# Patient Record
Sex: FEMALE | Race: OTHER | NOT HISPANIC OR LATINO
[De-identification: names, ages, dates, MRNs, and addresses within clinical notes are randomized per-mention and may not be internally consistent; named-entity substitution may affect disease eponyms.]

---

## 2017-01-26 ENCOUNTER — APPOINTMENT (OUTPATIENT)
Dept: NEUROLOGY | Facility: CLINIC | Age: 42
End: 2017-01-26

## 2017-04-25 ENCOUNTER — OUTPATIENT (OUTPATIENT)
Dept: OUTPATIENT SERVICES | Facility: HOSPITAL | Age: 42
LOS: 1 days | End: 2017-04-25
Payer: COMMERCIAL

## 2017-04-25 ENCOUNTER — APPOINTMENT (OUTPATIENT)
Dept: MRI IMAGING | Facility: CLINIC | Age: 42
End: 2017-04-25

## 2017-04-25 DIAGNOSIS — M54.16 RADICULOPATHY, LUMBAR REGION: ICD-10-CM

## 2017-04-25 DIAGNOSIS — Z98.89 OTHER SPECIFIED POSTPROCEDURAL STATES: Chronic | ICD-10-CM

## 2017-04-25 DIAGNOSIS — G89.29 OTHER CHRONIC PAIN: ICD-10-CM

## 2017-04-25 DIAGNOSIS — Z98.51 TUBAL LIGATION STATUS: Chronic | ICD-10-CM

## 2017-04-25 DIAGNOSIS — M51.36 OTHER INTERVERTEBRAL DISC DEGENERATION, LUMBAR REGION: ICD-10-CM

## 2017-04-25 DIAGNOSIS — Z98.84 BARIATRIC SURGERY STATUS: Chronic | ICD-10-CM

## 2017-04-25 PROCEDURE — 72158 MRI LUMBAR SPINE W/O & W/DYE: CPT

## 2017-04-25 PROCEDURE — A9585: CPT

## 2017-04-29 ENCOUNTER — EMERGENCY (EMERGENCY)
Facility: HOSPITAL | Age: 42
LOS: 1 days | Discharge: HOME | End: 2017-04-29
Attending: EMERGENCY MEDICINE | Admitting: EMERGENCY MEDICINE
Payer: COMMERCIAL

## 2017-04-29 VITALS
OXYGEN SATURATION: 100 % | TEMPERATURE: 98 F | HEIGHT: 66 IN | DIASTOLIC BLOOD PRESSURE: 50 MMHG | WEIGHT: 173.94 LBS | HEART RATE: 74 BPM | RESPIRATION RATE: 14 BRPM | SYSTOLIC BLOOD PRESSURE: 140 MMHG

## 2017-04-29 VITALS
HEART RATE: 70 BPM | OXYGEN SATURATION: 100 % | DIASTOLIC BLOOD PRESSURE: 58 MMHG | TEMPERATURE: 98 F | SYSTOLIC BLOOD PRESSURE: 137 MMHG | RESPIRATION RATE: 14 BRPM

## 2017-04-29 DIAGNOSIS — Z98.89 OTHER SPECIFIED POSTPROCEDURAL STATES: Chronic | ICD-10-CM

## 2017-04-29 DIAGNOSIS — A60.09 HERPESVIRAL INFECTION OF OTHER UROGENITAL TRACT: ICD-10-CM
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
## 2017-04-29 DIAGNOSIS — S70.12XA CONTUSION OF LEFT THIGH, INITIAL ENCOUNTER: ICD-10-CM

## 2017-04-29 DIAGNOSIS — J45.909 UNSPECIFIED ASTHMA, UNCOMPLICATED: ICD-10-CM

## 2017-04-29 DIAGNOSIS — Z98.51 TUBAL LIGATION STATUS: Chronic | ICD-10-CM
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
## 2017-04-29 DIAGNOSIS — Z98.84 BARIATRIC SURGERY STATUS: Chronic | ICD-10-CM

## 2017-04-29 LAB
ALBUMIN SERPL ELPH-MCNC: 3.8 G/DL — SIGNIFICANT CHANGE UP (ref 3.3–5)
ALP SERPL-CCNC: 67 U/L — SIGNIFICANT CHANGE UP (ref 40–120)
ALT FLD-CCNC: 28 U/L — SIGNIFICANT CHANGE UP (ref 12–78)
ANION GAP SERPL CALC-SCNC: 12 MMOL/L — SIGNIFICANT CHANGE UP (ref 5–17)
AST SERPL-CCNC: 38 U/L — HIGH (ref 15–37)
BASOPHILS # BLD AUTO: 0.1 K/UL — SIGNIFICANT CHANGE UP (ref 0–0.2)
BASOPHILS NFR BLD AUTO: 0.8 % — SIGNIFICANT CHANGE UP (ref 0–2)
BILIRUB SERPL-MCNC: 0.5 MG/DL — SIGNIFICANT CHANGE UP (ref 0.2–1.2)
BLD GP AB SCN SERPL QL: SIGNIFICANT CHANGE UP
BUN SERPL-MCNC: 17 MG/DL — SIGNIFICANT CHANGE UP (ref 7–23)
CALCIUM SERPL-MCNC: 8.7 MG/DL — SIGNIFICANT CHANGE UP (ref 8.5–10.1)
CHLORIDE SERPL-SCNC: 103 MMOL/L — SIGNIFICANT CHANGE UP (ref 96–108)
CO2 SERPL-SCNC: 26 MMOL/L — SIGNIFICANT CHANGE UP (ref 22–31)
CREAT SERPL-MCNC: 0.61 MG/DL — SIGNIFICANT CHANGE UP (ref 0.5–1.3)
EOSINOPHIL # BLD AUTO: 0 K/UL — SIGNIFICANT CHANGE UP (ref 0–0.5)
EOSINOPHIL NFR BLD AUTO: 0.6 % — SIGNIFICANT CHANGE UP (ref 0–6)
GLUCOSE SERPL-MCNC: 84 MG/DL — SIGNIFICANT CHANGE UP (ref 70–99)
HCG SERPL-ACNC: <1 MIU/ML — SIGNIFICANT CHANGE UP
HCT VFR BLD CALC: 39.3 % — SIGNIFICANT CHANGE UP (ref 34.5–45)
HGB BLD-MCNC: 12.4 G/DL — SIGNIFICANT CHANGE UP (ref 11.5–15.5)
INR BLD: 0.97 RATIO — SIGNIFICANT CHANGE UP (ref 0.88–1.16)
LYMPHOCYTES # BLD AUTO: 1.9 K/UL — SIGNIFICANT CHANGE UP (ref 1–3.3)
LYMPHOCYTES # BLD AUTO: 23.8 % — SIGNIFICANT CHANGE UP (ref 13–44)
MCHC RBC-ENTMCNC: 30.1 PG — SIGNIFICANT CHANGE UP (ref 27–34)
MCHC RBC-ENTMCNC: 31.6 GM/DL — LOW (ref 32–36)
MCV RBC AUTO: 95.2 FL — SIGNIFICANT CHANGE UP (ref 80–100)
MONOCYTES # BLD AUTO: 0.6 K/UL — SIGNIFICANT CHANGE UP (ref 0–0.9)
MONOCYTES NFR BLD AUTO: 7.3 % — SIGNIFICANT CHANGE UP (ref 1–9)
NEUTROPHILS # BLD AUTO: 5.3 K/UL — SIGNIFICANT CHANGE UP (ref 1.8–7.4)
NEUTROPHILS NFR BLD AUTO: 67.5 % — SIGNIFICANT CHANGE UP (ref 43–77)
PLATELET # BLD AUTO: 306 K/UL — SIGNIFICANT CHANGE UP (ref 150–400)
POTASSIUM SERPL-MCNC: 3.4 MMOL/L — LOW (ref 3.5–5.3)
POTASSIUM SERPL-SCNC: 3.4 MMOL/L — LOW (ref 3.5–5.3)
PROT SERPL-MCNC: 7.5 G/DL — SIGNIFICANT CHANGE UP (ref 6–8.3)
PROTHROM AB SERPL-ACNC: 10.6 SEC — SIGNIFICANT CHANGE UP (ref 9.8–12.7)
RBC # BLD: 4.13 M/UL — SIGNIFICANT CHANGE UP (ref 3.8–5.2)
RBC # FLD: 11.7 % — SIGNIFICANT CHANGE UP (ref 10.3–14.5)
SODIUM SERPL-SCNC: 141 MMOL/L — SIGNIFICANT CHANGE UP (ref 135–145)
WBC # BLD: 7.8 K/UL — SIGNIFICANT CHANGE UP (ref 3.8–10.5)
WBC # FLD AUTO: 7.8 K/UL — SIGNIFICANT CHANGE UP (ref 3.8–10.5)

## 2017-04-29 PROCEDURE — 80053 COMPREHEN METABOLIC PANEL: CPT

## 2017-04-29 PROCEDURE — 85610 PROTHROMBIN TIME: CPT

## 2017-04-29 PROCEDURE — 99284 EMERGENCY DEPT VISIT MOD MDM: CPT | Mod: 25

## 2017-04-29 PROCEDURE — 86850 RBC ANTIBODY SCREEN: CPT

## 2017-04-29 PROCEDURE — 86901 BLOOD TYPING SEROLOGIC RH(D): CPT

## 2017-04-29 PROCEDURE — 93971 EXTREMITY STUDY: CPT

## 2017-04-29 PROCEDURE — 96374 THER/PROPH/DIAG INJ IV PUSH: CPT

## 2017-04-29 PROCEDURE — 86900 BLOOD TYPING SEROLOGIC ABO: CPT

## 2017-04-29 PROCEDURE — 84702 CHORIONIC GONADOTROPIN TEST: CPT

## 2017-04-29 PROCEDURE — 93971 EXTREMITY STUDY: CPT | Mod: 26,LT

## 2017-04-29 PROCEDURE — 99284 EMERGENCY DEPT VISIT MOD MDM: CPT

## 2017-04-29 PROCEDURE — 85027 COMPLETE CBC AUTOMATED: CPT

## 2017-04-29 RX ORDER — KETOROLAC TROMETHAMINE 30 MG/ML
60 SYRINGE (ML) INJECTION ONCE
Qty: 0 | Refills: 0 | Status: DISCONTINUED | OUTPATIENT
Start: 2017-04-29 | End: 2017-04-29

## 2017-04-29 RX ADMIN — Medication 60 MILLIGRAM(S): at 18:08

## 2017-04-29 RX ADMIN — Medication 60 MILLIGRAM(S): at 18:38

## 2017-04-29 NOTE — ED PROVIDER NOTE - PROGRESS NOTE DETAILS
labs, US reviwed with patient, no acute findings, agrees to f/u with PMD on Monday, given name of Dr. Kohler for vasular follow up

## 2017-04-29 NOTE — ED ADULT NURSE NOTE - OBJECTIVE STATEMENT
Pt presents to the subacute area s/p bruising noted on the left leg, right ankle swelling, denies injury, skin warm and dry, + sensation, + capillary refill < 2 sec, + ambulation.

## 2017-04-29 NOTE — ED PROVIDER NOTE - OBJECTIVE STATEMENT
41 female presents to ER c/o left leg swelling and bruising, states she awoke Friday morning with the bruising and does not recall any recent trauma or fall.

## 2017-05-11 ENCOUNTER — RESULT REVIEW (OUTPATIENT)
Age: 42
End: 2017-05-11

## 2017-06-30 ENCOUNTER — OUTPATIENT (OUTPATIENT)
Dept: OUTPATIENT SERVICES | Facility: HOSPITAL | Age: 42
LOS: 1 days | Discharge: HOME | End: 2017-06-30
Payer: COMMERCIAL

## 2017-06-30 VITALS
OXYGEN SATURATION: 100 % | WEIGHT: 164.91 LBS | HEIGHT: 64 IN | TEMPERATURE: 98 F | SYSTOLIC BLOOD PRESSURE: 147 MMHG | DIASTOLIC BLOOD PRESSURE: 78 MMHG | HEART RATE: 58 BPM | RESPIRATION RATE: 14 BRPM

## 2017-06-30 DIAGNOSIS — Z98.51 TUBAL LIGATION STATUS: Chronic | ICD-10-CM

## 2017-06-30 DIAGNOSIS — M43.16 SPONDYLOLISTHESIS, LUMBAR REGION: ICD-10-CM

## 2017-06-30 DIAGNOSIS — Z98.890 OTHER SPECIFIED POSTPROCEDURAL STATES: Chronic | ICD-10-CM

## 2017-06-30 DIAGNOSIS — Z98.89 OTHER SPECIFIED POSTPROCEDURAL STATES: Chronic | ICD-10-CM

## 2017-06-30 DIAGNOSIS — M48.06 SPINAL STENOSIS, LUMBAR REGION: ICD-10-CM

## 2017-06-30 DIAGNOSIS — Z98.84 BARIATRIC SURGERY STATUS: Chronic | ICD-10-CM

## 2017-06-30 DIAGNOSIS — M41.25 OTHER IDIOPATHIC SCOLIOSIS, THORACOLUMBAR REGION: ICD-10-CM

## 2017-06-30 LAB
ABO RH CONFIRMATION: SIGNIFICANT CHANGE UP
ANION GAP SERPL CALC-SCNC: 7 MMOL/L — SIGNIFICANT CHANGE UP (ref 5–17)
APPEARANCE UR: CLEAR — SIGNIFICANT CHANGE UP
APTT BLD: 33.3 SEC — SIGNIFICANT CHANGE UP (ref 27.5–37.4)
BACTERIA # UR AUTO: (no result)
BASOPHILS # BLD AUTO: 0 K/UL — SIGNIFICANT CHANGE UP (ref 0–0.2)
BASOPHILS NFR BLD AUTO: 0.6 % — SIGNIFICANT CHANGE UP (ref 0–2)
BILIRUB UR-MCNC: (no result)
BLD GP AB SCN SERPL QL: SIGNIFICANT CHANGE UP
BUN SERPL-MCNC: 17 MG/DL — SIGNIFICANT CHANGE UP (ref 7–23)
CALCIUM SERPL-MCNC: 8.7 MG/DL — SIGNIFICANT CHANGE UP (ref 8.5–10.1)
CHLORIDE SERPL-SCNC: 108 MMOL/L — SIGNIFICANT CHANGE UP (ref 96–108)
CO2 SERPL-SCNC: 26 MMOL/L — SIGNIFICANT CHANGE UP (ref 22–31)
COLOR SPEC: YELLOW — SIGNIFICANT CHANGE UP
COMMENT - URINE: SIGNIFICANT CHANGE UP
CREAT SERPL-MCNC: 0.55 MG/DL — SIGNIFICANT CHANGE UP (ref 0.5–1.3)
DIFF PNL FLD: (no result)
EOSINOPHIL # BLD AUTO: 0.3 K/UL — SIGNIFICANT CHANGE UP (ref 0–0.5)
EOSINOPHIL NFR BLD AUTO: 4.6 % — SIGNIFICANT CHANGE UP (ref 0–6)
EPI CELLS # UR: SIGNIFICANT CHANGE UP
GLUCOSE SERPL-MCNC: 77 MG/DL — SIGNIFICANT CHANGE UP (ref 70–99)
GLUCOSE UR QL: NEGATIVE MG/DL — SIGNIFICANT CHANGE UP
HCT VFR BLD CALC: 37.6 % — SIGNIFICANT CHANGE UP (ref 34.5–45)
HGB BLD-MCNC: 12.6 G/DL — SIGNIFICANT CHANGE UP (ref 11.5–15.5)
INR BLD: 1.06 RATIO — SIGNIFICANT CHANGE UP (ref 0.88–1.16)
KETONES UR-MCNC: (no result)
LEUKOCYTE ESTERASE UR-ACNC: (no result)
LYMPHOCYTES # BLD AUTO: 2 K/UL — SIGNIFICANT CHANGE UP (ref 1–3.3)
LYMPHOCYTES # BLD AUTO: 27.6 % — SIGNIFICANT CHANGE UP (ref 13–44)
MCHC RBC-ENTMCNC: 29.3 PG — SIGNIFICANT CHANGE UP (ref 27–34)
MCHC RBC-ENTMCNC: 33.4 GM/DL — SIGNIFICANT CHANGE UP (ref 32–36)
MCV RBC AUTO: 88 FL — SIGNIFICANT CHANGE UP (ref 80–100)
MONOCYTES # BLD AUTO: 0.5 K/UL — SIGNIFICANT CHANGE UP (ref 0–0.9)
MONOCYTES NFR BLD AUTO: 6.3 % — SIGNIFICANT CHANGE UP (ref 2–14)
NEUTROPHILS # BLD AUTO: 4.4 K/UL — SIGNIFICANT CHANGE UP (ref 1.8–7.4)
NEUTROPHILS NFR BLD AUTO: 60.8 % — SIGNIFICANT CHANGE UP (ref 43–77)
NITRITE UR-MCNC: NEGATIVE — SIGNIFICANT CHANGE UP
PH UR: 6 — SIGNIFICANT CHANGE UP (ref 5–8)
PLATELET # BLD AUTO: 285 K/UL — SIGNIFICANT CHANGE UP (ref 150–400)
POTASSIUM SERPL-MCNC: 4.4 MMOL/L — SIGNIFICANT CHANGE UP (ref 3.5–5.3)
POTASSIUM SERPL-SCNC: 4.4 MMOL/L — SIGNIFICANT CHANGE UP (ref 3.5–5.3)
PROT UR-MCNC: 15 MG/DL
PROTHROM AB SERPL-ACNC: 11.5 SEC — SIGNIFICANT CHANGE UP (ref 9.8–12.7)
RBC # BLD: 4.28 M/UL — SIGNIFICANT CHANGE UP (ref 3.8–5.2)
RBC # FLD: 11.4 % — SIGNIFICANT CHANGE UP (ref 10.3–14.5)
RBC CASTS # UR COMP ASSIST: >50 /HPF (ref 0–4)
SODIUM SERPL-SCNC: 141 MMOL/L — SIGNIFICANT CHANGE UP (ref 135–145)
SP GR SPEC: 1.02 — SIGNIFICANT CHANGE UP (ref 1.01–1.02)
TYPE + AB SCN PNL BLD: SIGNIFICANT CHANGE UP
UROBILINOGEN FLD QL: 4 MG/DL
WBC # BLD: 7.2 K/UL — SIGNIFICANT CHANGE UP (ref 3.8–10.5)
WBC # FLD AUTO: 7.2 K/UL — SIGNIFICANT CHANGE UP (ref 3.8–10.5)
WBC UR QL: SIGNIFICANT CHANGE UP

## 2017-06-30 PROCEDURE — 93010 ELECTROCARDIOGRAM REPORT: CPT

## 2017-06-30 RX ORDER — MONTELUKAST 4 MG/1
0 TABLET, CHEWABLE ORAL
Qty: 0 | Refills: 0 | COMMUNITY

## 2017-06-30 RX ORDER — CYCLOBENZAPRINE HYDROCHLORIDE 10 MG/1
0 TABLET, FILM COATED ORAL
Qty: 0 | Refills: 0 | COMMUNITY

## 2017-06-30 RX ORDER — DICLOFENAC SODIUM 75 MG/1
0 TABLET, DELAYED RELEASE ORAL
Qty: 0 | Refills: 0 | COMMUNITY

## 2017-06-30 NOTE — H&P PST ADULT - HISTORY OF PRESENT ILLNESS
This is a  41y /o  Female who speaks fluent English, presents to PST prior to proposed procedure with Dr. Aguilar for laminectomy L3/4, L4/L5, spinal fusion, T10-L5, possible T10-pelvis, BMP, intra-op navigation. Reports 9/2016 while driving home from work and lost feeling to right lower extremity. Reports seen by her PCP Dr. Retana who referred an MRI of lower back which demonstrated herniations of vertebral discs, and referred to [facility] with  injections to right hip which did not help. Reports lower back pain which radiates down both legs and feeling of knife sensations to outside aspect of knees B/L. Reports followed up with PCP who ordered an MRI with contrast which demonstrated scoliosis and herniations of lumbar discs. Reports intense pain of back and "nerve pain" is managed with Oxycodone-Acetaminophen, Lyrica as directed and ice packs and pillows. Denies problems with urination or defaecation. Evaluated by Dr. Aguilar and now presents for said procedure.

## 2017-06-30 NOTE — H&P PST ADULT - ASSESSMENT
This is a  41y /o  Female who speaks fluent English, presents to PST prior to proposed procedure with Dr. Aguilar for laminectomy L3/4, L4/L5, spinal fusion, T10-L5, possible T10-pelvis, BMP, intra-op navigation.     1. Labs, UA, MRSA Swab: per Dr. Aguilar.  2. EKG to be reviewed by Cardiology.  3. UCG-STAT day of procedure.  4. Clearance with PCP Dr. Retana as scheduled,# [phone].  5. EZ sponges, Mupirocin ointment, holistic sheet, pamphlet, and day of procedure instructions provided and reviewed with patient.

## 2017-06-30 NOTE — H&P PST ADULT - FAMILY HISTORY
Father  Still living? No  Family history of aortic aneurysm, Age at diagnosis: Age Unknown     Mother  Still living? No  Family history of pneumonia, Age at diagnosis: 61-70     Grandparent  Still living? No  Family history of ovarian cancer, Age at diagnosis: 81-90  Family history of myocardial infarction, Age at diagnosis: 71-80  Family history of myocardial infarction, Age at diagnosis: 81-90

## 2017-06-30 NOTE — H&P PST ADULT - PSH
Gastric bypass status for obesity    H/O varicose vein ligation  left lower extremity 2011  S/P  section  X3; ,,   S/P endoscopy  Baseline

## 2017-06-30 NOTE — H&P PST ADULT - PMH
Asthma    Bilateral sciatica    History of anemia    Lumbar stenosis with neurogenic claudication    Scoliosis (and kyphoscoliosis), idiopathic    Spondylisthesis    Varicose veins of both lower extremities

## 2017-06-30 NOTE — H&P PST ADULT - VISION (WITH CORRECTIVE LENSES IF THE PATIENT USUALLY WEARS THEM):
Normal vision: sees adequately in most situations; can see medication labels, newsprint/for driving PRN

## 2017-07-01 LAB
MRSA PCR RESULT.: SIGNIFICANT CHANGE UP
S AUREUS DNA NOSE QL NAA+PROBE: DETECTED

## 2017-07-07 RX ORDER — NALOXONE HYDROCHLORIDE 4 MG/.1ML
0.1 SPRAY NASAL
Qty: 0 | Refills: 0 | Status: DISCONTINUED | OUTPATIENT
Start: 2017-07-10 | End: 2017-07-13

## 2017-07-07 RX ORDER — HYDROMORPHONE HYDROCHLORIDE 2 MG/ML
0.5 INJECTION INTRAMUSCULAR; INTRAVENOUS; SUBCUTANEOUS
Qty: 0 | Refills: 0 | Status: DISCONTINUED | OUTPATIENT
Start: 2017-07-10 | End: 2017-07-13

## 2017-07-07 RX ORDER — HYDROMORPHONE HYDROCHLORIDE 2 MG/ML
30 INJECTION INTRAMUSCULAR; INTRAVENOUS; SUBCUTANEOUS
Qty: 0 | Refills: 0 | Status: DISCONTINUED | OUTPATIENT
Start: 2017-07-10 | End: 2017-07-13

## 2017-07-07 RX ORDER — ONDANSETRON 8 MG/1
4 TABLET, FILM COATED ORAL EVERY 6 HOURS
Qty: 0 | Refills: 0 | Status: DISCONTINUED | OUTPATIENT
Start: 2017-07-10 | End: 2017-07-13

## 2017-07-10 ENCOUNTER — INPATIENT (INPATIENT)
Facility: HOSPITAL | Age: 42
LOS: 7 days | Discharge: HOME HEALTH | End: 2017-07-18
Attending: ORTHOPAEDIC SURGERY | Admitting: ORTHOPAEDIC SURGERY
Payer: COMMERCIAL

## 2017-07-10 VITALS
HEART RATE: 58 BPM | WEIGHT: 173.94 LBS | DIASTOLIC BLOOD PRESSURE: 90 MMHG | HEIGHT: 64 IN | RESPIRATION RATE: 16 BRPM | TEMPERATURE: 98 F | SYSTOLIC BLOOD PRESSURE: 149 MMHG

## 2017-07-10 DIAGNOSIS — Z98.890 OTHER SPECIFIED POSTPROCEDURAL STATES: Chronic | ICD-10-CM

## 2017-07-10 DIAGNOSIS — Z98.84 BARIATRIC SURGERY STATUS: Chronic | ICD-10-CM

## 2017-07-10 DIAGNOSIS — D50.0 IRON DEFICIENCY ANEMIA SECONDARY TO BLOOD LOSS (CHRONIC): ICD-10-CM

## 2017-07-10 DIAGNOSIS — I95.9 HYPOTENSION, UNSPECIFIED: ICD-10-CM

## 2017-07-10 DIAGNOSIS — Z98.89 OTHER SPECIFIED POSTPROCEDURAL STATES: Chronic | ICD-10-CM

## 2017-07-10 DIAGNOSIS — M48.06 SPINAL STENOSIS, LUMBAR REGION: ICD-10-CM

## 2017-07-10 LAB
ANION GAP SERPL CALC-SCNC: 12 MMOL/L — SIGNIFICANT CHANGE UP (ref 5–17)
BASOPHILS # BLD AUTO: 0 K/UL — SIGNIFICANT CHANGE UP (ref 0–0.2)
BASOPHILS NFR BLD AUTO: 0.2 % — SIGNIFICANT CHANGE UP (ref 0–2)
BLD GP AB SCN SERPL QL: SIGNIFICANT CHANGE UP
BUN SERPL-MCNC: 9 MG/DL — SIGNIFICANT CHANGE UP (ref 7–23)
CALCIUM SERPL-MCNC: 7 MG/DL — LOW (ref 8.5–10.1)
CHLORIDE SERPL-SCNC: 111 MMOL/L — HIGH (ref 96–108)
CO2 SERPL-SCNC: 20 MMOL/L — LOW (ref 22–31)
CREAT SERPL-MCNC: 0.44 MG/DL — LOW (ref 0.5–1.3)
EOSINOPHIL # BLD AUTO: 0 K/UL — SIGNIFICANT CHANGE UP (ref 0–0.5)
EOSINOPHIL NFR BLD AUTO: 0 % — SIGNIFICANT CHANGE UP (ref 0–6)
GLUCOSE SERPL-MCNC: 139 MG/DL — HIGH (ref 70–99)
HCG UR QL: NEGATIVE — SIGNIFICANT CHANGE UP
HCT VFR BLD CALC: 30.4 % — LOW (ref 34.5–45)
HGB BLD-MCNC: 9.9 G/DL — LOW (ref 11.5–15.5)
LYMPHOCYTES # BLD AUTO: 1.1 K/UL — SIGNIFICANT CHANGE UP (ref 1–3.3)
LYMPHOCYTES # BLD AUTO: 11.6 % — LOW (ref 13–44)
MCHC RBC-ENTMCNC: 28.8 PG — SIGNIFICANT CHANGE UP (ref 27–34)
MCHC RBC-ENTMCNC: 32.6 GM/DL — SIGNIFICANT CHANGE UP (ref 32–36)
MCV RBC AUTO: 88.5 FL — SIGNIFICANT CHANGE UP (ref 80–100)
MONOCYTES # BLD AUTO: 0.4 K/UL — SIGNIFICANT CHANGE UP (ref 0–0.9)
MONOCYTES NFR BLD AUTO: 3.8 % — SIGNIFICANT CHANGE UP (ref 2–14)
NEUTROPHILS # BLD AUTO: 7.8 K/UL — HIGH (ref 1.8–7.4)
NEUTROPHILS NFR BLD AUTO: 84.3 % — HIGH (ref 43–77)
PLATELET # BLD AUTO: 230 K/UL — SIGNIFICANT CHANGE UP (ref 150–400)
POTASSIUM SERPL-MCNC: 4 MMOL/L — SIGNIFICANT CHANGE UP (ref 3.5–5.3)
POTASSIUM SERPL-SCNC: 4 MMOL/L — SIGNIFICANT CHANGE UP (ref 3.5–5.3)
RBC # BLD: 3.43 M/UL — LOW (ref 3.8–5.2)
RBC # FLD: 12.1 % — SIGNIFICANT CHANGE UP (ref 10.3–14.5)
SODIUM SERPL-SCNC: 143 MMOL/L — SIGNIFICANT CHANGE UP (ref 135–145)
TYPE + AB SCN PNL BLD: SIGNIFICANT CHANGE UP
WBC # BLD: 9.3 K/UL — SIGNIFICANT CHANGE UP (ref 3.8–10.5)
WBC # FLD AUTO: 9.3 K/UL — SIGNIFICANT CHANGE UP (ref 3.8–10.5)

## 2017-07-10 PROCEDURE — 72100 X-RAY EXAM L-S SPINE 2/3 VWS: CPT | Mod: 26

## 2017-07-10 RX ORDER — ONDANSETRON 8 MG/1
4 TABLET, FILM COATED ORAL EVERY 6 HOURS
Qty: 0 | Refills: 0 | Status: DISCONTINUED | OUTPATIENT
Start: 2017-07-10 | End: 2017-07-18

## 2017-07-10 RX ORDER — SODIUM CHLORIDE 9 MG/ML
1000 INJECTION, SOLUTION INTRAVENOUS
Qty: 0 | Refills: 0 | Status: DISCONTINUED | OUTPATIENT
Start: 2017-07-10 | End: 2017-07-12

## 2017-07-10 RX ORDER — ACETAMINOPHEN 500 MG
1000 TABLET ORAL ONCE
Qty: 0 | Refills: 0 | Status: COMPLETED | OUTPATIENT
Start: 2017-07-10 | End: 2017-07-10

## 2017-07-10 RX ORDER — SODIUM CHLORIDE 9 MG/ML
1000 INJECTION, SOLUTION INTRAVENOUS
Qty: 0 | Refills: 0 | Status: DISCONTINUED | OUTPATIENT
Start: 2017-07-10 | End: 2017-07-10

## 2017-07-10 RX ORDER — BUDESONIDE, MICRONIZED 100 %
0.5 POWDER (GRAM) MISCELLANEOUS EVERY 12 HOURS
Qty: 0 | Refills: 0 | Status: DISCONTINUED | OUTPATIENT
Start: 2017-07-10 | End: 2017-07-18

## 2017-07-10 RX ORDER — ALBUTEROL 90 UG/1
2.5 AEROSOL, METERED ORAL EVERY 4 HOURS
Qty: 0 | Refills: 0 | Status: DISCONTINUED | OUTPATIENT
Start: 2017-07-10 | End: 2017-07-18

## 2017-07-10 RX ORDER — MAGNESIUM HYDROXIDE 400 MG/1
30 TABLET, CHEWABLE ORAL EVERY 12 HOURS
Qty: 0 | Refills: 0 | Status: DISCONTINUED | OUTPATIENT
Start: 2017-07-10 | End: 2017-07-18

## 2017-07-10 RX ORDER — DOCUSATE SODIUM 100 MG
100 CAPSULE ORAL THREE TIMES A DAY
Qty: 0 | Refills: 0 | Status: DISCONTINUED | OUTPATIENT
Start: 2017-07-10 | End: 2017-07-18

## 2017-07-10 RX ORDER — CYCLOBENZAPRINE HYDROCHLORIDE 10 MG/1
10 TABLET, FILM COATED ORAL EVERY 8 HOURS
Qty: 0 | Refills: 0 | Status: DISCONTINUED | OUTPATIENT
Start: 2017-07-10 | End: 2017-07-18

## 2017-07-10 RX ORDER — ACETAMINOPHEN 500 MG
650 TABLET ORAL EVERY 6 HOURS
Qty: 0 | Refills: 0 | Status: DISCONTINUED | OUTPATIENT
Start: 2017-07-10 | End: 2017-07-18

## 2017-07-10 RX ORDER — CEFAZOLIN SODIUM 1 G
2000 VIAL (EA) INJECTION EVERY 8 HOURS
Qty: 0 | Refills: 0 | Status: COMPLETED | OUTPATIENT
Start: 2017-07-10 | End: 2017-07-11

## 2017-07-10 RX ORDER — INFLUENZA VIRUS VACCINE 15; 15; 15; 15 UG/.5ML; UG/.5ML; UG/.5ML; UG/.5ML
0.5 SUSPENSION INTRAMUSCULAR ONCE
Qty: 0 | Refills: 0 | Status: DISCONTINUED | OUTPATIENT
Start: 2017-07-10 | End: 2017-07-18

## 2017-07-10 RX ORDER — ACETAMINOPHEN 500 MG
650 TABLET ORAL ONCE
Qty: 0 | Refills: 0 | Status: COMPLETED | OUTPATIENT
Start: 2017-07-10 | End: 2017-07-10

## 2017-07-10 RX ORDER — SODIUM CHLORIDE 9 MG/ML
1000 INJECTION INTRAMUSCULAR; INTRAVENOUS; SUBCUTANEOUS ONCE
Qty: 0 | Refills: 0 | Status: COMPLETED | OUTPATIENT
Start: 2017-07-10 | End: 2017-07-10

## 2017-07-10 RX ORDER — PHENYLEPHRINE HYDROCHLORIDE 10 MG/ML
0.5 INJECTION INTRAVENOUS
Qty: 80 | Refills: 0 | Status: DISCONTINUED | OUTPATIENT
Start: 2017-07-10 | End: 2017-07-13

## 2017-07-10 RX ADMIN — Medication 1000 MILLIGRAM(S): at 21:10

## 2017-07-10 RX ADMIN — HYDROMORPHONE HYDROCHLORIDE 30 MILLILITER(S): 2 INJECTION INTRAMUSCULAR; INTRAVENOUS; SUBCUTANEOUS at 18:52

## 2017-07-10 RX ADMIN — SODIUM CHLORIDE 2000 MILLILITER(S): 9 INJECTION INTRAMUSCULAR; INTRAVENOUS; SUBCUTANEOUS at 20:13

## 2017-07-10 RX ADMIN — SODIUM CHLORIDE 100 MILLILITER(S): 9 INJECTION, SOLUTION INTRAVENOUS at 18:58

## 2017-07-10 RX ADMIN — Medication 400 MILLIGRAM(S): at 19:47

## 2017-07-10 RX ADMIN — PHENYLEPHRINE HYDROCHLORIDE 14.79 MICROGRAM(S)/KG/MIN: 10 INJECTION INTRAVENOUS at 21:59

## 2017-07-10 RX ADMIN — CYCLOBENZAPRINE HYDROCHLORIDE 10 MILLIGRAM(S): 10 TABLET, FILM COATED ORAL at 19:35

## 2017-07-10 NOTE — CONSULT NOTE ADULT - SUBJECTIVE AND OBJECTIVE BOX
Patient is a 41y old  Female who presents with a chief complaint of "I'm in pain"   Since pt is having extreme pain, history obtained from chart.      HPI: Pt is a 42 y/o female with h/o iron deficiency anemia, asthma who while driving from work on 2016 developed sudden onset of RLE numbness.  Her w/up revealed multiple disc herniations.  She has been having LBP radiating down her blt lower ext.  She was admitted for elective  laminectomy and fusion of lumbar spine with instrumentation.  She had approximately 1000 cc of blood loss in OR s/p 4.5L of IVF intra-op and now s/p 1500 cc in PACU.  Pt is hypotensive in PACU, c/o severe LBP.  She is moving her blt lower ext.        PAST MEDICAL & SURGICAL HISTORY:  Varicose veins of both lower extremities  History of anemia  Bilateral sciatica  Spondylisthesis  Lumbar stenosis with neurogenic claudication  Scoliosis (and kyphoscoliosis), idiopathic  Asthma  S/P endoscopy: Baseline   H/O varicose vein ligation: left lower extremity 2011  Gastric bypass status for obesity:   S/P  section: X3; ,,   Now lumbar laminectomy      Allergies    diclofenac (Other)  shellfish (Hives; Short breath)    Intolerances        MEDICATIONS  (STANDING):  HYDROmorphone PCA (1 mG/mL) 30 milliLiter(s) PCA Continuous PCA Continuous  lactated ringers. 1000 milliLiter(s) (75 mL/Hr) IV Continuous <Continuous>  influenza   Vaccine 0.5 milliLiter(s) IntraMuscular once  lactated ringers. 1000 milliLiter(s) (100 mL/Hr) IV Continuous <Continuous>  cyclobenzaprine 10 milliGRAM(s) Oral every 8 hours    MEDICATIONS  (PRN):  HYDROmorphone PCA (1 mG/mL) Rescue Clinician Bolus 0.5 milliGRAM(s) IV Push every 15 minutes PRN for Pain Scale GREATER THAN 6  naloxone Injectable 0.1 milliGRAM(s) IV Push every 3 minutes PRN For ANY of the following changes in patient status:  A. RR LESS THAN 10 breaths per minute, B. Oxygen saturation LESS THAN 90%, C. Sedation score of 6  ondansetron Injectable 4 milliGRAM(s) IV Push every 6 hours PRN Nausea      FAMILY HISTORY:  Family history of myocardial infarction (Grandparent): Grandmother-paternal  Family history of myocardial infarction (Grandparent): Grandfather-paternal  Family history of ovarian cancer (Grandparent): Grandmother-maternal  Family history of pneumonia (Mother): Mother  Family history of aortic aneurysm (Father): Father      Social History:  with children, works as a , social ETOH use, never smoked.      Vital Signs Last 24 Hrs  T(C): 36 (10 Jul 2017 18:32), Max: 36.9 (10 Jul 2017 08:43)  T(F): 96.8 (10 Jul 2017 18:32), Max: 98.4 (10 Jul 2017 08:43)  HR: 68 (10 Jul 2017 18:42) (58 - 68)  BP: 87/52 (10 Jul 2017 18:42) (75/41 - 149/90)  BP(mean): --  RR: 18 (10 Jul 2017 18:42) (16 - 22)  SpO2: 100% (10 Jul 2017 18:42) (99% - 100%)    Daily Height in cm: 162.56 (10 Jul 2017 08:43)    Daily     I&O's Summary    10 Jul 2017 07:01  -  10 Jul 2017 19:39  --------------------------------------------------------  IN: 4500 mL / OUT: 420 mL / NET: 4080 mL          Data    Complete Blood Count + Automated Diff (17 @ 13:47)    WBC Count: 7.2 K/uL    RBC Count: 4.28 M/uL    Hemoglobin: 12.6 g/dL    Hematocrit: 37.6 %    Mean Cell Volume: 88.0 fl    Mean Cell Hemoglobin: 29.3 pg    Mean Cell Hemoglobin Conc: 33.4 gm/dL    Red Cell Distrib Width: 11.4 %    Platelet Count - Automated: 285 K/uL    Auto Neutrophil #: 4.4 K/uL    Auto Lymphocyte #: 2.0 K/uL    Auto Monocyte #: 0.5 K/uL    Auto Eosinophil #: 0.3 K/uL    Auto Basophil #: 0.0 K/uL    Auto Neutrophil %: 60.8: Differential percentages must be correlated with absolute numbers for  clinical significance. %    Auto Lymphocyte %: 27.6 %    Auto Monocyte %: 6.3 %    Auto Eosinophil %: 4.6 %    Auto Basophil %: 0.6 %                  Basic Metabolic Panel (17 @ 13:47)    Sodium, Serum: 141 mmol/L    Potassium, Serum: 4.4 mmol/L    Chloride, Serum: 108 mmol/L    Carbon Dioxide, Serum: 26 mmol/L    Anion Gap, Serum: 7 mmol/L    Blood Urea Nitrogen, Serum: 17 mg/dL    Creatinine, Serum: 0.55 mg/dL    Glucose, Serum: 77 mg/dL    Calcium, Total Serum: 8.7 mg/dL    eGFR if Non : 116: Interpretative comment  The units for eGFR are ml/min/1.73m2 (normalized body surface area). The  eGFR is calculated from a serum creatinine using the CKD-EPI equation.  Other variables required for calculation are race, age and sex. Among  patients w116: ith chronic kidney disease (CKD), the eGFR is useful in  determining the stage of disease according to KDOQI CKD classification.  All eGFR results are reported numerically with the following  interpretation.          GFR                    [id number]:                  Without     (ml/min/1.73 m2)    Kidney Damage       Kidney Damage        >= 90                    Stage 1                     Normal        60-89                    Stage 2                     Decreased GFR        [id number]:      Stage 3                     Stage 3        15-29                    Stage 4                     Stage 4        < 15                      Stage 5                     Stage 5  Each stage of CKD assumes that the associated GFR level has been in  cip992: ect for at least 3 months. Determination of stages one and two (with  eGFR > 59 ml/min/m2) requires estimation of kidney damage for at least 3  months as defined by structural or functional abnormalities.  Limitations: All estimates of GFR will be [id number]: s accurate for patients at  extremes of muscle mass (including but not limited to frail elderly,  critically ill, or cancer patients), those with unusual diets, and those  with conditions associated with reduced secretion or extrarenal  elimination of116:  creatinine. The eGFR equation is not recommended for use  in patients with unstable creatinine levels. mL/min/1.73M2    eGFR if African American: 134 mL/min/1.73M2
Patient is a 41y old  Female who presents with a chief complaint of "I'm in pain"   Since pt is having extreme pain, history obtained from chart.      HPI: Pt is a 40 y/o female with h/o iron deficiency anemia, asthma who while driving from work on 2016 developed sudden onset of RLE numbness.  Her w/up revealed multiple disc herniations.  She has been having LBP radiating down her blt lower ext.  She was admitted for elective  laminectomy and fusion of lumbar spine with instrumentation.  She had approximately 1000 cc of blood loss in OR s/p 4.5L of IVF intra-op and now s/p 1500 cc in PACU.  Pt is hypotensive in PACU, c/o severe LBP.  She is moving her blt lower ext.        PAST MEDICAL & SURGICAL HISTORY:  Varicose veins of both lower extremities  History of anemia  Bilateral sciatica  Spondylisthesis  Lumbar stenosis with neurogenic claudication  Scoliosis (and kyphoscoliosis), idiopathic  Asthma  S/P endoscopy: Baseline   H/O varicose vein ligation: left lower extremity 2011  Gastric bypass status for obesity:   S/P  section: X3; ,,   Now lumbar laminectomy      Vital Signs Last 24 Hrs  T(C): 37.2 (10 Jul 2017 21:15), Max: 37.2 (10 Jul 2017 21:15)  T(F): 99 (10 Jul 2017 21:15), Max: 99 (10 Jul 2017 21:15)  HR: 64 (10 Jul 2017 22:00) (55 - 68)  BP: 84/44 (10 Jul 2017 22:00) (75/41 - 149/90)  BP(mean): --  RR: 15 (10 Jul 2017 22:00) (12 - 22)  SpO2: 100% (10 Jul 2017 22:00) (98% - 100%)    PE  A and o x 3  Pain controlled.  HENT- na  Neck- -JVD  Lungs- clear, good air entry  Heart- RRR  Abd- soft  Spine as per surgery, Hemovacs no large output.  Ext- [place]  Neuro- non focal    07-10    143  |  111<H>  |  9   ----------------------------<  139<H>  4.0   |  20<L>  |  0.44<L>    Ca    7.0<L>      10 Jul 2017 19:28                              9.9    9.3   )-----------( 230      ( 10 Jul 2017 19:28 )             30.4

## 2017-07-10 NOTE — CONSULT NOTE ADULT - ASSESSMENT
Pt is a 40 y/o female with h/o iron deficiency anemia, asthma who while driving from work on 9/2016 developed sudden onset of RLE numbness.  Her w/up revealed multiple disc herniations.  She has been having LBP radiating down her blt lower ext.  She was admitted for elective  laminectomy and fusion of lumbar spine with instrumentation.  Her course complicated by hypotension with acute blood loss anemia.  Post-op medicine consult called for comanagement.    * Lumbar spinal stenosis-s/p lumbar laminectomy, fusion with instrumentation.  Continue pain control, monitor post-op labs, encourage use of incentive spirometry when able.  * Hypotension-due to acute blood loss and anesthesia.  Continue with IVF resuscitation, await repeat labs, may need blood transfusion.  If hypotension persists consider ICU eval.  Another liter of normal saline now.  * Asthma-stable, order nebulizers for now.  * Proph- Venodyne  * Disp-admit to SDU  * Comm- d/w pt and RN

## 2017-07-11 LAB
ANION GAP SERPL CALC-SCNC: 6 MMOL/L — SIGNIFICANT CHANGE UP (ref 5–17)
BASOPHILS # BLD AUTO: 0 K/UL — SIGNIFICANT CHANGE UP (ref 0–0.2)
BASOPHILS NFR BLD AUTO: 0.4 % — SIGNIFICANT CHANGE UP (ref 0–2)
BUN SERPL-MCNC: 6 MG/DL — LOW (ref 7–23)
CALCIUM SERPL-MCNC: 6.7 MG/DL — LOW (ref 8.5–10.1)
CHLORIDE SERPL-SCNC: 116 MMOL/L — HIGH (ref 96–108)
CO2 SERPL-SCNC: 21 MMOL/L — LOW (ref 22–31)
CREAT SERPL-MCNC: 0.43 MG/DL — LOW (ref 0.5–1.3)
EOSINOPHIL # BLD AUTO: 0 K/UL — SIGNIFICANT CHANGE UP (ref 0–0.5)
EOSINOPHIL NFR BLD AUTO: 0 % — SIGNIFICANT CHANGE UP (ref 0–6)
GLUCOSE SERPL-MCNC: 90 MG/DL — SIGNIFICANT CHANGE UP (ref 70–99)
HCT VFR BLD CALC: 28.8 % — LOW (ref 34.5–45)
HGB BLD-MCNC: 8.7 G/DL — LOW (ref 11.5–15.5)
HGB BLD-MCNC: 8.7 G/DL — LOW (ref 11.5–15.5)
HGB BLD-MCNC: 9.3 G/DL — LOW (ref 11.5–15.5)
LYMPHOCYTES # BLD AUTO: 1.8 K/UL — SIGNIFICANT CHANGE UP (ref 1–3.3)
LYMPHOCYTES # BLD AUTO: 18.3 % — SIGNIFICANT CHANGE UP (ref 13–44)
MAGNESIUM SERPL-MCNC: 1.4 MG/DL — LOW (ref 1.6–2.6)
MCHC RBC-ENTMCNC: 28.8 PG — SIGNIFICANT CHANGE UP (ref 27–34)
MCHC RBC-ENTMCNC: 32.2 GM/DL — SIGNIFICANT CHANGE UP (ref 32–36)
MCV RBC AUTO: 89.2 FL — SIGNIFICANT CHANGE UP (ref 80–100)
MONOCYTES # BLD AUTO: 0.9 K/UL — SIGNIFICANT CHANGE UP (ref 0–0.9)
MONOCYTES NFR BLD AUTO: 9.2 % — SIGNIFICANT CHANGE UP (ref 2–14)
NEUTROPHILS # BLD AUTO: 7 K/UL — SIGNIFICANT CHANGE UP (ref 1.8–7.4)
NEUTROPHILS NFR BLD AUTO: 72 % — SIGNIFICANT CHANGE UP (ref 43–77)
PHOSPHATE SERPL-MCNC: 3.8 MG/DL — SIGNIFICANT CHANGE UP (ref 2.5–4.5)
PLATELET # BLD AUTO: 265 K/UL — SIGNIFICANT CHANGE UP (ref 150–400)
POTASSIUM SERPL-MCNC: 4.4 MMOL/L — SIGNIFICANT CHANGE UP (ref 3.5–5.3)
POTASSIUM SERPL-SCNC: 4.4 MMOL/L — SIGNIFICANT CHANGE UP (ref 3.5–5.3)
RBC # BLD: 3.23 M/UL — LOW (ref 3.8–5.2)
RBC # FLD: 11.8 % — SIGNIFICANT CHANGE UP (ref 10.3–14.5)
SODIUM SERPL-SCNC: 143 MMOL/L — SIGNIFICANT CHANGE UP (ref 135–145)
WBC # BLD: 9.7 K/UL — SIGNIFICANT CHANGE UP (ref 3.8–10.5)
WBC # FLD AUTO: 9.7 K/UL — SIGNIFICANT CHANGE UP (ref 3.8–10.5)

## 2017-07-11 RX ORDER — SODIUM CHLORIDE 9 MG/ML
2000 INJECTION INTRAMUSCULAR; INTRAVENOUS; SUBCUTANEOUS ONCE
Qty: 0 | Refills: 0 | Status: COMPLETED | OUTPATIENT
Start: 2017-07-11 | End: 2017-07-11

## 2017-07-11 RX ORDER — DIPHENHYDRAMINE HCL 50 MG
50 CAPSULE ORAL EVERY 6 HOURS
Qty: 0 | Refills: 0 | Status: DISCONTINUED | OUTPATIENT
Start: 2017-07-11 | End: 2017-07-13

## 2017-07-11 RX ORDER — SODIUM CHLORIDE 9 MG/ML
1000 INJECTION INTRAMUSCULAR; INTRAVENOUS; SUBCUTANEOUS ONCE
Qty: 0 | Refills: 0 | Status: COMPLETED | OUTPATIENT
Start: 2017-07-11 | End: 2017-07-11

## 2017-07-11 RX ORDER — MAGNESIUM SULFATE 500 MG/ML
1 VIAL (ML) INJECTION ONCE
Qty: 0 | Refills: 0 | Status: COMPLETED | OUTPATIENT
Start: 2017-07-11 | End: 2017-07-11

## 2017-07-11 RX ORDER — CALCIUM GLUCONATE 100 MG/ML
2 VIAL (ML) INTRAVENOUS ONCE
Qty: 2 | Refills: 0 | Status: COMPLETED | OUTPATIENT
Start: 2017-07-11 | End: 2017-07-11

## 2017-07-11 RX ADMIN — SODIUM CHLORIDE 2000 MILLILITER(S): 9 INJECTION INTRAMUSCULAR; INTRAVENOUS; SUBCUTANEOUS at 13:45

## 2017-07-11 RX ADMIN — Medication 100 MILLIGRAM(S): at 21:25

## 2017-07-11 RX ADMIN — Medication 0.5 MILLIGRAM(S): at 08:44

## 2017-07-11 RX ADMIN — Medication 50 MILLIGRAM(S): at 15:10

## 2017-07-11 RX ADMIN — SODIUM CHLORIDE 100 MILLILITER(S): 9 INJECTION, SOLUTION INTRAVENOUS at 04:34

## 2017-07-11 RX ADMIN — Medication 50 MILLIGRAM(S): at 21:26

## 2017-07-11 RX ADMIN — Medication 0.5 MILLIGRAM(S): at 20:31

## 2017-07-11 RX ADMIN — CYCLOBENZAPRINE HYDROCHLORIDE 10 MILLIGRAM(S): 10 TABLET, FILM COATED ORAL at 05:44

## 2017-07-11 RX ADMIN — CYCLOBENZAPRINE HYDROCHLORIDE 10 MILLIGRAM(S): 10 TABLET, FILM COATED ORAL at 13:46

## 2017-07-11 RX ADMIN — Medication 650 MILLIGRAM(S): at 00:19

## 2017-07-11 RX ADMIN — Medication 100 MILLIGRAM(S): at 13:46

## 2017-07-11 RX ADMIN — SODIUM CHLORIDE 2000 MILLILITER(S): 9 INJECTION INTRAMUSCULAR; INTRAVENOUS; SUBCUTANEOUS at 14:19

## 2017-07-11 RX ADMIN — Medication 100 MILLIGRAM(S): at 05:43

## 2017-07-11 RX ADMIN — SODIUM CHLORIDE 1333.33 MILLILITER(S): 9 INJECTION INTRAMUSCULAR; INTRAVENOUS; SUBCUTANEOUS at 08:47

## 2017-07-11 RX ADMIN — Medication 100 GRAM(S): at 09:38

## 2017-07-11 RX ADMIN — HYDROMORPHONE HYDROCHLORIDE 30 MILLILITER(S): 2 INJECTION INTRAMUSCULAR; INTRAVENOUS; SUBCUTANEOUS at 17:07

## 2017-07-11 RX ADMIN — Medication 200 GRAM(S): at 11:00

## 2017-07-11 RX ADMIN — CYCLOBENZAPRINE HYDROCHLORIDE 10 MILLIGRAM(S): 10 TABLET, FILM COATED ORAL at 21:25

## 2017-07-11 RX ADMIN — Medication 100 MILLIGRAM(S): at 05:44

## 2017-07-11 NOTE — PROGRESS NOTE ADULT - ASSESSMENT
42yo F s/p spine surgery 7/10 - 1500ml EBL - shock requiring pressors  ? pt rec'd 7000ml IVF intra and post-operatively - still intravascularly depleted ?  No evidence of significant hemorrhage (beyond operative EBL) - Hgb reasonable  No evidence of sepsis    plan at this time is for continued ICU care  HOB 30  2000ml NS bolus - titrate pressors - strict I/O  Gi and DVT prophy as appropriate  analgesia with PCA  diet and mobility per spine surgery team  supportive care    case d/w pt and all questions answered - discussed possible need for CVL if escalating doses of pressors.

## 2017-07-11 NOTE — PROGRESS NOTE ADULT - SUBJECTIVE AND OBJECTIVE BOX
[facility]                                                           Orthopedic Spine Progress Note      POST OPERATIVE DAY #: 1 S/P laminectomy L3/4, L4/5, osteotomies, PSF T10-L5 for deg scoliosis     STATUS POST:                 Pre-Op Dx: Lumbar stenosis with neurogenic claudication    Post-Op Dx:  Lumbar stenosis with neurogenic claudication    Prin. Procedure:      SUBJECTIVE: Patient seen and examined.     Pain (0-10):   Current Pain Management:  [ ] PCA   [ ] Po Analgesics [ ] IM /IV Anagesics     Vital Signs Last 24 Hrs  T(C): 37 (11 Jul 2017 06:00), Max: 37.7 (10 Jul 2017 22:30)  T(F): 98.6 (11 Jul 2017 06:00), Max: 99.9 (10 Jul 2017 22:30)  HR: 68 (11 Jul 2017 07:00) (54 - 69)  BP: 96/50 (11 Jul 2017 07:00) (75/41 - 149/90)  BP(mean): 61 (11 Jul 2017 07:00) (56 - 65)  RR: 7 (11 Jul 2017 07:00) (7 - 22)  SpO2: 100% (11 Jul 2017 07:00) (98% - 100%)  I&O's Detail    10 Jul 2017 07:01  -  11 Jul 2017 07:00  --------------------------------------------------------  IN:    IV PiggyBack: 150 mL    lactated ringers.: 1200 mL    Other: 4500 mL    phenylephrine   Infusion: 263.5 mL    Sodium Chloride 0.9% IV Bolus: 1500 mL  Total IN: 7613.5 mL    OUT:    Bulb: 15 mL    Bulb: 25 mL    Drain: 230 mL    Drain: 405 mL    Indwelling Catheter - Urethral: 1295 mL    Other: 420 mL  Total OUT: 2390 mL    Total NET: 5223.5 mL          OBJECTIVE:         Wound /Dressing:   Cervical ROM:  Lumbar: ROM :  Neurological: A/O x               Sensation: [ ] intact to light touch  [ ] decreased:          Motor exam: [  ]          [ ] Upper extremity    Delt      Bicp       Tri      Wrist ext  Wrst Flex       Digit Ext Digit Flex                                     R         5/5        5/5        5/5       5/5            5/5            5/5       5/5          5/5                                     L          5/5        5/5        5/5       5/5            5/5            5/5       5/5          5/5         [ ] Lower ext.     Hip Flx  Hip Ext   Hip Abd  Hip Add Quad   Hamstrg   TA       EHL      GS                              R        5/5        5/5        5/5             5/5        5/5        5/5        5/5     5/5      5/5                              L         5/5        5/5        5/5             5/5        5/5        5/5        5/5     5/5      5/5                                                        [ ] Vascular :             Tension Signs:           Long Tract Findings:     RADIOLOGY & ADDITIONAL STUDIES:                                               LABS:                        9.3    9.7   )-----------( 265      ( 11 Jul 2017 06:43 )             28.8     07-11    143  |  116<H>  |  6<L>  ----------------------------<  90  4.4   |  21<L>  |  0.43<L>    Ca    6.7<L>      11 Jul 2017 05:43  Phos  3.8     07-11  Mg     1.4     07-11        POD # 1 S/ p laminectomy L3/4, L4/5, osteotomies, PSF T10-L5 for deg scoliosis   - Patient seen and examined, resting in bed, notes pain tolerable with PCA dosage  -  Neuro remains,  full and intact  - Wound clean and dry,  drains x 4 in place managed by plastics  - Post op anemia, monitor  serial  cbc  this am,   hct 28,..   - hypotensive 90's /50 's   HR 60's - remains in monitored bed,  followed by medicine / intensivist

## 2017-07-11 NOTE — PROGRESS NOTE ADULT - SUBJECTIVE AND OBJECTIVE BOX
42 y/o female with h/o iron deficiency anemia, asthma - diagnosed with multiple disc herniations causing LBP radiating down her blt lower ext.  She was admitted for elective  laminectomy and fusion of lumbar spine with instrumentation.    EBL ~ 1000 cc - pt received 4.5L of IVF intra-op and s/p 1500 cc in PACU.  Pt hypotensive in PACU, c/o severe LBP.  She was moving her blt lower ext.    Transferred to ICU and phenylephrine infusion initiated.    Above d/w Dr Malcolm.    Seen and examined this morning - pain reasonably controlled with PCA.  Hemovac output noted.  Decreasing urine output this morning - currently on phenylephrine infusionat 1.1 - 1.2 mcg/kg/min  offers no significant somatic complaints other than those related to the surgery.      Allergies    diclofenac (Other)  shellfish (Hives; Short breath)    ICU Vital Signs Last 24 Hrs  T(C): 37.5 (11 Jul 2017 10:00), Max: 37.7 (10 Jul 2017 22:30)  T(F): 99.5 (11 Jul 2017 10:00), Max: 99.9 (10 Jul 2017 22:30)  HR: 68 (11 Jul 2017 10:55) (54 - 73)  BP: 106/55 (11 Jul 2017 10:55) (75/41 - 121/47)  BP(mean): 72 (11 Jul 2017 10:55) (52 - 72)  ABP: --  ABP(mean): --  RR: 10 (11 Jul 2017 10:55) (7 - 22)  SpO2: 100% (11 Jul 2017 10:55) (98% - 100%)          I&O's Summary    10 Jul 2017 07:01  -  11 Jul 2017 07:00  --------------------------------------------------------  IN: 7613.5 mL / OUT: 2405 mL / NET: 5208.5 mL    11 Jul 2017 07:01  -  11 Jul 2017 11:06  --------------------------------------------------------  IN: 505.1 mL / OUT: 176 mL / NET: 329.1 mL                              9.3    9.7   )-----------( 265      ( 11 Jul 2017 06:43 )             28.8       07-11    143  |  116<H>  |  6<L>  ----------------------------<  90  4.4   |  21<L>  |  0.43<L>    Ca    6.7<L>      11 Jul 2017 05:43  Phos  3.8     07-11  Mg     1.4     07-11    MEDICATIONS  (STANDING):  HYDROmorphone PCA (1 mG/mL) 30 milliLiter(s) PCA Continuous PCA Continuous  influenza   Vaccine 0.5 milliLiter(s) IntraMuscular once  lactated ringers. 1000 milliLiter(s) (100 mL/Hr) IV Continuous <Continuous>  ceFAZolin   IVPB 2000 milliGRAM(s) IV Intermittent every 8 hours  cyclobenzaprine 10 milliGRAM(s) Oral every 8 hours  docusate sodium 100 milliGRAM(s) Oral three times a day  buDESOnide   0.5 milliGRAM(s) Respule 0.5 milliGRAM(s) Inhalation every 12 hours  phenylephrine    Infusion 0.5 MICROgram(s)/kG/Min (14.794 mL/Hr) IV Continuous <Continuous>    MEDICATIONS  (PRN):  HYDROmorphone PCA (1 mG/mL) Rescue Clinician Bolus 0.5 milliGRAM(s) IV Push every 15 minutes PRN for Pain Scale GREATER THAN 6  naloxone Injectable 0.1 milliGRAM(s) IV Push every 3 minutes PRN For ANY of the following changes in patient status:  A. RR LESS THAN 10 breaths per minute, B. Oxygen saturation LESS THAN 90%, C. Sedation score of 6  ondansetron Injectable 4 milliGRAM(s) IV Push every 6 hours PRN Nausea  acetaminophen   Tablet. 650 milliGRAM(s) Oral every 6 hours PRN Mild Pain (1 - 3)  aluminum hydroxide/magnesium hydroxide/simethicone Suspension 30 milliLiter(s) Oral every 12 hours PRN Indigestion  ondansetron Injectable 4 milliGRAM(s) IV Push every 6 hours PRN Nausea  magnesium hydroxide Suspension 30 milliLiter(s) Oral every 12 hours PRN Constipation  ALBUTerol   0.5% 2.5 milliGRAM(s) Nebulizer every 4 hours PRN SOB    Review of Systems:   · Negative General Symptoms	no sweating	  · General Symptoms	No fever/chills	  · Skin/Breast	No itching/rash	  · Ophthalmologic	No diplopia/photophobia	  · ENMT	dry mouth (+)	  · Respiratory and Thorax	No cough	  · Cardiovascular	No CP/Palpitations	  · Gastrointestinal	No NVD	  · Musculoskeletal Comments	C/o back pain as noted	  · Additional ROS	All other ROS are negative	    Physical Exam:   · Constitutional	detailed exam	  · Constitutional Comments	calm and collected but uncomfortable due to pain	  · Eyes	detailed exam	  · Eyes Details	PERRL; EOMI; conjunctiva clear	  · ENMT	detailed exam	  · ENMT Comments	limited exam, clear oropharynx	  · Neck	detailed exam	  · Neck Details	supple, No JVD	  · Respiratory	detailed exam	  · Respiratory Details	breath sounds equal; clear to auscultation bilaterally	  · Cardiovascular	detailed exam	  · Cardiovascular Details	regular rate and rhythm	  · Cardiovascular Details	positive S1; positive S2	  · Gastrointestinal	detailed exam	  · GI Normal	soft; nontender; no distention; bowel sounds normal	  · Extremities	detailed exam	  · Extremities Details	no clubbing; no cyanosis/edema	  · Neurological	detailed exam	  · Mental Status	non-focal, AAOx3	  · Motor	moving her blt lower ext	  · Musculoskeletal	detailed exam	  · Musculoskeletal Details	decreased ROM due to pain	  · Musculoskeletal Comments	due to LBP with drain in place	  · Psychiatric	detailed exam	  · Psychiatric Details	normal affect; normal behavior	        DVT Prophylaxis: Venodynes - chemoprophy contra due to POD#1 s/p spine sx with sig blood loss.    Advanced Directives: FULL  Discussed with: patient    Visit Information: Critical care time 45 min    ** Time is exclusive of billed procedures and/or teaching and/or routine family updates.

## 2017-07-11 NOTE — PROGRESS NOTE ADULT - SUBJECTIVE AND OBJECTIVE BOX
Pt remained hypotensive, ICU eval noted and appreciated, now on blayne.  Pt c/o LBP and spams of her lower back.    Vital Signs Last 24 Hrs  T(C): 37 (2017 06:00), Max: 37.7 (10 Jul 2017 22:30)  T(F): 98.6 (2017 06:00), Max: 99.9 (10 Jul 2017 22:30)  HR: 73 (2017 08:15) (54 - 73)  BP: 96/40 (2017 08:15) (75/41 - 149/90)  BP(mean): 58 (2017 08:15) (56 - 65)  RR: 10 (2017 08:15) (7 - 22)  SpO2: 100% (2017 08:15) (98% - 100%)    Daily Height in cm: 162.56 (10 Jul 2017 08:43)    Daily Weight in k.7 (10 Jul 2017 23:30)    I&O's Detail    10 Jul 2017 07:01  -  2017 07:00  --------------------------------------------------------  IN:    IV PiggyBack: 150 mL    lactated ringers.: 1200 mL    Other: 4500 mL    phenylephrine   Infusion: 263.5 mL    Sodium Chloride 0.9% IV Bolus: 1500 mL  Total IN: 7613.5 mL    OUT:    Bulb: 15 mL    Bulb: 25 mL    Drain: 230 mL    Drain: 405 mL    Indwelling Catheter - Urethral: 1295 mL    Other: 420 mL  Total OUT: 2390 mL    Total NET: 5223.5 mL          CAPILLARY BLOOD GLUCOSE                                          9.3    9.7   )-----------( 265      ( 2017 06:43 )             28.8       07-11    143  |  116<H>  |  6<L>  ----------------------------<  90  4.4   |  21<L>  |  0.43<L>    Ca    6.7<L>      2017 05:43  Phos  3.8     07-11  Mg     1.4     07-11                        MEDICATIONS  (STANDING):  HYDROmorphone PCA (1 mG/mL) 30 milliLiter(s) PCA Continuous PCA Continuous  influenza   Vaccine 0.5 milliLiter(s) IntraMuscular once  lactated ringers. 1000 milliLiter(s) (100 mL/Hr) IV Continuous <Continuous>  ceFAZolin   IVPB 2000 milliGRAM(s) IV Intermittent every 8 hours  cyclobenzaprine 10 milliGRAM(s) Oral every 8 hours  docusate sodium 100 milliGRAM(s) Oral three times a day  buDESOnide   0.5 milliGRAM(s) Respule 0.5 milliGRAM(s) Inhalation every 12 hours  phenylephrine    Infusion 0.5 MICROgram(s)/kG/Min (14.794 mL/Hr) IV Continuous <Continuous>  magnesium sulfate  IVPB 1 Gram(s) IV Intermittent once  calcium gluconate IVPB 2 Gram(s) IV Intermittent once    MEDICATIONS  (PRN):  HYDROmorphone PCA (1 mG/mL) Rescue Clinician Bolus 0.5 milliGRAM(s) IV Push every 15 minutes PRN for Pain Scale GREATER THAN 6  naloxone Injectable 0.1 milliGRAM(s) IV Push every 3 minutes PRN For ANY of the following changes in patient status:  A. RR LESS THAN 10 breaths per minute, B. Oxygen saturation LESS THAN 90%, C. Sedation score of 6  ondansetron Injectable 4 milliGRAM(s) IV Push every 6 hours PRN Nausea  acetaminophen   Tablet. 650 milliGRAM(s) Oral every 6 hours PRN Mild Pain (1 - 3)  aluminum hydroxide/magnesium hydroxide/simethicone Suspension 30 milliLiter(s) Oral every 12 hours PRN Indigestion  ondansetron Injectable 4 milliGRAM(s) IV Push every 6 hours PRN Nausea  magnesium hydroxide Suspension 30 milliLiter(s) Oral every 12 hours PRN Constipation  ALBUTerol   0.5% 2.5 milliGRAM(s) Nebulizer every 4 hours PRN SOB

## 2017-07-11 NOTE — PROGRESS NOTE ADULT - SUBJECTIVE AND OBJECTIVE BOX
pt requiring escalating dose of phenylephrine to maintain MAP 65mmHg    s/p 2000ml IVF bolus she was on 1.45 mcg/kg/min    repeat Hgb at 12:00 8.7  (was 12.6 pre-op and 9.9 post-op)    discussed risks/benefits/alternatives of blood transfusion with pt in detail and after the discussion she refused the blood transfusion suggesting we wait until the next Hgb check.    additional 2000ml IVF ordered for her.    Notably, Uout improved s/p the initial 2000ml boluses.    will repeat Hgb at 17:00 and address need for transfusion at that time if she is still requiring pressors.    A) acute blood loss anemia, symptomatic anemia, hypovolemic shock requiring pressors    plan as above.    Critical care time additional 30 min excluding procedures/teaching/routine family updates.

## 2017-07-11 NOTE — PROGRESS NOTE ADULT - ASSESSMENT
Pt is a 42 y/o female with h/o iron deficiency anemia, asthma who while driving from work on 9/2016 developed sudden onset of RLE numbness.  Her w/up revealed multiple disc herniations.  She has been having LBP radiating down her blt lower ext.  She was admitted for elective  laminectomy and fusion of lumbar spine with instrumentation.  Her course complicated by hypotension with acute blood loss anemia.  Post-op medicine consult called for comanagement.    * Lumbar spinal stenosis-s/p lumbar laminectomy, fusion with instrumentation.  Continue pain control, encourage use of incentive spirometry.  * Hotension- ? due to acute blood loss and anesthesia.  S/p aggressive IVF, continue titrate blayne as bp allows, monitor in ICU.  * Asthma-stable, nebulizers for now.  * Anemia-acute blood loss from surgery and dilutional (s/p 7 liters of IVF), monitor for now.  * Proph- Venodyne  * Disp-OOB when able, monitor in ICU  * Comm- d/w pt, Dr Vides, RN

## 2017-07-12 LAB
ANION GAP SERPL CALC-SCNC: 8 MMOL/L — SIGNIFICANT CHANGE UP (ref 5–17)
BASOPHILS # BLD AUTO: 0 K/UL — SIGNIFICANT CHANGE UP (ref 0–0.2)
BASOPHILS NFR BLD AUTO: 0.4 % — SIGNIFICANT CHANGE UP (ref 0–2)
BUN SERPL-MCNC: 4 MG/DL — LOW (ref 7–23)
CALCIUM SERPL-MCNC: 7.7 MG/DL — LOW (ref 8.5–10.1)
CHLORIDE SERPL-SCNC: 110 MMOL/L — HIGH (ref 96–108)
CO2 SERPL-SCNC: 24 MMOL/L — SIGNIFICANT CHANGE UP (ref 22–31)
CREAT SERPL-MCNC: 0.38 MG/DL — LOW (ref 0.5–1.3)
EOSINOPHIL # BLD AUTO: 0.1 K/UL — SIGNIFICANT CHANGE UP (ref 0–0.5)
EOSINOPHIL NFR BLD AUTO: 0.8 % — SIGNIFICANT CHANGE UP (ref 0–6)
GLUCOSE SERPL-MCNC: 104 MG/DL — HIGH (ref 70–99)
HCT VFR BLD CALC: 26.9 % — LOW (ref 34.5–45)
HGB BLD-MCNC: 8.1 G/DL — LOW (ref 11.5–15.5)
HGB BLD-MCNC: 8.5 G/DL — LOW (ref 11.5–15.5)
HGB BLD-MCNC: 8.7 G/DL — LOW (ref 11.5–15.5)
LYMPHOCYTES # BLD AUTO: 1.1 K/UL — SIGNIFICANT CHANGE UP (ref 1–3.3)
LYMPHOCYTES # BLD AUTO: 12 % — LOW (ref 13–44)
MCHC RBC-ENTMCNC: 28.7 PG — SIGNIFICANT CHANGE UP (ref 27–34)
MCHC RBC-ENTMCNC: 32.3 GM/DL — SIGNIFICANT CHANGE UP (ref 32–36)
MCV RBC AUTO: 88.8 FL — SIGNIFICANT CHANGE UP (ref 80–100)
MONOCYTES # BLD AUTO: 1 K/UL — HIGH (ref 0–0.9)
MONOCYTES NFR BLD AUTO: 10.6 % — SIGNIFICANT CHANGE UP (ref 2–14)
NEUTROPHILS # BLD AUTO: 7.1 K/UL — SIGNIFICANT CHANGE UP (ref 1.8–7.4)
NEUTROPHILS NFR BLD AUTO: 76.2 % — SIGNIFICANT CHANGE UP (ref 43–77)
PLATELET # BLD AUTO: 209 K/UL — SIGNIFICANT CHANGE UP (ref 150–400)
POTASSIUM SERPL-MCNC: 3.7 MMOL/L — SIGNIFICANT CHANGE UP (ref 3.5–5.3)
POTASSIUM SERPL-SCNC: 3.7 MMOL/L — SIGNIFICANT CHANGE UP (ref 3.5–5.3)
RBC # BLD: 3.02 M/UL — LOW (ref 3.8–5.2)
RBC # FLD: 12.2 % — SIGNIFICANT CHANGE UP (ref 10.3–14.5)
SODIUM SERPL-SCNC: 142 MMOL/L — SIGNIFICANT CHANGE UP (ref 135–145)
WBC # BLD: 9.3 K/UL — SIGNIFICANT CHANGE UP (ref 3.8–10.5)
WBC # FLD AUTO: 9.3 K/UL — SIGNIFICANT CHANGE UP (ref 3.8–10.5)

## 2017-07-12 RX ORDER — SODIUM CHLORIDE 9 MG/ML
3 INJECTION INTRAMUSCULAR; INTRAVENOUS; SUBCUTANEOUS EVERY 8 HOURS
Qty: 0 | Refills: 0 | Status: DISCONTINUED | OUTPATIENT
Start: 2017-07-12 | End: 2017-07-18

## 2017-07-12 RX ORDER — SENNA PLUS 8.6 MG/1
2 TABLET ORAL ONCE
Qty: 0 | Refills: 0 | Status: COMPLETED | OUTPATIENT
Start: 2017-07-12 | End: 2017-07-12

## 2017-07-12 RX ORDER — SODIUM CHLORIDE 9 MG/ML
1000 INJECTION, SOLUTION INTRAVENOUS
Qty: 0 | Refills: 0 | Status: DISCONTINUED | OUTPATIENT
Start: 2017-07-12 | End: 2017-07-18

## 2017-07-12 RX ORDER — SENNA PLUS 8.6 MG/1
2 TABLET ORAL AT BEDTIME
Qty: 0 | Refills: 0 | Status: DISCONTINUED | OUTPATIENT
Start: 2017-07-12 | End: 2017-07-18

## 2017-07-12 RX ADMIN — Medication 50 MILLIGRAM(S): at 21:33

## 2017-07-12 RX ADMIN — Medication 0.5 MILLIGRAM(S): at 19:46

## 2017-07-12 RX ADMIN — Medication 50 MILLIGRAM(S): at 07:24

## 2017-07-12 RX ADMIN — Medication 50 MILLIGRAM(S): at 13:33

## 2017-07-12 RX ADMIN — Medication 100 MILLIGRAM(S): at 13:31

## 2017-07-12 RX ADMIN — Medication 100 MILLIGRAM(S): at 21:33

## 2017-07-12 RX ADMIN — SENNA PLUS 2 TABLET(S): 8.6 TABLET ORAL at 21:33

## 2017-07-12 RX ADMIN — Medication 0.5 MILLIGRAM(S): at 07:54

## 2017-07-12 RX ADMIN — CYCLOBENZAPRINE HYDROCHLORIDE 10 MILLIGRAM(S): 10 TABLET, FILM COATED ORAL at 13:31

## 2017-07-12 RX ADMIN — CYCLOBENZAPRINE HYDROCHLORIDE 10 MILLIGRAM(S): 10 TABLET, FILM COATED ORAL at 21:33

## 2017-07-12 RX ADMIN — SODIUM CHLORIDE 100 MILLILITER(S): 9 INJECTION, SOLUTION INTRAVENOUS at 12:01

## 2017-07-12 RX ADMIN — Medication 100 MILLIGRAM(S): at 06:18

## 2017-07-12 RX ADMIN — CYCLOBENZAPRINE HYDROCHLORIDE 10 MILLIGRAM(S): 10 TABLET, FILM COATED ORAL at 06:18

## 2017-07-12 RX ADMIN — SENNA PLUS 2 TABLET(S): 8.6 TABLET ORAL at 09:17

## 2017-07-12 NOTE — PROGRESS NOTE ADULT - SUBJECTIVE AND OBJECTIVE BOX
Overnight events noted, her pressors were increased due to persistant hypotension, stable now.  Pt c/o LBP with spasms.  No CP, dizziness.    Vital Signs Last 24 Hrs  T(C): 37 (12 Jul 2017 06:00), Max: 38.1 (11 Jul 2017 18:15)  T(F): 98.6 (12 Jul 2017 06:00), Max: 100.6 (11 Jul 2017 18:15)  HR: 78 (12 Jul 2017 07:54) (67 - 94)  BP: 111/55 (12 Jul 2017 06:00) (82/64 - 121/57)  BP(mean): 69 (12 Jul 2017 06:00) (52 - 76)  RR: 26 (12 Jul 2017 06:00) (6 - 27)  SpO2: 99% (12 Jul 2017 06:00) (95% - 100%)    Daily     Daily     I&O's Detail    11 Jul 2017 07:01  -  12 Jul 2017 07:00  --------------------------------------------------------  IN:    IV PiggyBack: 4200 mL    lactated ringers.: 2300 mL    Oral Fluid: 1090 mL    phenylephrine   Infusion: 881.7 mL  Total IN: 8471.7 mL    OUT:    Bulb: 40 mL    Bulb: 60 mL    Drain: 140 mL    Drain: 310 mL    Indwelling Catheter - Urethral: 2771 mL  Total OUT: 3321 mL    Total NET: 5150.7 mL          CAPILLARY BLOOD GLUCOSE                                          8.7    9.3   )-----------( 209      ( 12 Jul 2017 05:29 )             26.9       07-12    142  |  110<H>  |  4<L>  ----------------------------<  104<H>  3.7   |  24  |  0.38<L>    Ca    7.7<L>      12 Jul 2017 05:29  Phos  3.8     07-11  Mg     1.4     07-11                        MEDICATIONS  (STANDING):  HYDROmorphone PCA (1 mG/mL) 30 milliLiter(s) PCA Continuous PCA Continuous  influenza   Vaccine 0.5 milliLiter(s) IntraMuscular once  lactated ringers. 1000 milliLiter(s) (100 mL/Hr) IV Continuous <Continuous>  cyclobenzaprine 10 milliGRAM(s) Oral every 8 hours  docusate sodium 100 milliGRAM(s) Oral three times a day  buDESOnide   0.5 milliGRAM(s) Respule 0.5 milliGRAM(s) Inhalation every 12 hours  phenylephrine    Infusion 0.5 MICROgram(s)/kG/Min (14.794 mL/Hr) IV Continuous <Continuous>  senna 2 Tablet(s) Oral at bedtime    MEDICATIONS  (PRN):  HYDROmorphone PCA (1 mG/mL) Rescue Clinician Bolus 0.5 milliGRAM(s) IV Push every 15 minutes PRN for Pain Scale GREATER THAN 6  naloxone Injectable 0.1 milliGRAM(s) IV Push every 3 minutes PRN For ANY of the following changes in patient status:  A. RR LESS THAN 10 breaths per minute, B. Oxygen saturation LESS THAN 90%, C. Sedation score of 6  ondansetron Injectable 4 milliGRAM(s) IV Push every 6 hours PRN Nausea  acetaminophen   Tablet. 650 milliGRAM(s) Oral every 6 hours PRN Mild Pain (1 - 3)  aluminum hydroxide/magnesium hydroxide/simethicone Suspension 30 milliLiter(s) Oral every 12 hours PRN Indigestion  ondansetron Injectable 4 milliGRAM(s) IV Push every 6 hours PRN Nausea  magnesium hydroxide Suspension 30 milliLiter(s) Oral every 12 hours PRN Constipation  ALBUTerol   0.5% 2.5 milliGRAM(s) Nebulizer every 4 hours PRN SOB  diphenhydrAMINE   Injectable 50 milliGRAM(s) IV Push every 6 hours PRN Itching

## 2017-07-12 NOTE — PROGRESS NOTE ADULT - SUBJECTIVE AND OBJECTIVE BOX
40 y/o female with h/o iron deficiency anemia, asthma - diagnosed with multiple disc herniations causing LBP radiating down her blt lower ext.  She was admitted for elective  laminectomy and fusion of lumbar spine with instrumentation.    EBL ~ 1000 cc - pt received 4.5L of IVF intra-op and s/p 1500 cc in PACU.  Pt hypotensive in PACU, c/o severe LBP.  She was moving her blt lower ext.    Transferred to ICU and phenylephrine infusion initiated.    Above d/w Dr Malcolm.    Seen and examined this morning - pain reasonably controlled with PCA.  Hemovac output noted.  Decreasing urine output this morning - currently on phenylephrine infusionat 1.1 - 1.2 mcg/kg/min  offers no significant somatic complaints other than those related to the surgery.    7/12: remains on phenylephrine infusion 1.1 mcg/kg/min - no new issues.  Hgb remains reasonable.      Allergies    diclofenac (Other)  shellfish (Hives; Short breath)    ICU Vital Signs Last 24 Hrs  T(C): 37 (12 Jul 2017 06:00), Max: 38.1 (11 Jul 2017 18:15)  T(F): 98.6 (12 Jul 2017 06:00), Max: 100.6 (11 Jul 2017 18:15)  HR: 78 (12 Jul 2017 07:54) (67 - 94)  BP: 111/55 (12 Jul 2017 06:00) (82/64 - 121/57)  BP(mean): 69 (12 Jul 2017 06:00) (52 - 76)  ABP: --  ABP(mean): --  RR: 26 (12 Jul 2017 06:00) (6 - 27)  SpO2: 99% (12 Jul 2017 06:00) (95% - 100%)          I&O's Summary    11 Jul 2017 07:01  -  12 Jul 2017 07:00  --------------------------------------------------------  IN: 8471.7 mL / OUT: 3321 mL / NET: 5150.7 mL                              8.7    9.3   )-----------( 209      ( 12 Jul 2017 05:29 )             26.9       07-12    142  |  110<H>  |  4<L>  ----------------------------<  104<H>  3.7   |  24  |  0.38<L>    Ca    7.7<L>      12 Jul 2017 05:29  Phos  3.8     07-11  Mg     1.4     07-11        MEDICATIONS  (STANDING):  HYDROmorphone PCA (1 mG/mL) 30 milliLiter(s) PCA Continuous PCA Continuous  influenza   Vaccine 0.5 milliLiter(s) IntraMuscular once  lactated ringers. 1000 milliLiter(s) (100 mL/Hr) IV Continuous <Continuous>  cyclobenzaprine 10 milliGRAM(s) Oral every 8 hours  docusate sodium 100 milliGRAM(s) Oral three times a day  buDESOnide   0.5 milliGRAM(s) Respule 0.5 milliGRAM(s) Inhalation every 12 hours  phenylephrine    Infusion 0.5 MICROgram(s)/kG/Min (14.794 mL/Hr) IV Continuous <Continuous>  senna 2 Tablet(s) Oral at bedtime    MEDICATIONS  (PRN):  HYDROmorphone PCA (1 mG/mL) Rescue Clinician Bolus 0.5 milliGRAM(s) IV Push every 15 minutes PRN for Pain Scale GREATER THAN 6  naloxone Injectable 0.1 milliGRAM(s) IV Push every 3 minutes PRN For ANY of the following changes in patient status:  A. RR LESS THAN 10 breaths per minute, B. Oxygen saturation LESS THAN 90%, C. Sedation score of 6  ondansetron Injectable 4 milliGRAM(s) IV Push every 6 hours PRN Nausea  acetaminophen   Tablet. 650 milliGRAM(s) Oral every 6 hours PRN Mild Pain (1 - 3)  aluminum hydroxide/magnesium hydroxide/simethicone Suspension 30 milliLiter(s) Oral every 12 hours PRN Indigestion  ondansetron Injectable 4 milliGRAM(s) IV Push every 6 hours PRN Nausea  magnesium hydroxide Suspension 30 milliLiter(s) Oral every 12 hours PRN Constipation  ALBUTerol   0.5% 2.5 milliGRAM(s) Nebulizer every 4 hours PRN SOB  diphenhydrAMINE   Injectable 50 milliGRAM(s) IV Push every 6 hours PRN Itching    Review of Systems:   · Negative General Symptoms	no sweating	  · General Symptoms	No fever/chills	  · Skin/Breast	No itching/rash	  · Ophthalmologic	No diplopia/photophobia	  · ENMT	dry mouth (+)	  · Respiratory and Thorax	No cough	  · Cardiovascular	No CP/Palpitations	  · Gastrointestinal	No NVD	  · Musculoskeletal Comments	C/o back pain as noted	  · Additional ROS	All other ROS are negative	    Physical Exam:   · Constitutional	detailed exam	  · Constitutional Comments	calm and collected but uncomfortable due to pain	  · Eyes	detailed exam	  · Eyes Details	PERRL; EOMI; conjunctiva clear	  · ENMT	detailed exam	  · ENMT Comments	limited exam, clear oropharynx	  · Neck	detailed exam	  · Neck Details	supple, No JVD	  · Respiratory	detailed exam	  · Respiratory Details	breath sounds equal; clear to auscultation bilaterally	  · Cardiovascular	detailed exam	  · Cardiovascular Details	regular rate and rhythm	  · Cardiovascular Details	positive S1; positive S2	  · Gastrointestinal	detailed exam	  · GI Normal	soft; nontender; no distention; bowel sounds normal	  · Extremities	detailed exam	  · Extremities Details	no clubbing; no cyanosis/edema	  · Neurological	detailed exam	  · Mental Status	non-focal, AAOx3	  · Motor	moving her blt lower ext	  · Musculoskeletal	detailed exam	  · Musculoskeletal Details	decreased ROM due to pain	  · Musculoskeletal Comments	due to LBP with drain in place	  · Psychiatric	detailed exam	  · Psychiatric Details	normal affect; normal behavior	        DVT Prophylaxis: Venodynes - chemoprophy contra due to POD#2 s/p spine sx with sig blood loss.    Advanced Directives: FULL  Discussed with: patient    Visit Information: Critical care time 45 min    ** Time is exclusive of billed procedures and/or teaching and/or routine family updates.

## 2017-07-12 NOTE — PROGRESS NOTE ADULT - SUBJECTIVE AND OBJECTIVE BOX
[facility]                                                           Orthopedic Spine Progress Note      POST OPERATIVE DAY # 2  STATUS POST: L3-5 laminectomies/osteotomies, T10-L5 PSF               Pre-Op Dx: Lumbar stenosis with neurogenic claudication    Post-Op Dx:  Lumbar stenosis with neurogenic claudication      SUBJECTIVE: Patient seen and examined, AOx3, unable to mobilize yesterday due to persistent hypotension, blood transfusion was discussed but pt refused, pressure improving after total 4L IVF boluses and pressors throughout yesterday, pain well-controlled w/PCA, c/o lumbar spasms - on Flexeril, Tmax 100.6    Current Pain Management:  [x] PCA   [ ] Po Analgesics [ ] IM /IV Anagesics     Vital Signs Last 24 Hrs  T(C): 37 (12 Jul 2017 06:00), Max: 38.1 (11 Jul 2017 18:15)  T(F): 98.6 (12 Jul 2017 06:00), Max: 100.6 (11 Jul 2017 18:15)  HR: 78 (12 Jul 2017 07:54) (64 - 94)  BP: 111/55 (12 Jul 2017 06:00) (82/64 - 121/57)  BP(mean): 69 (12 Jul 2017 06:00) (52 - 76)  RR: 26 (12 Jul 2017 06:00) (6 - 27)  SpO2: 99% (12 Jul 2017 06:00) (95% - 100%)  I&O's Detail    11 Jul 2017 07:01  -  12 Jul 2017 07:00  --------------------------------------------------------  IN:    IV PiggyBack: 4200 mL    lactated ringers.: 2300 mL    Oral Fluid: 1090 mL    phenylephrine   Infusion: 881.7 mL  Total IN: 8471.7 mL    OUT:    Bulb: 40 mL    Bulb: 60 mL    Drain: 140 mL    Drain: 310 mL    Indwelling Catheter - Urethral: 2771 mL  Total OUT: 3321 mL    Total NET: 5150.7 mL    OBJECTIVE:      Wound /Dressing: intact, drains 60/40/140/310cc - all drains kept/managed by plastic surgeon  Cervical ROM: normal  Lumbar: ROM: not tested  Neurological: A/O x 3         Sensation: [x] intact to light touch  [ ] decreased:          Motor exam: [x]                 [x] Lower ext.     Hip Flx   Quad   Hamstrg   TA       EHL      GS                                 R        5/5        5/5        5/5        5/5        5/5      5/5                                        L        5-/5       5-/5        5/5        5/5        5/5      5/5                                                                [x] Vascular: intact             Tension Signs: none          Long Tract Findings: none     RADIOLOGY & ADDITIONAL STUDIES:                                               LABS:                        8.7    9.3   )-----------( 209      ( 12 Jul 2017 05:29 )             26.9     07-12    142  |  110<H>  |  4<L>  ----------------------------<  104<H>  3.7   |  24  |  0.38<L>    Ca    7.7<L>      12 Jul 2017 05:29  Phos  3.8     07-11  Mg     1.4     07-11          Blood Culture: n/a  Wound Culture: n/a

## 2017-07-12 NOTE — PROGRESS NOTE ADULT - SUBJECTIVE AND OBJECTIVE BOX
Doing well  Stable in the ICU  States she feels much better compared to yesterday.  Exam:  dressing intact  Drains:30/40/45/5 cc of serosanguenous output

## 2017-07-12 NOTE — PROGRESS NOTE ADULT - ASSESSMENT
Will observe the drains overnight and remove the right hemovac drain in the morning if output continues to be low. Dressing change tomorrow.  Continue current care

## 2017-07-12 NOTE — PROGRESS NOTE ADULT - ASSESSMENT
42yo F s/p spine surgery 7/10 - 1500ml EBL - shock requiring pressors  ? pt rec'd 7000ml IVF intra and post-operatively - still intravascularly depleted ?  additional 4000ml IVF on 7/11 - still requiring pressors for BP support  No evidence of significant hemorrhage (beyond operative EBL) - Hgb reasonable  No evidence of sepsis    plan at this time is for continued ICU care  HOB 30  Strict I/O  Gi and DVT prophy as appropriate  analgesia with PCA  diet and mobility per spine surgery team  start chemoprophy for DVT in am if H/H reasonable/no evidence of bleeding  supportive care    case d/w pt and all questions answered - discussed possible need for CVL if escalating doses of pressors.

## 2017-07-12 NOTE — PROGRESS NOTE ADULT - ASSESSMENT
Pt is a 42 y/o female with h/o iron deficiency anemia, asthma who while driving from work on 9/2016 developed sudden onset of RLE numbness.  Her w/up revealed multiple disc herniations.  She has been having LBP radiating down her blt lower ext.  She was admitted for elective  laminectomy and fusion of lumbar spine with instrumentation.  Her course complicated by hypotension with acute blood loss anemia.  Post-op medicine consult called for comanagement.    * Lumbar spinal stenosis-s/p lumbar laminectomy, fusion with instrumentation.  Continue pain control, encourage use of incentive spirometry.  * Hotension- due to acute blood loss and anesthesia.  S/p aggressive IVF, continue titrate blayne as bp allows, monitor in ICU.  * Asthma-stable  * Anemia-acute blood loss from surgery and dilutional, monitor for now, transfuse as needed.  * Proph- Venodyne  * Disp-OOB, PT, monitor in ICU  * Comm- d/w pt

## 2017-07-13 LAB
ANION GAP SERPL CALC-SCNC: 6 MMOL/L — SIGNIFICANT CHANGE UP (ref 5–17)
BASOPHILS # BLD AUTO: 0 K/UL — SIGNIFICANT CHANGE UP (ref 0–0.2)
BASOPHILS NFR BLD AUTO: 0.7 % — SIGNIFICANT CHANGE UP (ref 0–2)
BUN SERPL-MCNC: 5 MG/DL — LOW (ref 7–23)
CALCIUM SERPL-MCNC: 7.4 MG/DL — LOW (ref 8.5–10.1)
CHLORIDE SERPL-SCNC: 104 MMOL/L — SIGNIFICANT CHANGE UP (ref 96–108)
CO2 SERPL-SCNC: 28 MMOL/L — SIGNIFICANT CHANGE UP (ref 22–31)
CREAT SERPL-MCNC: 0.33 MG/DL — LOW (ref 0.5–1.3)
EOSINOPHIL # BLD AUTO: 0.2 K/UL — SIGNIFICANT CHANGE UP (ref 0–0.5)
EOSINOPHIL NFR BLD AUTO: 3.4 % — SIGNIFICANT CHANGE UP (ref 0–6)
GLUCOSE SERPL-MCNC: 94 MG/DL — SIGNIFICANT CHANGE UP (ref 70–99)
HCT VFR BLD CALC: 23.9 % — LOW (ref 34.5–45)
HGB BLD-MCNC: 7.8 G/DL — LOW (ref 11.5–15.5)
LYMPHOCYTES # BLD AUTO: 1.1 K/UL — SIGNIFICANT CHANGE UP (ref 1–3.3)
LYMPHOCYTES # BLD AUTO: 15.4 % — SIGNIFICANT CHANGE UP (ref 13–44)
MAGNESIUM SERPL-MCNC: 1.5 MG/DL — LOW (ref 1.6–2.6)
MCHC RBC-ENTMCNC: 28.9 PG — SIGNIFICANT CHANGE UP (ref 27–34)
MCHC RBC-ENTMCNC: 32.7 GM/DL — SIGNIFICANT CHANGE UP (ref 32–36)
MCV RBC AUTO: 88.4 FL — SIGNIFICANT CHANGE UP (ref 80–100)
MONOCYTES # BLD AUTO: 0.7 K/UL — SIGNIFICANT CHANGE UP (ref 0–0.9)
MONOCYTES NFR BLD AUTO: 9.7 % — SIGNIFICANT CHANGE UP (ref 2–14)
NEUTROPHILS # BLD AUTO: 4.9 K/UL — SIGNIFICANT CHANGE UP (ref 1.8–7.4)
NEUTROPHILS NFR BLD AUTO: 70.9 % — SIGNIFICANT CHANGE UP (ref 43–77)
PHOSPHATE SERPL-MCNC: 2.8 MG/DL — SIGNIFICANT CHANGE UP (ref 2.5–4.5)
PLATELET # BLD AUTO: 164 K/UL — SIGNIFICANT CHANGE UP (ref 150–400)
POTASSIUM SERPL-MCNC: 3.6 MMOL/L — SIGNIFICANT CHANGE UP (ref 3.5–5.3)
POTASSIUM SERPL-SCNC: 3.6 MMOL/L — SIGNIFICANT CHANGE UP (ref 3.5–5.3)
RBC # BLD: 2.7 M/UL — LOW (ref 3.8–5.2)
RBC # FLD: 12.1 % — SIGNIFICANT CHANGE UP (ref 10.3–14.5)
SODIUM SERPL-SCNC: 138 MMOL/L — SIGNIFICANT CHANGE UP (ref 135–145)
WBC # BLD: 6.9 K/UL — SIGNIFICANT CHANGE UP (ref 3.8–10.5)
WBC # FLD AUTO: 6.9 K/UL — SIGNIFICANT CHANGE UP (ref 3.8–10.5)

## 2017-07-13 RX ORDER — DIPHENHYDRAMINE HCL 50 MG
25 CAPSULE ORAL EVERY 6 HOURS
Qty: 0 | Refills: 0 | Status: DISCONTINUED | OUTPATIENT
Start: 2017-07-13 | End: 2017-07-18

## 2017-07-13 RX ORDER — MAGNESIUM SULFATE 500 MG/ML
1 VIAL (ML) INJECTION ONCE
Qty: 0 | Refills: 0 | Status: COMPLETED | OUTPATIENT
Start: 2017-07-13 | End: 2017-07-13

## 2017-07-13 RX ORDER — HYDROMORPHONE HYDROCHLORIDE 2 MG/ML
2 INJECTION INTRAMUSCULAR; INTRAVENOUS; SUBCUTANEOUS
Qty: 0 | Refills: 0 | Status: DISCONTINUED | OUTPATIENT
Start: 2017-07-13 | End: 2017-07-14

## 2017-07-13 RX ORDER — OXYCODONE HYDROCHLORIDE 5 MG/1
5 TABLET ORAL ONCE
Qty: 0 | Refills: 0 | Status: DISCONTINUED | OUTPATIENT
Start: 2017-07-13 | End: 2017-07-13

## 2017-07-13 RX ORDER — OXYCODONE AND ACETAMINOPHEN 5; 325 MG/1; MG/1
2 TABLET ORAL EVERY 4 HOURS
Qty: 0 | Refills: 0 | Status: DISCONTINUED | OUTPATIENT
Start: 2017-07-13 | End: 2017-07-18

## 2017-07-13 RX ADMIN — HYDROMORPHONE HYDROCHLORIDE 2 MILLIGRAM(S): 2 INJECTION INTRAMUSCULAR; INTRAVENOUS; SUBCUTANEOUS at 21:09

## 2017-07-13 RX ADMIN — SODIUM CHLORIDE 3 MILLILITER(S): 9 INJECTION INTRAMUSCULAR; INTRAVENOUS; SUBCUTANEOUS at 15:05

## 2017-07-13 RX ADMIN — Medication 25 MILLIGRAM(S): at 15:04

## 2017-07-13 RX ADMIN — Medication 0.5 MILLIGRAM(S): at 20:32

## 2017-07-13 RX ADMIN — CYCLOBENZAPRINE HYDROCHLORIDE 10 MILLIGRAM(S): 10 TABLET, FILM COATED ORAL at 13:33

## 2017-07-13 RX ADMIN — OXYCODONE AND ACETAMINOPHEN 2 TABLET(S): 5; 325 TABLET ORAL at 18:07

## 2017-07-13 RX ADMIN — SENNA PLUS 2 TABLET(S): 8.6 TABLET ORAL at 21:08

## 2017-07-13 RX ADMIN — CYCLOBENZAPRINE HYDROCHLORIDE 10 MILLIGRAM(S): 10 TABLET, FILM COATED ORAL at 05:36

## 2017-07-13 RX ADMIN — CYCLOBENZAPRINE HYDROCHLORIDE 10 MILLIGRAM(S): 10 TABLET, FILM COATED ORAL at 21:08

## 2017-07-13 RX ADMIN — SODIUM CHLORIDE 3 MILLILITER(S): 9 INJECTION INTRAMUSCULAR; INTRAVENOUS; SUBCUTANEOUS at 21:10

## 2017-07-13 RX ADMIN — OXYCODONE AND ACETAMINOPHEN 2 TABLET(S): 5; 325 TABLET ORAL at 23:01

## 2017-07-13 RX ADMIN — Medication 100 MILLIGRAM(S): at 05:36

## 2017-07-13 RX ADMIN — Medication 100 MILLIGRAM(S): at 13:33

## 2017-07-13 RX ADMIN — Medication 100 GRAM(S): at 09:20

## 2017-07-13 RX ADMIN — Medication 100 MILLIGRAM(S): at 21:08

## 2017-07-13 RX ADMIN — ONDANSETRON 4 MILLIGRAM(S): 8 TABLET, FILM COATED ORAL at 11:10

## 2017-07-13 RX ADMIN — SODIUM CHLORIDE 3 MILLILITER(S): 9 INJECTION INTRAMUSCULAR; INTRAVENOUS; SUBCUTANEOUS at 05:37

## 2017-07-13 RX ADMIN — OXYCODONE HYDROCHLORIDE 5 MILLIGRAM(S): 5 TABLET ORAL at 12:30

## 2017-07-13 RX ADMIN — OXYCODONE HYDROCHLORIDE 5 MILLIGRAM(S): 5 TABLET ORAL at 13:21

## 2017-07-13 RX ADMIN — Medication 5 MILLIGRAM(S): at 13:33

## 2017-07-13 NOTE — PHYSICAL THERAPY INITIAL EVALUATION ADULT - CRITERIA FOR SKILLED THERAPEUTIC INTERVENTIONS
anticipated equipment needs at discharge/predicted duration of therapy intervention/rehab potential/impairments found/functional limitations in following categories/therapy frequency

## 2017-07-13 NOTE — PROGRESS NOTE ADULT - ASSESSMENT
Pt is a 42 y/o female with h/o iron deficiency anemia, asthma who while driving from work on 9/2016 developed sudden onset of RLE numbness.  Her w/up revealed multiple disc herniations.  She has been having LBP radiating down her blt lower ext.  She was admitted for elective  laminectomy and fusion of lumbar spine with instrumentation.  Her course complicated by hypotension with acute blood loss anemia.  Post-op medicine consult called for comanagement.    * Lumbar spinal stenosis-s/p lumbar laminectomy, fusion with instrumentation.  Continue pain control, encourage use of incentive spirometry.  * Hotension- due to hypovolumic shock from acute blood loss and anesthesia.  S/p aggressive IVF and now off blayne.  * Asthma-stable  * Anemia-acute blood loss from surgery and dilutional, monitor for now, transfuse as needed.  Add po iron.  * Proph- Venodyne while drains in place  * Disp-OOB, PT, transfer to floors  * Comm- d/w pt

## 2017-07-13 NOTE — PROGRESS NOTE ADULT - SUBJECTIVE AND OBJECTIVE BOX
42 y/o female with h/o iron deficiency anemia, asthma - diagnosed with multiple disc herniations causing LBP radiating down her blt lower ext.  She was admitted for elective  laminectomy and fusion of lumbar spine with instrumentation.    EBL ~ 1000 cc - pt received 4.5L of IVF intra-op and s/p 1500 cc in PACU.  Pt hypotensive in PACU, c/o severe LBP.  She was moving her blt lower ext.    Transferred to ICU and phenylephrine infusion initiated.    Above d/w Dr Malcolm.    Seen and examined this morning - pain reasonably controlled with PCA.  Hemovac output noted.  Decreasing urine output this morning - currently on phenylephrine infusionat 1.1 - 1.2 mcg/kg/min  offers no significant somatic complaints other than those related to the surgery.    : remains on phenylephrine infusion 1.1 mcg/kg/min - no new issues.  Hgb remains reasonable.    addendum: OFF phenylephrine as of 14:30.    : remains off pressors - hgb slightly lower but still reasonable.  Hemodynamics and respiratory function reasonable.  No new issues no overnight events. Spont voiding and tolerating diet.  No significant somatic complaints.    PAST MEDICAL & SURGICAL HISTORY:  Varicose veins of both lower extremities  History of anemia  Bilateral sciatica  Spondylisthesis  Lumbar stenosis with neurogenic claudication  Scoliosis (and kyphoscoliosis), idiopathic  Asthma  S/P endoscopy: Baseline   H/O varicose vein ligation: left lower extremity 2011  Gastric bypass status for obesity:   S/P  section: X3; ,,       Allergies    diclofenac (Other)  shellfish (Hives; Short breath)    ICU Vital Signs Last 24 Hrs  T(C): 36.2 (2017 10:00), Max: 36.9 (2017 20:00)  T(F): 97.2 (2017 10:00), Max: 98.4 (2017 20:00)  HR: 86 (2017 10:00) (82 - 115)  BP: 113/72 (2017 10:00) (91/53 - 123/81)  BP(mean): 82 (2017 10:00) (62 - 90)  ABP: --  ABP(mean): --  RR: 9 (2017 10:00) (7 - 21)  SpO2: 99% (2017 10:00) (92% - 100%)          I&O's Summary    2017 07:  -  2017 07:00  --------------------------------------------------------  IN: 2240.7 mL / OUT: 2095 mL / NET: 145.7 mL    2017 07:  -  2017 12:11  --------------------------------------------------------  IN: 60 mL / OUT: 350 mL / NET: -290 mL                              7.8    6.9   )-----------( 164      ( 2017 05:17 )             23.9       07-13    138  |  104  |  5<L>  ----------------------------<  94  3.6   |  28  |  0.33<L>    Ca    7.4<L>      2017 05:17  Phos  2.8     07-13  Mg     1.5     13      MEDICATIONS  (STANDING):  influenza   Vaccine 0.5 milliLiter(s) IntraMuscular once  cyclobenzaprine 10 milliGRAM(s) Oral every 8 hours  docusate sodium 100 milliGRAM(s) Oral three times a day  buDESOnide   0.5 milliGRAM(s) Respule 0.5 milliGRAM(s) Inhalation every 12 hours  senna 2 Tablet(s) Oral at bedtime  sodium chloride 0.9% lock flush 3 milliLiter(s) IV Push every 8 hours  lactated ringers. 1000 milliLiter(s) (30 mL/Hr) IV Continuous <Continuous>    MEDICATIONS  (PRN):  acetaminophen   Tablet. 650 milliGRAM(s) Oral every 6 hours PRN Mild Pain (1 - 3)  aluminum hydroxide/magnesium hydroxide/simethicone Suspension 30 milliLiter(s) Oral every 12 hours PRN Indigestion  ondansetron Injectable 4 milliGRAM(s) IV Push every 6 hours PRN Nausea  magnesium hydroxide Suspension 30 milliLiter(s) Oral every 12 hours PRN Constipation  ALBUTerol   0.5% 2.5 milliGRAM(s) Nebulizer every 4 hours PRN SOB  diphenhydrAMINE   Injectable 50 milliGRAM(s) IV Push every 6 hours PRN Itching  bisacodyl 5 milliGRAM(s) Oral every 12 hours PRN Constipation    Review of Systems:   · Negative General Symptoms	no sweating	  · General Symptoms	No fever/chills	  · Skin/Breast	No itching/rash	  · Ophthalmologic	No diplopia/photophobia	  · ENMT	dry mouth (+)	  · Respiratory and Thorax	No cough	  · Cardiovascular	No CP/Palpitations	  · Gastrointestinal	No NVD	  · Musculoskeletal Comments	C/o back pain as noted	  · Additional ROS	All other ROS are negative	    Physical Exam:   · Constitutional	detailed exam	  · Constitutional Comments	calm and collected but uncomfortable due to pain	  · Eyes	detailed exam	  · Eyes Details	PERRL; EOMI; conjunctiva clear	  · ENMT	detailed exam	  · ENMT Comments	limited exam, clear oropharynx	  · Neck	detailed exam	  · Neck Details	supple, No JVD	  · Respiratory	detailed exam	  · Respiratory Details	breath sounds equal; clear to auscultation bilaterally	  · Cardiovascular	detailed exam	  · Cardiovascular Details	regular rate and rhythm	  · Cardiovascular Details	positive S1; positive S2	  · Gastrointestinal	detailed exam	  · GI Normal	soft; nontender; no distention; bowel sounds normal	  · Extremities	detailed exam	  · Extremities Details	no clubbing; no cyanosis/edema	  · Neurological	detailed exam	  · Mental Status	non-focal, AAOx3	  · Motor	moving her blt lower ext	  · Musculoskeletal	detailed exam	  · Musculoskeletal Details	decreased ROM due to pain	  · Musculoskeletal Comments	due to LBP with drain in place	  · Psychiatric	detailed exam	  · Psychiatric Details	normal affect; normal behavior	        DVT Prophylaxis: Venodynes - chemoprophy contra due to POD#3 s/p spine sx with sig blood loss.    Advanced Directives: FULL  Discussed with: patient    Visit Information: SSQ    ** Time is exclusive of billed procedures and/or teaching and/or routine family updates.

## 2017-07-13 NOTE — PHYSICAL THERAPY INITIAL EVALUATION ADULT - ADDITIONAL COMMENTS
pt lives in 2 level house, can stay ground floor if needed, 2 JAIR w/o rail, amb w/ SAC outside of house

## 2017-07-13 NOTE — PHYSICAL THERAPY INITIAL EVALUATION ADULT - ACTIVE RANGE OF MOTION EXAMINATION, REHAB EVAL
bilateral upper extremity Active ROM was WFL (within functional limits)/L hip/knee flex 1/4 (pain), knee ext ~0 from TKE pos. , B DF/PF WFL, R hip/knee flex ~1/2, knee ext ~0 from TKE pos.

## 2017-07-13 NOTE — PROGRESS NOTE ADULT - ASSESSMENT
40yo F s/p spine surgery 7/10 - 1500ml EBL - shock requiring pressors  ? pt rec'd 7000ml IVF intra and post-operatively - still intravascularly depleted ?  additional 4000ml IVF on 7/11 - still requiring pressors for BP support  No evidence of significant hemorrhage (beyond operative EBL) - Hgb reasonable  No evidence of sepsis  OFF pressors, tolerating diet, spont voiding.  anemia due to acute blood loss    plan at this time is for transfer to med-surg (2N)  HOB 30  IVF  Gi and DVT prophy as appropriate - chemoprophy when ok with spine surgery team  analgesia with PCA  diet and mobility per spine surgery team  supportive care

## 2017-07-13 NOTE — PROGRESS NOTE ADULT - SUBJECTIVE AND OBJECTIVE BOX
Doing well  Pending transfer from the ICU to the regular room  Exam:  dressing changed  incision c/d/i  Drains:40/20/10/5  Hemovac drains with minimal output

## 2017-07-13 NOTE — PROGRESS NOTE ADULT - SUBJECTIVE AND OBJECTIVE BOX
[facility]                                                           Orthopedic Spine Progress Note      POST OPERATIVE DAY #: 3  STATUS POST:      T10-L5 Posterior spinal fusion, instrumentation, osteotomies           Pre-Op Dx: Lumbar stenosis with neurogenic claudication    Post-Op Dx:  Lumbar stenosis with neurogenic claudication        SUBJECTIVE: Patient seen and examined. moderate pain.  Continued pre op left leg pain/weakness.  No longer on pressors  Vitals have stabilized  Has not ambulated yet  Pain controlled with the PCA      Current Pain Management:  [X] PCA   [ ] Po Analgesics [ ] IM /IV Anagesics     Vital Signs Last 24 Hrs  T(C): 36.7 (13 Jul 2017 06:00), Max: 37.7 (12 Jul 2017 12:00)  T(F): 98 (13 Jul 2017 06:00), Max: 99.8 (12 Jul 2017 12:00)  HR: 94 (13 Jul 2017 09:00) (81 - 115)  BP: 95/72 (13 Jul 2017 09:00) (91/53 - 123/81)  BP(mean): 76 (13 Jul 2017 09:00) (62 - 90)  RR: 15 (13 Jul 2017 09:00) (7 - 21)  SpO2: 100% (13 Jul 2017 09:00) (92% - 100%)  I&O's Detail    12 Jul 2017 07:01  -  13 Jul 2017 07:00  --------------------------------------------------------  IN:    lactated ringers.: 1500 mL    lactated ringers.: 210 mL    Oral Fluid: 430 mL    phenylephrine   Infusion: 100.7 mL  Total IN: 2240.7 mL    OUT:    Bulb: 60 mL    Bulb: 70 mL    Drain: 55 mL    Drain: 10 mL    Indwelling Catheter - Urethral: 1500 mL    Voided: 400 mL  Total OUT: 2095 mL    Total NET: 145.7 mL      13 Jul 2017 07:01  -  13 Jul 2017 10:23  --------------------------------------------------------  IN:    lactated ringers.: 60 mL  Total IN: 60 mL    OUT:    Voided: 350 mL  Total OUT: 350 mL    Total NET: -290 mL          OBJECTIVE:         Wound /Dressing: intact    Neurological: A/O x   3           Sensation: [ X] intact to light touch  [ ] decreased:          Motor exam: [  ]          5/5 strength in the B/L quads, TA, EHL, GS                                                      RADIOLOGY & ADDITIONAL STUDIES:                                               LABS:                        7.8    6.9   )-----------( 164      ( 13 Jul 2017 05:17 )             23.9     07-13    138  |  104  |  5<L>  ----------------------------<  94  3.6   |  28  |  0.33<L>    Ca    7.4<L>      13 Jul 2017 05:17  Phos  2.8     07-13  Mg     1.5     07-13            A/P :  41y Female   s/p:  T10-L5 instrumented fusion, osteotomies  -    Pain control  Cont to monitor H/H  VSS stable  Cont PCA  Up with PT

## 2017-07-13 NOTE — PHYSICAL THERAPY INITIAL EVALUATION ADULT - DIAGNOSIS, PT EVAL
Lumbar stenosis with neurogenic claudication, S/P laminectomy L3/4, L4/5, osteotomies, PSF T10-L5 for degen. scoliosis, anemia, shock

## 2017-07-13 NOTE — PHYSICAL THERAPY INITIAL EVALUATION ADULT - PRECAUTIONS/LIMITATIONS, REHAB EVAL
spinal precautions/fall precautions/surgical precautions/tele, drain, brace/oxygen therapy device and L/min

## 2017-07-13 NOTE — PHYSICAL THERAPY INITIAL EVALUATION ADULT - PERTINENT HX OF CURRENT PROBLEM, REHAB EVAL
multiple disc herniations causing LBP radiating down her blt lower ext.  She was admitted for elective lami/fusion w/ instr.1500ml EBL. hypotensive in PACU, c/o severe LBP. Transferred to ICU, on pressor support.

## 2017-07-13 NOTE — PROGRESS NOTE ADULT - SUBJECTIVE AND OBJECTIVE BOX
Pt now off pressors, c/o LBP from surgery.  No CP or SOB.    Vital Signs Last 24 Hrs  T(C): 36.7 (13 Jul 2017 06:00), Max: 37.7 (12 Jul 2017 12:00)  T(F): 98 (13 Jul 2017 06:00), Max: 99.8 (12 Jul 2017 12:00)  HR: 84 (13 Jul 2017 06:00) (75 - 115)  BP: 104/63 (13 Jul 2017 06:00) (91/53 - 123/81)  BP(mean): 72 (13 Jul 2017 06:00) (62 - 90)  RR: 8 (13 Jul 2017 06:00) (7 - 21)  SpO2: 100% (13 Jul 2017 06:00) (92% - 100%)    Daily     Daily     I&O's Detail    12 Jul 2017 07:01  -  13 Jul 2017 07:00  --------------------------------------------------------  IN:    lactated ringers.: 1500 mL    lactated ringers.: 210 mL    Oral Fluid: 430 mL    phenylephrine   Infusion: 100.7 mL  Total IN: 2240.7 mL    OUT:    Bulb: 60 mL    Bulb: 70 mL    Drain: 55 mL    Drain: 10 mL    Indwelling Catheter - Urethral: 1500 mL    Voided: 400 mL  Total OUT: 2095 mL    Total NET: 145.7 mL          CAPILLARY BLOOD GLUCOSE                                          7.8    6.9   )-----------( 164      ( 13 Jul 2017 05:17 )             23.9       07-13    138  |  104  |  5<L>  ----------------------------<  94  3.6   |  28  |  0.33<L>    Ca    7.4<L>      13 Jul 2017 05:17  Phos  2.8     07-13  Mg     1.5     07-13                        MEDICATIONS  (STANDING):  HYDROmorphone PCA (1 mG/mL) 30 milliLiter(s) PCA Continuous PCA Continuous  influenza   Vaccine 0.5 milliLiter(s) IntraMuscular once  cyclobenzaprine 10 milliGRAM(s) Oral every 8 hours  docusate sodium 100 milliGRAM(s) Oral three times a day  buDESOnide   0.5 milliGRAM(s) Respule 0.5 milliGRAM(s) Inhalation every 12 hours  phenylephrine    Infusion 0.5 MICROgram(s)/kG/Min (14.794 mL/Hr) IV Continuous <Continuous>  senna 2 Tablet(s) Oral at bedtime  sodium chloride 0.9% lock flush 3 milliLiter(s) IV Push every 8 hours  lactated ringers. 1000 milliLiter(s) (30 mL/Hr) IV Continuous <Continuous>    MEDICATIONS  (PRN):  HYDROmorphone PCA (1 mG/mL) Rescue Clinician Bolus 0.5 milliGRAM(s) IV Push every 15 minutes PRN for Pain Scale GREATER THAN 6  naloxone Injectable 0.1 milliGRAM(s) IV Push every 3 minutes PRN For ANY of the following changes in patient status:  A. RR LESS THAN 10 breaths per minute, B. Oxygen saturation LESS THAN 90%, C. Sedation score of 6  ondansetron Injectable 4 milliGRAM(s) IV Push every 6 hours PRN Nausea  acetaminophen   Tablet. 650 milliGRAM(s) Oral every 6 hours PRN Mild Pain (1 - 3)  aluminum hydroxide/magnesium hydroxide/simethicone Suspension 30 milliLiter(s) Oral every 12 hours PRN Indigestion  ondansetron Injectable 4 milliGRAM(s) IV Push every 6 hours PRN Nausea  magnesium hydroxide Suspension 30 milliLiter(s) Oral every 12 hours PRN Constipation  ALBUTerol   0.5% 2.5 milliGRAM(s) Nebulizer every 4 hours PRN SOB  diphenhydrAMINE   Injectable 50 milliGRAM(s) IV Push every 6 hours PRN Itching

## 2017-07-14 LAB
ANION GAP SERPL CALC-SCNC: 7 MMOL/L — SIGNIFICANT CHANGE UP (ref 5–17)
BLD GP AB SCN SERPL QL: SIGNIFICANT CHANGE UP
BUN SERPL-MCNC: 7 MG/DL — SIGNIFICANT CHANGE UP (ref 7–23)
CALCIUM SERPL-MCNC: 7.6 MG/DL — LOW (ref 8.5–10.1)
CHLORIDE SERPL-SCNC: 104 MMOL/L — SIGNIFICANT CHANGE UP (ref 96–108)
CO2 SERPL-SCNC: 29 MMOL/L — SIGNIFICANT CHANGE UP (ref 22–31)
CREAT SERPL-MCNC: 0.31 MG/DL — LOW (ref 0.5–1.3)
GLUCOSE SERPL-MCNC: 95 MG/DL — SIGNIFICANT CHANGE UP (ref 70–99)
HCT VFR BLD CALC: 22.1 % — LOW (ref 34.5–45)
HGB BLD-MCNC: 7.2 G/DL — LOW (ref 11.5–15.5)
MAGNESIUM SERPL-MCNC: 1.9 MG/DL — SIGNIFICANT CHANGE UP (ref 1.6–2.6)
MCHC RBC-ENTMCNC: 28.5 PG — SIGNIFICANT CHANGE UP (ref 27–34)
MCHC RBC-ENTMCNC: 32.6 GM/DL — SIGNIFICANT CHANGE UP (ref 32–36)
MCV RBC AUTO: 87.5 FL — SIGNIFICANT CHANGE UP (ref 80–100)
PHOSPHATE SERPL-MCNC: 3.1 MG/DL — SIGNIFICANT CHANGE UP (ref 2.5–4.5)
PLATELET # BLD AUTO: 212 K/UL — SIGNIFICANT CHANGE UP (ref 150–400)
POTASSIUM SERPL-MCNC: 3.1 MMOL/L — LOW (ref 3.5–5.3)
POTASSIUM SERPL-SCNC: 3.1 MMOL/L — LOW (ref 3.5–5.3)
RBC # BLD: 2.52 M/UL — LOW (ref 3.8–5.2)
RBC # FLD: 12.1 % — SIGNIFICANT CHANGE UP (ref 10.3–14.5)
SODIUM SERPL-SCNC: 140 MMOL/L — SIGNIFICANT CHANGE UP (ref 135–145)
TYPE + AB SCN PNL BLD: SIGNIFICANT CHANGE UP
WBC # BLD: 5.7 K/UL — SIGNIFICANT CHANGE UP (ref 3.8–10.5)
WBC # FLD AUTO: 5.7 K/UL — SIGNIFICANT CHANGE UP (ref 3.8–10.5)

## 2017-07-14 RX ORDER — HYDROMORPHONE HYDROCHLORIDE 2 MG/ML
2 INJECTION INTRAMUSCULAR; INTRAVENOUS; SUBCUTANEOUS
Qty: 0 | Refills: 0 | Status: DISCONTINUED | OUTPATIENT
Start: 2017-07-14 | End: 2017-07-18

## 2017-07-14 RX ORDER — POTASSIUM CHLORIDE 20 MEQ
40 PACKET (EA) ORAL ONCE
Qty: 0 | Refills: 0 | Status: COMPLETED | OUTPATIENT
Start: 2017-07-14 | End: 2017-07-14

## 2017-07-14 RX ADMIN — SENNA PLUS 2 TABLET(S): 8.6 TABLET ORAL at 21:06

## 2017-07-14 RX ADMIN — Medication 100 MILLIGRAM(S): at 15:25

## 2017-07-14 RX ADMIN — HYDROMORPHONE HYDROCHLORIDE 2 MILLIGRAM(S): 2 INJECTION INTRAMUSCULAR; INTRAVENOUS; SUBCUTANEOUS at 04:09

## 2017-07-14 RX ADMIN — OXYCODONE AND ACETAMINOPHEN 2 TABLET(S): 5; 325 TABLET ORAL at 06:10

## 2017-07-14 RX ADMIN — OXYCODONE AND ACETAMINOPHEN 2 TABLET(S): 5; 325 TABLET ORAL at 11:16

## 2017-07-14 RX ADMIN — CYCLOBENZAPRINE HYDROCHLORIDE 10 MILLIGRAM(S): 10 TABLET, FILM COATED ORAL at 21:06

## 2017-07-14 RX ADMIN — Medication 40 MILLIEQUIVALENT(S): at 09:39

## 2017-07-14 RX ADMIN — OXYCODONE AND ACETAMINOPHEN 2 TABLET(S): 5; 325 TABLET ORAL at 15:27

## 2017-07-14 RX ADMIN — SODIUM CHLORIDE 3 MILLILITER(S): 9 INJECTION INTRAMUSCULAR; INTRAVENOUS; SUBCUTANEOUS at 06:12

## 2017-07-14 RX ADMIN — Medication 0.5 MILLIGRAM(S): at 20:17

## 2017-07-14 RX ADMIN — HYDROMORPHONE HYDROCHLORIDE 2 MILLIGRAM(S): 2 INJECTION INTRAMUSCULAR; INTRAVENOUS; SUBCUTANEOUS at 09:39

## 2017-07-14 RX ADMIN — ONDANSETRON 4 MILLIGRAM(S): 8 TABLET, FILM COATED ORAL at 09:47

## 2017-07-14 RX ADMIN — HYDROMORPHONE HYDROCHLORIDE 2 MILLIGRAM(S): 2 INJECTION INTRAMUSCULAR; INTRAVENOUS; SUBCUTANEOUS at 23:01

## 2017-07-14 RX ADMIN — OXYCODONE AND ACETAMINOPHEN 2 TABLET(S): 5; 325 TABLET ORAL at 17:41

## 2017-07-14 RX ADMIN — SODIUM CHLORIDE 3 MILLILITER(S): 9 INJECTION INTRAMUSCULAR; INTRAVENOUS; SUBCUTANEOUS at 15:24

## 2017-07-14 RX ADMIN — HYDROMORPHONE HYDROCHLORIDE 2 MILLIGRAM(S): 2 INJECTION INTRAMUSCULAR; INTRAVENOUS; SUBCUTANEOUS at 13:21

## 2017-07-14 RX ADMIN — Medication 0.5 MILLIGRAM(S): at 08:54

## 2017-07-14 RX ADMIN — HYDROMORPHONE HYDROCHLORIDE 2 MILLIGRAM(S): 2 INJECTION INTRAMUSCULAR; INTRAVENOUS; SUBCUTANEOUS at 17:41

## 2017-07-14 RX ADMIN — OXYCODONE AND ACETAMINOPHEN 2 TABLET(S): 5; 325 TABLET ORAL at 20:05

## 2017-07-14 RX ADMIN — OXYCODONE AND ACETAMINOPHEN 2 TABLET(S): 5; 325 TABLET ORAL at 21:00

## 2017-07-14 RX ADMIN — CYCLOBENZAPRINE HYDROCHLORIDE 10 MILLIGRAM(S): 10 TABLET, FILM COATED ORAL at 06:10

## 2017-07-14 RX ADMIN — CYCLOBENZAPRINE HYDROCHLORIDE 10 MILLIGRAM(S): 10 TABLET, FILM COATED ORAL at 15:25

## 2017-07-14 RX ADMIN — HYDROMORPHONE HYDROCHLORIDE 2 MILLIGRAM(S): 2 INJECTION INTRAMUSCULAR; INTRAVENOUS; SUBCUTANEOUS at 18:45

## 2017-07-14 RX ADMIN — OXYCODONE AND ACETAMINOPHEN 2 TABLET(S): 5; 325 TABLET ORAL at 07:48

## 2017-07-14 RX ADMIN — SODIUM CHLORIDE 3 MILLILITER(S): 9 INJECTION INTRAMUSCULAR; INTRAVENOUS; SUBCUTANEOUS at 21:03

## 2017-07-14 RX ADMIN — HYDROMORPHONE HYDROCHLORIDE 2 MILLIGRAM(S): 2 INJECTION INTRAMUSCULAR; INTRAVENOUS; SUBCUTANEOUS at 17:45

## 2017-07-14 RX ADMIN — HYDROMORPHONE HYDROCHLORIDE 2 MILLIGRAM(S): 2 INJECTION INTRAMUSCULAR; INTRAVENOUS; SUBCUTANEOUS at 10:09

## 2017-07-14 RX ADMIN — Medication 100 MILLIGRAM(S): at 06:10

## 2017-07-14 RX ADMIN — Medication 100 MILLIGRAM(S): at 21:06

## 2017-07-14 NOTE — PROGRESS NOTE ADULT - SUBJECTIVE AND OBJECTIVE BOX
Pt c/o LBP and spams otherwise uneventful night.  She ambulated to bathroom this morning.    Vital Signs Last 24 Hrs  T(C): 36.9 (14 Jul 2017 07:18), Max: 37.2 (13 Jul 2017 15:39)  T(F): 98.5 (14 Jul 2017 07:18), Max: 98.9 (13 Jul 2017 15:39)  HR: 70 (14 Jul 2017 08:50) (60 - 97)  BP: 99/57 (14 Jul 2017 07:18) (97/60 - 136/71)  BP(mean): 66 (14 Jul 2017 03:07) (66 - 87)  RR: 17 (14 Jul 2017 07:18) (9 - 23)  SpO2: 98% (14 Jul 2017 07:18) (93% - 100%)    Daily     Daily     I&O's Detail    13 Jul 2017 07:01  -  14 Jul 2017 07:00  --------------------------------------------------------  IN:    lactated ringers.: 210 mL  Total IN: 210 mL    OUT:    Bulb: 100 mL    Bulb: 100 mL    Voided: 800 mL  Total OUT: 1000 mL    Total NET: -790 mL          CAPILLARY BLOOD GLUCOSE                                          7.2    5.7   )-----------( 212      ( 14 Jul 2017 05:43 )             22.1       07-14    140  |  104  |  7   ----------------------------<  95  3.1<L>   |  29  |  0.31<L>    Ca    7.6<L>      14 Jul 2017 05:43  Phos  3.1     07-14  Mg     1.9     07-14                        MEDICATIONS  (STANDING):  influenza   Vaccine 0.5 milliLiter(s) IntraMuscular once  cyclobenzaprine 10 milliGRAM(s) Oral every 8 hours  docusate sodium 100 milliGRAM(s) Oral three times a day  buDESOnide   0.5 milliGRAM(s) Respule 0.5 milliGRAM(s) Inhalation every 12 hours  senna 2 Tablet(s) Oral at bedtime  sodium chloride 0.9% lock flush 3 milliLiter(s) IV Push every 8 hours  lactated ringers. 1000 milliLiter(s) (30 mL/Hr) IV Continuous <Continuous>  potassium chloride    Tablet ER 40 milliEquivalent(s) Oral once    MEDICATIONS  (PRN):  acetaminophen   Tablet. 650 milliGRAM(s) Oral every 6 hours PRN Mild Pain (1 - 3)  aluminum hydroxide/magnesium hydroxide/simethicone Suspension 30 milliLiter(s) Oral every 12 hours PRN Indigestion  ondansetron Injectable 4 milliGRAM(s) IV Push every 6 hours PRN Nausea  magnesium hydroxide Suspension 30 milliLiter(s) Oral every 12 hours PRN Constipation  ALBUTerol   0.5% 2.5 milliGRAM(s) Nebulizer every 4 hours PRN SOB  bisacodyl 5 milliGRAM(s) Oral every 12 hours PRN Constipation  diphenhydrAMINE   Capsule 25 milliGRAM(s) Oral every 6 hours PRN Rash and/or Itching  oxyCODONE    5 mG/acetaminophen 325 mG 2 Tablet(s) Oral every 4 hours PRN Moderate Pain (4 - 6)  HYDROmorphone  Injectable 2 milliGRAM(s) SubCutaneous every 3 hours PRN Severe Pain (7 - 10)

## 2017-07-14 NOTE — PROGRESS NOTE ADULT - SUBJECTIVE AND OBJECTIVE BOX
Pt seen resting in bed with daughter at bedside. Pt c/o incicisonal site pain with numbness and tingling of b/l lower extremities (anterior aspect). She denies weakness of b/l lower extremities. She states she has vertigo and light headedness with phywsical therapy and with walking. She voids on own without complications and no BM at this time.    PE  Gen appearance: in mild discomfort Alert and oriented x 3  Motor strength: 5/5 of b/l lower extremities  SEnsation: intact  Incisional site: clean and dry dressing intact with drains x2 100cc over 24 hours serosang drainage  No calf tenderness bilaterally  ABD: soft and non-tender    Plan  Afebrile, last BP:99/57  Drains per Plastics  H/H: 7.2/22.1, WBC: wnl, --- Transfusion was discussed with patient. Pt agrees to transfusion.   Potassium: 3.1L repleted  Creatinine .31L and calcuium 7.6L POD#3. Pt seen resting in bed with daughter at bedside. Pt c/o incicisonal site pain with numbness and tingling of b/l lower extremities (anterior aspect) present prior to surgery. She denies weakness of b/l lower extremities. She states she has vertigo and lightheaded with physical therapy and with walking. She voids on own without complications and no BM at this time.    PE  Gen appearance: in mild discomfort Alert and oriented x 3  Motor strength: 5/5 of b/l lower extremities  Sensation: intact  Incisional site: clean and dry dressing intact with drains x2 100cc over 24 hours serosang drainage  No calf tenderness bilaterally  ABD: soft and non-tender    Plan  Afebrile, last BP:99/57---manage per Medicine 2/2 anemia  Drains per Plastics  H/H: 7.2/22.1, WBC: wnl, --- Transfusion was discussed with patient. Pt agrees to transfusion. 2 units packed red blood cells. Type and screen ordered as well.   Potassium: 3.1L repleted  Creatinine.31L and calcuium 7.6L evaluate and manage per Medicine  Discussed with Dr. Bullock.

## 2017-07-14 NOTE — PROGRESS NOTE ADULT - ASSESSMENT
Pt is a 42 y/o female with h/o iron deficiency anemia, asthma who while driving from work on 9/2016 developed sudden onset of RLE numbness.  Her w/up revealed multiple disc herniations.  She has been having LBP radiating down her blt lower ext.  She was admitted for elective  laminectomy and fusion of lumbar spine with instrumentation.  Her course complicated by hypotension with acute blood loss anemia.  Post-op medicine consult called for comanagement.    * Lumbar spinal stenosis-s/p lumbar laminectomy, fusion with instrumentation.  Continue pain control, encourage use of incentive spirometry.  * Hypotension- due to hypovolumic shock from acute blood loss and anesthesia.  S/p aggressive IVF and off pressors, improved.  * Asthma-stable  * Hypokalemia-supplement po potassium  * Anemia-acute blood loss from surgery and dilutional, monitor for now, will discuss transfusion with primary team as needed, po iron.  * Proph- Venodyne while drains in place  * Disp-OOB, PT,    * Comm- d/w pt, RN

## 2017-07-15 LAB
ANION GAP SERPL CALC-SCNC: 5 MMOL/L — SIGNIFICANT CHANGE UP (ref 5–17)
BUN SERPL-MCNC: 9 MG/DL — SIGNIFICANT CHANGE UP (ref 7–23)
CALCIUM SERPL-MCNC: 7.6 MG/DL — LOW (ref 8.5–10.1)
CHLORIDE SERPL-SCNC: 108 MMOL/L — SIGNIFICANT CHANGE UP (ref 96–108)
CO2 SERPL-SCNC: 27 MMOL/L — SIGNIFICANT CHANGE UP (ref 22–31)
CREAT SERPL-MCNC: 0.4 MG/DL — LOW (ref 0.5–1.3)
GLUCOSE SERPL-MCNC: 89 MG/DL — SIGNIFICANT CHANGE UP (ref 70–99)
HCT VFR BLD CALC: 27.3 % — LOW (ref 34.5–45)
HGB BLD-MCNC: 9.1 G/DL — LOW (ref 11.5–15.5)
MCHC RBC-ENTMCNC: 28.6 PG — SIGNIFICANT CHANGE UP (ref 27–34)
MCHC RBC-ENTMCNC: 33.4 GM/DL — SIGNIFICANT CHANGE UP (ref 32–36)
MCV RBC AUTO: 85.8 FL — SIGNIFICANT CHANGE UP (ref 80–100)
PLATELET # BLD AUTO: 260 K/UL — SIGNIFICANT CHANGE UP (ref 150–400)
POTASSIUM SERPL-MCNC: 3.4 MMOL/L — LOW (ref 3.5–5.3)
POTASSIUM SERPL-SCNC: 3.4 MMOL/L — LOW (ref 3.5–5.3)
RBC # BLD: 3.18 M/UL — LOW (ref 3.8–5.2)
RBC # FLD: 12.3 % — SIGNIFICANT CHANGE UP (ref 10.3–14.5)
SODIUM SERPL-SCNC: 140 MMOL/L — SIGNIFICANT CHANGE UP (ref 135–145)
WBC # BLD: 6.4 K/UL — SIGNIFICANT CHANGE UP (ref 3.8–10.5)
WBC # FLD AUTO: 6.4 K/UL — SIGNIFICANT CHANGE UP (ref 3.8–10.5)

## 2017-07-15 PROCEDURE — 93970 EXTREMITY STUDY: CPT | Mod: 26

## 2017-07-15 RX ORDER — POTASSIUM CHLORIDE 20 MEQ
40 PACKET (EA) ORAL ONCE
Qty: 0 | Refills: 0 | Status: COMPLETED | OUTPATIENT
Start: 2017-07-15 | End: 2017-07-15

## 2017-07-15 RX ADMIN — SENNA PLUS 2 TABLET(S): 8.6 TABLET ORAL at 21:03

## 2017-07-15 RX ADMIN — OXYCODONE AND ACETAMINOPHEN 2 TABLET(S): 5; 325 TABLET ORAL at 07:40

## 2017-07-15 RX ADMIN — Medication 0.5 MILLIGRAM(S): at 19:32

## 2017-07-15 RX ADMIN — HYDROMORPHONE HYDROCHLORIDE 2 MILLIGRAM(S): 2 INJECTION INTRAMUSCULAR; INTRAVENOUS; SUBCUTANEOUS at 14:43

## 2017-07-15 RX ADMIN — OXYCODONE AND ACETAMINOPHEN 2 TABLET(S): 5; 325 TABLET ORAL at 21:00

## 2017-07-15 RX ADMIN — SODIUM CHLORIDE 3 MILLILITER(S): 9 INJECTION INTRAMUSCULAR; INTRAVENOUS; SUBCUTANEOUS at 05:13

## 2017-07-15 RX ADMIN — CYCLOBENZAPRINE HYDROCHLORIDE 10 MILLIGRAM(S): 10 TABLET, FILM COATED ORAL at 05:16

## 2017-07-15 RX ADMIN — HYDROMORPHONE HYDROCHLORIDE 2 MILLIGRAM(S): 2 INJECTION INTRAMUSCULAR; INTRAVENOUS; SUBCUTANEOUS at 20:00

## 2017-07-15 RX ADMIN — HYDROMORPHONE HYDROCHLORIDE 2 MILLIGRAM(S): 2 INJECTION INTRAMUSCULAR; INTRAVENOUS; SUBCUTANEOUS at 11:00

## 2017-07-15 RX ADMIN — CYCLOBENZAPRINE HYDROCHLORIDE 10 MILLIGRAM(S): 10 TABLET, FILM COATED ORAL at 21:03

## 2017-07-15 RX ADMIN — OXYCODONE AND ACETAMINOPHEN 2 TABLET(S): 5; 325 TABLET ORAL at 21:01

## 2017-07-15 RX ADMIN — Medication 100 MILLIGRAM(S): at 05:16

## 2017-07-15 RX ADMIN — HYDROMORPHONE HYDROCHLORIDE 2 MILLIGRAM(S): 2 INJECTION INTRAMUSCULAR; INTRAVENOUS; SUBCUTANEOUS at 20:30

## 2017-07-15 RX ADMIN — HYDROMORPHONE HYDROCHLORIDE 2 MILLIGRAM(S): 2 INJECTION INTRAMUSCULAR; INTRAVENOUS; SUBCUTANEOUS at 05:16

## 2017-07-15 RX ADMIN — SODIUM CHLORIDE 3 MILLILITER(S): 9 INJECTION INTRAMUSCULAR; INTRAVENOUS; SUBCUTANEOUS at 13:34

## 2017-07-15 RX ADMIN — HYDROMORPHONE HYDROCHLORIDE 2 MILLIGRAM(S): 2 INJECTION INTRAMUSCULAR; INTRAVENOUS; SUBCUTANEOUS at 15:43

## 2017-07-15 RX ADMIN — Medication 100 MILLIGRAM(S): at 21:03

## 2017-07-15 RX ADMIN — OXYCODONE AND ACETAMINOPHEN 2 TABLET(S): 5; 325 TABLET ORAL at 17:25

## 2017-07-15 RX ADMIN — Medication 0.5 MILLIGRAM(S): at 07:31

## 2017-07-15 RX ADMIN — OXYCODONE AND ACETAMINOPHEN 2 TABLET(S): 5; 325 TABLET ORAL at 08:00

## 2017-07-15 RX ADMIN — Medication 40 MILLIEQUIVALENT(S): at 13:43

## 2017-07-15 RX ADMIN — SODIUM CHLORIDE 3 MILLILITER(S): 9 INJECTION INTRAMUSCULAR; INTRAVENOUS; SUBCUTANEOUS at 21:04

## 2017-07-15 RX ADMIN — OXYCODONE AND ACETAMINOPHEN 2 TABLET(S): 5; 325 TABLET ORAL at 02:30

## 2017-07-15 RX ADMIN — OXYCODONE AND ACETAMINOPHEN 2 TABLET(S): 5; 325 TABLET ORAL at 16:25

## 2017-07-15 RX ADMIN — HYDROMORPHONE HYDROCHLORIDE 2 MILLIGRAM(S): 2 INJECTION INTRAMUSCULAR; INTRAVENOUS; SUBCUTANEOUS at 10:00

## 2017-07-15 RX ADMIN — CYCLOBENZAPRINE HYDROCHLORIDE 10 MILLIGRAM(S): 10 TABLET, FILM COATED ORAL at 13:34

## 2017-07-15 RX ADMIN — Medication 100 MILLIGRAM(S): at 13:34

## 2017-07-15 RX ADMIN — HYDROMORPHONE HYDROCHLORIDE 2 MILLIGRAM(S): 2 INJECTION INTRAMUSCULAR; INTRAVENOUS; SUBCUTANEOUS at 00:00

## 2017-07-15 RX ADMIN — OXYCODONE AND ACETAMINOPHEN 2 TABLET(S): 5; 325 TABLET ORAL at 01:50

## 2017-07-15 RX ADMIN — HYDROMORPHONE HYDROCHLORIDE 2 MILLIGRAM(S): 2 INJECTION INTRAMUSCULAR; INTRAVENOUS; SUBCUTANEOUS at 06:00

## 2017-07-15 NOTE — PROGRESS NOTE ADULT - ASSESSMENT
A/P: s/p L3-5 osteotomies, T10-L5 PSF - stable  1.  STAT CBC post-transfusion  2.  Repeat CBC tomorrow am (already in orders), repeat BMP   3.  STAT b/l LE Dopplers to r/o DVT  4.  Continue PT/mobilization A/P: s/p L3-5 osteotomies, T10-L5 PSF - stable  1.  STAT CBC post-transfusion  2.  STAT BMP (last K+ was 3.1 on 7/14)  2.  Repeat CBC tomorrow am (already in orders), repeat BMP tomorrow am  3.  STAT b/l LE Dopplers to r/o DVT  4.  Continue PT/mobilization

## 2017-07-15 NOTE — PROGRESS NOTE ADULT - ASSESSMENT
Pt is a 42 y/o female with h/o iron deficiency anemia, asthma who while driving from work on 9/2016 developed sudden onset of RLE numbness.  Her w/up revealed multiple disc herniations.  She has been having LBP radiating down her blt lower ext.  She was admitted for elective  laminectomy and fusion of lumbar spine with instrumentation.  Her course complicated by hypotension with acute blood loss anemia.  Post-op medicine consult called for comanagement.    * Lumbar spinal stenosis-s/p lumbar laminectomy, fusion with instrumentation.  Continue pain control, encourage use of incentive spirometry. Physical therapy  * Hypotension- due to hypovolumic shock from acute blood loss and anesthesia.  S/p aggressive IVF and off pressors, improved. BP better now  * Asthma-stable  * Hypokalemia-supplement po potassium again today  * Anemia-acute blood loss from surgery and dilutional, monitor for now, will discuss transfusion with primary team as needed, po iron. Hb still low but patient is asymptomatic. Transfuse as per primary team  * Proph- Venodyne while drains in place  * Disp-OOB, PT,    * Comm- d/w pt, RN

## 2017-07-15 NOTE — PROGRESS NOTE ADULT - SUBJECTIVE AND OBJECTIVE BOX
[facility]                                                           Orthopedic Spine Progress Note      POST OPERATIVE DAY #5   STATUS POST: L3-5 osteotomies, T10-L5 PSF    Pre-Op Dx: Lumbar stenosis with neurogenic claudication    Post-Op Dx:  Lumbar stenosis with neurogenic claudication    SUBJECTIVE: Patient seen and examined, received 2U PRBCs yesterday - 2nd unit completed at 0200, no repeat CBC ordered until Sunday am, c/o b/l leg pain after PT today (first real mobilization since surgery), patient restless     Current Pain Management:  [ ] PCA   [x] Po Analgesics [x] IM /IV Anagesics     Vital Signs Last 24 Hrs  T(C): 36.4 (15 Jul 2017 02:00), Max: 37.1 (14 Jul 2017 22:46)  T(F): 97.6 (15 Jul 2017 02:00), Max: 98.7 (14 Jul 2017 22:46)  HR: 70 (15 Jul 2017 07:32) (70 - 82)  BP: 103/60 (15 Jul 2017 02:00) (92/53 - 103/60)  BP(mean): --  RR: 16 (15 Jul 2017 02:00) (14 - 16)  SpO2: 99% (15 Jul 2017 02:00) (98% - 100%)  I&O's Detail    14 Jul 2017 07:01  -  15 Jul 2017 07:00  --------------------------------------------------------  IN:    Oral Fluid: 240 mL    Packed Red Blood Cells: 279 mL  Total IN: 519 mL    OUT:    Bulb: 30 mL    Bulb: 80 mL    Drain: 20 mL    Voided: 1 mL  Total OUT: 131 mL    Total NET: 388 mL          OBJECTIVE:       Wound /Dressing: wound clean, dry  Drains: remaining two SHAWNA drains 20/30cc - kept/managed by plastics, drains sewn in - resecured drain sites w/gauze/tape (patient moving about in bed, restless)   Cervical ROM: normal  Lumbar: ROM: not tested  Neurological: A/O x 3              Sensation: [x] intact to light touch  [ ] decreased:          Motor exam: [x]                [x] Lower ext.     Hip Flx   Quad   Hamstrg   TA       EHL      GS                                R        5/5        5/5       5/5         5/5      5/5        5/5                                        L         5/5        5/5       5/5         5/5      5/5        5/5                                                                [x Vascular :             Tension Signs: none          Long Tract Findings: none    RADIOLOGY & ADDITIONAL STUDIES:                                               LABS:                        7.2    5.7   )-----------( 212      ( 14 Jul 2017 05:43 )             22.1     07-14    140  |  104  |  7   ----------------------------<  95  3.1<L>   |  29  |  0.31<L>    Ca    7.6<L>      14 Jul 2017 05:43  Phos  3.1     07-14  Mg     1.9     07-14          Blood Culture: n/a  Wound Culture: n/a

## 2017-07-15 NOTE — PROGRESS NOTE ADULT - SUBJECTIVE AND OBJECTIVE BOX
7/15/17: No cp, sob, n/v/f/c; reports back pain and pain going down the left leg    ROS: all systems reviewed and are negative    PHYSICAL EXAM:  Vital Signs Last 24 Hrs  T(C): 36.4 (15 Jul 2017 02:00), Max: 37.1 (14 Jul 2017 22:46)  T(F): 97.6 (15 Jul 2017 02:00), Max: 98.7 (14 Jul 2017 22:46)  HR: 70 (15 Jul 2017 07:32) (70 - 86)  BP: 103/60 (15 Jul 2017 02:00) (92/53 - 106/63)  BP(mean): --  RR: 16 (15 Jul 2017 02:00) (14 - 16)  SpO2: 99% (15 Jul 2017 02:00) (98% - 100%)  Vital Signs Last 24 Hrs  T(C): 36.4 (15 Jul 2017 02:00), Max: 37.1 (14 Jul 2017 22:46)  T(F): 97.6 (15 Jul 2017 02:00), Max: 98.7 (14 Jul 2017 22:46)  HR: 70 (15 Jul 2017 07:32) (70 - 86)  BP: 103/60 (15 Jul 2017 02:00) (92/53 - 106/63)  BP(mean): --  RR: 16 (15 Jul 2017 02:00) (14 - 16)  SpO2: 99% (15 Jul 2017 02:00) (98% - 100%)    GEN: A and O, NAD,, mood stable  HEENT:   NC/AT, EOMI, no oropharyngeal lesions, erythema, exudates, oral thrush    NECK:   supple, , no palpable lymph nodes, no thyromegaly    CV:  +S1, +S2, regular, no murmurs or rubs    RESP:   lungs clear to auscultation bilaterally, no wheezing, rales, rhonchi, good air entry bilaterally    GI:  abdomen soft, non-tender, non-distended, normal BS, no bruits, no abdominal masses, no palpable masses    BACK: dressing intact - 2 carlos alberto drains in place; brace in place;    RECTAL:  not examined    :  not examined    MSK:   normal muscle tone, no atrophy, no rigidity, no contractions    EXT:   no clubbing, no cyanosis, no edema, no calf pain, swelling or erythema    VASCULAR:  pulses equal and symmetric in the upper and lower extremities    NEURO:  AAOX3, no focal neurological deficits, follows all commands, able to move extremities spontaneously    SKIN:  no ulcers, lesions or rashes    LABS:                              7.2    5.7   )-----------( 212      ( 14 Jul 2017 05:43 )             22.1     07-14    140  |  104  |  7   ----------------------------<  95  3.1<L>   |  29  |  0.31<L>    Ca    7.6<L>      14 Jul 2017 05:43  Phos  3.1     07-14  Mg     1.9     07-14        MEDICATIONS  (STANDING):  influenza   Vaccine 0.5 milliLiter(s) IntraMuscular once  cyclobenzaprine 10 milliGRAM(s) Oral every 8 hours  docusate sodium 100 milliGRAM(s) Oral three times a day  buDESOnide   0.5 milliGRAM(s) Respule 0.5 milliGRAM(s) Inhalation every 12 hours  senna 2 Tablet(s) Oral at bedtime  sodium chloride 0.9% lock flush 3 milliLiter(s) IV Push every 8 hours  lactated ringers. 1000 milliLiter(s) (30 mL/Hr) IV Continuous <Continuous>  potassium chloride    Tablet ER 40 milliEquivalent(s) Oral once    MEDICATIONS  (PRN):  acetaminophen   Tablet. 650 milliGRAM(s) Oral every 6 hours PRN Mild Pain (1 - 3)  aluminum hydroxide/magnesium hydroxide/simethicone Suspension 30 milliLiter(s) Oral every 12 hours PRN Indigestion  ondansetron Injectable 4 milliGRAM(s) IV Push every 6 hours PRN Nausea  magnesium hydroxide Suspension 30 milliLiter(s) Oral every 12 hours PRN Constipation  ALBUTerol   0.5% 2.5 milliGRAM(s) Nebulizer every 4 hours PRN SOB  bisacodyl 5 milliGRAM(s) Oral every 12 hours PRN Constipation  diphenhydrAMINE   Capsule 25 milliGRAM(s) Oral every 6 hours PRN Rash and/or Itching  oxyCODONE    5 mG/acetaminophen 325 mG 2 Tablet(s) Oral every 4 hours PRN Moderate Pain (4 - 6)  HYDROmorphone  Injectable 2 milliGRAM(s) SubCutaneous every 3 hours PRN Severe Pain (7 - 10)

## 2017-07-16 LAB
ANION GAP SERPL CALC-SCNC: 5 MMOL/L — SIGNIFICANT CHANGE UP (ref 5–17)
BUN SERPL-MCNC: 8 MG/DL — SIGNIFICANT CHANGE UP (ref 7–23)
CALCIUM SERPL-MCNC: 7.6 MG/DL — LOW (ref 8.5–10.1)
CHLORIDE SERPL-SCNC: 108 MMOL/L — SIGNIFICANT CHANGE UP (ref 96–108)
CO2 SERPL-SCNC: 26 MMOL/L — SIGNIFICANT CHANGE UP (ref 22–31)
CREAT SERPL-MCNC: 0.37 MG/DL — LOW (ref 0.5–1.3)
GLUCOSE SERPL-MCNC: 86 MG/DL — SIGNIFICANT CHANGE UP (ref 70–99)
HCT VFR BLD CALC: 25.2 % — LOW (ref 34.5–45)
HGB BLD-MCNC: 8.6 G/DL — LOW (ref 11.5–15.5)
MCHC RBC-ENTMCNC: 29.2 PG — SIGNIFICANT CHANGE UP (ref 27–34)
MCHC RBC-ENTMCNC: 34.1 GM/DL — SIGNIFICANT CHANGE UP (ref 32–36)
MCV RBC AUTO: 85.4 FL — SIGNIFICANT CHANGE UP (ref 80–100)
PLATELET # BLD AUTO: 251 K/UL — SIGNIFICANT CHANGE UP (ref 150–400)
POTASSIUM SERPL-MCNC: 3.8 MMOL/L — SIGNIFICANT CHANGE UP (ref 3.5–5.3)
POTASSIUM SERPL-SCNC: 3.8 MMOL/L — SIGNIFICANT CHANGE UP (ref 3.5–5.3)
RBC # BLD: 2.96 M/UL — LOW (ref 3.8–5.2)
RBC # FLD: 12.2 % — SIGNIFICANT CHANGE UP (ref 10.3–14.5)
SODIUM SERPL-SCNC: 139 MMOL/L — SIGNIFICANT CHANGE UP (ref 135–145)
WBC # BLD: 5.2 K/UL — SIGNIFICANT CHANGE UP (ref 3.8–10.5)
WBC # FLD AUTO: 5.2 K/UL — SIGNIFICANT CHANGE UP (ref 3.8–10.5)

## 2017-07-16 RX ADMIN — Medication 0.5 MILLIGRAM(S): at 19:28

## 2017-07-16 RX ADMIN — OXYCODONE AND ACETAMINOPHEN 2 TABLET(S): 5; 325 TABLET ORAL at 06:05

## 2017-07-16 RX ADMIN — HYDROMORPHONE HYDROCHLORIDE 2 MILLIGRAM(S): 2 INJECTION INTRAMUSCULAR; INTRAVENOUS; SUBCUTANEOUS at 17:17

## 2017-07-16 RX ADMIN — HYDROMORPHONE HYDROCHLORIDE 2 MILLIGRAM(S): 2 INJECTION INTRAMUSCULAR; INTRAVENOUS; SUBCUTANEOUS at 10:20

## 2017-07-16 RX ADMIN — OXYCODONE AND ACETAMINOPHEN 2 TABLET(S): 5; 325 TABLET ORAL at 06:35

## 2017-07-16 RX ADMIN — SENNA PLUS 2 TABLET(S): 8.6 TABLET ORAL at 20:32

## 2017-07-16 RX ADMIN — CYCLOBENZAPRINE HYDROCHLORIDE 10 MILLIGRAM(S): 10 TABLET, FILM COATED ORAL at 13:20

## 2017-07-16 RX ADMIN — HYDROMORPHONE HYDROCHLORIDE 2 MILLIGRAM(S): 2 INJECTION INTRAMUSCULAR; INTRAVENOUS; SUBCUTANEOUS at 14:00

## 2017-07-16 RX ADMIN — OXYCODONE AND ACETAMINOPHEN 2 TABLET(S): 5; 325 TABLET ORAL at 23:28

## 2017-07-16 RX ADMIN — Medication 100 MILLIGRAM(S): at 06:06

## 2017-07-16 RX ADMIN — SODIUM CHLORIDE 3 MILLILITER(S): 9 INJECTION INTRAMUSCULAR; INTRAVENOUS; SUBCUTANEOUS at 06:31

## 2017-07-16 RX ADMIN — HYDROMORPHONE HYDROCHLORIDE 2 MILLIGRAM(S): 2 INJECTION INTRAMUSCULAR; INTRAVENOUS; SUBCUTANEOUS at 00:33

## 2017-07-16 RX ADMIN — SODIUM CHLORIDE 3 MILLILITER(S): 9 INJECTION INTRAMUSCULAR; INTRAVENOUS; SUBCUTANEOUS at 05:49

## 2017-07-16 RX ADMIN — OXYCODONE AND ACETAMINOPHEN 2 TABLET(S): 5; 325 TABLET ORAL at 23:58

## 2017-07-16 RX ADMIN — HYDROMORPHONE HYDROCHLORIDE 2 MILLIGRAM(S): 2 INJECTION INTRAMUSCULAR; INTRAVENOUS; SUBCUTANEOUS at 13:26

## 2017-07-16 RX ADMIN — Medication 100 MILLIGRAM(S): at 20:32

## 2017-07-16 RX ADMIN — HYDROMORPHONE HYDROCHLORIDE 2 MILLIGRAM(S): 2 INJECTION INTRAMUSCULAR; INTRAVENOUS; SUBCUTANEOUS at 03:50

## 2017-07-16 RX ADMIN — CYCLOBENZAPRINE HYDROCHLORIDE 10 MILLIGRAM(S): 10 TABLET, FILM COATED ORAL at 06:06

## 2017-07-16 RX ADMIN — OXYCODONE AND ACETAMINOPHEN 2 TABLET(S): 5; 325 TABLET ORAL at 11:22

## 2017-07-16 RX ADMIN — HYDROMORPHONE HYDROCHLORIDE 2 MILLIGRAM(S): 2 INJECTION INTRAMUSCULAR; INTRAVENOUS; SUBCUTANEOUS at 21:01

## 2017-07-16 RX ADMIN — HYDROMORPHONE HYDROCHLORIDE 2 MILLIGRAM(S): 2 INJECTION INTRAMUSCULAR; INTRAVENOUS; SUBCUTANEOUS at 20:31

## 2017-07-16 RX ADMIN — CYCLOBENZAPRINE HYDROCHLORIDE 10 MILLIGRAM(S): 10 TABLET, FILM COATED ORAL at 20:33

## 2017-07-16 RX ADMIN — HYDROMORPHONE HYDROCHLORIDE 2 MILLIGRAM(S): 2 INJECTION INTRAMUSCULAR; INTRAVENOUS; SUBCUTANEOUS at 01:03

## 2017-07-16 RX ADMIN — OXYCODONE AND ACETAMINOPHEN 2 TABLET(S): 5; 325 TABLET ORAL at 12:22

## 2017-07-16 RX ADMIN — Medication 100 MILLIGRAM(S): at 13:20

## 2017-07-16 RX ADMIN — HYDROMORPHONE HYDROCHLORIDE 2 MILLIGRAM(S): 2 INJECTION INTRAMUSCULAR; INTRAVENOUS; SUBCUTANEOUS at 04:20

## 2017-07-16 RX ADMIN — SODIUM CHLORIDE 3 MILLILITER(S): 9 INJECTION INTRAMUSCULAR; INTRAVENOUS; SUBCUTANEOUS at 20:33

## 2017-07-16 RX ADMIN — HYDROMORPHONE HYDROCHLORIDE 2 MILLIGRAM(S): 2 INJECTION INTRAMUSCULAR; INTRAVENOUS; SUBCUTANEOUS at 09:28

## 2017-07-16 RX ADMIN — Medication 0.5 MILLIGRAM(S): at 08:31

## 2017-07-16 NOTE — PROGRESS NOTE ADULT - SUBJECTIVE AND OBJECTIVE BOX
Doing ok  In regular room. Stable.  Some difficulty ambulating due to pain  Exam:  incision c/d/i  SHAWNA drains put out 40/50 cc over the last 24 hours.

## 2017-07-16 NOTE — PROGRESS NOTE ADULT - SUBJECTIVE AND OBJECTIVE BOX
[facility]                                                           Orthopedic Spine Progress Note      POST OPERATIVE DAY #: 6   STATUS POST:             T10L5 PSF    Pre-Op Dx: Lumbar stenosis with neurogenic claudication    Post-Op Dx:  Lumbar stenosis with neurogenic claudication        SUBJECTIVE: Patient seen and examined. Moderate pain  Slow to mobilize  Had a transfusion  K replaced  US neg for DVTs      Current Pain Management:  [ ] PCA   [X ] Po Analgesics [ X] IM /IV Anagesics     Vital Signs Last 24 Hrs  T(C): 36.8 (16 Jul 2017 07:18), Max: 36.8 (15 Jul 2017 23:12)  T(F): 98.2 (16 Jul 2017 07:18), Max: 98.3 (15 Jul 2017 23:12)  HR: 85 (16 Jul 2017 08:31) (74 - 90)  BP: 101/63 (16 Jul 2017 07:18) (97/60 - 101/63)  BP(mean): --  RR: 16 (16 Jul 2017 07:18) (16 - 16)  SpO2: 98% (16 Jul 2017 07:18) (97% - 100%)    OUT:    Bulb: 40 mL    Bulb: 50 mL  Total OUT: 90 mL            OBJECTIVE:         Wound /Dressing: clean, drains intact, active serous out put    Neurological: A/O x  3             Sensation: [ X] intact to light touch  [ ] decreased:          Motor exam: [  ]                  [X ] Lower ext.     Hip Flx  Hip Ext   Hip Abd  Hip Add Quad   Hamstrg   TA       EHL      GS                              R        5/5        5/5        5/5             5/5        5/5        5/5        5/5     5/5      5/5                              L         5/5        5/5        5/5             5/5        5/5        5/5        5/5     5/5      5/5                                                       RADIOLOGY & ADDITIONAL STUDIES:                                               LABS:                        8.6    5.2   )-----------( 251      ( 16 Jul 2017 05:55 )             25.2     07-16    139  |  108  |  8   ----------------------------<  86  3.8   |  26  |  0.37<L>    Ca    7.6<L>      16 Jul 2017 05:55       US Duplex Venous Lower Ext Complete, Bilateral (07.15.17 @ 18:47) >  EXAM:  US DPLX LWR EXT VEINS COMPL BI                        PROCEDURE DATE:  07/15/2017      NTERPRETATION:  EXAM: VENOUS DUPLEX LOWER EXTREMITIES BILATERAL    IMPRESSION:  No evidence of deep venous thrombosis in either lower extremity               A/P :  41y Female   s/p:  T10-L5 fusion, osteotomies  -    Pain control  -   Cont PT  Labs stable  Cont drains per Plastics  Needs more mobilization

## 2017-07-16 NOTE — PROGRESS NOTE ADULT - SUBJECTIVE AND OBJECTIVE BOX
7/15/17: No cp, sob, n/v/f/c; reports back pain and pain going down the left leg  7/16/17: no cp, sob, still with significant amounts of left leg pain - numbness and tongling; back pain still persistent; voiding without issues    ROS: all systems reviewed and are negative    PHYSICAL EXAM:  Vital Signs Last 24 Hrs  T(C): 36.8 (16 Jul 2017 07:18), Max: 36.8 (15 Jul 2017 23:12)  T(F): 98.2 (16 Jul 2017 07:18), Max: 98.3 (15 Jul 2017 23:12)  HR: 85 (16 Jul 2017 08:31) (74 - 90)  BP: 101/63 (16 Jul 2017 07:18) (97/60 - 101/63)  BP(mean): --  RR: 16 (16 Jul 2017 07:18) (16 - 16)  SpO2: 98% (16 Jul 2017 07:18) (97% - 100%)    GEN: A and O, NAD,, mood stable  HEENT:   NC/AT, EOMI, no oropharyngeal lesions, erythema, exudates, oral thrush    NECK:   supple,     CV:  +S1, +S2, regular, no murmurs or rubs    RESP:   lungs clear to auscultation bilaterally, no wheezing, rales, rhonchi, good air entry bilaterally    GI:  abdomen soft, non-tender, non-distended, normal BS, no bruits, no abdominal masses, no palpable masses    BACK: dressing intact - 2 carlos alberto drains in place; brace in place;    RECTAL:  not examined    :  not examined    MSK:   normal muscle tone, no atrophy, no rigidity, no contractions    EXT:   no clubbing, no cyanosis, no edema, no calf pain, swelling or erythema    VASCULAR:  pulses equal and symmetric in the upper and lower extremities    NEURO:  AAOX3, no focal neurological deficits, follows all commands, able to move extremities spontaneously    SKIN:  no ulcers, lesions or rashes    LABS:                              8.6    5.2   )-----------( 251      ( 16 Jul 2017 05:55 )             25.2     07-16    139  |  108  |  8   ----------------------------<  86  3.8   |  26  |  0.37<L>    Ca    7.6<L>      16 Jul 2017 05:55          MEDICATIONS  (STANDING):  influenza   Vaccine 0.5 milliLiter(s) IntraMuscular once  cyclobenzaprine 10 milliGRAM(s) Oral every 8 hours  docusate sodium 100 milliGRAM(s) Oral three times a day  buDESOnide   0.5 milliGRAM(s) Respule 0.5 milliGRAM(s) Inhalation every 12 hours  senna 2 Tablet(s) Oral at bedtime  sodium chloride 0.9% lock flush 3 milliLiter(s) IV Push every 8 hours  lactated ringers. 1000 milliLiter(s) (30 mL/Hr) IV Continuous <Continuous>    MEDICATIONS  (PRN):  acetaminophen   Tablet. 650 milliGRAM(s) Oral every 6 hours PRN Mild Pain (1 - 3)  aluminum hydroxide/magnesium hydroxide/simethicone Suspension 30 milliLiter(s) Oral every 12 hours PRN Indigestion  ondansetron Injectable 4 milliGRAM(s) IV Push every 6 hours PRN Nausea  magnesium hydroxide Suspension 30 milliLiter(s) Oral every 12 hours PRN Constipation  ALBUTerol   0.5% 2.5 milliGRAM(s) Nebulizer every 4 hours PRN SOB  bisacodyl 5 milliGRAM(s) Oral every 12 hours PRN Constipation  diphenhydrAMINE   Capsule 25 milliGRAM(s) Oral every 6 hours PRN Rash and/or Itching  oxyCODONE    5 mG/acetaminophen 325 mG 2 Tablet(s) Oral every 4 hours PRN Moderate Pain (4 - 6)  HYDROmorphone  Injectable 2 milliGRAM(s) SubCutaneous every 3 hours PRN Severe Pain (7 - 10)

## 2017-07-17 LAB
ANION GAP SERPL CALC-SCNC: 7 MMOL/L — SIGNIFICANT CHANGE UP (ref 5–17)
BUN SERPL-MCNC: 10 MG/DL — SIGNIFICANT CHANGE UP (ref 7–23)
CALCIUM SERPL-MCNC: 8.3 MG/DL — LOW (ref 8.5–10.1)
CHLORIDE SERPL-SCNC: 104 MMOL/L — SIGNIFICANT CHANGE UP (ref 96–108)
CO2 SERPL-SCNC: 26 MMOL/L — SIGNIFICANT CHANGE UP (ref 22–31)
CREAT SERPL-MCNC: 0.46 MG/DL — LOW (ref 0.5–1.3)
GLUCOSE SERPL-MCNC: 81 MG/DL — SIGNIFICANT CHANGE UP (ref 70–99)
HCT VFR BLD CALC: 28.2 % — LOW (ref 34.5–45)
HGB BLD-MCNC: 9.5 G/DL — LOW (ref 11.5–15.5)
MCHC RBC-ENTMCNC: 29.1 PG — SIGNIFICANT CHANGE UP (ref 27–34)
MCHC RBC-ENTMCNC: 33.5 GM/DL — SIGNIFICANT CHANGE UP (ref 32–36)
MCV RBC AUTO: 86.9 FL — SIGNIFICANT CHANGE UP (ref 80–100)
PLATELET # BLD AUTO: 335 K/UL — SIGNIFICANT CHANGE UP (ref 150–400)
POTASSIUM SERPL-MCNC: 3.5 MMOL/L — SIGNIFICANT CHANGE UP (ref 3.5–5.3)
POTASSIUM SERPL-SCNC: 3.5 MMOL/L — SIGNIFICANT CHANGE UP (ref 3.5–5.3)
RBC # BLD: 3.25 M/UL — LOW (ref 3.8–5.2)
RBC # FLD: 12.7 % — SIGNIFICANT CHANGE UP (ref 10.3–14.5)
SODIUM SERPL-SCNC: 137 MMOL/L — SIGNIFICANT CHANGE UP (ref 135–145)
WBC # BLD: 7.1 K/UL — SIGNIFICANT CHANGE UP (ref 3.8–10.5)
WBC # FLD AUTO: 7.1 K/UL — SIGNIFICANT CHANGE UP (ref 3.8–10.5)

## 2017-07-17 RX ORDER — FERROUS SULFATE 325(65) MG
325 TABLET ORAL
Qty: 0 | Refills: 0 | Status: DISCONTINUED | OUTPATIENT
Start: 2017-07-17 | End: 2017-07-18

## 2017-07-17 RX ADMIN — OXYCODONE AND ACETAMINOPHEN 2 TABLET(S): 5; 325 TABLET ORAL at 22:51

## 2017-07-17 RX ADMIN — HYDROMORPHONE HYDROCHLORIDE 2 MILLIGRAM(S): 2 INJECTION INTRAMUSCULAR; INTRAVENOUS; SUBCUTANEOUS at 13:39

## 2017-07-17 RX ADMIN — Medication 0.5 MILLIGRAM(S): at 07:50

## 2017-07-17 RX ADMIN — Medication 100 MILLIGRAM(S): at 05:23

## 2017-07-17 RX ADMIN — SODIUM CHLORIDE 3 MILLILITER(S): 9 INJECTION INTRAMUSCULAR; INTRAVENOUS; SUBCUTANEOUS at 21:20

## 2017-07-17 RX ADMIN — CYCLOBENZAPRINE HYDROCHLORIDE 10 MILLIGRAM(S): 10 TABLET, FILM COATED ORAL at 21:18

## 2017-07-17 RX ADMIN — SODIUM CHLORIDE 3 MILLILITER(S): 9 INJECTION INTRAMUSCULAR; INTRAVENOUS; SUBCUTANEOUS at 13:01

## 2017-07-17 RX ADMIN — OXYCODONE AND ACETAMINOPHEN 2 TABLET(S): 5; 325 TABLET ORAL at 23:50

## 2017-07-17 RX ADMIN — HYDROMORPHONE HYDROCHLORIDE 2 MILLIGRAM(S): 2 INJECTION INTRAMUSCULAR; INTRAVENOUS; SUBCUTANEOUS at 21:00

## 2017-07-17 RX ADMIN — HYDROMORPHONE HYDROCHLORIDE 2 MILLIGRAM(S): 2 INJECTION INTRAMUSCULAR; INTRAVENOUS; SUBCUTANEOUS at 13:09

## 2017-07-17 RX ADMIN — Medication 325 MILLIGRAM(S): at 17:23

## 2017-07-17 RX ADMIN — SODIUM CHLORIDE 3 MILLILITER(S): 9 INJECTION INTRAMUSCULAR; INTRAVENOUS; SUBCUTANEOUS at 05:24

## 2017-07-17 RX ADMIN — Medication 100 MILLIGRAM(S): at 13:09

## 2017-07-17 RX ADMIN — HYDROMORPHONE HYDROCHLORIDE 2 MILLIGRAM(S): 2 INJECTION INTRAMUSCULAR; INTRAVENOUS; SUBCUTANEOUS at 17:22

## 2017-07-17 RX ADMIN — HYDROMORPHONE HYDROCHLORIDE 2 MILLIGRAM(S): 2 INJECTION INTRAMUSCULAR; INTRAVENOUS; SUBCUTANEOUS at 20:33

## 2017-07-17 RX ADMIN — OXYCODONE AND ACETAMINOPHEN 2 TABLET(S): 5; 325 TABLET ORAL at 05:23

## 2017-07-17 RX ADMIN — HYDROMORPHONE HYDROCHLORIDE 2 MILLIGRAM(S): 2 INJECTION INTRAMUSCULAR; INTRAVENOUS; SUBCUTANEOUS at 09:03

## 2017-07-17 RX ADMIN — CYCLOBENZAPRINE HYDROCHLORIDE 10 MILLIGRAM(S): 10 TABLET, FILM COATED ORAL at 05:23

## 2017-07-17 RX ADMIN — HYDROMORPHONE HYDROCHLORIDE 2 MILLIGRAM(S): 2 INJECTION INTRAMUSCULAR; INTRAVENOUS; SUBCUTANEOUS at 03:42

## 2017-07-17 RX ADMIN — OXYCODONE AND ACETAMINOPHEN 2 TABLET(S): 5; 325 TABLET ORAL at 11:21

## 2017-07-17 RX ADMIN — CYCLOBENZAPRINE HYDROCHLORIDE 10 MILLIGRAM(S): 10 TABLET, FILM COATED ORAL at 13:09

## 2017-07-17 RX ADMIN — SENNA PLUS 2 TABLET(S): 8.6 TABLET ORAL at 21:18

## 2017-07-17 RX ADMIN — HYDROMORPHONE HYDROCHLORIDE 2 MILLIGRAM(S): 2 INJECTION INTRAMUSCULAR; INTRAVENOUS; SUBCUTANEOUS at 03:12

## 2017-07-17 RX ADMIN — Medication 0.5 MILLIGRAM(S): at 19:08

## 2017-07-17 RX ADMIN — HYDROMORPHONE HYDROCHLORIDE 2 MILLIGRAM(S): 2 INJECTION INTRAMUSCULAR; INTRAVENOUS; SUBCUTANEOUS at 09:33

## 2017-07-17 RX ADMIN — Medication 100 MILLIGRAM(S): at 21:18

## 2017-07-17 RX ADMIN — OXYCODONE AND ACETAMINOPHEN 2 TABLET(S): 5; 325 TABLET ORAL at 12:02

## 2017-07-17 NOTE — PROGRESS NOTE ADULT - ASSESSMENT
Pt is a 42 y/o female with h/o iron deficiency anemia, asthma who while driving from work on 9/2016 developed sudden onset of RLE numbness.  Her w/up revealed multiple disc herniations.  She has been having LBP radiating down her blt lower ext.  She was admitted for elective  laminectomy and fusion of lumbar spine with instrumentation.  Her course complicated by hypotension with acute blood loss anemia.  Post-op medicine consult called for comanagement.    * Lumbar spinal stenosis-s/p lumbar laminectomy, fusion with instrumentation.  Continue pain control, encourage use of incentive spirometry.  * Hypotension- due to hypovolumic shock from acute blood loss and anesthesia.  S/p aggressive IVF and off pressors, improved.  * Asthma-stable  * Anemia-acute blood loss from surgery and dilutional, s/p 2 units, monitor for now, continue po iron  * Proph- Venodyne while drains in place  * Disp-OOB, PT, d/c planning  * Comm- d/w pt, RN

## 2017-07-17 NOTE — PROGRESS NOTE ADULT - SUBJECTIVE AND OBJECTIVE BOX
Pt c/o back pain, c/o generalized weakness, dopplers negative for DVT yesterday.  Drain remain in place.    Vital Signs Last 24 Hrs  T(C): 36.8 (16 Jul 2017 23:32), Max: 36.8 (16 Jul 2017 23:32)  T(F): 98.3 (16 Jul 2017 23:32), Max: 98.3 (16 Jul 2017 23:32)  HR: 90 (17 Jul 2017 07:51) (74 - 90)  BP: 99/55 (16 Jul 2017 23:32) (99/55 - 99/55)  BP(mean): --  RR: 16 (16 Jul 2017 23:32) (16 - 16)  SpO2: 99% (16 Jul 2017 23:32) (99% - 99%)    Daily     Daily     I&O's Detail    16 Jul 2017 07:01  -  17 Jul 2017 07:00  --------------------------------------------------------  IN:    Oral Fluid: 720 mL  Total IN: 720 mL    OUT:    Bulb: 30 mL    Bulb: 30 mL  Total OUT: 60 mL    Total NET: 660 mL          CAPILLARY BLOOD GLUCOSE                                          8.6    5.2   )-----------( 251      ( 16 Jul 2017 05:55 )             25.2       07-16    139  |  108  |  8   ----------------------------<  86  3.8   |  26  |  0.37<L>    Ca    7.6<L>      16 Jul 2017 05:55                        MEDICATIONS  (STANDING):  influenza   Vaccine 0.5 milliLiter(s) IntraMuscular once  cyclobenzaprine 10 milliGRAM(s) Oral every 8 hours  docusate sodium 100 milliGRAM(s) Oral three times a day  buDESOnide   0.5 milliGRAM(s) Respule 0.5 milliGRAM(s) Inhalation every 12 hours  senna 2 Tablet(s) Oral at bedtime  sodium chloride 0.9% lock flush 3 milliLiter(s) IV Push every 8 hours  lactated ringers. 1000 milliLiter(s) (30 mL/Hr) IV Continuous <Continuous>    MEDICATIONS  (PRN):  acetaminophen   Tablet. 650 milliGRAM(s) Oral every 6 hours PRN Mild Pain (1 - 3)  aluminum hydroxide/magnesium hydroxide/simethicone Suspension 30 milliLiter(s) Oral every 12 hours PRN Indigestion  ondansetron Injectable 4 milliGRAM(s) IV Push every 6 hours PRN Nausea  magnesium hydroxide Suspension 30 milliLiter(s) Oral every 12 hours PRN Constipation  ALBUTerol   0.5% 2.5 milliGRAM(s) Nebulizer every 4 hours PRN SOB  bisacodyl 5 milliGRAM(s) Oral every 12 hours PRN Constipation  diphenhydrAMINE   Capsule 25 milliGRAM(s) Oral every 6 hours PRN Rash and/or Itching  oxyCODONE    5 mG/acetaminophen 325 mG 2 Tablet(s) Oral every 4 hours PRN Moderate Pain (4 - 6)  HYDROmorphone  Injectable 2 milliGRAM(s) SubCutaneous every 3 hours PRN Severe Pain (7 - 10)

## 2017-07-17 NOTE — PROGRESS NOTE ADULT - SUBJECTIVE AND OBJECTIVE BOX
Pt seen resting on chair. Pt c/o incisional site pain with occasional tingling of the right knee and right tibial region. Pt states LLE weakness is improving. She is eating well. Voiding on own without complications. She states she had no vertigo with physical tehrapy yesterday and was able to tolerate sessions. Pain tolerable with medications at this time.    PE  Gen appearance: NAD  Motor strength: 5/5 of b/l lower ext  Sensation: intact  No calf tenderness  Incisional site with clean and dry dressing with 2 drains serosang drainage 30cc x 2.    Plan  Afebrile. Last BP:99/55 manage per Medicine.   Mobilzie with physical therapy and incentive spirometer encouraged.  Another day of physical therapy recommended.  Repeat CBC for evaluation of low H/H POD#6. Pt seen resting on chair. Pt c/o incisional site pain with occasional tingling of the right knee and right tibial region. Pt states LLE weakness is improving. She is eating well. Voiding on own without complications. She states she had no vertigo with physical therapy yesterday and was able to tolerate sessions without vertigo or lightheaded. Pain tolerable with medications at this time.    PE  Gen appearance: NAD  Motor strength: 5/5 of b/l lower ext  Sensation: intact  No calf tenderness  Incisional site with clean and dry dressing with 2 drains serosang drainage 30cc x 2.    Plan  Afebrile. Last BP:99/55 manage per Medicine.   Mobilize with physical therapy and incentive spirometer encouraged.  Another day of physical therapy recommended.  Repeat CBC for evaluation of low H/H  Drains managed per Plastics.  Discussed with Dr. Bullock.

## 2017-07-18 ENCOUNTER — TRANSCRIPTION ENCOUNTER (OUTPATIENT)
Age: 42
End: 2017-07-18

## 2017-07-18 VITALS
HEART RATE: 75 BPM | DIASTOLIC BLOOD PRESSURE: 64 MMHG | SYSTOLIC BLOOD PRESSURE: 103 MMHG | OXYGEN SATURATION: 100 % | RESPIRATION RATE: 16 BRPM | TEMPERATURE: 98 F

## 2017-07-18 RX ORDER — ACETAMINOPHEN 500 MG
2 TABLET ORAL
Qty: 0 | Refills: 0 | DISCHARGE
Start: 2017-07-18

## 2017-07-18 RX ORDER — OXYCODONE HYDROCHLORIDE 5 MG/1
2 TABLET ORAL
Qty: 0 | Refills: 0 | COMMUNITY
Start: 2017-07-18

## 2017-07-18 RX ORDER — FERROUS SULFATE 325(65) MG
1 TABLET ORAL
Qty: 0 | Refills: 0 | COMMUNITY
Start: 2017-07-18

## 2017-07-18 RX ADMIN — Medication 0.5 MILLIGRAM(S): at 08:50

## 2017-07-18 RX ADMIN — SODIUM CHLORIDE 3 MILLILITER(S): 9 INJECTION INTRAMUSCULAR; INTRAVENOUS; SUBCUTANEOUS at 05:42

## 2017-07-18 RX ADMIN — OXYCODONE AND ACETAMINOPHEN 2 TABLET(S): 5; 325 TABLET ORAL at 11:50

## 2017-07-18 RX ADMIN — Medication 100 MILLIGRAM(S): at 05:37

## 2017-07-18 RX ADMIN — HYDROMORPHONE HYDROCHLORIDE 2 MILLIGRAM(S): 2 INJECTION INTRAMUSCULAR; INTRAVENOUS; SUBCUTANEOUS at 00:48

## 2017-07-18 RX ADMIN — OXYCODONE AND ACETAMINOPHEN 2 TABLET(S): 5; 325 TABLET ORAL at 09:19

## 2017-07-18 RX ADMIN — Medication 100 MILLIGRAM(S): at 13:33

## 2017-07-18 RX ADMIN — OXYCODONE AND ACETAMINOPHEN 2 TABLET(S): 5; 325 TABLET ORAL at 04:34

## 2017-07-18 RX ADMIN — ALBUTEROL 2.5 MILLIGRAM(S): 90 AEROSOL, METERED ORAL at 08:47

## 2017-07-18 RX ADMIN — OXYCODONE AND ACETAMINOPHEN 2 TABLET(S): 5; 325 TABLET ORAL at 08:07

## 2017-07-18 RX ADMIN — CYCLOBENZAPRINE HYDROCHLORIDE 10 MILLIGRAM(S): 10 TABLET, FILM COATED ORAL at 13:33

## 2017-07-18 RX ADMIN — Medication 325 MILLIGRAM(S): at 08:08

## 2017-07-18 RX ADMIN — OXYCODONE AND ACETAMINOPHEN 2 TABLET(S): 5; 325 TABLET ORAL at 03:53

## 2017-07-18 RX ADMIN — CYCLOBENZAPRINE HYDROCHLORIDE 10 MILLIGRAM(S): 10 TABLET, FILM COATED ORAL at 05:37

## 2017-07-18 RX ADMIN — HYDROMORPHONE HYDROCHLORIDE 2 MILLIGRAM(S): 2 INJECTION INTRAMUSCULAR; INTRAVENOUS; SUBCUTANEOUS at 01:17

## 2017-07-18 NOTE — DISCHARGE NOTE ADULT - PATIENT PORTAL LINK FT
“You can access the [employer] Patient Portal, offered by [facility], by registering with the following website: [email or web address]”

## 2017-07-18 NOTE — DISCHARGE NOTE ADULT - CARE PROVIDER_API CALL
Aneudy Aguilar), Orthopaedic Surgery  [address] [place] [address]  Phone: [phone]  Fax: [phone]    Andrzej Carrion), Plastic Surgery  [address] [place] [id number]  Phone: [phone]  Fax: [phone]    Deric Rosado), Anesthesiology; Pain Medicine  [address] [place] [address]  Phone: [phone]  Fax: [phone]

## 2017-07-18 NOTE — PROGRESS NOTE ADULT - PROVIDER SPECIALTY LIST ADULT
Critical Care
Internal Medicine
Orthopedics
Plastic Surgery
Orthopedics

## 2017-07-18 NOTE — DISCHARGE NOTE ADULT - HOSPITAL COURSE
The patient was admitted on 07/10/17 for elective L3-5 laminectomy/osteotomies, T10-L5 posterior fusion done on the day of admission by Dr. Carrington with plastic surgery closure by Dr. Carrion.  The patient tolerated the surgery without complication and was transferred to the ICU.  POD #1 - PCA/paulino were kept, neuro was intact, hematocrit 28, hypotensive, care was managed by intensivist.  POD #2 - refused blood transfusion, hypotensive, received total 4L IVF bolus throughout the prior day and evening, pressure stabilized now, H/H 8.7/26.9VSS, Tmax 100.6, PCA/paulino kept, PT/mobilization ordered, neuro intact, drains intact.  POD #3 - c/o vertigo w/PT, paulino removed, voiding on own, neuro/motor intact, hypokalemia - replaced, drains kept, transferred to , VSS, afeb, acute postoperative blood loss anemia - H/H 7.2/22.1 - 2U PRBCs ordered transfused with patient consent.  POD #4 - s/p blood transfusion x 2U, vertigo improved, able to tolerate PT, labs pending, c/o diffuse LE pain after PT, also extremely restless - Dopplers b/l LEs ordered to r/o DVT, 2 drains removed yesterday by plastics, 2 remain at 30/20cc, VSS, afeb.  POD #5 - drqainsa 50/50cc - kept, mobilizing better - needs to do stairs, anemia improving after transfusion, K+ normalized, Dopplers negative.  POD #6 - voiding, vertigo resolved, anemia improving, neuro/motor intact, VSS, afebrile.  POD #7 - ambulating better, did stairs w/PT yesterday, VSS, afebrile, anemia improved (H/H 9.5/28.2), WBC 7.1, drains 45/25cc - kept, neuro/motor intact.  Patient is subsequently discharged home in stable condition with home PT pending being seen by Dr. Carrion today to make final determination if drains to be removed or if patient to go home with drains.

## 2017-07-18 NOTE — PROGRESS NOTE ADULT - NEUROLOGICAL DETAILS
alert and oriented x 3

## 2017-07-18 NOTE — PROGRESS NOTE ADULT - PSYCHIATRIC DETAILS
normal affect/normal behavior
normal affect/normal behavior
normal behavior/normal affect
normal behavior/normal affect
normal affect/normal behavior

## 2017-07-18 NOTE — PROGRESS NOTE ADULT - GASTROINTESTINAL DETAILS
nontender/no distention/bowel sounds normal/soft
bowel sounds normal/nontender/no distention/soft
soft/bowel sounds normal/nontender/no distention
soft/no distention/nontender/bowel sounds normal
soft/no distention/nontender/bowel sounds normal
bowel sounds normal/nontender/no distention/soft

## 2017-07-18 NOTE — DISCHARGE NOTE ADULT - PLAN OF CARE
decrease pain, increase strength NO lifting, NO driving, home PT, showering/drains as per Dr. Matthew instructions

## 2017-07-18 NOTE — PROGRESS NOTE ADULT - COMMENTS
All other ROS negative

## 2017-07-18 NOTE — PROGRESS NOTE ADULT - GENERAL COMMENTS
c/o generalized weakness

## 2017-07-18 NOTE — PROGRESS NOTE ADULT - MS EXT PE MLT D E PC
no pedal edema/no cyanosis/no clubbing
no clubbing/no cyanosis
no cyanosis/no clubbing
no clubbing/no cyanosis

## 2017-07-18 NOTE — PROGRESS NOTE ADULT - RS GEN PE MLT RESP DETAILS PC
clear to auscultation bilaterally/breath sounds equal
breath sounds equal/clear to auscultation bilaterally
clear to auscultation bilaterally/breath sounds equal
clear to auscultation bilaterally/breath sounds equal

## 2017-07-18 NOTE — PROGRESS NOTE ADULT - SUBJECTIVE AND OBJECTIVE BOX
Pt with uneventful night, still c/o LBP.    Vital Signs Last 24 Hrs  T(C): 36.8 (18 Jul 2017 07:07), Max: 36.8 (18 Jul 2017 07:07)  T(F): 98.3 (18 Jul 2017 07:07), Max: 98.3 (18 Jul 2017 07:07)  HR: 75 (18 Jul 2017 07:07) (70 - 75)  BP: 103/64 (18 Jul 2017 07:07) (103/64 - 106/67)  BP(mean): --  RR: 16 (18 Jul 2017 07:07) (16 - 16)  SpO2: 100% (18 Jul 2017 07:07) (100% - 100%)    Daily     Daily     I&O's Detail    17 Jul 2017 07:01  -  18 Jul 2017 07:00  --------------------------------------------------------  IN:    Oral Fluid: 240 mL  Total IN: 240 mL    OUT:    Bulb: 25 mL    Bulb: 45 mL    Voided: 1 mL  Total OUT: 71 mL    Total NET: 169 mL          CAPILLARY BLOOD GLUCOSE                                          9.5    7.1   )-----------( 335      ( 17 Jul 2017 11:47 )             28.2       07-17    137  |  104  |  10  ----------------------------<  81  3.5   |  26  |  0.46<L>    Ca    8.3<L>      17 Jul 2017 11:47                        MEDICATIONS  (STANDING):  influenza   Vaccine 0.5 milliLiter(s) IntraMuscular once  cyclobenzaprine 10 milliGRAM(s) Oral every 8 hours  docusate sodium 100 milliGRAM(s) Oral three times a day  buDESOnide   0.5 milliGRAM(s) Respule 0.5 milliGRAM(s) Inhalation every 12 hours  senna 2 Tablet(s) Oral at bedtime  sodium chloride 0.9% lock flush 3 milliLiter(s) IV Push every 8 hours  lactated ringers. 1000 milliLiter(s) (30 mL/Hr) IV Continuous <Continuous>  ferrous    sulfate 325 milliGRAM(s) Oral two times a day with meals    MEDICATIONS  (PRN):  acetaminophen   Tablet. 650 milliGRAM(s) Oral every 6 hours PRN Mild Pain (1 - 3)  aluminum hydroxide/magnesium hydroxide/simethicone Suspension 30 milliLiter(s) Oral every 12 hours PRN Indigestion  ondansetron Injectable 4 milliGRAM(s) IV Push every 6 hours PRN Nausea  magnesium hydroxide Suspension 30 milliLiter(s) Oral every 12 hours PRN Constipation  ALBUTerol   0.5% 2.5 milliGRAM(s) Nebulizer every 4 hours PRN SOB  bisacodyl 5 milliGRAM(s) Oral every 12 hours PRN Constipation  diphenhydrAMINE   Capsule 25 milliGRAM(s) Oral every 6 hours PRN Rash and/or Itching  oxyCODONE    5 mG/acetaminophen 325 mG 2 Tablet(s) Oral every 4 hours PRN Moderate Pain (4 - 6)  HYDROmorphone  Injectable 2 milliGRAM(s) SubCutaneous every 3 hours PRN Severe Pain (7 - 10)

## 2017-07-18 NOTE — PROGRESS NOTE ADULT - CARDIOVASCULAR DETAILS
positive S2/positive S1
positive S1/positive S2
positive S2/positive S1

## 2017-07-18 NOTE — DISCHARGE NOTE ADULT - NS AS ACTIVITY OBS
Stairs allowed/Walking-Outdoors allowed/No Heavy lifting/straining/Do not drive or operate machinery/Do not make important decisions

## 2017-07-18 NOTE — PROGRESS NOTE ADULT - ASSESSMENT
Pt is a 42 y/o female with h/o iron deficiency anemia, asthma who while driving from work on 9/2016 developed sudden onset of RLE numbness.  Her w/up revealed multiple disc herniations.  She has been having LBP radiating down her blt lower ext.  She was admitted for elective  laminectomy and fusion of lumbar spine with instrumentation.  Her course complicated by hypotension with acute blood loss anemia.  Post-op medicine consult called for comanagement.    * Lumbar spinal stenosis-s/p lumbar laminectomy, fusion with instrumentation.  Continue pain control, encourage use of incentive spirometry.  * Hypotension- due to hypovolumic shock from acute blood loss and anesthesia.  S/p aggressive IVF and off pressors, improved.  * Asthma-stable  * Anemia-acute blood loss from surgery and dilutional, s/p 2 units, monitor for now, continue po iron, stable.  * Proph- Venodyne while drains in place  * Disp-OOB, PT, d/c planning, pt will f/u with pcp and spine surgery as outpt.  * Comm- d/w pt, RN, Keisha

## 2017-07-18 NOTE — DISCHARGE NOTE ADULT - CARE PLAN
Principal Discharge DX:	Lumbar stenosis with neurogenic claudication  Goal:	decrease pain, increase strength  Instructions for follow-up, activity and diet:	NO lifting, NO driving, home PT, showering/drains as per Dr. Matthew instructions

## 2017-07-18 NOTE — PROGRESS NOTE ADULT - SUBJECTIVE AND OBJECTIVE BOX
40 yo female s/p paraspinal muscle flaps. laying in bed     Exam  incision is c/d/i  SHAWNA draining 25/40 cc in 24 hours serosanguinous fluid   there are no signs of infections    A/P  s/p paraspinal muscle flaps  pt to be discharged, okay to be discharged with Drains in place   pt can follow up in office in 1 week.   record drain out put twice a day and bring log to follow up visit  do not shower   leave all dressings in place, reinforce if necessary

## 2017-07-18 NOTE — DISCHARGE NOTE ADULT - MEDICATION SUMMARY - MEDICATIONS TO TAKE
I will START or STAY ON the medications listed below when I get home from the hospital:    acetaminophen 325 mg oral tablet  -- 2 tab(s) by mouth every 6 hours, As needed, Mild Pain (1 - 3)  -- Indication: For fever    acetaminophen-oxycodone 325 mg-5 mg oral tablet  -- 2 tab(s) by mouth every 4 hours, As needed, Moderate Pain (4 - 6)  -- Indication: For pain    Advair Diskus 500 mcg-50 mcg inhalation powder  -- 1 puff(s) inhaled 2 times a day  -- Indication: For Asthma    Ventolin HFA 90 mcg/inh inhalation aerosol  -- 2 puff(s) inhaled 4 times a day, As Needed  -- Indication: For Asthma    mupirocin topical  -- Apply on skin to affected area   -- Indication: For dermatologic    ferrous sulfate 325 mg (65 mg elemental iron) oral tablet  -- 1 tab(s) by mouth 3 times a day  -- Indication: For Anemia due to blood loss    tiZANidine 4 mg oral capsule  -- 2 cap(s) by mouth 3 times a day  -- Indication: For spasms    fluticasone propionate  -- 2 spray(s) by mouth once a day  -- Indication: For Asthma

## 2017-07-18 NOTE — DISCHARGE NOTE ADULT - ADDITIONAL INSTRUCTIONS
follow up in 1-2 weeks with Dr. Carrington, follow up with Dr. Carrion as instructed by him, follow up with PCP regarding postoperative blood loss anemia, follow up with pain management physician

## 2017-07-20 DIAGNOSIS — M41.85 OTHER FORMS OF SCOLIOSIS, THORACOLUMBAR REGION: ICD-10-CM

## 2017-07-20 DIAGNOSIS — E87.6 HYPOKALEMIA: ICD-10-CM

## 2017-07-20 DIAGNOSIS — J45.909 UNSPECIFIED ASTHMA, UNCOMPLICATED: ICD-10-CM

## 2017-07-20 DIAGNOSIS — G95.19 OTHER VASCULAR MYELOPATHIES: ICD-10-CM

## 2017-07-20 DIAGNOSIS — Z98.84 BARIATRIC SURGERY STATUS: ICD-10-CM

## 2017-07-20 DIAGNOSIS — D62 ACUTE POSTHEMORRHAGIC ANEMIA: ICD-10-CM

## 2017-07-20 DIAGNOSIS — M41.9 SCOLIOSIS, UNSPECIFIED: ICD-10-CM

## 2017-07-20 DIAGNOSIS — M51.15 INTERVERTEBRAL DISC DISORDERS WITH RADICULOPATHY, THORACOLUMBAR REGION: ICD-10-CM

## 2017-07-20 DIAGNOSIS — I95.89 OTHER HYPOTENSION: ICD-10-CM

## 2017-07-21 ENCOUNTER — INPATIENT (INPATIENT)
Facility: HOSPITAL | Age: 42
LOS: 2 days | Discharge: HOME | End: 2017-07-24
Attending: SURGERY | Admitting: SURGERY
Payer: COMMERCIAL

## 2017-07-21 VITALS
WEIGHT: 166.89 LBS | HEART RATE: 91 BPM | RESPIRATION RATE: 20 BRPM | OXYGEN SATURATION: 100 % | SYSTOLIC BLOOD PRESSURE: 132 MMHG | HEIGHT: 64 IN | DIASTOLIC BLOOD PRESSURE: 75 MMHG | TEMPERATURE: 99 F

## 2017-07-21 DIAGNOSIS — M48.06 SPINAL STENOSIS, LUMBAR REGION: ICD-10-CM

## 2017-07-21 DIAGNOSIS — Z98.890 OTHER SPECIFIED POSTPROCEDURAL STATES: Chronic | ICD-10-CM

## 2017-07-21 DIAGNOSIS — Z98.84 BARIATRIC SURGERY STATUS: Chronic | ICD-10-CM

## 2017-07-21 DIAGNOSIS — K45.8 OTHER SPECIFIED ABDOMINAL HERNIA WITHOUT OBSTRUCTION OR GANGRENE: ICD-10-CM

## 2017-07-21 DIAGNOSIS — Z98.89 OTHER SPECIFIED POSTPROCEDURAL STATES: Chronic | ICD-10-CM

## 2017-07-21 DIAGNOSIS — J45.909 UNSPECIFIED ASTHMA, UNCOMPLICATED: ICD-10-CM

## 2017-07-21 LAB
ALBUMIN SERPL ELPH-MCNC: 2.9 G/DL — LOW (ref 3.3–5)
ALP SERPL-CCNC: 74 U/L — SIGNIFICANT CHANGE UP (ref 40–120)
ALT FLD-CCNC: 29 U/L — SIGNIFICANT CHANGE UP (ref 12–78)
ANION GAP SERPL CALC-SCNC: 11 MMOL/L — SIGNIFICANT CHANGE UP (ref 5–17)
ANISOCYTOSIS BLD QL: SLIGHT — SIGNIFICANT CHANGE UP
APPEARANCE UR: CLEAR — SIGNIFICANT CHANGE UP
AST SERPL-CCNC: 29 U/L — SIGNIFICANT CHANGE UP (ref 15–37)
BACTERIA # UR AUTO: (no result)
BASOPHILS # BLD AUTO: 0.1 K/UL — SIGNIFICANT CHANGE UP (ref 0–0.2)
BASOPHILS NFR BLD AUTO: 0.8 % — SIGNIFICANT CHANGE UP (ref 0–2)
BILIRUB SERPL-MCNC: 0.3 MG/DL — SIGNIFICANT CHANGE UP (ref 0.2–1.2)
BILIRUB UR-MCNC: NEGATIVE — SIGNIFICANT CHANGE UP
BUN SERPL-MCNC: 7 MG/DL — SIGNIFICANT CHANGE UP (ref 7–23)
CALCIUM SERPL-MCNC: 8.7 MG/DL — SIGNIFICANT CHANGE UP (ref 8.5–10.1)
CHLORIDE SERPL-SCNC: 107 MMOL/L — SIGNIFICANT CHANGE UP (ref 96–108)
CO2 SERPL-SCNC: 21 MMOL/L — LOW (ref 22–31)
COLOR SPEC: YELLOW — SIGNIFICANT CHANGE UP
CREAT SERPL-MCNC: 0.44 MG/DL — LOW (ref 0.5–1.3)
DACRYOCYTES BLD QL SMEAR: SLIGHT — SIGNIFICANT CHANGE UP
DIFF PNL FLD: (no result)
EOSINOPHIL # BLD AUTO: 0.1 K/UL — SIGNIFICANT CHANGE UP (ref 0–0.5)
EOSINOPHIL NFR BLD AUTO: 0.7 % — SIGNIFICANT CHANGE UP (ref 0–6)
EPI CELLS # UR: SIGNIFICANT CHANGE UP
GLUCOSE SERPL-MCNC: 101 MG/DL — HIGH (ref 70–99)
GLUCOSE UR QL: NEGATIVE MG/DL — SIGNIFICANT CHANGE UP
HCT VFR BLD CALC: 31.4 % — LOW (ref 34.5–45)
HGB BLD-MCNC: 10.6 G/DL — LOW (ref 11.5–15.5)
HYPOCHROMIA BLD QL: SLIGHT — SIGNIFICANT CHANGE UP
KETONES UR-MCNC: (no result)
LEUKOCYTE ESTERASE UR-ACNC: NEGATIVE — SIGNIFICANT CHANGE UP
LIDOCAIN IGE QN: 371 U/L — SIGNIFICANT CHANGE UP (ref 73–393)
LYMPHOCYTES # BLD AUTO: 1.4 K/UL — SIGNIFICANT CHANGE UP (ref 1–3.3)
LYMPHOCYTES # BLD AUTO: 16.4 % — SIGNIFICANT CHANGE UP (ref 13–44)
MACROCYTES BLD QL: SLIGHT — SIGNIFICANT CHANGE UP
MANUAL DIF COMMENT BLD-IMP: SIGNIFICANT CHANGE UP
MCHC RBC-ENTMCNC: 28.8 PG — SIGNIFICANT CHANGE UP (ref 27–34)
MCHC RBC-ENTMCNC: 33.7 GM/DL — SIGNIFICANT CHANGE UP (ref 32–36)
MCV RBC AUTO: 85.4 FL — SIGNIFICANT CHANGE UP (ref 80–100)
MICROCYTES BLD QL: SLIGHT — SIGNIFICANT CHANGE UP
MONOCYTES # BLD AUTO: 0.6 K/UL — SIGNIFICANT CHANGE UP (ref 0–0.9)
MONOCYTES NFR BLD AUTO: 7.2 % — SIGNIFICANT CHANGE UP (ref 2–14)
NEUTROPHILS # BLD AUTO: 6.2 K/UL — SIGNIFICANT CHANGE UP (ref 1.8–7.4)
NEUTROPHILS NFR BLD AUTO: 75 % — SIGNIFICANT CHANGE UP (ref 43–77)
NITRITE UR-MCNC: NEGATIVE — SIGNIFICANT CHANGE UP
PH UR: 8 — SIGNIFICANT CHANGE UP (ref 5–8)
PLAT MORPH BLD: NORMAL — SIGNIFICANT CHANGE UP
PLATELET # BLD AUTO: 486 K/UL — HIGH (ref 150–400)
POIKILOCYTOSIS BLD QL AUTO: SLIGHT — SIGNIFICANT CHANGE UP
POLYCHROMASIA BLD QL SMEAR: SIGNIFICANT CHANGE UP
POTASSIUM SERPL-MCNC: 3.6 MMOL/L — SIGNIFICANT CHANGE UP (ref 3.5–5.3)
POTASSIUM SERPL-SCNC: 3.6 MMOL/L — SIGNIFICANT CHANGE UP (ref 3.5–5.3)
PROT SERPL-MCNC: 6.2 GM/DL — SIGNIFICANT CHANGE UP (ref 6–8.3)
PROT UR-MCNC: NEGATIVE MG/DL — SIGNIFICANT CHANGE UP
RBC # BLD: 3.68 M/UL — LOW (ref 3.8–5.2)
RBC # FLD: 12.3 % — SIGNIFICANT CHANGE UP (ref 10.3–14.5)
RBC BLD AUTO: (no result)
RBC CASTS # UR COMP ASSIST: (no result) /HPF (ref 0–4)
SODIUM SERPL-SCNC: 139 MMOL/L — SIGNIFICANT CHANGE UP (ref 135–145)
SP GR SPEC: 1.01 — SIGNIFICANT CHANGE UP (ref 1.01–1.02)
UROBILINOGEN FLD QL: NEGATIVE MG/DL — SIGNIFICANT CHANGE UP
WBC # BLD: 8.3 K/UL — SIGNIFICANT CHANGE UP (ref 3.8–10.5)
WBC # FLD AUTO: 8.3 K/UL — SIGNIFICANT CHANGE UP (ref 3.8–10.5)
WBC UR QL: SIGNIFICANT CHANGE UP

## 2017-07-21 PROCEDURE — 99285 EMERGENCY DEPT VISIT HI MDM: CPT

## 2017-07-21 PROCEDURE — 74177 CT ABD & PELVIS W/CONTRAST: CPT | Mod: 26

## 2017-07-21 RX ORDER — CYCLOBENZAPRINE HYDROCHLORIDE 10 MG/1
10 TABLET, FILM COATED ORAL THREE TIMES A DAY
Qty: 0 | Refills: 0 | Status: DISCONTINUED | OUTPATIENT
Start: 2017-07-21 | End: 2017-07-22

## 2017-07-21 RX ORDER — ONDANSETRON 8 MG/1
4 TABLET, FILM COATED ORAL EVERY 6 HOURS
Qty: 0 | Refills: 0 | Status: DISCONTINUED | OUTPATIENT
Start: 2017-07-21 | End: 2017-07-22

## 2017-07-21 RX ORDER — BUDESONIDE AND FORMOTEROL FUMARATE DIHYDRATE 160; 4.5 UG/1; UG/1
2 AEROSOL RESPIRATORY (INHALATION)
Qty: 0 | Refills: 0 | Status: DISCONTINUED | OUTPATIENT
Start: 2017-07-21 | End: 2017-07-22

## 2017-07-21 RX ORDER — ONDANSETRON 8 MG/1
4 TABLET, FILM COATED ORAL ONCE
Qty: 0 | Refills: 0 | Status: COMPLETED | OUTPATIENT
Start: 2017-07-21 | End: 2017-07-21

## 2017-07-21 RX ORDER — SODIUM CHLORIDE 9 MG/ML
1000 INJECTION INTRAMUSCULAR; INTRAVENOUS; SUBCUTANEOUS ONCE
Qty: 0 | Refills: 0 | Status: COMPLETED | OUTPATIENT
Start: 2017-07-21 | End: 2017-07-21

## 2017-07-21 RX ORDER — ALBUTEROL 90 UG/1
2 AEROSOL, METERED ORAL EVERY 6 HOURS
Qty: 0 | Refills: 0 | Status: DISCONTINUED | OUTPATIENT
Start: 2017-07-21 | End: 2017-07-22

## 2017-07-21 RX ORDER — HYDROMORPHONE HYDROCHLORIDE 2 MG/ML
1 INJECTION INTRAMUSCULAR; INTRAVENOUS; SUBCUTANEOUS ONCE
Qty: 0 | Refills: 0 | Status: DISCONTINUED | OUTPATIENT
Start: 2017-07-21 | End: 2017-07-21

## 2017-07-21 RX ORDER — OXYCODONE HYDROCHLORIDE 5 MG/1
10 TABLET ORAL EVERY 6 HOURS
Qty: 0 | Refills: 0 | Status: DISCONTINUED | OUTPATIENT
Start: 2017-07-21 | End: 2017-07-22

## 2017-07-21 RX ORDER — ACETAMINOPHEN 500 MG
650 TABLET ORAL EVERY 6 HOURS
Qty: 0 | Refills: 0 | Status: DISCONTINUED | OUTPATIENT
Start: 2017-07-21 | End: 2017-07-22

## 2017-07-21 RX ORDER — FLUTICASONE PROPIONATE 50 MCG
1 SPRAY, SUSPENSION NASAL DAILY
Qty: 0 | Refills: 0 | Status: DISCONTINUED | OUTPATIENT
Start: 2017-07-21 | End: 2017-07-22

## 2017-07-21 RX ORDER — HEPARIN SODIUM 5000 [USP'U]/ML
5000 INJECTION INTRAVENOUS; SUBCUTANEOUS EVERY 8 HOURS
Qty: 0 | Refills: 0 | Status: DISCONTINUED | OUTPATIENT
Start: 2017-07-21 | End: 2017-07-22

## 2017-07-21 RX ORDER — DEXTROSE MONOHYDRATE, SODIUM CHLORIDE, AND POTASSIUM CHLORIDE 50; .745; 4.5 G/1000ML; G/1000ML; G/1000ML
1000 INJECTION, SOLUTION INTRAVENOUS
Qty: 0 | Refills: 0 | Status: DISCONTINUED | OUTPATIENT
Start: 2017-07-21 | End: 2017-07-22

## 2017-07-21 RX ADMIN — HEPARIN SODIUM 5000 UNIT(S): 5000 INJECTION INTRAVENOUS; SUBCUTANEOUS at 15:12

## 2017-07-21 RX ADMIN — HYDROMORPHONE HYDROCHLORIDE 1 MILLIGRAM(S): 2 INJECTION INTRAMUSCULAR; INTRAVENOUS; SUBCUTANEOUS at 10:40

## 2017-07-21 RX ADMIN — ONDANSETRON 4 MILLIGRAM(S): 8 TABLET, FILM COATED ORAL at 15:46

## 2017-07-21 RX ADMIN — HYDROMORPHONE HYDROCHLORIDE 1 MILLIGRAM(S): 2 INJECTION INTRAMUSCULAR; INTRAVENOUS; SUBCUTANEOUS at 11:11

## 2017-07-21 RX ADMIN — ONDANSETRON 4 MILLIGRAM(S): 8 TABLET, FILM COATED ORAL at 07:05

## 2017-07-21 RX ADMIN — OXYCODONE HYDROCHLORIDE 10 MILLIGRAM(S): 5 TABLET ORAL at 15:12

## 2017-07-21 RX ADMIN — HEPARIN SODIUM 5000 UNIT(S): 5000 INJECTION INTRAVENOUS; SUBCUTANEOUS at 21:06

## 2017-07-21 RX ADMIN — ONDANSETRON 4 MILLIGRAM(S): 8 TABLET, FILM COATED ORAL at 21:06

## 2017-07-21 RX ADMIN — OXYCODONE HYDROCHLORIDE 10 MILLIGRAM(S): 5 TABLET ORAL at 21:06

## 2017-07-21 RX ADMIN — CYCLOBENZAPRINE HYDROCHLORIDE 10 MILLIGRAM(S): 10 TABLET, FILM COATED ORAL at 21:06

## 2017-07-21 RX ADMIN — ONDANSETRON 4 MILLIGRAM(S): 8 TABLET, FILM COATED ORAL at 10:40

## 2017-07-21 RX ADMIN — Medication 75 MILLIGRAM(S): at 22:34

## 2017-07-21 RX ADMIN — Medication 650 MILLIGRAM(S): at 21:05

## 2017-07-21 RX ADMIN — SODIUM CHLORIDE 1000 MILLILITER(S): 9 INJECTION INTRAMUSCULAR; INTRAVENOUS; SUBCUTANEOUS at 07:05

## 2017-07-21 RX ADMIN — HYDROMORPHONE HYDROCHLORIDE 1 MILLIGRAM(S): 2 INJECTION INTRAMUSCULAR; INTRAVENOUS; SUBCUTANEOUS at 07:35

## 2017-07-21 RX ADMIN — DEXTROSE MONOHYDRATE, SODIUM CHLORIDE, AND POTASSIUM CHLORIDE 125 MILLILITER(S): 50; .745; 4.5 INJECTION, SOLUTION INTRAVENOUS at 15:48

## 2017-07-21 RX ADMIN — OXYCODONE HYDROCHLORIDE 10 MILLIGRAM(S): 5 TABLET ORAL at 18:05

## 2017-07-21 RX ADMIN — HYDROMORPHONE HYDROCHLORIDE 1 MILLIGRAM(S): 2 INJECTION INTRAMUSCULAR; INTRAVENOUS; SUBCUTANEOUS at 07:05

## 2017-07-21 NOTE — H&P ADULT - HISTORY OF PRESENT ILLNESS
This is a 42y/o female with PMH of morbid obesity and asthma and PSH of corrective back surgery 8 days ago and Laparoscopic Gastric Bypass surgery in 2009. The pt was discharged from the hospital on this past Tuesday(3days ago) after undergoing back surgery.  The pt was not doing well at home in tolerating her back pain.  Pain meds were not holding her for long and she called doctors office yesterday stating she couldnt take the pain anymore.  The doctor prescribed her flexeril and 3 hours after taking it she woke up wretching and vomiting.  This persisted for hours until she came to the ED.  The pt has not vomitted since she has been in the ED.  But still complains of nausea and abdominal pain and is receiving Zofran.

## 2017-07-21 NOTE — CONSULT NOTE ADULT - PROBLEM SELECTOR RECOMMENDATION 2
management per Dr Nelson  If further surgery can be postponed until wound is further healed, it would be preferrable. If she requires abdominal surgery due to obstruction now, we will follow along post op.

## 2017-07-21 NOTE — H&P ADULT - NSHPLABSRESULTS_GEN_ALL_CORE
10.6   8.3   )-----------( 486      ( 21 Jul 2017 06:38 )             31.4       07-21    139  |  107  |  7   ----------------------------<  101<H>  3.6   |  21<L>  |  0.44<L>    Ca    8.7      21 Jul 2017 06:38    TPro  6.2  /  Alb  2.9<L>  /  TBili  0.3  /  DBili  x   /  AST  29  /  ALT  29  /  AlkPhos  74  07-21    EXAM:  CT ABDOMEN AND PELVIS OC IC                            PROCEDURE DATE:  07/21/2017    IMPRESSION:     Whirling of the vessels in the small bowel mesentery suspicious for   nonobstructing internal hernia.

## 2017-07-21 NOTE — ED PROVIDER NOTE - CONSTITUTIONAL, MLM
normal... Well appearing, well nourished, awake, alert, oriented to person, place, time/situation, actively dry heaving and in moderate distress.

## 2017-07-21 NOTE — ED PROVIDER NOTE - OBJECTIVE STATEMENT
42 y/o female with PMHx of scoliosis s/p back surgery (by Dr. Bah, d/c home on 7/10) gastric bypass (2007 at [facility]), BTL,  x3 presents to the ED c/o epigastric pain. Pt states she experienced piercing pain when laying flat in her back after the surgery, was told to take her pain management meds (Oxycodone) which only provided relief for two hours. Pt called Dr. Bah yesterday and took Cyclobenzaprine after which pt began vomiting, was unable to keep down food. +mild numbness to right leg when pt stands. Pt currently nauseous. No hx of SBO, DM. Non smoker. Denies drug use. Drinks socially. PMD Dr. Trinidad

## 2017-07-21 NOTE — ED ADULT NURSE NOTE - CAS EDN DISCHARGE ASSESSMENT
Alert and oriented to person, place and time/Symptoms improved/Awake/Patient baseline mental status/No adverse reaction to first time med in ED

## 2017-07-21 NOTE — H&P ADULT - NSHPPHYSICALEXAM_GEN_ALL_CORE
Skin:  Warm and dry    HEENT:  PERRLA, no ottorhea, nares patent, buccal mucosa pink and moist    Neck:  non tender to palpation, trachea midline, no JVD    Respiratory: Lungs clear and equal bilat, no r/r/w    Cardiovascular:  S1S2 reg, no M    Gastrointestinal:  non-distended, +NABS, soft, non-tender to deep palpation, no masses palpable, no guarding no rebound    Extremities:  FROM X 4, no c/c/e    Vascular:  2+/4+ bilateral    Neurological:  A & O X3

## 2017-07-21 NOTE — CONSULT NOTE ADULT - PROBLEM SELECTOR RECOMMENDATION 9
Expected course  Maintain spinal precautions with mobility, PO pain meds  F/U in office for retained drains as planned

## 2017-07-21 NOTE — ED ADULT NURSE NOTE - OBJECTIVE STATEMENT
Pt presents to the ED with abdominal pain s/p back sx Tuesday. Pt states she was having pain when laying down and nerve pain radiating to the right knee, pt called her surgical MD in regards to the pain and her medication was switched to cyclobenaprine. Pt states after the medication she had a decrease in appetite and began dry heaving but had no episodes of vomiting. Pt states that she kept waking up in the middle of the night due to abdominal pain and nausea as well as the back pain. Pt denies any fevers at home.

## 2017-07-21 NOTE — H&P ADULT - ASSESSMENT
ASS:  ABDOMINAL PAIN, INTERNAL HERNIA WITHOUT OBSTRUCTION  PLAN:  ADMIT TO DR. SANCHEZ, CLEAR LIQUID DIET, IVF, OBSERVATION

## 2017-07-21 NOTE — H&P ADULT - ATTENDING COMMENTS
The patient was seen and examined.  I agree with the above findings.  The internal hernia was found incidentally.  We will monitor her abdominal exams.  If her pain does not improve or get significantly worse we will repair her internal hernia.  She does not want an operation currently due to her recent back surgery.

## 2017-07-21 NOTE — CONSULT NOTE ADULT - SUBJECTIVE AND OBJECTIVE BOX
Patient well known to our service  S/P complex reconstruction of lumbar spine by Dr Aguilar on 7/10  L3-4,L4-5 laminectomy, osteotomy and T10- S1 instrumented fusion  Post op course was uneventful and was discharged home 7/18/2017.  Her wound closure was per Dr Dimas and she was D/C'd home with 2 drains (SHAWNA)    She describes usual post op pain. Some residual left knee pain.  No apparent fevers or wound drainage    Saw Dr Aguilar and Dr Dimas earlier in week  Drains left in    Last night had intractable abd pain and vomiting.  Presented to ED  Was found to have an ?internal hernia? (?related to her previous gastric bypass surgery 10 years ago)  Admitted by general surgery    Was told she may need further surgery for this bowel obstruction.    No new LE symptoms      EXAM:    Serous drainage in bilateral SHAWNA drains  35 cc over last 24 hrs bilat  Wounds clean dry no erythema or drainage  Abd: soft  LE - well perfused, No DVT noted  NEuro: intact motor/sens  Left knee - painful along lateral joint line -no erythema or effusion

## 2017-07-21 NOTE — ED ADULT TRIAGE NOTE - CHIEF COMPLAINT QUOTE
Patient c/o nausea, vomiting and chills. HX of spinal surgery for scoliosis on July 10, patient discharged on tuesday. patient is on lyrica and oxycodone

## 2017-07-22 LAB
ANION GAP SERPL CALC-SCNC: 10 MMOL/L — SIGNIFICANT CHANGE UP (ref 5–17)
BASOPHILS # BLD AUTO: 0.1 K/UL — SIGNIFICANT CHANGE UP (ref 0–0.2)
BASOPHILS NFR BLD AUTO: 0.8 % — SIGNIFICANT CHANGE UP (ref 0–2)
BLD GP AB SCN SERPL QL: SIGNIFICANT CHANGE UP
BUN SERPL-MCNC: 4 MG/DL — LOW (ref 7–23)
CALCIUM SERPL-MCNC: 8.9 MG/DL — SIGNIFICANT CHANGE UP (ref 8.5–10.1)
CHLORIDE SERPL-SCNC: 106 MMOL/L — SIGNIFICANT CHANGE UP (ref 96–108)
CO2 SERPL-SCNC: 22 MMOL/L — SIGNIFICANT CHANGE UP (ref 22–31)
CREAT SERPL-MCNC: 0.47 MG/DL — LOW (ref 0.5–1.3)
EOSINOPHIL # BLD AUTO: 0.1 K/UL — SIGNIFICANT CHANGE UP (ref 0–0.5)
EOSINOPHIL NFR BLD AUTO: 1.5 % — SIGNIFICANT CHANGE UP (ref 0–6)
GLUCOSE SERPL-MCNC: 110 MG/DL — HIGH (ref 70–99)
HCG UR QL: NEGATIVE — SIGNIFICANT CHANGE UP
HCT VFR BLD CALC: 31.8 % — LOW (ref 34.5–45)
HGB BLD-MCNC: 10.6 G/DL — LOW (ref 11.5–15.5)
LYMPHOCYTES # BLD AUTO: 1.9 K/UL — SIGNIFICANT CHANGE UP (ref 1–3.3)
LYMPHOCYTES # BLD AUTO: 24.3 % — SIGNIFICANT CHANGE UP (ref 13–44)
MCHC RBC-ENTMCNC: 28.9 PG — SIGNIFICANT CHANGE UP (ref 27–34)
MCHC RBC-ENTMCNC: 33.3 GM/DL — SIGNIFICANT CHANGE UP (ref 32–36)
MCV RBC AUTO: 86.7 FL — SIGNIFICANT CHANGE UP (ref 80–100)
MONOCYTES # BLD AUTO: 0.5 K/UL — SIGNIFICANT CHANGE UP (ref 0–0.9)
MONOCYTES NFR BLD AUTO: 6.9 % — SIGNIFICANT CHANGE UP (ref 2–14)
NEUTROPHILS # BLD AUTO: 5.1 K/UL — SIGNIFICANT CHANGE UP (ref 1.8–7.4)
NEUTROPHILS NFR BLD AUTO: 66.5 % — SIGNIFICANT CHANGE UP (ref 43–77)
PLATELET # BLD AUTO: 500 K/UL — HIGH (ref 150–400)
POTASSIUM SERPL-MCNC: 4 MMOL/L — SIGNIFICANT CHANGE UP (ref 3.5–5.3)
POTASSIUM SERPL-SCNC: 4 MMOL/L — SIGNIFICANT CHANGE UP (ref 3.5–5.3)
RBC # BLD: 3.67 M/UL — LOW (ref 3.8–5.2)
RBC # FLD: 12.9 % — SIGNIFICANT CHANGE UP (ref 10.3–14.5)
SODIUM SERPL-SCNC: 138 MMOL/L — SIGNIFICANT CHANGE UP (ref 135–145)
TYPE + AB SCN PNL BLD: SIGNIFICANT CHANGE UP
WBC # BLD: 7.7 K/UL — SIGNIFICANT CHANGE UP (ref 3.8–10.5)
WBC # FLD AUTO: 7.7 K/UL — SIGNIFICANT CHANGE UP (ref 3.8–10.5)

## 2017-07-22 RX ORDER — OXYCODONE AND ACETAMINOPHEN 5; 325 MG/1; MG/1
1 TABLET ORAL EVERY 4 HOURS
Qty: 0 | Refills: 0 | Status: DISCONTINUED | OUTPATIENT
Start: 2017-07-22 | End: 2017-07-24

## 2017-07-22 RX ORDER — SODIUM CHLORIDE 9 MG/ML
1000 INJECTION INTRAMUSCULAR; INTRAVENOUS; SUBCUTANEOUS
Qty: 0 | Refills: 0 | Status: DISCONTINUED | OUTPATIENT
Start: 2017-07-22 | End: 2017-07-22

## 2017-07-22 RX ORDER — ONDANSETRON 8 MG/1
4 TABLET, FILM COATED ORAL EVERY 6 HOURS
Qty: 0 | Refills: 0 | Status: DISCONTINUED | OUTPATIENT
Start: 2017-07-22 | End: 2017-07-24

## 2017-07-22 RX ORDER — MEPERIDINE HYDROCHLORIDE 50 MG/ML
12.5 INJECTION INTRAMUSCULAR; INTRAVENOUS; SUBCUTANEOUS
Qty: 0 | Refills: 0 | Status: DISCONTINUED | OUTPATIENT
Start: 2017-07-22 | End: 2017-07-22

## 2017-07-22 RX ORDER — FAMOTIDINE 10 MG/ML
20 INJECTION INTRAVENOUS EVERY 12 HOURS
Qty: 0 | Refills: 0 | Status: DISCONTINUED | OUTPATIENT
Start: 2017-07-22 | End: 2017-07-24

## 2017-07-22 RX ORDER — MORPHINE SULFATE 50 MG/1
4 CAPSULE, EXTENDED RELEASE ORAL EVERY 4 HOURS
Qty: 0 | Refills: 0 | Status: DISCONTINUED | OUTPATIENT
Start: 2017-07-22 | End: 2017-07-24

## 2017-07-22 RX ORDER — SODIUM CHLORIDE 9 MG/ML
1000 INJECTION INTRAMUSCULAR; INTRAVENOUS; SUBCUTANEOUS
Qty: 0 | Refills: 0 | Status: DISCONTINUED | OUTPATIENT
Start: 2017-07-22 | End: 2017-07-24

## 2017-07-22 RX ORDER — OXYCODONE HYDROCHLORIDE 5 MG/1
5 TABLET ORAL EVERY 4 HOURS
Qty: 0 | Refills: 0 | Status: DISCONTINUED | OUTPATIENT
Start: 2017-07-22 | End: 2017-07-22

## 2017-07-22 RX ORDER — HYDROMORPHONE HYDROCHLORIDE 2 MG/ML
0.5 INJECTION INTRAMUSCULAR; INTRAVENOUS; SUBCUTANEOUS
Qty: 0 | Refills: 0 | Status: DISCONTINUED | OUTPATIENT
Start: 2017-07-22 | End: 2017-07-22

## 2017-07-22 RX ORDER — ONDANSETRON 8 MG/1
4 TABLET, FILM COATED ORAL ONCE
Qty: 0 | Refills: 0 | Status: DISCONTINUED | OUTPATIENT
Start: 2017-07-22 | End: 2017-07-22

## 2017-07-22 RX ADMIN — HEPARIN SODIUM 5000 UNIT(S): 5000 INJECTION INTRAVENOUS; SUBCUTANEOUS at 05:03

## 2017-07-22 RX ADMIN — MORPHINE SULFATE 4 MILLIGRAM(S): 50 CAPSULE, EXTENDED RELEASE ORAL at 15:27

## 2017-07-22 RX ADMIN — OXYCODONE AND ACETAMINOPHEN 1 TABLET(S): 5; 325 TABLET ORAL at 22:59

## 2017-07-22 RX ADMIN — OXYCODONE HYDROCHLORIDE 5 MILLIGRAM(S): 5 TABLET ORAL at 14:37

## 2017-07-22 RX ADMIN — CYCLOBENZAPRINE HYDROCHLORIDE 10 MILLIGRAM(S): 10 TABLET, FILM COATED ORAL at 05:04

## 2017-07-22 RX ADMIN — MORPHINE SULFATE 4 MILLIGRAM(S): 50 CAPSULE, EXTENDED RELEASE ORAL at 21:31

## 2017-07-22 RX ADMIN — MORPHINE SULFATE 4 MILLIGRAM(S): 50 CAPSULE, EXTENDED RELEASE ORAL at 22:30

## 2017-07-22 RX ADMIN — OXYCODONE AND ACETAMINOPHEN 1 TABLET(S): 5; 325 TABLET ORAL at 19:30

## 2017-07-22 RX ADMIN — DEXTROSE MONOHYDRATE, SODIUM CHLORIDE, AND POTASSIUM CHLORIDE 125 MILLILITER(S): 50; .745; 4.5 INJECTION, SOLUTION INTRAVENOUS at 00:58

## 2017-07-22 RX ADMIN — OXYCODONE HYDROCHLORIDE 10 MILLIGRAM(S): 5 TABLET ORAL at 00:33

## 2017-07-22 RX ADMIN — ONDANSETRON 4 MILLIGRAM(S): 8 TABLET, FILM COATED ORAL at 21:39

## 2017-07-22 RX ADMIN — OXYCODONE HYDROCHLORIDE 10 MILLIGRAM(S): 5 TABLET ORAL at 06:46

## 2017-07-22 RX ADMIN — ONDANSETRON 4 MILLIGRAM(S): 8 TABLET, FILM COATED ORAL at 05:03

## 2017-07-22 RX ADMIN — SODIUM CHLORIDE 75 MILLILITER(S): 9 INJECTION INTRAMUSCULAR; INTRAVENOUS; SUBCUTANEOUS at 18:06

## 2017-07-22 RX ADMIN — ONDANSETRON 4 MILLIGRAM(S): 8 TABLET, FILM COATED ORAL at 10:03

## 2017-07-22 RX ADMIN — OXYCODONE HYDROCHLORIDE 10 MILLIGRAM(S): 5 TABLET ORAL at 10:02

## 2017-07-22 RX ADMIN — OXYCODONE HYDROCHLORIDE 10 MILLIGRAM(S): 5 TABLET ORAL at 05:03

## 2017-07-22 RX ADMIN — OXYCODONE AND ACETAMINOPHEN 1 TABLET(S): 5; 325 TABLET ORAL at 18:06

## 2017-07-22 NOTE — BRIEF OPERATIVE NOTE - SURGICAL START DATE/TIME
22-Jul-2017 11:52 89 yo male who recently started on chemo admitted after near syncope while waiting for communion without eating or drinking and noted to have leukocytosis, dry cough but negative imaging, low procalcitonin

## 2017-07-22 NOTE — PROGRESS NOTE ADULT - PROBLEM SELECTOR PLAN 1
to or for diagnostic laparoscopy reduction of internal hernia and closure of internal hernia.  All risk and complications were discussed with her in detail.

## 2017-07-22 NOTE — PROGRESS NOTE ADULT - SUBJECTIVE AND OBJECTIVE BOX
The patient is complains of worsening abdominal pain    Vital Signs Last 24 Hrs  T(C): 37 (22 Jul 2017 04:44), Max: 37.3 (21 Jul 2017 21:04)  T(F): 98.6 (22 Jul 2017 04:44), Max: 99.1 (21 Jul 2017 21:04)  HR: 73 (22 Jul 2017 04:44) (73 - 79)  BP: 152/82 (22 Jul 2017 04:44) (116/69 - 152/82)  BP(mean): --  RR: 18 (22 Jul 2017 04:44) (16 - 18)  SpO2: 100% (22 Jul 2017 04:44) (95% - 100%)    PHYSICAL EXAM:  General: NAD.  HEENT: no JVD, no jaundice.  LUNGS: CTAB.  Heart: S1 S2 RRR  Abd: soft mildly tender, non distended                          10.6   7.7   )-----------( 500      ( 22 Jul 2017 05:31 )             31.8       07-22    138  |  106  |  4<L>  ----------------------------<  110<H>  4.0   |  22  |  0.47<L>    Ca    8.9      22 Jul 2017 05:31    TPro  6.2  /  Alb  2.9<L>  /  TBili  0.3  /  DBili  x   /  AST  29  /  ALT  29  /  AlkPhos  74  07-21

## 2017-07-23 LAB
ANION GAP SERPL CALC-SCNC: 8 MMOL/L — SIGNIFICANT CHANGE UP (ref 5–17)
BASOPHILS # BLD AUTO: 0 K/UL — SIGNIFICANT CHANGE UP (ref 0–0.2)
BASOPHILS NFR BLD AUTO: 0.3 % — SIGNIFICANT CHANGE UP (ref 0–2)
BUN SERPL-MCNC: 5 MG/DL — LOW (ref 7–23)
CALCIUM SERPL-MCNC: 8 MG/DL — LOW (ref 8.5–10.1)
CHLORIDE SERPL-SCNC: 106 MMOL/L — SIGNIFICANT CHANGE UP (ref 96–108)
CO2 SERPL-SCNC: 25 MMOL/L — SIGNIFICANT CHANGE UP (ref 22–31)
CREAT SERPL-MCNC: 0.36 MG/DL — LOW (ref 0.5–1.3)
EOSINOPHIL # BLD AUTO: 0.1 K/UL — SIGNIFICANT CHANGE UP (ref 0–0.5)
EOSINOPHIL NFR BLD AUTO: 1.2 % — SIGNIFICANT CHANGE UP (ref 0–6)
GLUCOSE SERPL-MCNC: 89 MG/DL — SIGNIFICANT CHANGE UP (ref 70–99)
HCT VFR BLD CALC: 27.1 % — LOW (ref 34.5–45)
HGB BLD-MCNC: 8.7 G/DL — LOW (ref 11.5–15.5)
LYMPHOCYTES # BLD AUTO: 1.8 K/UL — SIGNIFICANT CHANGE UP (ref 1–3.3)
LYMPHOCYTES # BLD AUTO: 22.2 % — SIGNIFICANT CHANGE UP (ref 13–44)
MCHC RBC-ENTMCNC: 27.9 PG — SIGNIFICANT CHANGE UP (ref 27–34)
MCHC RBC-ENTMCNC: 32.1 GM/DL — SIGNIFICANT CHANGE UP (ref 32–36)
MCV RBC AUTO: 86.9 FL — SIGNIFICANT CHANGE UP (ref 80–100)
MONOCYTES # BLD AUTO: 0.6 K/UL — SIGNIFICANT CHANGE UP (ref 0–0.9)
MONOCYTES NFR BLD AUTO: 7.7 % — SIGNIFICANT CHANGE UP (ref 2–14)
NEUTROPHILS # BLD AUTO: 5.6 K/UL — SIGNIFICANT CHANGE UP (ref 1.8–7.4)
NEUTROPHILS NFR BLD AUTO: 68.5 % — SIGNIFICANT CHANGE UP (ref 43–77)
PLATELET # BLD AUTO: 458 K/UL — HIGH (ref 150–400)
POTASSIUM SERPL-MCNC: 3.6 MMOL/L — SIGNIFICANT CHANGE UP (ref 3.5–5.3)
POTASSIUM SERPL-SCNC: 3.6 MMOL/L — SIGNIFICANT CHANGE UP (ref 3.5–5.3)
RBC # BLD: 3.12 M/UL — LOW (ref 3.8–5.2)
RBC # FLD: 12.5 % — SIGNIFICANT CHANGE UP (ref 10.3–14.5)
SODIUM SERPL-SCNC: 139 MMOL/L — SIGNIFICANT CHANGE UP (ref 135–145)
WBC # BLD: 8.2 K/UL — SIGNIFICANT CHANGE UP (ref 3.8–10.5)
WBC # FLD AUTO: 8.2 K/UL — SIGNIFICANT CHANGE UP (ref 3.8–10.5)

## 2017-07-23 RX ADMIN — OXYCODONE AND ACETAMINOPHEN 1 TABLET(S): 5; 325 TABLET ORAL at 09:36

## 2017-07-23 RX ADMIN — OXYCODONE AND ACETAMINOPHEN 1 TABLET(S): 5; 325 TABLET ORAL at 00:00

## 2017-07-23 RX ADMIN — MORPHINE SULFATE 4 MILLIGRAM(S): 50 CAPSULE, EXTENDED RELEASE ORAL at 09:11

## 2017-07-23 RX ADMIN — MORPHINE SULFATE 4 MILLIGRAM(S): 50 CAPSULE, EXTENDED RELEASE ORAL at 17:48

## 2017-07-23 RX ADMIN — SODIUM CHLORIDE 75 MILLILITER(S): 9 INJECTION INTRAMUSCULAR; INTRAVENOUS; SUBCUTANEOUS at 07:59

## 2017-07-23 RX ADMIN — MORPHINE SULFATE 4 MILLIGRAM(S): 50 CAPSULE, EXTENDED RELEASE ORAL at 02:33

## 2017-07-23 RX ADMIN — OXYCODONE AND ACETAMINOPHEN 1 TABLET(S): 5; 325 TABLET ORAL at 14:30

## 2017-07-23 RX ADMIN — MORPHINE SULFATE 4 MILLIGRAM(S): 50 CAPSULE, EXTENDED RELEASE ORAL at 07:56

## 2017-07-23 RX ADMIN — MORPHINE SULFATE 4 MILLIGRAM(S): 50 CAPSULE, EXTENDED RELEASE ORAL at 18:53

## 2017-07-23 RX ADMIN — MORPHINE SULFATE 4 MILLIGRAM(S): 50 CAPSULE, EXTENDED RELEASE ORAL at 17:59

## 2017-07-23 RX ADMIN — MORPHINE SULFATE 4 MILLIGRAM(S): 50 CAPSULE, EXTENDED RELEASE ORAL at 03:00

## 2017-07-23 RX ADMIN — MORPHINE SULFATE 4 MILLIGRAM(S): 50 CAPSULE, EXTENDED RELEASE ORAL at 22:10

## 2017-07-23 RX ADMIN — MORPHINE SULFATE 4 MILLIGRAM(S): 50 CAPSULE, EXTENDED RELEASE ORAL at 12:29

## 2017-07-23 RX ADMIN — ONDANSETRON 4 MILLIGRAM(S): 8 TABLET, FILM COATED ORAL at 18:07

## 2017-07-23 NOTE — PROGRESS NOTE ADULT - SUBJECTIVE AND OBJECTIVE BOX
Pt POD#1 s/p internal hernia repair s/p L3-4, L4- laminectomy, osteotomy and T10-S1 instrumented fusion. Pt with uneventful post-op course was discharged on 7/18/2017. Had multiple bowel movements 7/19/2017 presented to hospital for worsening of abd pain on 7/21/2017. She states abd pain progressively worsened yesterday. Pt seen resting in bed today. She c/o abd incisional site pain. Pain of the low back tolerable. She denies lower extremities pain, numbness, and weakness. She is voiding on own without complications. Pain tolerable with current regimen.    PE  Gen apperance: NAD  Motor strengthL 5/5 of b/l lower ext  Sensation: intact  No calf tenderness  Abd: Soft. Incisional sites noted.   Low back incisional site with 2 drains 10cc and 20cc serosang drainage.     Plan  Afebrile. VSS.  WBC:wnl, H/H: 8.7/27.1L, BUN:5, Creatinine: 0.35L  Brace noted at bedside. Pt POD#1 s/p internal hernia repair s/p L3-4, L4- laminectomy, osteotomy and T10-S1 instrumented fusion. Pt with uneventful post-op course was discharged on 7/18/2017. Had multiple bowel movements 7/19/2017 presented to hospital for worsening of abd pain on 7/21/2017. She states abd pain progressively worsened yesterday and was sent to OR for internal hernia repair. Pt seen resting in bed today. She c/o abd incisional site pain. Pain of the low back tolerable. She denies lower extremities pain, numbness, and weakness. She is voiding on own without complications. Pain tolerable with current regimen.    PE  Gen apperance: NAD  Motor strengthL 5/5 of b/l lower ext  Sensation: intact  No calf tenderness  Abd: Soft. Incisional sites noted.   Low back incisional site with 2 drains 10cc and 20cc serosang drainage removed. Discussed with Dr. Dimas     Plan  Afebrile. VSS.  WBC:wnl, H/H: 8.7/27.1L, BUN:5, Creatinine: 0.35L  Brace noted at bedside.   Drains removed. Discussed with Dr. Dimas.   Discussed case with Dr. Weathers. Pt POD#1 s/p internal hernia repair s/p L3-4, L4- laminectomy, osteotomy and T10-S1 instrumented fusion. Pt with uneventful post-op course was discharged on 7/18/2017. Had multiple bowel movements 7/19/2017 presented to hospital for worsening of abd pain on 7/21/2017. She states abd pain progressively worsened yesterday and was sent to OR for internal hernia repair. Pt seen resting in bed today. She c/o abd incisional site pain. Pain of the low back tolerable. She denies lower extremities pain, numbness, and weakness. She is voiding on own without complications. Pain tolerable with current regimen.    PE  Gen apperance: NAD  Motor strengthL 5/5 of b/l lower ext  Sensation: intact  No calf tenderness  Abd: Soft. Incisional sites noted.   Low back incisional site with 2 drains 10cc and 20cc serosang drainage removed. Discussed with Dr. Dimas     Plan  Afebrile. VSS.  WBC:wnl, H/H: 8.7/27.1L, BUN:5, Creatinine: 0.35L  Brace noted at bedside.   Drains removed. Discussed with Dr. Dimas.   DVT prophylaxis. Mobilize with physical therapy once cleared by Bariatric Surgical service.   Discussed case with Dr. Weathers. Pt POD#1 s/p internal hernia repair s/p L3-4, L4- laminectomy, osteotomy and T10-S1 instrumented fusion. Pt with uneventful post-op course was discharged on 7/18/2017. Had multiple bowel movements 7/19/2017 presented to hospital for worsening of abd pain on 7/21/2017. She states abd pain progressively worsened yesterday and was sent to OR for internal hernia repair. Pt seen resting in bed today. She c/o abd incisional site pain. Pain of the low back tolerable. She denies lower extremities pain, numbness, and weakness. She is voiding on own without complications. Pain tolerable with current medication regimen.    PE  Gen apperance: NAD  Motor strengthL 5/5 of b/l lower ext  Sensation: intact  No calf tenderness  Abd: Soft. Incisional sites noted.   Low back incisional site with 2 drains 10cc and 20cc serosang drainage removed. Discussed with Dr. Dimas     Plan  Afebrile. VSS.  WBC:wnl, H/H: 8.7/27.1L, BUN:5, Creatinine: 0.35L  Brace noted at bedside.   Drains removed. Discussed with Dr. Dimas.   DVT prophylaxis. Mobilize with physical therapy once cleared by Bariatric Surgical service.   Discussed case with Dr. Weathers. Pt POD#1 s/p internal hernia repair s/p L3-4, L4- laminectomy, osteotomy and T10-S1 instrumented fusion. Pt with uneventful post-op course was discharged on 7/18/2017. Had multiple bowel movements 7/19/2017 presented to hospital for worsening of abd pain on 7/21/2017. She states abd pain progressively worsened yesterday and was sent to OR for internal hernia repair. Pt seen resting in bed today. She c/o abd incisional site pain. Pain of the low back tolerable. She denies lower extremities pain, numbness, and weakness. She is voiding on own without complications. Pain tolerable with current medication regimen.    PE  Gen apperance: NAD  Motor strengthL 5/5 of b/l lower ext  Sensation: intact  No calf tenderness  Abd: Soft. Incisional sites noted.   Low back incisional site with 2 drains 10cc and 20cc serosang drainage removed. Incisional site superior aspect healing well. Inferior aspect with delayed healing and serous drainage noted minimal.  Discussed with Dr. Dimas--- will evaluate tommorow    Plan  Afebrile. VSS.  WBC:wnl, H/H: 8.7/27.1L, BUN:5, Creatinine: 0.35L  Brace noted at bedside.   Drains removed. Incisional  and observation. Will be assessed tomorrow by Dr. Dimas.   DVT prophylaxis. Mobilize with physical therapy once cleared by Bariatric Surgical service.   Discussed case with Dr. Weathers.

## 2017-07-23 NOTE — PROGRESS NOTE ADULT - SUBJECTIVE AND OBJECTIVE BOX
Post Op Day#: 1  Procedure:  Laparoscopic Internal hernia repair    The patient is doing well without complaints.    Vital Signs Last 24 Hrs  T(C): 36.8 (23 Jul 2017 05:42), Max: 37.3 (22 Jul 2017 21:29)  T(F): 98.2 (23 Jul 2017 05:42), Max: 99.1 (22 Jul 2017 21:29)  HR: 71 (23 Jul 2017 05:42) (69 - 97)  BP: 115/62 (23 Jul 2017 05:42) (102/55 - 147/77)  BP(mean): --  RR: 17 (23 Jul 2017 05:42) (14 - 22)  SpO2: 98% (23 Jul 2017 05:42) (97% - 100%)    PHYSICAL EXAM:  General: NAD.  HEENT: no JVD, no jaundice.  LUNGS: CTAB.  Heart: S1 S2 RRR  Abd: soft nt/nd   Wounds: clean dry and intact                          8.7    8.2   )-----------( 458      ( 23 Jul 2017 06:27 )             27.1       07-23    139  |  106  |  5<L>  ----------------------------<  89  3.6   |  25  |  0.36<L>    Ca    8.0<L>      23 Jul 2017 06:27

## 2017-07-23 NOTE — PROGRESS NOTE ADULT - PROBLEM SELECTOR PLAN 1
regular diet  oob with physical therapy  if tavares regular patient can go home tomorrow  pain control

## 2017-07-23 NOTE — DIETITIAN INITIAL EVALUATION ADULT. - OTHER INFO
consult for N/V, pt on CC diet with no snacks.  Gastric bypass in 2009, pt s/p back surgery.  Dx of internal hernia w/o obstruction. consult for N/V, pt on CC diet with no snacks.  Gastric bypass in 2009, pt s/p back surgery.  Dx of internal hernia w/o obstruction. Pt says she lost appetite 2 weeks ago because of pain, typically eats healthful diet, yogurt, oatmeal.  Pt now of full liquid diet, PO intake increasing.  Pt still has some nausea, vomiting has resolved.

## 2017-07-23 NOTE — DIETITIAN INITIAL EVALUATION ADULT. - ENERGY NEEDS
Ht.    64  "        Wt.  70      kg               BMI   31.6               IBW      65 kg               Pt is at   130 %  IBW

## 2017-07-24 ENCOUNTER — TRANSCRIPTION ENCOUNTER (OUTPATIENT)
Age: 42
End: 2017-07-24

## 2017-07-24 VITALS
HEART RATE: 77 BPM | DIASTOLIC BLOOD PRESSURE: 71 MMHG | OXYGEN SATURATION: 99 % | RESPIRATION RATE: 17 BRPM | TEMPERATURE: 98 F | SYSTOLIC BLOOD PRESSURE: 103 MMHG

## 2017-07-24 LAB
HCT VFR BLD CALC: 26.6 % — LOW (ref 34.5–45)
HGB BLD-MCNC: 8.6 G/DL — LOW (ref 11.5–15.5)
MCHC RBC-ENTMCNC: 27.8 PG — SIGNIFICANT CHANGE UP (ref 27–34)
MCHC RBC-ENTMCNC: 32.2 GM/DL — SIGNIFICANT CHANGE UP (ref 32–36)
MCV RBC AUTO: 86.3 FL — SIGNIFICANT CHANGE UP (ref 80–100)
PLATELET # BLD AUTO: 476 K/UL — HIGH (ref 150–400)
RBC # BLD: 3.09 M/UL — LOW (ref 3.8–5.2)
RBC # FLD: 12.5 % — SIGNIFICANT CHANGE UP (ref 10.3–14.5)
WBC # BLD: 6.4 K/UL — SIGNIFICANT CHANGE UP (ref 3.8–10.5)
WBC # FLD AUTO: 6.4 K/UL — SIGNIFICANT CHANGE UP (ref 3.8–10.5)

## 2017-07-24 RX ADMIN — MORPHINE SULFATE 4 MILLIGRAM(S): 50 CAPSULE, EXTENDED RELEASE ORAL at 06:10

## 2017-07-24 RX ADMIN — MORPHINE SULFATE 4 MILLIGRAM(S): 50 CAPSULE, EXTENDED RELEASE ORAL at 02:20

## 2017-07-24 RX ADMIN — MORPHINE SULFATE 4 MILLIGRAM(S): 50 CAPSULE, EXTENDED RELEASE ORAL at 10:20

## 2017-07-24 RX ADMIN — MORPHINE SULFATE 4 MILLIGRAM(S): 50 CAPSULE, EXTENDED RELEASE ORAL at 02:45

## 2017-07-24 RX ADMIN — MORPHINE SULFATE 4 MILLIGRAM(S): 50 CAPSULE, EXTENDED RELEASE ORAL at 12:01

## 2017-07-24 NOTE — DISCHARGE NOTE ADULT - HOSPITAL COURSE
patient admitted for nausea and vomitng s/p lap gastric bypass 10 years ago. found to have an internal hernia on ct scan.  pt was taken to the or for repair.  did well postop dc home

## 2017-07-24 NOTE — DISCHARGE NOTE ADULT - MEDICATION SUMMARY - MEDICATIONS TO TAKE
I will START or STAY ON the medications listed below when I get home from the hospital:    acetaminophen 325 mg oral tablet  -- 2 tab(s) by mouth every 6 hours, As needed, Mild Pain (1 - 3)  -- Indication: For EPIGASTRIC PAIN/VOMITING    acetaminophen-oxycodone 325 mg-5 mg oral tablet  -- 2 tab(s) by mouth every 4 hours, As needed, Moderate Pain (4 - 6)  -- Indication: For EPIGASTRIC PAIN/VOMITING    Advair Diskus 500 mcg-50 mcg inhalation powder  -- 1 puff(s) inhaled 2 times a day  -- Indication: For EPIGASTRIC PAIN/VOMITING    Ventolin HFA 90 mcg/inh inhalation aerosol  -- 2 puff(s) inhaled 4 times a day, As Needed  -- Indication: For EPIGASTRIC PAIN/VOMITING    mupirocin topical  -- Apply on skin to affected area   -- Indication: For EPIGASTRIC PAIN/VOMITING    ferrous sulfate 325 mg (65 mg elemental iron) oral tablet  -- 1 tab(s) by mouth 3 times a day  -- Indication: For EPIGASTRIC PAIN/VOMITING    cyclobenzaprine  --  by mouth   -- Indication: For EPIGASTRIC PAIN/VOMITING    tiZANidine 4 mg oral capsule  -- 2 cap(s) by mouth 3 times a day  -- Indication: For EPIGASTRIC PAIN/VOMITING    fluticasone propionate  -- 2 spray(s) by mouth once a day  -- Indication: For EPIGASTRIC PAIN/VOMITING

## 2017-07-24 NOTE — PROGRESS NOTE ADULT - PROBLEM SELECTOR PLAN 1
tolerating regular diet  The patient is s/p lap internal hernia repair and doing very well.  All discharge instructions were given to the patient, as well as potential signs of complications.  The patient will follow up in 2 weeks.  [facility]

## 2017-07-24 NOTE — PROGRESS NOTE ADULT - SUBJECTIVE AND OBJECTIVE BOX
Post Op Day#: 2  Procedure:  Laparoscopic Internal hernia repair    The patient is doing well without complaints.    Vital Signs Last 24 Hrs  T(C): 36.9 (24 Jul 2017 05:27), Max: 37.2 (23 Jul 2017 21:16)  T(F): 98.5 (24 Jul 2017 05:27), Max: 98.9 (23 Jul 2017 21:16)  HR: 64 (24 Jul 2017 05:27) (64 - 74)  BP: 113/63 (24 Jul 2017 05:27) (113/63 - 123/71)  BP(mean): --  RR: 17 (24 Jul 2017 05:27) (16 - 17)  SpO2: 99% (24 Jul 2017 05:27) (99% - 100%)    PHYSICAL EXAM:  General: NAD.  HEENT: no JVD, no jaundice.  LUNGS: CTAB.  Heart: S1 S2 RRR  Abd: soft nt/nd   Wounds: clean dry and intact                          8.6    6.4   )-----------( 476      ( 24 Jul 2017 05:36 )             26.6       07-23    139  |  106  |  5<L>  ----------------------------<  89  3.6   |  25  |  0.36<L>    Ca    8.0<L>      23 Jul 2017 06:27

## 2017-07-24 NOTE — PROGRESS NOTE ADULT - SUBJECTIVE AND OBJECTIVE BOX
Pt POD#2 s/p internal hernia repair s/p L3-4, L4- laminectomy, osteotomy and T10-S1 instrumented fusion. Pt with uneventful post-op course was discharged on 7/18/2017.  Pt seen resting in bed with Daughter at bedside. Pt c/o incisional site pain of the back and abd region. She denies lower extremities pain, numbness, and weakness. She is voiding.     PE  Gen appearance: NAD  Motor strength: 5/5 of b/l lower ext  Sensation: intact  No calf tenderness bilaterally  Incisional site: Dressing with serous drainage on the left inferior aspect where drain was removed yesterday. No active drainage of wound. Delayed healing of the inferior aspect of incisional site.  Abd: soft and ND. Incisional sites noted    Plan  Afebrile, BP:113/63  H/H: 8.6/26.6L WBC:wnl  Mobilize with physical therapy and incentive spirometer encouraged.  Incisional site evaluation by Plastic service. Pt POD#2 s/p internal hernia repair s/p L3-4, L4- laminectomy, osteotomy and T10-S1 instrumented fusion. Pt with uneventful post-op course was discharged on 7/18/2017.  Pt seen resting in bed with Daughter at bedside. Pt c/o incisional site pain of the back and abd region. She denies lower extremities pain, numbness, and weakness. She is voiding.     PE  Gen appearance: NAD  Motor strength: 5/5 of b/l lower ext  Sensation: intact  No calf tenderness bilaterally  Incisional site: Dressing with serous drainage on the left inferior aspect where drain was removed yesterday. No active drainage of wound. Delayed healing of the inferior aspect of incisional site.  Abd: soft and ND. Incisional sites noted    Plan  Afebrile, BP:113/63  H/H: 8.6/26.6L WBC:wnl  Mobilize with physical therapy and incentive spirometer encouraged.  Incisional site evaluate per Plastic service.

## 2017-07-24 NOTE — DISCHARGE NOTE ADULT - PATIENT PORTAL LINK FT
“You can access the [facility] Patient Portal, offered by [facility], by registering with the following website: [email or web address]”

## 2017-07-24 NOTE — PHYSICAL THERAPY INITIAL EVALUATION ADULT - MODALITIES TREATMENT COMMENTS
pt left seated in chair post Eval; chair alarm / back brace donned; daughter present; callbell in reach; pt instructed not to get up alone; call nursing for assist; tavares well; pain level 1/10 post Eval; RN Monae made aware pt OOB

## 2017-07-24 NOTE — DISCHARGE NOTE ADULT - CARE PLAN
Principal Discharge DX:	Internal hernia  Goal:	recover from surgery  Instructions for follow-up, activity and diet:	follow up in 2 weeks, regular bariatric diet

## 2017-07-27 DIAGNOSIS — K46.9 UNSPECIFIED ABDOMINAL HERNIA WITHOUT OBSTRUCTION OR GANGRENE: ICD-10-CM

## 2017-08-02 ENCOUNTER — EMERGENCY (EMERGENCY)
Facility: HOSPITAL | Age: 42
LOS: 1 days | Discharge: HOME | End: 2017-08-02
Attending: EMERGENCY MEDICINE | Admitting: EMERGENCY MEDICINE
Payer: COMMERCIAL

## 2017-08-02 VITALS
OXYGEN SATURATION: 100 % | DIASTOLIC BLOOD PRESSURE: 68 MMHG | HEART RATE: 75 BPM | RESPIRATION RATE: 18 BRPM | SYSTOLIC BLOOD PRESSURE: 118 MMHG | TEMPERATURE: 98 F

## 2017-08-02 VITALS — HEIGHT: 64 IN | WEIGHT: 167.99 LBS

## 2017-08-02 DIAGNOSIS — R11.0 NAUSEA: ICD-10-CM

## 2017-08-02 DIAGNOSIS — Z98.890 OTHER SPECIFIED POSTPROCEDURAL STATES: Chronic | ICD-10-CM

## 2017-08-02 DIAGNOSIS — Z98.89 OTHER SPECIFIED POSTPROCEDURAL STATES: Chronic | ICD-10-CM

## 2017-08-02 DIAGNOSIS — R10.9 UNSPECIFIED ABDOMINAL PAIN: ICD-10-CM

## 2017-08-02 DIAGNOSIS — Z98.84 BARIATRIC SURGERY STATUS: Chronic | ICD-10-CM

## 2017-08-02 LAB
ALBUMIN SERPL ELPH-MCNC: 3.6 G/DL — SIGNIFICANT CHANGE UP (ref 3.3–5)
ALP SERPL-CCNC: 99 U/L — SIGNIFICANT CHANGE UP (ref 40–120)
ALT FLD-CCNC: 16 U/L — SIGNIFICANT CHANGE UP (ref 12–78)
ANION GAP SERPL CALC-SCNC: 11 MMOL/L — SIGNIFICANT CHANGE UP (ref 5–17)
APTT BLD: 29.9 SEC — SIGNIFICANT CHANGE UP (ref 27.5–37.4)
AST SERPL-CCNC: 14 U/L — LOW (ref 15–37)
BASOPHILS # BLD AUTO: 0.1 K/UL — SIGNIFICANT CHANGE UP (ref 0–0.2)
BASOPHILS NFR BLD AUTO: 0.9 % — SIGNIFICANT CHANGE UP (ref 0–2)
BILIRUB SERPL-MCNC: 0.5 MG/DL — SIGNIFICANT CHANGE UP (ref 0.2–1.2)
BUN SERPL-MCNC: 13 MG/DL — SIGNIFICANT CHANGE UP (ref 7–23)
CALCIUM SERPL-MCNC: 9.3 MG/DL — SIGNIFICANT CHANGE UP (ref 8.5–10.1)
CHLORIDE SERPL-SCNC: 102 MMOL/L — SIGNIFICANT CHANGE UP (ref 96–108)
CO2 SERPL-SCNC: 22 MMOL/L — SIGNIFICANT CHANGE UP (ref 22–31)
CREAT SERPL-MCNC: 0.61 MG/DL — SIGNIFICANT CHANGE UP (ref 0.5–1.3)
EOSINOPHIL # BLD AUTO: 0.1 K/UL — SIGNIFICANT CHANGE UP (ref 0–0.5)
EOSINOPHIL NFR BLD AUTO: 0.9 % — SIGNIFICANT CHANGE UP (ref 0–6)
GLUCOSE SERPL-MCNC: 93 MG/DL — SIGNIFICANT CHANGE UP (ref 70–99)
HCT VFR BLD CALC: 33.6 % — LOW (ref 34.5–45)
HGB BLD-MCNC: 11.6 G/DL — SIGNIFICANT CHANGE UP (ref 11.5–15.5)
INR BLD: 1.12 RATIO — SIGNIFICANT CHANGE UP (ref 0.88–1.16)
LIDOCAIN IGE QN: 512 U/L — HIGH (ref 73–393)
LYMPHOCYTES # BLD AUTO: 2.2 K/UL — SIGNIFICANT CHANGE UP (ref 1–3.3)
LYMPHOCYTES # BLD AUTO: 31.6 % — SIGNIFICANT CHANGE UP (ref 13–44)
MCHC RBC-ENTMCNC: 28.9 PG — SIGNIFICANT CHANGE UP (ref 27–34)
MCHC RBC-ENTMCNC: 34.6 GM/DL — SIGNIFICANT CHANGE UP (ref 32–36)
MCV RBC AUTO: 83.4 FL — SIGNIFICANT CHANGE UP (ref 80–100)
MONOCYTES # BLD AUTO: 0.5 K/UL — SIGNIFICANT CHANGE UP (ref 0–0.9)
MONOCYTES NFR BLD AUTO: 7.8 % — SIGNIFICANT CHANGE UP (ref 2–14)
NEUTROPHILS # BLD AUTO: 4.1 K/UL — SIGNIFICANT CHANGE UP (ref 1.8–7.4)
NEUTROPHILS NFR BLD AUTO: 58.8 % — SIGNIFICANT CHANGE UP (ref 43–77)
PLATELET # BLD AUTO: 459 K/UL — HIGH (ref 150–400)
POTASSIUM SERPL-MCNC: 3.6 MMOL/L — SIGNIFICANT CHANGE UP (ref 3.5–5.3)
POTASSIUM SERPL-SCNC: 3.6 MMOL/L — SIGNIFICANT CHANGE UP (ref 3.5–5.3)
PROT SERPL-MCNC: 7.1 GM/DL — SIGNIFICANT CHANGE UP (ref 6–8.3)
PROTHROM AB SERPL-ACNC: 12.1 SEC — SIGNIFICANT CHANGE UP (ref 9.8–12.7)
RBC # BLD: 4.03 M/UL — SIGNIFICANT CHANGE UP (ref 3.8–5.2)
RBC # FLD: 12.1 % — SIGNIFICANT CHANGE UP (ref 10.3–14.5)
SODIUM SERPL-SCNC: 135 MMOL/L — SIGNIFICANT CHANGE UP (ref 135–145)
WBC # BLD: 7 K/UL — SIGNIFICANT CHANGE UP (ref 3.8–10.5)
WBC # FLD AUTO: 7 K/UL — SIGNIFICANT CHANGE UP (ref 3.8–10.5)

## 2017-08-02 PROCEDURE — 93010 ELECTROCARDIOGRAM REPORT: CPT

## 2017-08-02 PROCEDURE — 74177 CT ABD & PELVIS W/CONTRAST: CPT | Mod: 26

## 2017-08-02 PROCEDURE — 74020: CPT | Mod: 26

## 2017-08-02 PROCEDURE — 99285 EMERGENCY DEPT VISIT HI MDM: CPT

## 2017-08-02 PROCEDURE — 74022 RADEX COMPL AQT ABD SERIES: CPT | Mod: 26

## 2017-08-02 PROCEDURE — 71010: CPT | Mod: 26

## 2017-08-02 RX ORDER — DIPHENHYDRAMINE HCL 50 MG
25 CAPSULE ORAL ONCE
Qty: 0 | Refills: 0 | Status: COMPLETED | OUTPATIENT
Start: 2017-08-02 | End: 2017-08-02

## 2017-08-02 RX ORDER — MORPHINE SULFATE 50 MG/1
4 CAPSULE, EXTENDED RELEASE ORAL ONCE
Qty: 0 | Refills: 0 | Status: DISCONTINUED | OUTPATIENT
Start: 2017-08-02 | End: 2017-08-02

## 2017-08-02 RX ORDER — HYDROMORPHONE HYDROCHLORIDE 2 MG/ML
1 INJECTION INTRAMUSCULAR; INTRAVENOUS; SUBCUTANEOUS ONCE
Qty: 0 | Refills: 0 | Status: DISCONTINUED | OUTPATIENT
Start: 2017-08-02 | End: 2017-08-02

## 2017-08-02 RX ORDER — METOCLOPRAMIDE HCL 10 MG
10 TABLET ORAL ONCE
Qty: 0 | Refills: 0 | Status: COMPLETED | OUTPATIENT
Start: 2017-08-02 | End: 2017-08-02

## 2017-08-02 RX ORDER — SODIUM CHLORIDE 9 MG/ML
1000 INJECTION INTRAMUSCULAR; INTRAVENOUS; SUBCUTANEOUS ONCE
Qty: 0 | Refills: 0 | Status: COMPLETED | OUTPATIENT
Start: 2017-08-02 | End: 2017-08-02

## 2017-08-02 RX ORDER — SODIUM CHLORIDE 9 MG/ML
3 INJECTION INTRAMUSCULAR; INTRAVENOUS; SUBCUTANEOUS ONCE
Qty: 0 | Refills: 0 | Status: COMPLETED | OUTPATIENT
Start: 2017-08-02 | End: 2017-08-02

## 2017-08-02 RX ORDER — ONDANSETRON 8 MG/1
8 TABLET, FILM COATED ORAL ONCE
Qty: 0 | Refills: 0 | Status: COMPLETED | OUTPATIENT
Start: 2017-08-02 | End: 2017-08-02

## 2017-08-02 RX ADMIN — SODIUM CHLORIDE 1000 MILLILITER(S): 9 INJECTION INTRAMUSCULAR; INTRAVENOUS; SUBCUTANEOUS at 19:43

## 2017-08-02 RX ADMIN — SODIUM CHLORIDE 3 MILLILITER(S): 9 INJECTION INTRAMUSCULAR; INTRAVENOUS; SUBCUTANEOUS at 19:43

## 2017-08-02 RX ADMIN — MORPHINE SULFATE 4 MILLIGRAM(S): 50 CAPSULE, EXTENDED RELEASE ORAL at 19:43

## 2017-08-02 RX ADMIN — Medication 10 MILLIGRAM(S): at 21:16

## 2017-08-02 RX ADMIN — ONDANSETRON 8 MILLIGRAM(S): 8 TABLET, FILM COATED ORAL at 19:43

## 2017-08-02 RX ADMIN — Medication 25 MILLIGRAM(S): at 21:15

## 2017-08-02 NOTE — ED PROVIDER NOTE - GASTROINTESTINAL, MLM
Abdomen soft, tenderness to epigastric, LUQ, periumbilical regions, no rebound or guarding. no palpable mass. hyperactive bowel sounds.

## 2017-08-02 NOTE — ED ADULT NURSE NOTE - OBJECTIVE STATEMENT
Pt presents to the ED with complaints of vomiting for the past week, pt has a h/o recent sx and pt surgeon contacted and updated as to pt status. Pt states pain is diffuse throughout the abdominal region, bowel sounds present and WNL. Pt denies any recent fevers but states she has been having chills. Pt has had decreased eating and drinking due to the pain, nausea and vomiting.

## 2017-08-02 NOTE — ED STATDOCS - PROGRESS NOTE DETAILS
James Mohan on behalf of Attending Dr. Merino. 42 y/o female with PMHx presents to the ED c/o N/V and abd pain. Pt states that when she eats anything, she feels like her heart is racing. Pt saw Dr. Nelson who gave her nausea meds which provide no relief. PSHx of hernia repair with Dr. Calvo. Pt states she has been nauseous since the surgery. LN June 30 of last month. Pt will be sent to the Main ED for further evaluation. I, Aidan Merino DO, performed the initial face to face bedside interview with this patient regarding history of present illness and determined that the patient should be seen in the main ED.  The history, was documented by the scribe in my presence and I attest to the accuracy of the documentation.

## 2017-08-02 NOTE — ED PROVIDER NOTE - PROGRESS NOTE DETAILS
d/w Dr Nelson - no signs of obstruction or hernia - he believes her nausea is due to her pain medication - this was discussed with patient and  and need for follow up  patient agreeable with plan    -md sarah

## 2017-08-02 NOTE — ED PROVIDER NOTE - OBJECTIVE STATEMENT
40 yo female h/o asthma, s/p hernia repair with Dr. Nelson last week, presents with abd pain, n/v. +chills. denies fever. no relief with nausea meds. notes palpitations after eating.

## 2018-01-26 NOTE — PROGRESS NOTE ADULT - ASSESSMENT
Laparoscopic Internal hernia repair
abdominal pain worsening probably due to internal hernia
s/p lap internal hernia repair
no

## 2018-03-21 NOTE — PROGRESS NOTE ADULT - ASSESSMENT
NURSING DISCHARGE NOTE    Discharged home via private car  Accompanied by son  Belongings packed up by patient and taken with her    All d/c instructions provided and discussed with patient. All medications and f/u appts reviewed.  All questions answere Pt is a 40 y/o female with h/o iron deficiency anemia, asthma who while driving from work on 9/2016 developed sudden onset of RLE numbness.  Her w/up revealed multiple disc herniations.  She has been having LBP radiating down her blt lower ext.  She was admitted for elective  laminectomy and fusion of lumbar spine with instrumentation.  Her course complicated by hypotension with acute blood loss anemia.  Post-op medicine consult called for comanagement.    * Lumbar spinal stenosis-s/p lumbar laminectomy, fusion with instrumentation.  Continue pain control, encourage use of incentive spirometry. Physical therapy, pain control  * Hypotension- due to hypovolumic shock from acute blood loss and anesthesia.  S/p aggressive IVF and off pressors, improved. BP better now and stable;   * Asthma-stable, no bronchospasm on exam  * Hypokalemia-resolved after repletion  * Anemia-acute blood loss from surgery and dilutional, h/h stable, po iron and pt is asymptomatic, would not transfuse  * Proph- Venodyne while drains in place  * Disp-OOB, PT,    * Comm- d/w pt, RN

## 2018-07-13 ENCOUNTER — EMERGENCY (EMERGENCY)
Facility: HOSPITAL | Age: 43
LOS: 0 days | Discharge: HOME | End: 2018-07-13
Attending: EMERGENCY MEDICINE | Admitting: EMERGENCY MEDICINE
Payer: COMMERCIAL

## 2018-07-13 VITALS
OXYGEN SATURATION: 99 % | HEART RATE: 82 BPM | HEIGHT: 66 IN | WEIGHT: 164.91 LBS | RESPIRATION RATE: 19 BRPM | DIASTOLIC BLOOD PRESSURE: 80 MMHG | TEMPERATURE: 98 F | SYSTOLIC BLOOD PRESSURE: 133 MMHG

## 2018-07-13 VITALS — DIASTOLIC BLOOD PRESSURE: 87 MMHG | SYSTOLIC BLOOD PRESSURE: 141 MMHG | HEART RATE: 80 BPM

## 2018-07-13 DIAGNOSIS — Z98.89 OTHER SPECIFIED POSTPROCEDURAL STATES: Chronic | ICD-10-CM

## 2018-07-13 DIAGNOSIS — Z98.890 OTHER SPECIFIED POSTPROCEDURAL STATES: Chronic | ICD-10-CM

## 2018-07-13 DIAGNOSIS — R10.9 UNSPECIFIED ABDOMINAL PAIN: ICD-10-CM

## 2018-07-13 DIAGNOSIS — J45.909 UNSPECIFIED ASTHMA, UNCOMPLICATED: ICD-10-CM

## 2018-07-13 DIAGNOSIS — Z98.84 BARIATRIC SURGERY STATUS: ICD-10-CM

## 2018-07-13 DIAGNOSIS — Z98.84 BARIATRIC SURGERY STATUS: Chronic | ICD-10-CM

## 2018-07-13 DIAGNOSIS — R11.2 NAUSEA WITH VOMITING, UNSPECIFIED: ICD-10-CM

## 2018-07-13 DIAGNOSIS — Z91.013 ALLERGY TO SEAFOOD: ICD-10-CM

## 2018-07-13 DIAGNOSIS — Z88.8 ALLERGY STATUS TO OTHER DRUGS, MEDICAMENTS AND BIOLOGICAL SUBSTANCES STATUS: ICD-10-CM

## 2018-07-13 LAB
ALBUMIN SERPL ELPH-MCNC: 4.6 G/DL — SIGNIFICANT CHANGE UP (ref 3.3–5)
ALP SERPL-CCNC: 79 U/L — SIGNIFICANT CHANGE UP (ref 40–120)
ALT FLD-CCNC: 32 U/L — SIGNIFICANT CHANGE UP (ref 12–78)
ANION GAP SERPL CALC-SCNC: 12 MMOL/L — SIGNIFICANT CHANGE UP (ref 5–17)
APPEARANCE UR: CLEAR — SIGNIFICANT CHANGE UP
APTT BLD: 28.1 SEC — SIGNIFICANT CHANGE UP (ref 27.5–37.4)
AST SERPL-CCNC: 26 U/L — SIGNIFICANT CHANGE UP (ref 15–37)
BACTERIA # UR AUTO: ABNORMAL
BASOPHILS # BLD AUTO: 0.03 K/UL — SIGNIFICANT CHANGE UP (ref 0–0.2)
BASOPHILS NFR BLD AUTO: 0.5 % — SIGNIFICANT CHANGE UP (ref 0–2)
BILIRUB SERPL-MCNC: 0.4 MG/DL — SIGNIFICANT CHANGE UP (ref 0.2–1.2)
BILIRUB UR-MCNC: NEGATIVE — SIGNIFICANT CHANGE UP
BLD GP AB SCN SERPL QL: SIGNIFICANT CHANGE UP
BUN SERPL-MCNC: 10 MG/DL — SIGNIFICANT CHANGE UP (ref 7–23)
CALCIUM SERPL-MCNC: 9 MG/DL — SIGNIFICANT CHANGE UP (ref 8.5–10.1)
CHLORIDE SERPL-SCNC: 108 MMOL/L — SIGNIFICANT CHANGE UP (ref 96–108)
CO2 SERPL-SCNC: 23 MMOL/L — SIGNIFICANT CHANGE UP (ref 22–31)
COLOR SPEC: YELLOW — SIGNIFICANT CHANGE UP
CREAT SERPL-MCNC: 0.59 MG/DL — SIGNIFICANT CHANGE UP (ref 0.5–1.3)
DIFF PNL FLD: ABNORMAL
EOSINOPHIL # BLD AUTO: 0.16 K/UL — SIGNIFICANT CHANGE UP (ref 0–0.5)
EOSINOPHIL NFR BLD AUTO: 2.6 % — SIGNIFICANT CHANGE UP (ref 0–6)
EPI CELLS # UR: SIGNIFICANT CHANGE UP
GLUCOSE SERPL-MCNC: 96 MG/DL — SIGNIFICANT CHANGE UP (ref 70–99)
GLUCOSE UR QL: NEGATIVE MG/DL — SIGNIFICANT CHANGE UP
HCG SERPL-ACNC: <1 MIU/ML — SIGNIFICANT CHANGE UP
HCT VFR BLD CALC: 40.2 % — SIGNIFICANT CHANGE UP (ref 34.5–45)
HGB BLD-MCNC: 13.8 G/DL — SIGNIFICANT CHANGE UP (ref 11.5–15.5)
IMM GRANULOCYTES NFR BLD AUTO: 0.3 % — SIGNIFICANT CHANGE UP (ref 0–1.5)
INR BLD: 0.98 RATIO — SIGNIFICANT CHANGE UP (ref 0.88–1.16)
KETONES UR-MCNC: ABNORMAL
LEUKOCYTE ESTERASE UR-ACNC: NEGATIVE — SIGNIFICANT CHANGE UP
LIDOCAIN IGE QN: 66 U/L — LOW (ref 73–393)
LYMPHOCYTES # BLD AUTO: 0.97 K/UL — LOW (ref 1–3.3)
LYMPHOCYTES # BLD AUTO: 15.6 % — SIGNIFICANT CHANGE UP (ref 13–44)
MCHC RBC-ENTMCNC: 31.1 PG — SIGNIFICANT CHANGE UP (ref 27–34)
MCHC RBC-ENTMCNC: 34.3 GM/DL — SIGNIFICANT CHANGE UP (ref 32–36)
MCV RBC AUTO: 90.5 FL — SIGNIFICANT CHANGE UP (ref 80–100)
MONOCYTES # BLD AUTO: 0.31 K/UL — SIGNIFICANT CHANGE UP (ref 0–0.9)
MONOCYTES NFR BLD AUTO: 5 % — SIGNIFICANT CHANGE UP (ref 2–14)
NEUTROPHILS # BLD AUTO: 4.74 K/UL — SIGNIFICANT CHANGE UP (ref 1.8–7.4)
NEUTROPHILS NFR BLD AUTO: 76 % — SIGNIFICANT CHANGE UP (ref 43–77)
NITRITE UR-MCNC: NEGATIVE — SIGNIFICANT CHANGE UP
NRBC # BLD: 0 /100 WBCS — SIGNIFICANT CHANGE UP (ref 0–0)
PH UR: 8 — SIGNIFICANT CHANGE UP (ref 5–8)
PLATELET # BLD AUTO: 271 K/UL — SIGNIFICANT CHANGE UP (ref 150–400)
POTASSIUM SERPL-MCNC: 4.2 MMOL/L — SIGNIFICANT CHANGE UP (ref 3.5–5.3)
POTASSIUM SERPL-SCNC: 4.2 MMOL/L — SIGNIFICANT CHANGE UP (ref 3.5–5.3)
PROT SERPL-MCNC: 8.5 GM/DL — HIGH (ref 6–8.3)
PROT UR-MCNC: NEGATIVE MG/DL — SIGNIFICANT CHANGE UP
PROTHROM AB SERPL-ACNC: 10.6 SEC — SIGNIFICANT CHANGE UP (ref 9.8–12.7)
RBC # BLD: 4.44 M/UL — SIGNIFICANT CHANGE UP (ref 3.8–5.2)
RBC # FLD: 12.7 % — SIGNIFICANT CHANGE UP (ref 10.3–14.5)
RBC CASTS # UR COMP ASSIST: ABNORMAL /HPF (ref 0–4)
SODIUM SERPL-SCNC: 143 MMOL/L — SIGNIFICANT CHANGE UP (ref 135–145)
SP GR SPEC: 1.01 — SIGNIFICANT CHANGE UP (ref 1.01–1.02)
TYPE + AB SCN PNL BLD: SIGNIFICANT CHANGE UP
UROBILINOGEN FLD QL: NEGATIVE MG/DL — SIGNIFICANT CHANGE UP
WBC # BLD: 6.23 K/UL — SIGNIFICANT CHANGE UP (ref 3.8–10.5)
WBC # FLD AUTO: 6.23 K/UL — SIGNIFICANT CHANGE UP (ref 3.8–10.5)
WBC UR QL: SIGNIFICANT CHANGE UP

## 2018-07-13 PROCEDURE — 74177 CT ABD & PELVIS W/CONTRAST: CPT | Mod: 26

## 2018-07-13 PROCEDURE — 71045 X-RAY EXAM CHEST 1 VIEW: CPT | Mod: 26

## 2018-07-13 PROCEDURE — 99284 EMERGENCY DEPT VISIT MOD MDM: CPT | Mod: 25

## 2018-07-13 RX ORDER — ONDANSETRON 8 MG/1
1 TABLET, FILM COATED ORAL
Qty: 15 | Refills: 0
Start: 2018-07-13

## 2018-07-13 RX ORDER — ONDANSETRON 8 MG/1
8 TABLET, FILM COATED ORAL ONCE
Qty: 0 | Refills: 0 | Status: COMPLETED | OUTPATIENT
Start: 2018-07-13 | End: 2018-07-13

## 2018-07-13 RX ORDER — ONDANSETRON 8 MG/1
4 TABLET, FILM COATED ORAL ONCE
Qty: 0 | Refills: 0 | Status: COMPLETED | OUTPATIENT
Start: 2018-07-13 | End: 2018-07-13

## 2018-07-13 RX ORDER — KETOROLAC TROMETHAMINE 30 MG/ML
30 SYRINGE (ML) INJECTION ONCE
Qty: 0 | Refills: 0 | Status: DISCONTINUED | OUTPATIENT
Start: 2018-07-13 | End: 2018-07-13

## 2018-07-13 RX ORDER — HYDROMORPHONE HYDROCHLORIDE 2 MG/ML
1 INJECTION INTRAMUSCULAR; INTRAVENOUS; SUBCUTANEOUS ONCE
Qty: 0 | Refills: 0 | Status: DISCONTINUED | OUTPATIENT
Start: 2018-07-13 | End: 2018-07-13

## 2018-07-13 RX ORDER — SODIUM CHLORIDE 9 MG/ML
3 INJECTION INTRAMUSCULAR; INTRAVENOUS; SUBCUTANEOUS ONCE
Qty: 0 | Refills: 0 | Status: COMPLETED | OUTPATIENT
Start: 2018-07-13 | End: 2018-07-13

## 2018-07-13 RX ORDER — MORPHINE SULFATE 50 MG/1
4 CAPSULE, EXTENDED RELEASE ORAL ONCE
Qty: 0 | Refills: 0 | Status: DISCONTINUED | OUTPATIENT
Start: 2018-07-13 | End: 2018-07-13

## 2018-07-13 RX ORDER — SODIUM CHLORIDE 9 MG/ML
1000 INJECTION INTRAMUSCULAR; INTRAVENOUS; SUBCUTANEOUS ONCE
Qty: 0 | Refills: 0 | Status: COMPLETED | OUTPATIENT
Start: 2018-07-13 | End: 2018-07-13

## 2018-07-13 RX ADMIN — MORPHINE SULFATE 4 MILLIGRAM(S): 50 CAPSULE, EXTENDED RELEASE ORAL at 04:42

## 2018-07-13 RX ADMIN — SODIUM CHLORIDE 3 MILLILITER(S): 9 INJECTION INTRAMUSCULAR; INTRAVENOUS; SUBCUTANEOUS at 05:08

## 2018-07-13 RX ADMIN — MORPHINE SULFATE 4 MILLIGRAM(S): 50 CAPSULE, EXTENDED RELEASE ORAL at 05:53

## 2018-07-13 RX ADMIN — HYDROMORPHONE HYDROCHLORIDE 1 MILLIGRAM(S): 2 INJECTION INTRAMUSCULAR; INTRAVENOUS; SUBCUTANEOUS at 06:36

## 2018-07-13 RX ADMIN — ONDANSETRON 4 MILLIGRAM(S): 8 TABLET, FILM COATED ORAL at 04:42

## 2018-07-13 RX ADMIN — Medication 30 MILLIGRAM(S): at 09:19

## 2018-07-13 RX ADMIN — SODIUM CHLORIDE 1000 MILLILITER(S): 9 INJECTION INTRAMUSCULAR; INTRAVENOUS; SUBCUTANEOUS at 04:43

## 2018-07-13 RX ADMIN — HYDROMORPHONE HYDROCHLORIDE 1 MILLIGRAM(S): 2 INJECTION INTRAMUSCULAR; INTRAVENOUS; SUBCUTANEOUS at 05:18

## 2018-07-13 RX ADMIN — HYDROMORPHONE HYDROCHLORIDE 1 MILLIGRAM(S): 2 INJECTION INTRAMUSCULAR; INTRAVENOUS; SUBCUTANEOUS at 07:42

## 2018-07-13 RX ADMIN — HYDROMORPHONE HYDROCHLORIDE 1 MILLIGRAM(S): 2 INJECTION INTRAMUSCULAR; INTRAVENOUS; SUBCUTANEOUS at 05:53

## 2018-07-13 RX ADMIN — ONDANSETRON 8 MILLIGRAM(S): 8 TABLET, FILM COATED ORAL at 05:18

## 2018-07-13 NOTE — ED PROVIDER NOTE - OBJECTIVE STATEMENT
Pt. is a 43 yo F with a hx of gastric bypass surgery, hernia repair, lumbar stenosis, sciataca, anemia, asthma BIB  for diffuse abdominal pains for the past 3 days with nausea and vomiting.  Pt. thinks pain started after lifting a heavy case of water at [facility].  Pt. denies falling, but felt like something ripped or popped inside abdomen.  Pt. unable to tolerate anything by mouth and took oxycodone last night without any relief. Pt. denies fever, chills, sweats.  Pt. describes current abdominal pain as pulsating pain around periumbilical region.  Denies chest pain, trouble breathing.  Denies any problems with urinating or bowel movements. Surg: Omar

## 2018-07-13 NOTE — ED ADULT NURSE NOTE - OBJECTIVE STATEMENT
pt presents tot he ed w/ abd pain for the past 3 days with nausea and vomiting. States she lifted something at [employer] the other day and felt a pop.  Pt denies fevers / chest pain / sob

## 2018-07-13 NOTE — ED PROVIDER NOTE - MEDICAL DECISION MAKING DETAILS
Abdominal pain, nausea and vomiting with multiple prior abdominal surgeries including gastric bypass.  Pt. will get labs, urine, and CT to r/o obstruction.

## 2018-07-13 NOTE — ED PROVIDER NOTE - CONSTITUTIONAL, MLM
normal... Well appearing, well nourished, awake, alert, oriented to person, place, time/situation and appears uncomfortable.  Moaning in pain.

## 2018-07-13 NOTE — ED PROVIDER NOTE - PROGRESS NOTE DETAILS
Attending Edgar, Radiology called with internal hernia, Dr. Omar cole Attending Hernández, Dr. Nelson evaluated pt.  Would like pt to have toradol and clear diet.  If symptoms improve p[t can be d/c and follow up with him as an outpt. Attending Hernández, pt tolerata po, feeling better, still with pulsating feeling umbilicus.  Pt feels well enough to go home, has oxycodone at home and will follow up with Dr. Nelson.  Pt will return to ED if symptoms worsen.

## 2018-07-18 ENCOUNTER — INPATIENT (INPATIENT)
Facility: HOSPITAL | Age: 43
LOS: 1 days | Discharge: HOME | End: 2018-07-20
Attending: SURGERY | Admitting: SURGERY
Payer: COMMERCIAL

## 2018-07-18 VITALS — WEIGHT: 160.94 LBS

## 2018-07-18 DIAGNOSIS — Z98.84 BARIATRIC SURGERY STATUS: Chronic | ICD-10-CM

## 2018-07-18 DIAGNOSIS — Z96.649 PRESENCE OF UNSPECIFIED ARTIFICIAL HIP JOINT: Chronic | ICD-10-CM

## 2018-07-18 DIAGNOSIS — Z98.890 OTHER SPECIFIED POSTPROCEDURAL STATES: Chronic | ICD-10-CM

## 2018-07-18 DIAGNOSIS — Z98.89 OTHER SPECIFIED POSTPROCEDURAL STATES: Chronic | ICD-10-CM

## 2018-07-18 PROBLEM — M48.06 SPINAL STENOSIS, LUMBAR REGION: Chronic | Status: ACTIVE | Noted: 2017-06-30

## 2018-07-18 PROBLEM — M54.31 SCIATICA, RIGHT SIDE: Chronic | Status: ACTIVE | Noted: 2017-06-30

## 2018-07-18 PROBLEM — M41.20 OTHER IDIOPATHIC SCOLIOSIS, SITE UNSPECIFIED: Chronic | Status: ACTIVE | Noted: 2017-06-30

## 2018-07-18 PROBLEM — I83.93 ASYMPTOMATIC VARICOSE VEINS OF BILATERAL LOWER EXTREMITIES: Chronic | Status: ACTIVE | Noted: 2017-06-30

## 2018-07-18 PROBLEM — M43.10 SPONDYLOLISTHESIS, SITE UNSPECIFIED: Chronic | Status: ACTIVE | Noted: 2017-06-30

## 2018-07-18 PROBLEM — Z86.2 PERSONAL HISTORY OF DISEASES OF THE BLOOD AND BLOOD-FORMING ORGANS AND CERTAIN DISORDERS INVOLVING THE IMMUNE MECHANISM: Chronic | Status: ACTIVE | Noted: 2017-06-30

## 2018-07-18 LAB
ALBUMIN SERPL ELPH-MCNC: 4.2 G/DL — SIGNIFICANT CHANGE UP (ref 3.3–5)
ALLERGY+IMMUNOLOGY DIAG STUDY NOTE: SIGNIFICANT CHANGE UP
ALP SERPL-CCNC: 65 U/L — SIGNIFICANT CHANGE UP (ref 40–120)
ALT FLD-CCNC: 25 U/L — SIGNIFICANT CHANGE UP (ref 12–78)
ANION GAP SERPL CALC-SCNC: 12 MMOL/L — SIGNIFICANT CHANGE UP (ref 5–17)
APPEARANCE UR: CLEAR — SIGNIFICANT CHANGE UP
AST SERPL-CCNC: 19 U/L — SIGNIFICANT CHANGE UP (ref 15–37)
BACTERIA # UR AUTO: ABNORMAL
BASOPHILS # BLD AUTO: 0.02 K/UL — SIGNIFICANT CHANGE UP (ref 0–0.2)
BASOPHILS NFR BLD AUTO: 0.2 % — SIGNIFICANT CHANGE UP (ref 0–2)
BILIRUB SERPL-MCNC: 0.9 MG/DL — SIGNIFICANT CHANGE UP (ref 0.2–1.2)
BILIRUB UR-MCNC: NEGATIVE — SIGNIFICANT CHANGE UP
BUN SERPL-MCNC: 12 MG/DL — SIGNIFICANT CHANGE UP (ref 7–23)
CALCIUM SERPL-MCNC: 8.9 MG/DL — SIGNIFICANT CHANGE UP (ref 8.5–10.1)
CHLORIDE SERPL-SCNC: 105 MMOL/L — SIGNIFICANT CHANGE UP (ref 96–108)
CO2 SERPL-SCNC: 21 MMOL/L — LOW (ref 22–31)
COLOR SPEC: YELLOW — SIGNIFICANT CHANGE UP
COMMENT - URINE: SIGNIFICANT CHANGE UP
CREAT SERPL-MCNC: 0.69 MG/DL — SIGNIFICANT CHANGE UP (ref 0.5–1.3)
DIFF PNL FLD: ABNORMAL
EOSINOPHIL # BLD AUTO: 0.09 K/UL — SIGNIFICANT CHANGE UP (ref 0–0.5)
EOSINOPHIL NFR BLD AUTO: 1 % — SIGNIFICANT CHANGE UP (ref 0–6)
EPI CELLS # UR: SIGNIFICANT CHANGE UP
GLUCOSE SERPL-MCNC: 93 MG/DL — SIGNIFICANT CHANGE UP (ref 70–99)
GLUCOSE UR QL: NEGATIVE MG/DL — SIGNIFICANT CHANGE UP
HCT VFR BLD CALC: 40.8 % — SIGNIFICANT CHANGE UP (ref 34.5–45)
HGB BLD-MCNC: 14.2 G/DL — SIGNIFICANT CHANGE UP (ref 11.5–15.5)
IMM GRANULOCYTES NFR BLD AUTO: 0.2 % — SIGNIFICANT CHANGE UP (ref 0–1.5)
KETONES UR-MCNC: ABNORMAL
LEUKOCYTE ESTERASE UR-ACNC: ABNORMAL
LIDOCAIN IGE QN: 55 U/L — LOW (ref 73–393)
LYMPHOCYTES # BLD AUTO: 1.2 K/UL — SIGNIFICANT CHANGE UP (ref 1–3.3)
LYMPHOCYTES # BLD AUTO: 13.8 % — SIGNIFICANT CHANGE UP (ref 13–44)
MCHC RBC-ENTMCNC: 31.3 PG — SIGNIFICANT CHANGE UP (ref 27–34)
MCHC RBC-ENTMCNC: 34.8 GM/DL — SIGNIFICANT CHANGE UP (ref 32–36)
MCV RBC AUTO: 90.1 FL — SIGNIFICANT CHANGE UP (ref 80–100)
MONOCYTES # BLD AUTO: 0.51 K/UL — SIGNIFICANT CHANGE UP (ref 0–0.9)
MONOCYTES NFR BLD AUTO: 5.8 % — SIGNIFICANT CHANGE UP (ref 2–14)
NEUTROPHILS # BLD AUTO: 6.88 K/UL — SIGNIFICANT CHANGE UP (ref 1.8–7.4)
NEUTROPHILS NFR BLD AUTO: 79 % — HIGH (ref 43–77)
NITRITE UR-MCNC: NEGATIVE — SIGNIFICANT CHANGE UP
NRBC # BLD: 0 /100 WBCS — SIGNIFICANT CHANGE UP (ref 0–0)
PH UR: 7 — SIGNIFICANT CHANGE UP (ref 5–8)
PLATELET # BLD AUTO: 286 K/UL — SIGNIFICANT CHANGE UP (ref 150–400)
POTASSIUM SERPL-MCNC: 4.2 MMOL/L — SIGNIFICANT CHANGE UP (ref 3.5–5.3)
POTASSIUM SERPL-SCNC: 4.2 MMOL/L — SIGNIFICANT CHANGE UP (ref 3.5–5.3)
PROT SERPL-MCNC: 7.8 GM/DL — SIGNIFICANT CHANGE UP (ref 6–8.3)
PROT UR-MCNC: NEGATIVE MG/DL — SIGNIFICANT CHANGE UP
RBC # BLD: 4.53 M/UL — SIGNIFICANT CHANGE UP (ref 3.8–5.2)
RBC # FLD: 12.3 % — SIGNIFICANT CHANGE UP (ref 10.3–14.5)
RBC CASTS # UR COMP ASSIST: ABNORMAL /HPF (ref 0–4)
SODIUM SERPL-SCNC: 138 MMOL/L — SIGNIFICANT CHANGE UP (ref 135–145)
SP GR SPEC: 1.01 — SIGNIFICANT CHANGE UP (ref 1.01–1.02)
UROBILINOGEN FLD QL: 4 MG/DL
WBC # BLD: 8.72 K/UL — SIGNIFICANT CHANGE UP (ref 3.8–10.5)
WBC # FLD AUTO: 8.72 K/UL — SIGNIFICANT CHANGE UP (ref 3.8–10.5)
WBC UR QL: SIGNIFICANT CHANGE UP

## 2018-07-18 PROCEDURE — 99223 1ST HOSP IP/OBS HIGH 75: CPT

## 2018-07-18 PROCEDURE — 99285 EMERGENCY DEPT VISIT HI MDM: CPT

## 2018-07-18 PROCEDURE — 74177 CT ABD & PELVIS W/CONTRAST: CPT | Mod: 26

## 2018-07-18 RX ORDER — BUDESONIDE AND FORMOTEROL FUMARATE DIHYDRATE 160; 4.5 UG/1; UG/1
2 AEROSOL RESPIRATORY (INHALATION)
Qty: 0 | Refills: 0 | Status: DISCONTINUED | OUTPATIENT
Start: 2018-07-18 | End: 2018-07-20

## 2018-07-18 RX ORDER — ONDANSETRON 8 MG/1
4 TABLET, FILM COATED ORAL ONCE
Qty: 0 | Refills: 0 | Status: COMPLETED | OUTPATIENT
Start: 2018-07-18 | End: 2018-07-18

## 2018-07-18 RX ORDER — HYDROMORPHONE HYDROCHLORIDE 2 MG/ML
0.4 INJECTION INTRAMUSCULAR; INTRAVENOUS; SUBCUTANEOUS
Qty: 0 | Refills: 0 | Status: DISCONTINUED | OUTPATIENT
Start: 2018-07-18 | End: 2018-07-18

## 2018-07-18 RX ORDER — ALBUTEROL 90 UG/1
2 AEROSOL, METERED ORAL EVERY 6 HOURS
Qty: 0 | Refills: 0 | Status: DISCONTINUED | OUTPATIENT
Start: 2018-07-18 | End: 2018-07-20

## 2018-07-18 RX ORDER — HYDROMORPHONE HYDROCHLORIDE 2 MG/ML
1 INJECTION INTRAMUSCULAR; INTRAVENOUS; SUBCUTANEOUS ONCE
Qty: 0 | Refills: 0 | Status: DISCONTINUED | OUTPATIENT
Start: 2018-07-18 | End: 2018-07-18

## 2018-07-18 RX ORDER — OXYCODONE AND ACETAMINOPHEN 5; 325 MG/1; MG/1
2 TABLET ORAL EVERY 6 HOURS
Qty: 0 | Refills: 0 | Status: DISCONTINUED | OUTPATIENT
Start: 2018-07-18 | End: 2018-07-20

## 2018-07-18 RX ORDER — TIZANIDINE 4 MG/1
2 TABLET ORAL
Qty: 0 | Refills: 0 | COMMUNITY

## 2018-07-18 RX ORDER — HEPARIN SODIUM 5000 [USP'U]/ML
5000 INJECTION INTRAVENOUS; SUBCUTANEOUS EVERY 8 HOURS
Qty: 0 | Refills: 0 | Status: DISCONTINUED | OUTPATIENT
Start: 2018-07-18 | End: 2018-07-20

## 2018-07-18 RX ORDER — OXYCODONE AND ACETAMINOPHEN 5; 325 MG/1; MG/1
1 TABLET ORAL EVERY 4 HOURS
Qty: 0 | Refills: 0 | Status: DISCONTINUED | OUTPATIENT
Start: 2018-07-18 | End: 2018-07-20

## 2018-07-18 RX ORDER — ONDANSETRON 8 MG/1
4 TABLET, FILM COATED ORAL EVERY 6 HOURS
Qty: 0 | Refills: 0 | Status: DISCONTINUED | OUTPATIENT
Start: 2018-07-18 | End: 2018-07-20

## 2018-07-18 RX ORDER — FENTANYL CITRATE 50 UG/ML
50 INJECTION INTRAVENOUS
Qty: 0 | Refills: 0 | Status: DISCONTINUED | OUTPATIENT
Start: 2018-07-18 | End: 2018-07-18

## 2018-07-18 RX ORDER — SODIUM CHLORIDE 9 MG/ML
1000 INJECTION, SOLUTION INTRAVENOUS
Qty: 0 | Refills: 0 | Status: DISCONTINUED | OUTPATIENT
Start: 2018-07-18 | End: 2018-07-18

## 2018-07-18 RX ORDER — CYCLOBENZAPRINE HYDROCHLORIDE 10 MG/1
0 TABLET, FILM COATED ORAL
Qty: 0 | Refills: 0 | COMMUNITY

## 2018-07-18 RX ORDER — SODIUM CHLORIDE 9 MG/ML
1000 INJECTION INTRAMUSCULAR; INTRAVENOUS; SUBCUTANEOUS
Qty: 0 | Refills: 0 | Status: DISCONTINUED | OUTPATIENT
Start: 2018-07-18 | End: 2018-07-20

## 2018-07-18 RX ORDER — SODIUM CHLORIDE 9 MG/ML
2000 INJECTION INTRAMUSCULAR; INTRAVENOUS; SUBCUTANEOUS ONCE
Qty: 0 | Refills: 0 | Status: COMPLETED | OUTPATIENT
Start: 2018-07-18 | End: 2018-07-18

## 2018-07-18 RX ORDER — FLUTICASONE PROPIONATE AND SALMETEROL 50; 250 UG/1; UG/1
1 POWDER ORAL; RESPIRATORY (INHALATION)
Qty: 0 | Refills: 0 | COMMUNITY

## 2018-07-18 RX ORDER — OXYCODONE HYDROCHLORIDE 5 MG/1
10 TABLET ORAL EVERY 6 HOURS
Qty: 0 | Refills: 0 | Status: DISCONTINUED | OUTPATIENT
Start: 2018-07-18 | End: 2018-07-18

## 2018-07-18 RX ORDER — MORPHINE SULFATE 50 MG/1
2 CAPSULE, EXTENDED RELEASE ORAL
Qty: 0 | Refills: 0 | Status: DISCONTINUED | OUTPATIENT
Start: 2018-07-18 | End: 2018-07-20

## 2018-07-18 RX ORDER — ONDANSETRON 8 MG/1
4 TABLET, FILM COATED ORAL ONCE
Qty: 0 | Refills: 0 | Status: DISCONTINUED | OUTPATIENT
Start: 2018-07-18 | End: 2018-07-18

## 2018-07-18 RX ORDER — MUPIROCIN 20 MG/G
0 OINTMENT TOPICAL
Qty: 0 | Refills: 0 | COMMUNITY

## 2018-07-18 RX ADMIN — HYDROMORPHONE HYDROCHLORIDE 1 MILLIGRAM(S): 2 INJECTION INTRAMUSCULAR; INTRAVENOUS; SUBCUTANEOUS at 13:03

## 2018-07-18 RX ADMIN — OXYCODONE HYDROCHLORIDE 10 MILLIGRAM(S): 5 TABLET ORAL at 17:37

## 2018-07-18 RX ADMIN — SODIUM CHLORIDE 2000 MILLILITER(S): 9 INJECTION INTRAMUSCULAR; INTRAVENOUS; SUBCUTANEOUS at 11:10

## 2018-07-18 RX ADMIN — FENTANYL CITRATE 50 MICROGRAM(S): 50 INJECTION INTRAVENOUS at 18:24

## 2018-07-18 RX ADMIN — SODIUM CHLORIDE 100 MILLILITER(S): 9 INJECTION INTRAMUSCULAR; INTRAVENOUS; SUBCUTANEOUS at 20:03

## 2018-07-18 RX ADMIN — HYDROMORPHONE HYDROCHLORIDE 0.4 MILLIGRAM(S): 2 INJECTION INTRAMUSCULAR; INTRAVENOUS; SUBCUTANEOUS at 17:40

## 2018-07-18 RX ADMIN — HYDROMORPHONE HYDROCHLORIDE 0.4 MILLIGRAM(S): 2 INJECTION INTRAMUSCULAR; INTRAVENOUS; SUBCUTANEOUS at 18:00

## 2018-07-18 RX ADMIN — ONDANSETRON 4 MILLIGRAM(S): 8 TABLET, FILM COATED ORAL at 11:26

## 2018-07-18 RX ADMIN — FENTANYL CITRATE 50 MICROGRAM(S): 50 INJECTION INTRAVENOUS at 19:15

## 2018-07-18 RX ADMIN — HYDROMORPHONE HYDROCHLORIDE 1 MILLIGRAM(S): 2 INJECTION INTRAMUSCULAR; INTRAVENOUS; SUBCUTANEOUS at 11:56

## 2018-07-18 RX ADMIN — SODIUM CHLORIDE 100 MILLILITER(S): 9 INJECTION INTRAMUSCULAR; INTRAVENOUS; SUBCUTANEOUS at 18:00

## 2018-07-18 RX ADMIN — HYDROMORPHONE HYDROCHLORIDE 0.4 MILLIGRAM(S): 2 INJECTION INTRAMUSCULAR; INTRAVENOUS; SUBCUTANEOUS at 19:15

## 2018-07-18 RX ADMIN — ONDANSETRON 4 MILLIGRAM(S): 8 TABLET, FILM COATED ORAL at 12:10

## 2018-07-18 RX ADMIN — OXYCODONE AND ACETAMINOPHEN 2 TABLET(S): 5; 325 TABLET ORAL at 20:11

## 2018-07-18 RX ADMIN — FENTANYL CITRATE 50 MICROGRAM(S): 50 INJECTION INTRAVENOUS at 18:13

## 2018-07-18 RX ADMIN — HEPARIN SODIUM 5000 UNIT(S): 5000 INJECTION INTRAVENOUS; SUBCUTANEOUS at 22:15

## 2018-07-18 RX ADMIN — HYDROMORPHONE HYDROCHLORIDE 0.4 MILLIGRAM(S): 2 INJECTION INTRAMUSCULAR; INTRAVENOUS; SUBCUTANEOUS at 17:50

## 2018-07-18 RX ADMIN — HYDROMORPHONE HYDROCHLORIDE 1 MILLIGRAM(S): 2 INJECTION INTRAMUSCULAR; INTRAVENOUS; SUBCUTANEOUS at 18:00

## 2018-07-18 RX ADMIN — HYDROMORPHONE HYDROCHLORIDE 0.4 MILLIGRAM(S): 2 INJECTION INTRAMUSCULAR; INTRAVENOUS; SUBCUTANEOUS at 17:30

## 2018-07-18 RX ADMIN — HYDROMORPHONE HYDROCHLORIDE 0.4 MILLIGRAM(S): 2 INJECTION INTRAMUSCULAR; INTRAVENOUS; SUBCUTANEOUS at 19:30

## 2018-07-18 RX ADMIN — HYDROMORPHONE HYDROCHLORIDE 1 MILLIGRAM(S): 2 INJECTION INTRAMUSCULAR; INTRAVENOUS; SUBCUTANEOUS at 11:26

## 2018-07-18 RX ADMIN — OXYCODONE HYDROCHLORIDE 10 MILLIGRAM(S): 5 TABLET ORAL at 18:00

## 2018-07-18 NOTE — ED STATDOCS - OBJECTIVE STATEMENT
43 y/o female with a PMHx of s/p gastric bypass, asthma, abscess in hip, s/p hip replacement 2/10/2018 presents to the ED c/o cramping and throbbing abd pain. +vomiting after PO intake, HA. Pt was at ED 5 days ago. CT found a Non obstructing internal hernia LUQ. Pt denies possibility of being pregnant. Denies CP, SOB. Dr. Nelson- bariatric Surgeon

## 2018-07-18 NOTE — BRIEF OPERATIVE NOTE - PROCEDURE
<<-----Click on this checkbox to enter Procedure Lysis of adhesions for small bowel obstruction  07/18/2018    Active  [id number]  Diagnostic laparoscopy  07/18/2018    Active  [id number]

## 2018-07-18 NOTE — H&P ADULT - ASSESSMENT
A/P: Small Bowel Obstruction secondary to Internal Hernia    -- Case D/W Dr. Nelson  -- Admit to surgery  -- NPO  -- IVF  -- Analgesics  -- Antiemetics  -- Pre-op for diagnostic laparoscopy today

## 2018-07-18 NOTE — ED STATDOCS - PSH
Gastric bypass status for obesity    H/O varicose vein ligation  left lower extremity 2011  History of hip replacement    S/P  section  X3; ,,   S/P endoscopy  Baseline

## 2018-07-18 NOTE — ED ADULT TRIAGE NOTE - CHIEF COMPLAINT QUOTE
Pt presents to ED c/o diffuse abd pain starting on Friday. Pt reports she was seen in [facility] on Friday for same s/s. Pt reports vomiting and diarrhea.

## 2018-07-18 NOTE — H&P ADULT - HISTORY OF PRESENT ILLNESS
43 yo female presents to ER with complaint of crampy abdominal pain with associated nausea, vomiting, and diarrhea.  Abdominal pain/ discomfort began a week ago.  On Friday, 7/13 pain intensified with associated cramping and pressure-- patient reported to the ER.  Work up revealed a non-obstructing internal hernia on CT.  Patient was discharged home.  Yesterday, patient developed nausea/ vomiting and diarrhea.  Symptoms worsened and as a result, she presented to ER today.  Last ate at 4:30pm on 7/17, last BM (diarrhea) at 2am today.  Denies CP, SOB, dyspnea, fever, chills, or urinary complaints.

## 2018-07-18 NOTE — ED STATDOCS - MEDICAL DECISION MAKING DETAILS
Given extensive surgical hx, high concern SBO. Plan for IV fluids, labs, pain control, Zofran, CT abs/pelvis IV and oral contrast rule out SBO.

## 2018-07-18 NOTE — PRE-OP CHECKLIST - COMMENTS
last ate last evening around 5pm. unable to tolerate sips of water this am and last drank ct po contrast.

## 2018-07-18 NOTE — ED STATDOCS - PROGRESS NOTE DETAILS
42 yr. old female PMH: Gastric Bypass, Asthma, Abscess in Hip S/P Hip replacement 2/10/18 presents to ED with 42 yr. old female PMH: Gastric Bypass, Asthma, Abscess in Hip S/P Hip replacement 2/10/18 presents to ED with cramping and severe abdominal pain associated with vomiting after eating or drinking. recently seen at [facility] 5 days ago and found non obstructing hernia. No chest pain or difficulty breathing. Seen and examined by attending in intake. Plan: IV,IVF, Labs and CT abd./pelvis, pain medication and Zofran. Will f/u with results and re evaluate. Lowell NP

## 2018-07-19 DIAGNOSIS — K56.609 UNSPECIFIED INTESTINAL OBSTRUCTION, UNSPECIFIED AS TO PARTIAL VERSUS COMPLETE OBSTRUCTION: ICD-10-CM

## 2018-07-19 LAB
ANION GAP SERPL CALC-SCNC: 6 MMOL/L — SIGNIFICANT CHANGE UP (ref 5–17)
BASOPHILS # BLD AUTO: 0.02 K/UL — SIGNIFICANT CHANGE UP (ref 0–0.2)
BASOPHILS NFR BLD AUTO: 0.4 % — SIGNIFICANT CHANGE UP (ref 0–2)
BUN SERPL-MCNC: 6 MG/DL — LOW (ref 7–23)
CALCIUM SERPL-MCNC: 7.6 MG/DL — LOW (ref 8.5–10.1)
CHLORIDE SERPL-SCNC: 109 MMOL/L — HIGH (ref 96–108)
CO2 SERPL-SCNC: 23 MMOL/L — SIGNIFICANT CHANGE UP (ref 22–31)
CREAT SERPL-MCNC: 0.49 MG/DL — LOW (ref 0.5–1.3)
CULTURE RESULTS: NO GROWTH — SIGNIFICANT CHANGE UP
EOSINOPHIL # BLD AUTO: 0.11 K/UL — SIGNIFICANT CHANGE UP (ref 0–0.5)
EOSINOPHIL NFR BLD AUTO: 2.2 % — SIGNIFICANT CHANGE UP (ref 0–6)
ERYTHROCYTE [SEDIMENTATION RATE] IN BLOOD: 8 MM/HR — SIGNIFICANT CHANGE UP (ref 0–15)
GLUCOSE SERPL-MCNC: 71 MG/DL — SIGNIFICANT CHANGE UP (ref 70–99)
HCT VFR BLD CALC: 31.6 % — LOW (ref 34.5–45)
HGB BLD-MCNC: 10.7 G/DL — LOW (ref 11.5–15.5)
IMM GRANULOCYTES NFR BLD AUTO: 0.2 % — SIGNIFICANT CHANGE UP (ref 0–1.5)
LYMPHOCYTES # BLD AUTO: 1.54 K/UL — SIGNIFICANT CHANGE UP (ref 1–3.3)
LYMPHOCYTES # BLD AUTO: 31.4 % — SIGNIFICANT CHANGE UP (ref 13–44)
MCHC RBC-ENTMCNC: 31.4 PG — SIGNIFICANT CHANGE UP (ref 27–34)
MCHC RBC-ENTMCNC: 33.9 GM/DL — SIGNIFICANT CHANGE UP (ref 32–36)
MCV RBC AUTO: 92.7 FL — SIGNIFICANT CHANGE UP (ref 80–100)
MONOCYTES # BLD AUTO: 0.5 K/UL — SIGNIFICANT CHANGE UP (ref 0–0.9)
MONOCYTES NFR BLD AUTO: 10.2 % — SIGNIFICANT CHANGE UP (ref 2–14)
NEUTROPHILS # BLD AUTO: 2.72 K/UL — SIGNIFICANT CHANGE UP (ref 1.8–7.4)
NEUTROPHILS NFR BLD AUTO: 55.6 % — SIGNIFICANT CHANGE UP (ref 43–77)
NRBC # BLD: 0 /100 WBCS — SIGNIFICANT CHANGE UP (ref 0–0)
PLATELET # BLD AUTO: 200 K/UL — SIGNIFICANT CHANGE UP (ref 150–400)
POTASSIUM SERPL-MCNC: 3.4 MMOL/L — LOW (ref 3.5–5.3)
POTASSIUM SERPL-SCNC: 3.4 MMOL/L — LOW (ref 3.5–5.3)
RBC # BLD: 3.41 M/UL — LOW (ref 3.8–5.2)
RBC # FLD: 12.5 % — SIGNIFICANT CHANGE UP (ref 10.3–14.5)
SODIUM SERPL-SCNC: 138 MMOL/L — SIGNIFICANT CHANGE UP (ref 135–145)
SPECIMEN SOURCE: SIGNIFICANT CHANGE UP
WBC # BLD: 4.9 K/UL — SIGNIFICANT CHANGE UP (ref 3.8–10.5)
WBC # FLD AUTO: 4.9 K/UL — SIGNIFICANT CHANGE UP (ref 3.8–10.5)

## 2018-07-19 PROCEDURE — 73502 X-RAY EXAM HIP UNI 2-3 VIEWS: CPT | Mod: 26

## 2018-07-19 PROCEDURE — 73552 X-RAY EXAM OF FEMUR 2/>: CPT | Mod: 26,LT

## 2018-07-19 PROCEDURE — 93010 ELECTROCARDIOGRAM REPORT: CPT

## 2018-07-19 RX ORDER — POTASSIUM CHLORIDE 20 MEQ
40 PACKET (EA) ORAL ONCE
Qty: 0 | Refills: 0 | Status: COMPLETED | OUTPATIENT
Start: 2018-07-19 | End: 2018-07-19

## 2018-07-19 RX ADMIN — MORPHINE SULFATE 2 MILLIGRAM(S): 50 CAPSULE, EXTENDED RELEASE ORAL at 22:08

## 2018-07-19 RX ADMIN — OXYCODONE AND ACETAMINOPHEN 2 TABLET(S): 5; 325 TABLET ORAL at 12:14

## 2018-07-19 RX ADMIN — OXYCODONE AND ACETAMINOPHEN 2 TABLET(S): 5; 325 TABLET ORAL at 18:35

## 2018-07-19 RX ADMIN — OXYCODONE AND ACETAMINOPHEN 1 TABLET(S): 5; 325 TABLET ORAL at 00:13

## 2018-07-19 RX ADMIN — MORPHINE SULFATE 2 MILLIGRAM(S): 50 CAPSULE, EXTENDED RELEASE ORAL at 08:45

## 2018-07-19 RX ADMIN — OXYCODONE AND ACETAMINOPHEN 2 TABLET(S): 5; 325 TABLET ORAL at 17:52

## 2018-07-19 RX ADMIN — Medication 40 MILLIEQUIVALENT(S): at 17:51

## 2018-07-19 RX ADMIN — HEPARIN SODIUM 5000 UNIT(S): 5000 INJECTION INTRAVENOUS; SUBCUTANEOUS at 04:36

## 2018-07-19 RX ADMIN — HEPARIN SODIUM 5000 UNIT(S): 5000 INJECTION INTRAVENOUS; SUBCUTANEOUS at 13:10

## 2018-07-19 RX ADMIN — BUDESONIDE AND FORMOTEROL FUMARATE DIHYDRATE 2 PUFF(S): 160; 4.5 AEROSOL RESPIRATORY (INHALATION) at 19:15

## 2018-07-19 RX ADMIN — MORPHINE SULFATE 2 MILLIGRAM(S): 50 CAPSULE, EXTENDED RELEASE ORAL at 04:34

## 2018-07-19 RX ADMIN — MORPHINE SULFATE 2 MILLIGRAM(S): 50 CAPSULE, EXTENDED RELEASE ORAL at 09:00

## 2018-07-19 RX ADMIN — HEPARIN SODIUM 5000 UNIT(S): 5000 INJECTION INTRAVENOUS; SUBCUTANEOUS at 22:08

## 2018-07-19 RX ADMIN — OXYCODONE AND ACETAMINOPHEN 2 TABLET(S): 5; 325 TABLET ORAL at 11:50

## 2018-07-19 NOTE — CONSULT NOTE ADULT - SUBJECTIVE AND OBJECTIVE BOX
43 yo com amb w/o AD presents with left hip swelling. pt admitted for abd pain and was taken by gsx for exlap and lysis of adhesions. since admitted consult for ortho for left anterior thigh fluid collection noticed on PE and on CT C/A/P. pt had L esequiel done 2/10/2018 by dr navas for hip pain from AVN and OA of left hip after chronic steroid use. no issues with ESEQUIEL after surgery. noticed this fluid collection/mass about 1-2 months ago. pt was seen in dr. navas office last week and was scheduled for aspiration but since becoming sick has been in the hospital and missed the appointment for intervention. pt denies cp/sob. pt denies any fevers or chills. no other complaints at this time.    ros: all ORS neg other than noted above    PAST MEDICAL & SURGICAL HISTORY:  Varicose veins of both lower extremities  History of anemia  Bilateral sciatica  Spondylisthesis  Lumbar stenosis with neurogenic claudication  Scoliosis (and kyphoscoliosis), idiopathic  Asthma  History of hip replacement  S/P endoscopy: Baseline   H/O varicose vein ligation: left lower extremity 2011  Gastric bypass status for obesity:   S/P  section: X3; ,,     Home Medications:  Advair Diskus 250 mcg-50 mcg inhalation powder: 1 puff(s) inhaled 2 times a day (2018 14:46)  fluticasone propionate: 2 spray(s) orally once a day (2018 14:46)  oxyCODONE-acetaminophen 10 mg-325 mg oral tablet: 1 tab(s) orally every 6 hours, As Needed (2018 14:46)  tiZANidine 4 mg oral capsule: 1 cap(s) orally 3 times a day, As Needed (2018 14:46)  Ventolin HFA 90 mcg/inh inhalation aerosol: 2 puff(s) inhaled 4 times a day, As Needed (2018 14:46)    Allergies    diclofenac (Other)  shellfish (Hives; Short breath)    Intolerances    imaging:  xray hip femur: pending  CT:  PROCEDURE:   CT of the Abdomen and Pelvis was performed with intravenous contrast.   Intravenous contrast: 90 ml Omnipaque 350. 10 ml discarded.   Oral contrast: positive contrast was administered.   Sagittal and coronal reformats were performed.     FINDINGS:     LOWER CHEST: Visualized lung bases, heart and pericardium are unremarkable.     LIVER: Within normal limits.   SPLEEN: Within normal limits.   PANCREAS: Within normal limits.   GALLBLADDER: Within normal limits.   BILE DUCTS: Normal caliber.   ADRENALS: Within normal limits.   KIDNEYS/URETERS: No mass, stone or hydronephrosis.     RETROPERITONEUM: No lymphadenopathy.   VESSELS: Within normal limits     BOWEL: Status post Alexandr-en-Y gastric bypass with severe dilatation of   proximal small bowel loops, including the Alexandr limb. There is extensive   swirling of the mesenteric vasculature. There is no pneumatosis or wall   thickening. Appendix normal.   PERITONEUM: Small amount of fluid surrounding left upper quadrant small   bowel loops and in the pelvis. No abscess or pneumoperitoneum..     REPRODUCTIVE ORGANS: Uterus and adnexa are unremarkable. Essure device is   noted in the bilateral fallopian tubes.   BLADDER: Within normal limits.     ABDOMINAL WALL: 5.2 x 9.1 x 10.0 cm fluid collection in the anterior left   groin, unchanged. Surgical clips noted in the groin.   BONES: Left hip arthroplasty. Spinal rods extending from T10 through L5.     IMPRESSION: Proximal small bowel obstruction due to internal hernia.         gen: nad, resting comfortably    abd: well appearing scar from exlap, soft    LLE: well appearing scar from anterior approach over left hip from ESEQUIEL, no erythema or drainage, no warmth, large mass anterior thigh medial to scar, no obvious fluctuance, mild pain with ttp, no ttp over lateral hip, painless full ROM joint, +ehl/fhl/ta/gsc/quad/ham/hf, +dp/pt, no calf ttp bilateral 43 yo com amb w/o AD presents with left hip swelling. pt admitted for abd pain and was taken by gsx for exlap and lysis of adhesions. since admitted consult for ortho for left anterior thigh fluid collection noticed on PE and on CT C/A/P. pt had L esequiel done 2/10/2018 by dr navas for hip pain from AVN and OA of left hip after chronic steroid use. no issues with ESEQUIEL after surgery. noticed this fluid collection/mass about 1-2 months ago. pt was seen in dr. navas office last week and was scheduled for aspiration but since becoming sick has been in the hospital and missed the appointment for intervention; reports hip aspiration was rescheduled by orthopedic surgeon for 18. pt denies cp/sob. pt denies any fevers or chills. no other complaints at this time.    ros: all ORS neg other than noted above    PAST MEDICAL & SURGICAL HISTORY:  Varicose veins of both lower extremities  History of anemia  Bilateral sciatica  Spondylisthesis  Lumbar stenosis with neurogenic claudication  Scoliosis (and kyphoscoliosis), idiopathic  Asthma  History of hip replacement  S/P endoscopy: Baseline   H/O varicose vein ligation: left lower extremity 2011  Gastric bypass status for obesity:   S/P  section: X3; ,,     Home Medications:  Advair Diskus 250 mcg-50 mcg inhalation powder: 1 puff(s) inhaled 2 times a day (2018 14:46)  fluticasone propionate: 2 spray(s) orally once a day (2018 14:46)  oxyCODONE-acetaminophen 10 mg-325 mg oral tablet: 1 tab(s) orally every 6 hours, As Needed (2018 14:46)  tiZANidine 4 mg oral capsule: 1 cap(s) orally 3 times a day, As Needed (2018 14:46)  Ventolin HFA 90 mcg/inh inhalation aerosol: 2 puff(s) inhaled 4 times a day, As Needed (2018 14:46)    Allergies    diclofenac (Other)  shellfish (Hives; Short breath)    Intolerances    imaging:  xray hip femur: pending  CT:  PROCEDURE:   CT of the Abdomen and Pelvis was performed with intravenous contrast.   Intravenous contrast: 90 ml Omnipaque 350. 10 ml discarded.   Oral contrast: positive contrast was administered.   Sagittal and coronal reformats were performed.     FINDINGS:     LOWER CHEST: Visualized lung bases, heart and pericardium are unremarkable.     LIVER: Within normal limits.   SPLEEN: Within normal limits.   PANCREAS: Within normal limits.   GALLBLADDER: Within normal limits.   BILE DUCTS: Normal caliber.   ADRENALS: Within normal limits.   KIDNEYS/URETERS: No mass, stone or hydronephrosis.     RETROPERITONEUM: No lymphadenopathy.   VESSELS: Within normal limits     BOWEL: Status post Alexandr-en-Y gastric bypass with severe dilatation of   proximal small bowel loops, including the Alexandr limb. There is extensive   swirling of the mesenteric vasculature. There is no pneumatosis or wall   thickening. Appendix normal.   PERITONEUM: Small amount of fluid surrounding left upper quadrant small   bowel loops and in the pelvis. No abscess or pneumoperitoneum..     REPRODUCTIVE ORGANS: Uterus and adnexa are unremarkable. Essure device is   noted in the bilateral fallopian tubes.   BLADDER: Within normal limits.     ABDOMINAL WALL: 5.2 x 9.1 x 10.0 cm fluid collection in the anterior left   groin, unchanged. Surgical clips noted in the groin.   BONES: Left hip arthroplasty. Spinal rods extending from T10 through L5.     IMPRESSION: Proximal small bowel obstruction due to internal hernia.         gen: nad, resting comfortably    abd: well appearing scar from exlap, soft    LLE: well appearing scar from anterior approach over left hip from ESEQUIEL, no erythema or drainage, no warmth, large mass anterior thigh medial to scar, no obvious fluctuance, mild pain with ttp, no ttp over lateral hip, painless full ROM joint, +ehl/fhl/ta/gsc/quad/ham/hf, +dp/pt, no calf ttp bilateral

## 2018-07-19 NOTE — CONSULT NOTE ADULT - ASSESSMENT
43 yo female with left thigh fluid collection sp ESEQUIEL likely seroma    based on PE and prelim labs low likelihood of PJI  pain control  wbat LLE  dvt ppx  care as per GSx and medical teams  left thigh fluid collection seems chronic in nature and liekly 2/2 anterior hip approach  recommend possible IR aspiration of collection  fu esr/crp  fu UA  imaging reviewed  no acute orthopedic surgical intervention at this time  will discuss with attending and advise if plan changes  ortho stable 41 yo female with left thigh fluid collection sp ESEQUIEL likely seroma; based on PE and prelim labs low likelihood of PJI    pain control  wbat LLE  dvt ppx  care as per GSx and medical teams  left thigh fluid collection seems chronic in nature and likely 2/2 anterior hip approach  fu esr/crp  imaging reviewed  recommend f/u with private orthopedic surgeon as OP for previously scheduled aspiration of collection  no acute orthopedic surgical intervention at this time  ortho stable  reconsult orthopedics as needed

## 2018-07-19 NOTE — PROGRESS NOTE ADULT - SUBJECTIVE AND OBJECTIVE BOX
Post Op Day#: 1  Procedure:  Laparoscopic lysis of adhesions    The patient is doing well without complaints.    Vital Signs Last 24 Hrs  T(C): 37 (19 Jul 2018 11:16), Max: 37.1 (18 Jul 2018 19:45)  T(F): 98.6 (19 Jul 2018 11:16), Max: 98.8 (18 Jul 2018 19:45)  HR: 73 (19 Jul 2018 11:16) (48 - 77)  BP: 90/44 (19 Jul 2018 11:16) (90/44 - 120/66)  BP(mean): 88 (18 Jul 2018 15:01) (88 - 88)  RR: 16 (19 Jul 2018 11:16) (1 - 18)  SpO2: 99% (19 Jul 2018 11:16) (96% - 100%)    PHYSICAL EXAM:  General: NAD.  HEENT: no JVD, no jaundice.  LUNGS: CTAB.  Heart: S1 S2 RRR  Abd: soft nt/nd   Wounds: clean dry and intact                          10.7   4.90  )-----------( 200      ( 19 Jul 2018 07:24 )             31.6       07-19    138  |  109<H>  |  6<L>  ----------------------------<  71  3.4<L>   |  23  |  0.49<L>    Ca    7.6<L>      19 Jul 2018 07:24    TPro  7.8  /  Alb  4.2  /  TBili  0.9  /  DBili  x   /  AST  19  /  ALT  25  /  AlkPhos  65  07-18

## 2018-07-20 ENCOUNTER — TRANSCRIPTION ENCOUNTER (OUTPATIENT)
Age: 43
End: 2018-07-20

## 2018-07-20 VITALS
DIASTOLIC BLOOD PRESSURE: 54 MMHG | TEMPERATURE: 98 F | OXYGEN SATURATION: 100 % | SYSTOLIC BLOOD PRESSURE: 90 MMHG | RESPIRATION RATE: 18 BRPM | HEART RATE: 59 BPM

## 2018-07-20 LAB
ANION GAP SERPL CALC-SCNC: 7 MMOL/L — SIGNIFICANT CHANGE UP (ref 5–17)
BUN SERPL-MCNC: 8 MG/DL — SIGNIFICANT CHANGE UP (ref 7–23)
CALCIUM SERPL-MCNC: 7.7 MG/DL — LOW (ref 8.5–10.1)
CHLORIDE SERPL-SCNC: 113 MMOL/L — HIGH (ref 96–108)
CO2 SERPL-SCNC: 24 MMOL/L — SIGNIFICANT CHANGE UP (ref 22–31)
CREAT SERPL-MCNC: 0.47 MG/DL — LOW (ref 0.5–1.3)
CRP SERPL-MCNC: 1.12 MG/DL — HIGH (ref 0–0.4)
GLUCOSE SERPL-MCNC: 86 MG/DL — SIGNIFICANT CHANGE UP (ref 70–99)
HCT VFR BLD CALC: 29.6 % — LOW (ref 34.5–45)
HGB BLD-MCNC: 9.8 G/DL — LOW (ref 11.5–15.5)
MCHC RBC-ENTMCNC: 31 PG — SIGNIFICANT CHANGE UP (ref 27–34)
MCHC RBC-ENTMCNC: 33.1 GM/DL — SIGNIFICANT CHANGE UP (ref 32–36)
MCV RBC AUTO: 93.7 FL — SIGNIFICANT CHANGE UP (ref 80–100)
NRBC # BLD: 0 /100 WBCS — SIGNIFICANT CHANGE UP (ref 0–0)
PLATELET # BLD AUTO: 174 K/UL — SIGNIFICANT CHANGE UP (ref 150–400)
POTASSIUM SERPL-MCNC: 4.4 MMOL/L — SIGNIFICANT CHANGE UP (ref 3.5–5.3)
POTASSIUM SERPL-SCNC: 4.4 MMOL/L — SIGNIFICANT CHANGE UP (ref 3.5–5.3)
RBC # BLD: 3.16 M/UL — LOW (ref 3.8–5.2)
RBC # FLD: 12.8 % — SIGNIFICANT CHANGE UP (ref 10.3–14.5)
SODIUM SERPL-SCNC: 144 MMOL/L — SIGNIFICANT CHANGE UP (ref 135–145)
WBC # BLD: 4.21 K/UL — SIGNIFICANT CHANGE UP (ref 3.8–10.5)
WBC # FLD AUTO: 4.21 K/UL — SIGNIFICANT CHANGE UP (ref 3.8–10.5)

## 2018-07-20 RX ORDER — ALBUTEROL 90 UG/1
2 AEROSOL, METERED ORAL
Qty: 0 | Refills: 0 | COMMUNITY

## 2018-07-20 RX ADMIN — BUDESONIDE AND FORMOTEROL FUMARATE DIHYDRATE 2 PUFF(S): 160; 4.5 AEROSOL RESPIRATORY (INHALATION) at 07:56

## 2018-07-20 RX ADMIN — HEPARIN SODIUM 5000 UNIT(S): 5000 INJECTION INTRAVENOUS; SUBCUTANEOUS at 06:17

## 2018-07-20 RX ADMIN — OXYCODONE AND ACETAMINOPHEN 2 TABLET(S): 5; 325 TABLET ORAL at 12:30

## 2018-07-20 RX ADMIN — OXYCODONE AND ACETAMINOPHEN 2 TABLET(S): 5; 325 TABLET ORAL at 06:18

## 2018-07-20 RX ADMIN — OXYCODONE AND ACETAMINOPHEN 2 TABLET(S): 5; 325 TABLET ORAL at 11:29

## 2018-07-20 RX ADMIN — SODIUM CHLORIDE 100 MILLILITER(S): 9 INJECTION INTRAMUSCULAR; INTRAVENOUS; SUBCUTANEOUS at 03:55

## 2018-07-20 NOTE — PROGRESS NOTE ADULT - PROBLEM SELECTOR PLAN 1
doing well tolerating diet.  ortho needs to drain her thigh seroma vs abscess.  consult appreciated  if ok with ortho will dc today.  if not will keep the patient admitted.

## 2018-07-20 NOTE — DISCHARGE NOTE ADULT - CARE PLAN
Principal Discharge DX:	SBO (small bowel obstruction)  Goal:	reIcover from surgery  Assessment and plan of treatment:	follow up in 2 weeks, follow the office packet

## 2018-07-20 NOTE — DISCHARGE NOTE ADULT - MEDICATION SUMMARY - MEDICATIONS TO TAKE
I will START or STAY ON the medications listed below when I get home from the hospital:    oxyCODONE-acetaminophen 10 mg-325 mg oral tablet  -- 1 tab(s) by mouth every 6 hours, As Needed  -- Indication: For SMALL BOWEL OBSTRUCTION    ondansetron 4 mg oral tablet, disintegrating  -- 1 tab(s) by mouth 3 times a day, As Needed nausea  -- Indication: For SBO (small bowel obstruction)    Advair Diskus 250 mcg-50 mcg inhalation powder  -- 1 puff(s) inhaled 2 times a day  -- Indication: For SBO (small bowel obstruction)    tiZANidine 4 mg oral capsule  -- 1 cap(s) by mouth 3 times a day, As Needed  -- Indication: For SBO (small bowel obstruction)    fluticasone propionate  -- 2 spray(s) by mouth once a day  -- Indication: For SBO (small bowel obstruction)

## 2018-07-20 NOTE — DISCHARGE NOTE ADULT - PATIENT PORTAL LINK FT
You can access the [facility] Patient Portal, offered by [facility], by registering with the following website: [email or web address]

## 2018-07-25 DIAGNOSIS — K56.51 INTESTINAL ADHESIONS [BANDS], WITH PARTIAL OBSTRUCTION: ICD-10-CM

## 2018-07-25 DIAGNOSIS — Z98.84 BARIATRIC SURGERY STATUS: ICD-10-CM

## 2018-07-25 DIAGNOSIS — K56.609 UNSPECIFIED INTESTINAL OBSTRUCTION, UNSPECIFIED AS TO PARTIAL VERSUS COMPLETE OBSTRUCTION: ICD-10-CM

## 2018-07-25 DIAGNOSIS — J45.909 UNSPECIFIED ASTHMA, UNCOMPLICATED: ICD-10-CM

## 2018-07-25 DIAGNOSIS — K46.9 UNSPECIFIED ABDOMINAL HERNIA WITHOUT OBSTRUCTION OR GANGRENE: ICD-10-CM

## 2019-07-14 NOTE — PROGRESS NOTE ADULT - ASSESSMENT
A/P: s/p L3-5 laminectomies/osteotomies, T10-L5 PSF - stable          hypotension - stable          postoperative blood loss anemia - asymptomatic, stable  1.  Continue PCA  2.  Attempt PT/mobilization this am if blood pressure remains stable  3.  May d/c paulion this afternoon if able to tolerate PT  4.  Medical care as per intensivist normal... Well appearing, well nourished, awake, alert, oriented to person, place, time/situation and in no apparent distress.

## 2020-01-12 ENCOUNTER — EMERGENCY (EMERGENCY)
Facility: HOSPITAL | Age: 45
LOS: 0 days | Discharge: HOME | End: 2020-01-12
Attending: STUDENT IN AN ORGANIZED HEALTH CARE EDUCATION/TRAINING PROGRAM
Payer: COMMERCIAL

## 2020-01-12 VITALS
HEART RATE: 79 BPM | DIASTOLIC BLOOD PRESSURE: 87 MMHG | SYSTOLIC BLOOD PRESSURE: 149 MMHG | RESPIRATION RATE: 20 BRPM | TEMPERATURE: 98 F | OXYGEN SATURATION: 100 %

## 2020-01-12 VITALS — WEIGHT: 139.99 LBS | HEIGHT: 64 IN

## 2020-01-12 DIAGNOSIS — Z98.84 BARIATRIC SURGERY STATUS: ICD-10-CM

## 2020-01-12 DIAGNOSIS — Z98.890 OTHER SPECIFIED POSTPROCEDURAL STATES: Chronic | ICD-10-CM

## 2020-01-12 DIAGNOSIS — Z96.649 PRESENCE OF UNSPECIFIED ARTIFICIAL HIP JOINT: Chronic | ICD-10-CM

## 2020-01-12 DIAGNOSIS — Z91.013 ALLERGY TO SEAFOOD: ICD-10-CM

## 2020-01-12 DIAGNOSIS — Z98.89 OTHER SPECIFIED POSTPROCEDURAL STATES: Chronic | ICD-10-CM

## 2020-01-12 DIAGNOSIS — R10.9 UNSPECIFIED ABDOMINAL PAIN: ICD-10-CM

## 2020-01-12 DIAGNOSIS — R11.2 NAUSEA WITH VOMITING, UNSPECIFIED: ICD-10-CM

## 2020-01-12 DIAGNOSIS — Z98.84 BARIATRIC SURGERY STATUS: Chronic | ICD-10-CM

## 2020-01-12 DIAGNOSIS — R10.84 GENERALIZED ABDOMINAL PAIN: ICD-10-CM

## 2020-01-12 LAB
ALBUMIN SERPL ELPH-MCNC: 4.2 G/DL — SIGNIFICANT CHANGE UP (ref 3.3–5)
ALP SERPL-CCNC: 67 U/L — SIGNIFICANT CHANGE UP (ref 40–120)
ALT FLD-CCNC: 20 U/L — SIGNIFICANT CHANGE UP (ref 12–78)
ANION GAP SERPL CALC-SCNC: 6 MMOL/L — SIGNIFICANT CHANGE UP (ref 5–17)
APPEARANCE UR: CLEAR — SIGNIFICANT CHANGE UP
APTT BLD: 28 SEC — SIGNIFICANT CHANGE UP (ref 27.5–36.3)
AST SERPL-CCNC: 19 U/L — SIGNIFICANT CHANGE UP (ref 15–37)
BASOPHILS # BLD AUTO: 0.03 K/UL — SIGNIFICANT CHANGE UP (ref 0–0.2)
BASOPHILS NFR BLD AUTO: 0.6 % — SIGNIFICANT CHANGE UP (ref 0–2)
BILIRUB SERPL-MCNC: 0.5 MG/DL — SIGNIFICANT CHANGE UP (ref 0.2–1.2)
BILIRUB UR-MCNC: NEGATIVE — SIGNIFICANT CHANGE UP
BUN SERPL-MCNC: 12 MG/DL — SIGNIFICANT CHANGE UP (ref 7–23)
CALCIUM SERPL-MCNC: 8.8 MG/DL — SIGNIFICANT CHANGE UP (ref 8.5–10.1)
CHLORIDE SERPL-SCNC: 103 MMOL/L — SIGNIFICANT CHANGE UP (ref 96–108)
CO2 SERPL-SCNC: 25 MMOL/L — SIGNIFICANT CHANGE UP (ref 22–31)
COLOR SPEC: YELLOW — SIGNIFICANT CHANGE UP
CREAT SERPL-MCNC: 0.54 MG/DL — SIGNIFICANT CHANGE UP (ref 0.5–1.3)
DIFF PNL FLD: NEGATIVE — SIGNIFICANT CHANGE UP
EOSINOPHIL # BLD AUTO: 0 K/UL — SIGNIFICANT CHANGE UP (ref 0–0.5)
EOSINOPHIL NFR BLD AUTO: 0 % — SIGNIFICANT CHANGE UP (ref 0–6)
GLUCOSE SERPL-MCNC: 118 MG/DL — HIGH (ref 70–99)
GLUCOSE UR QL: NEGATIVE MG/DL — SIGNIFICANT CHANGE UP
HCG SERPL-ACNC: <1 MIU/ML — SIGNIFICANT CHANGE UP
HCT VFR BLD CALC: 35.1 % — SIGNIFICANT CHANGE UP (ref 34.5–45)
HGB BLD-MCNC: 10.5 G/DL — LOW (ref 11.5–15.5)
IMM GRANULOCYTES NFR BLD AUTO: 0.6 % — SIGNIFICANT CHANGE UP (ref 0–1.5)
INR BLD: 1.02 RATIO — SIGNIFICANT CHANGE UP (ref 0.88–1.16)
KETONES UR-MCNC: ABNORMAL
LACTATE SERPL-SCNC: 1.9 MMOL/L — SIGNIFICANT CHANGE UP (ref 0.7–2)
LEUKOCYTE ESTERASE UR-ACNC: NEGATIVE — SIGNIFICANT CHANGE UP
LIDOCAIN IGE QN: 35 U/L — LOW (ref 73–393)
LYMPHOCYTES # BLD AUTO: 0.6 K/UL — LOW (ref 1–3.3)
LYMPHOCYTES # BLD AUTO: 12.5 % — LOW (ref 13–44)
MCHC RBC-ENTMCNC: 22.3 PG — LOW (ref 27–34)
MCHC RBC-ENTMCNC: 29.9 GM/DL — LOW (ref 32–36)
MCV RBC AUTO: 74.7 FL — LOW (ref 80–100)
MONOCYTES # BLD AUTO: 0.15 K/UL — SIGNIFICANT CHANGE UP (ref 0–0.9)
MONOCYTES NFR BLD AUTO: 3.1 % — SIGNIFICANT CHANGE UP (ref 2–14)
NEUTROPHILS # BLD AUTO: 4 K/UL — SIGNIFICANT CHANGE UP (ref 1.8–7.4)
NEUTROPHILS NFR BLD AUTO: 83.2 % — HIGH (ref 43–77)
NITRITE UR-MCNC: NEGATIVE — SIGNIFICANT CHANGE UP
PH UR: 8 — SIGNIFICANT CHANGE UP (ref 5–8)
PLATELET # BLD AUTO: 403 K/UL — HIGH (ref 150–400)
POTASSIUM SERPL-MCNC: 4 MMOL/L — SIGNIFICANT CHANGE UP (ref 3.5–5.3)
POTASSIUM SERPL-SCNC: 4 MMOL/L — SIGNIFICANT CHANGE UP (ref 3.5–5.3)
PROT SERPL-MCNC: 8 GM/DL — SIGNIFICANT CHANGE UP (ref 6–8.3)
PROT UR-MCNC: NEGATIVE MG/DL — SIGNIFICANT CHANGE UP
PROTHROM AB SERPL-ACNC: 11.3 SEC — SIGNIFICANT CHANGE UP (ref 10–12.9)
RBC # BLD: 4.7 M/UL — SIGNIFICANT CHANGE UP (ref 3.8–5.2)
RBC # FLD: 17.2 % — HIGH (ref 10.3–14.5)
SODIUM SERPL-SCNC: 134 MMOL/L — LOW (ref 135–145)
SP GR SPEC: 1.01 — SIGNIFICANT CHANGE UP (ref 1.01–1.02)
UROBILINOGEN FLD QL: NEGATIVE MG/DL — SIGNIFICANT CHANGE UP
WBC # BLD: 4.81 K/UL — SIGNIFICANT CHANGE UP (ref 3.8–10.5)
WBC # FLD AUTO: 4.81 K/UL — SIGNIFICANT CHANGE UP (ref 3.8–10.5)

## 2020-01-12 PROCEDURE — 74177 CT ABD & PELVIS W/CONTRAST: CPT | Mod: 26

## 2020-01-12 PROCEDURE — 83690 ASSAY OF LIPASE: CPT

## 2020-01-12 PROCEDURE — 81003 URINALYSIS AUTO W/O SCOPE: CPT

## 2020-01-12 PROCEDURE — 85610 PROTHROMBIN TIME: CPT

## 2020-01-12 PROCEDURE — 99285 EMERGENCY DEPT VISIT HI MDM: CPT

## 2020-01-12 PROCEDURE — 83605 ASSAY OF LACTIC ACID: CPT

## 2020-01-12 PROCEDURE — 85730 THROMBOPLASTIN TIME PARTIAL: CPT

## 2020-01-12 PROCEDURE — 80053 COMPREHEN METABOLIC PANEL: CPT

## 2020-01-12 PROCEDURE — 86850 RBC ANTIBODY SCREEN: CPT

## 2020-01-12 PROCEDURE — 86900 BLOOD TYPING SEROLOGIC ABO: CPT

## 2020-01-12 PROCEDURE — 96376 TX/PRO/DX INJ SAME DRUG ADON: CPT

## 2020-01-12 PROCEDURE — 87086 URINE CULTURE/COLONY COUNT: CPT

## 2020-01-12 PROCEDURE — 86901 BLOOD TYPING SEROLOGIC RH(D): CPT

## 2020-01-12 PROCEDURE — 85025 COMPLETE CBC W/AUTO DIFF WBC: CPT

## 2020-01-12 PROCEDURE — 36415 COLL VENOUS BLD VENIPUNCTURE: CPT

## 2020-01-12 PROCEDURE — 74177 CT ABD & PELVIS W/CONTRAST: CPT

## 2020-01-12 PROCEDURE — 99284 EMERGENCY DEPT VISIT MOD MDM: CPT | Mod: 25

## 2020-01-12 PROCEDURE — 84702 CHORIONIC GONADOTROPIN TEST: CPT

## 2020-01-12 PROCEDURE — 96361 HYDRATE IV INFUSION ADD-ON: CPT

## 2020-01-12 PROCEDURE — 96375 TX/PRO/DX INJ NEW DRUG ADDON: CPT

## 2020-01-12 PROCEDURE — 96374 THER/PROPH/DIAG INJ IV PUSH: CPT | Mod: XU

## 2020-01-12 RX ORDER — SODIUM CHLORIDE 9 MG/ML
1000 INJECTION, SOLUTION INTRAVENOUS ONCE
Refills: 0 | Status: COMPLETED | OUTPATIENT
Start: 2020-01-12 | End: 2020-01-12

## 2020-01-12 RX ORDER — HYDROMORPHONE HYDROCHLORIDE 2 MG/ML
1 INJECTION INTRAMUSCULAR; INTRAVENOUS; SUBCUTANEOUS ONCE
Refills: 0 | Status: DISCONTINUED | OUTPATIENT
Start: 2020-01-12 | End: 2020-01-12

## 2020-01-12 RX ORDER — ONDANSETRON 8 MG/1
4 TABLET, FILM COATED ORAL ONCE
Refills: 0 | Status: COMPLETED | OUTPATIENT
Start: 2020-01-12 | End: 2020-01-12

## 2020-01-12 RX ORDER — MORPHINE SULFATE 50 MG/1
4 CAPSULE, EXTENDED RELEASE ORAL ONCE
Refills: 0 | Status: DISCONTINUED | OUTPATIENT
Start: 2020-01-12 | End: 2020-01-12

## 2020-01-12 RX ORDER — ONDANSETRON 8 MG/1
1 TABLET, FILM COATED ORAL
Qty: 6 | Refills: 0
Start: 2020-01-12 | End: 2020-01-13

## 2020-01-12 RX ORDER — DOCUSATE SODIUM 100 MG
1 CAPSULE ORAL
Qty: 30 | Refills: 0
Start: 2020-01-12 | End: 2020-01-21

## 2020-01-12 RX ADMIN — SODIUM CHLORIDE 1000 MILLILITER(S): 9 INJECTION, SOLUTION INTRAVENOUS at 16:52

## 2020-01-12 RX ADMIN — SODIUM CHLORIDE 1000 MILLILITER(S): 9 INJECTION, SOLUTION INTRAVENOUS at 15:13

## 2020-01-12 RX ADMIN — HYDROMORPHONE HYDROCHLORIDE 1 MILLIGRAM(S): 2 INJECTION INTRAMUSCULAR; INTRAVENOUS; SUBCUTANEOUS at 16:58

## 2020-01-12 RX ADMIN — ONDANSETRON 4 MILLIGRAM(S): 8 TABLET, FILM COATED ORAL at 20:21

## 2020-01-12 RX ADMIN — HYDROMORPHONE HYDROCHLORIDE 1 MILLIGRAM(S): 2 INJECTION INTRAMUSCULAR; INTRAVENOUS; SUBCUTANEOUS at 16:03

## 2020-01-12 RX ADMIN — MORPHINE SULFATE 4 MILLIGRAM(S): 50 CAPSULE, EXTENDED RELEASE ORAL at 15:12

## 2020-01-12 RX ADMIN — HYDROMORPHONE HYDROCHLORIDE 1 MILLIGRAM(S): 2 INJECTION INTRAMUSCULAR; INTRAVENOUS; SUBCUTANEOUS at 17:10

## 2020-01-12 RX ADMIN — MORPHINE SULFATE 4 MILLIGRAM(S): 50 CAPSULE, EXTENDED RELEASE ORAL at 15:20

## 2020-01-12 RX ADMIN — HYDROMORPHONE HYDROCHLORIDE 1 MILLIGRAM(S): 2 INJECTION INTRAMUSCULAR; INTRAVENOUS; SUBCUTANEOUS at 16:10

## 2020-01-12 RX ADMIN — ONDANSETRON 4 MILLIGRAM(S): 8 TABLET, FILM COATED ORAL at 15:12

## 2020-01-12 NOTE — ED PROVIDER NOTE - CARE PROVIDER_API CALL
Lang Nelson)  Surgery  [address], [address]  [place] [address]  Phone: [phone]  Fax: [phone]  Follow Up Time:

## 2020-01-12 NOTE — ED ADULT NURSE NOTE - OBJECTIVE STATEMENT
pt came to ED for evaluation of severe ABD pain with N/V/D. Also c/o back pain from surgery; pt sts the back pain is ongoing. Fentanyl patch right chest wall. Pt denies any other pain or complaints.

## 2020-01-12 NOTE — ED PROVIDER NOTE - PATIENT PORTAL LINK FT
You can access the FollowMyHealth Patient Portal offered by [facility] by registering at the following website: [email or web address]. By joining [employer]’s FollowMyHealth portal, you will also be able to view your health information using other applications (apps) compatible with our system.

## 2020-01-12 NOTE — ED ADULT NURSE NOTE - CHPI ED NUR SYMPTOMS NEG
no dysuria/no blood in stool/no abdominal distension/no burning urination/no chills/no fever/no hematuria

## 2020-01-12 NOTE — ED PROVIDER NOTE - NS_ ATTENDINGSCRIBEDETAILS _ED_A_ED_FT
Emmy Garcia M.D.: The history, relevant review of systems, past medical and surgical history, medical decision making, and physical examination was documented by the scribe in my presence and I attest to the accuracy of the documentation .

## 2020-01-12 NOTE — ED PROVIDER NOTE - CLINICAL SUMMARY MEDICAL DECISION MAKING FREE TEXT BOX
44 yr old F presents to the ED with abd pain. + N/V Concern for SBO, given symptoms and similar presentation with prior SBO. Possibility of ischemic bowels secondary to prior SBO and hernia. Less likely gastroenteritis or pancreatitis. Plan: fluids, symptoms control, lactate levels, CT A/P likely surgery consult

## 2020-01-12 NOTE — ED PROVIDER NOTE - OBJECTIVE STATEMENT
45 y/o F with PMHx of Asthma, Bilateral sciatica, anemia  Lumbar stenosis,, Scoliosis, SBO presenting to the ED c/o abd pain x yesterday. +N/V Pt had bariatric surgery x1.5 year ago for SBO. Patient reports that she had Austin's ice last night, and since then has been having and pain and symptoms that feels similar to her SBO in the past. Denies any diarrhea, blood in stool, fever or chills. 43 y/o F with PMHx of Asthma, Bilateral sciatica, anemia  Lumbar stenosis,, Scoliosis, SBO presenting to the ED c/o abd pain x yesterday. +N/V Pt had Laparoscopic lysis of adhesions x1.5 year ago for SBO. Patient reports that she had Austin's ice last night, and since then has been having and pain and symptoms that feels similar to her SBO in the past. Denies any diarrhea, blood in stool, fever or chills.

## 2020-01-12 NOTE — ED PROVIDER NOTE - CONSTITUTIONAL, MLM
normal... awake, alert, oriented to person, place, time/situation and in no apparent distress. +ill-appearing, cachetic

## 2020-01-12 NOTE — ED PROVIDER NOTE - CARE PLAN
Principal Discharge DX:	Generalized abdominal pain  Secondary Diagnosis:	Non-intractable vomiting with nausea

## 2020-01-12 NOTE — ED ADULT TRIAGE NOTE - CHIEF COMPLAINT QUOTE
Pt complains of severe stomach pain, nausea and vomiting. Pt is a patient of Dr. Nelson having had surgery approx 1 1/2 years ago. Pt states that she in unable to keep anything down, believing that she has a SBO.

## 2020-01-12 NOTE — ED PROVIDER NOTE - ENMT, MLM
Airway patent, Nasal mucosa clear.  Throat has no vesicles, no oropharyngeal exudates and uvula is midline. +Dry mucous membranes

## 2020-01-12 NOTE — ED PROVIDER NOTE - PROGRESS NOTE DETAILS
43 y/o F with PMH of asthma, gastric bypass, lumbar stenosis, SBO presents with diffuse abdominal pain x 2 days. States pain is consistent with prior SBO apx 1.5 years ago. states she had laproscopic lysis of adhesions at that time with Dr. Nelson. +nausea/vomiting since last night. Last bowel movement/passage of gas last night. PE: uncomfortable appearing. Cardiac: s1s2, RRR> Lungs: CTAB. Abdomen: hypoactive BS, nondistended, soft, diffuse tenderness to palpation. A/P: r/o SBO, plan for analgesia, labs, CTAP, likely surgical consult, reassess. - Lee Brown PA-C labs and imaging reviewed, no acute findings. D/w Dr. Alexandra, advised if patient is tolerating PO may be discharged with outpatient follow up. Recommended stool softeners for home if discharged. Patient tolerating PO at this time, will d/c home. - Lee Brown PA-C

## 2020-01-12 NOTE — ED PROVIDER NOTE - ATTENDING CONTRIBUTION TO CARE
Emmy Garcia M.D: note was prepared by scrwen in my pressence and represents my medical decision making . pt was seen and followed by PA and his contributions are noted in progress notes and disposition.

## 2020-01-12 NOTE — ED ADULT NURSE REASSESSMENT NOTE - NS ED NURSE REASSESS COMMENT FT1
pt tolerating PO challenge well with crackers and ginger ale well at this time. sts she is comfortable

## 2020-01-14 LAB
CULTURE RESULTS: SIGNIFICANT CHANGE UP
SPECIMEN SOURCE: SIGNIFICANT CHANGE UP

## 2020-01-16 RX ORDER — CEPHALEXIN 500 MG
1 CAPSULE ORAL
Qty: 14 | Refills: 0
Start: 2020-01-16 | End: 2020-01-22

## 2020-01-16 NOTE — ED POST DISCHARGE NOTE - DETAILS
Returned call to ED reported +UC and Rx. Keflex. Agreed to take medication and F/U with PMD. Lowell NP

## 2020-02-04 NOTE — ED PROVIDER NOTE - CROS ED NEURO POS
The patient was monitored closely during her stay.  She was kept on continuous telemetry monitoring where she remained in sinus rhythm no significant arrhythmias.  Cardiology was consulted.  Her troponins were trended and remained negative. Patient reported long history of cigarette smoking.  Patient with O2 sats on room air 93-94%.  Patient suspected with early signs of COPD.  Patient educated on the importance of smoking cessation. Patient was initially scheduled for a Lexiscan stress test however patient was found to have had a cup of non decaffeinated coffee prior to test.  Her overall condition remained stable with no further complaints of chest pain.  Patient was cleared for discharge and to follow up with outpatient stress test to be ordered by Cardiology.        
+right leg numbness

## 2020-12-28 NOTE — PATIENT PROFILE ADULT. - ABILITY TO HEAR (WITH HEARING AID OR HEARING APPLIANCE IF NORMALLY USED):
What Type Of Note Output Would You Prefer (Optional)?: Bullet Format
How Severe Is Your Rash?: mild
Is This A New Presentation, Or A Follow-Up?: Rash
Adequate: hears normal conversation without difficulty

## 2021-01-01 NOTE — ED PROVIDER NOTE - CROS ED GI ALL NEG
Hearing Risk  Risk Factors for Hearing Loss: No known risk factors    Hearing Screening 1     Screener Name: Nataliia Love  Method: Otoacoustic emissions  Screening 1 Results: Right Ear Pass, Left Ear Pass        Mom Name: Nando Hill Name: Gómez Brianne  : 2022  Pediatrician: Ranulfo Lawson MD - - -

## 2021-02-08 NOTE — H&P PST ADULT - PRO PAIN EXPRESSION
Received RN trigger for advanced directives. Per update from OFT's pt A&Ox1 and is not appropriate for POA discussions at this time. Updated that this is not her baseline for mentation. P to return home this date with family per chart review. SW available should pt remain admitted and become appropriate for Advanced Directive completion.    verbalization

## 2021-03-03 NOTE — ED PROVIDER NOTE - CAPILLARY REFILL
"CLINICAL NUTRITION SERVICES - REASSESSMENT NOTE     Nutrition Prescription    RECOMMENDATIONS FOR MDs/PROVIDERS TO ORDER:  Consider thiamine supplementation, depending on recency/use of etoh and if would have clinical benefit (if suspect pt has vitamin B1 deficiency; order set is available if Wernicke).    Malnutrition Status:    Patient does not meet two of the established criteria necessary for diagnosing malnutrition    Recommendations already ordered by Registered Dietitian (RD):  Nephrovite    Future/Additional Recommendations:  1. Rec a 2 g sodium diet vs 2 g sodium diet with a high consistent carbohydrate diet (order as a \"Combination Diet\"). Continue fluid restriction as per team.   2. Monitor K+ and phos trends and possible need for restrictions if warranted in the future. Pt's phos was 4.8 on 2/23/21, consider rechecking. Monitor potential need for a phos binder.  3. Monitor BG control. Hgb A1c of 7 on 1/9/21. BG was 200 on 3/3.   4. Order zinc supplementation if pt will require lactulose in the future.   5. Consider checking vitamin D status. Pt was ordered to take vitamin D PTA.   6. Rec continue bowel regimen (being held at times).     7. If pt needs TFs this admission, would rec place feeding tube (FT) and refer to TF recs in nutrition note 2/23.      EVALUATION OF THE PROGRESS TOWARD GOALS   Diet: NPO for tunneled dialysis catheter. Previously on a 2 g sodium diet order. On a 1.5 L fluid restriction. Ordered to receive Boost Glucose Control at 10:00 and HS.   Intake: Pt consuming 100% consistently with a good appetite (per % intake flowsheets). Pt was busy with nursing staff during attempts to see. [employer] (meal ordering system) indicates food/beverages sent 2/28-3/2 totaled 2712 kcals and 118 g protein daily on average. Pt is ordering three meals daily, consistently.     NEW FINDINGS   Labs: Note, ammonia of 56 on 2/22/21.   GI: Pt having loose, brown stools. Last stool on 3/2. Having one to two " stools per day. On scheduled miralax and senna, held at times. Abdominal fullness noted per RN. Ascites noted per chart. Note, pt s/p 1 L paracentesis on 3/2.   Wt hx: 107 kg (1/16/20), 99.2 kg (7/22/20), 100.7 kg (10/14/20), 105 kg (12/11/20), 104.8 kg (1/9/21), 103.5 kg (1/29/21), 118.8 kg (2/21/21, admit), 100.4 kg (3/3) - Difficult to assess with fluid status and now diuresis with upcoming dialysis start soon.     ASSESSED NUTRITION NEEDS (for inpatient hospital stay) - updated  Dosing Weight: 86 kg (adjusted, based on lowest wt so far this admission of 100.4 kg on 3/3)   Estimated Energy Needs: 2158-4595 kcals/day (30-35 kcals/kg)  Justification: Increased needs with liver diease and upcoming dialysis but estimated needs are lower with wt status. Rec the low end of this range.  Estimated Protein Needs: 103-155 grams protein/day (1.2- 1.8 grams of pro/kg)  Justification: Increased needs with liver disease and upcoming dialysis.  500 mL/day    Justification: On a fluid restriction    MALNUTRITION  % Intake: No decreased intake noted  % Weight Loss: Difficult to assess with fluid status and now diuresis with upcoming dialysis start soon.   Subcutaneous Fat Loss: None observed, but difficult to assess with fluid status and body habitus.  Muscle Loss: None observed, but difficult to assess with fluid status and body habitus.  Fluid Accumulation/Edema: Trace  Malnutrition Diagnosis: Patient does not meet two of the established criteria necessary for diagnosing malnutrition    Previous Goals   Patient to consume % of nutritionally adequate meal trays TID, or the equivalent with supplements/snacks.  Evaluation: Meeting.    Previous Nutrition Diagnosis  Predicted inadequate nutrient intake (protein-energy) related to restrictive diet order and GI sx.   Evaluation: Unresolved, updated below.    CURRENT NUTRITION DIAGNOSIS  Predicted inadequate nutrient intake (protein-energy) related to restrictive diet order,  increased needs, and GI sx.     INTERVENTIONS  Implementation  Multivitamin/mineral supplement therapy: Ordered a nephrovite (to help meet micronutrient needs) to start 3/4, as pt to start dialysis soon.    Goals  Patient to consume % of nutritionally adequate meal trays TID, or the equivalent with supplements/snacks.    Monitoring/Evaluation  Progress toward goals will be monitored and evaluated per protocol.     Nutrition will continue to follow.      Sintia Smith, MS, RD, LD, [facility]   6C Pgr: [phone]    less than 2 seconds

## 2022-10-12 NOTE — PHYSICAL THERAPY INITIAL EVALUATION ADULT - PHYSICAL ASSIST/NONPHYSICAL ASSIST: STAND/SIT, REHAB EVAL
1 person assist Partially impaired: cannot see medication labels or newsprint, but can see obstacles in path, and the surrounding layout; can count fingers at arm's length

## 2023-01-04 ENCOUNTER — APPOINTMENT (OUTPATIENT)
Dept: ORTHOPEDIC SURGERY | Facility: CLINIC | Age: 48
End: 2023-01-04
Payer: COMMERCIAL

## 2023-01-04 VITALS
BODY MASS INDEX: 32.1 KG/M2 | HEART RATE: 72 BPM | WEIGHT: 188 LBS | DIASTOLIC BLOOD PRESSURE: 84 MMHG | HEIGHT: 64 IN | OXYGEN SATURATION: 98 % | SYSTOLIC BLOOD PRESSURE: 126 MMHG

## 2023-01-04 PROCEDURE — 72081 X-RAY EXAM ENTIRE SPI 1 VW: CPT

## 2023-01-04 PROCEDURE — 99203 OFFICE O/P NEW LOW 30 MIN: CPT

## 2023-01-09 NOTE — DISCUSSION/SUMMARY
[de-identified] : A lengthy discussion was had with the patient regarding her condition.   Rx CT L spine.   Will call pt with results or review via telehealth.   \par The patient's questions were sought and answered satisfactorily.\par \par \par \par Marlin SHETH NP am acting as a scribe for Dr. Frank Schwab.\par \par

## 2023-01-09 NOTE — ASSESSMENT
[FreeTextEntry1] : Scoliosis x-rays dated 1/4/2023 demonstrate lumbosacral instrumentation failure, left lumbar keaton fracture, right iliac screw fracture, proximal junctional kyphosis\par \par MRI cervical spine dated 11/26/2022 demonstrates C5-6 right foraminal stenosis.

## 2023-01-09 NOTE — PHYSICAL EXAM
[de-identified] : NVI.  +TTP axial lumbar spine.  Cervical discomfort with ROM. [de-identified] : Scoliosis x-rays dated 1/4/2023 demonstrates instrumentation failure lumbar region on the left keaton, right iliac screw fracture, P JK\par \par MRI cervical spine dated 11/26/2022 demonstrates C5-6 right foraminal stenosis.

## 2023-01-09 NOTE — HISTORY OF PRESENT ILLNESS
[de-identified] : Pt presents with cc: of neck pain.  Had revision thoracolumbar spinal fusion by Dr. Schwab in Nov 2018.   Neck pain radiates to right arm and hand with spasms in hand.  Has diffuse mid and low back pain with knife-like pain on upper left lumbar region.  C/o pain in right groin with numbness in R thigh.  She can't drive as her RLE feels "dead" and she can't use the gas pedal.  Her R knee locks and she has fallen.  Unable to climb stairs d/t pain.  She tilts forward when walking.  \par Saw neurologist, had EMG done, sx not recommended as per pt.  Sees pain mgmt every 6 months- has RFA, facet injection, ESIs.  Last injection Sept/Oct- temporary relief.

## 2023-02-02 ENCOUNTER — NON-APPOINTMENT (OUTPATIENT)
Age: 48
End: 2023-02-02

## 2023-03-20 ENCOUNTER — TRANSCRIPTION ENCOUNTER (OUTPATIENT)
Age: 48
End: 2023-03-20

## 2023-03-20 RX ORDER — FLUTICASONE PROPIONATE 220 MCG
2 AEROSOL WITH ADAPTER (GRAM) INHALATION
Qty: 0 | Refills: 0 | DISCHARGE

## 2023-03-20 RX ORDER — FLUTICASONE PROPIONATE AND SALMETEROL 50; 250 UG/1; UG/1
1 POWDER ORAL; RESPIRATORY (INHALATION)
Qty: 0 | Refills: 0 | DISCHARGE

## 2023-03-20 RX ORDER — TIZANIDINE 4 MG/1
1 TABLET ORAL
Qty: 0 | Refills: 0 | DISCHARGE

## 2023-03-20 RX ORDER — OXYCODONE AND ACETAMINOPHEN 5; 325 MG/1; MG/1
1 TABLET ORAL
Qty: 0 | Refills: 0 | DISCHARGE

## 2023-03-21 ENCOUNTER — TRANSCRIPTION ENCOUNTER (OUTPATIENT)
Age: 48
End: 2023-03-21

## 2023-03-21 ENCOUNTER — INPATIENT (INPATIENT)
Facility: HOSPITAL | Age: 48
LOS: 0 days | Discharge: HOME | DRG: 473 | End: 2023-03-22
Attending: ORTHOPAEDIC SURGERY | Admitting: ORTHOPAEDIC SURGERY
Payer: COMMERCIAL

## 2023-03-21 ENCOUNTER — APPOINTMENT (OUTPATIENT)
Dept: ORTHOPEDIC SURGERY | Facility: HOSPITAL | Age: 48
End: 2023-03-21
Payer: COMMERCIAL

## 2023-03-21 VITALS
DIASTOLIC BLOOD PRESSURE: 84 MMHG | HEIGHT: 64 IN | HEART RATE: 72 BPM | RESPIRATION RATE: 16 BRPM | WEIGHT: 192.24 LBS | OXYGEN SATURATION: 100 % | TEMPERATURE: 97 F | SYSTOLIC BLOOD PRESSURE: 135 MMHG

## 2023-03-21 DIAGNOSIS — Z98.89 OTHER SPECIFIED POSTPROCEDURAL STATES: Chronic | ICD-10-CM

## 2023-03-21 DIAGNOSIS — Z96.649 PRESENCE OF UNSPECIFIED ARTIFICIAL HIP JOINT: Chronic | ICD-10-CM

## 2023-03-21 DIAGNOSIS — M48.06 SPINAL STENOSIS, LUMBAR REGION: ICD-10-CM

## 2023-03-21 DIAGNOSIS — Z86.2 PERSONAL HISTORY OF DISEASES OF THE BLOOD AND BLOOD-FORMING ORGANS AND CERTAIN DISORDERS INVOLVING THE IMMUNE MECHANISM: ICD-10-CM

## 2023-03-21 DIAGNOSIS — M54.12 RADICULOPATHY, CERVICAL REGION: ICD-10-CM

## 2023-03-21 DIAGNOSIS — M41.20 OTHER IDIOPATHIC SCOLIOSIS, SITE UNSPECIFIED: ICD-10-CM

## 2023-03-21 DIAGNOSIS — Z98.890 OTHER SPECIFIED POSTPROCEDURAL STATES: Chronic | ICD-10-CM

## 2023-03-21 DIAGNOSIS — J45.909 UNSPECIFIED ASTHMA, UNCOMPLICATED: ICD-10-CM

## 2023-03-21 DIAGNOSIS — M43.10 SPONDYLOLISTHESIS, SITE UNSPECIFIED: ICD-10-CM

## 2023-03-21 DIAGNOSIS — Z98.84 BARIATRIC SURGERY STATUS: Chronic | ICD-10-CM

## 2023-03-21 LAB
BLD GP AB SCN SERPL QL: NEGATIVE — SIGNIFICANT CHANGE UP
RH IG SCN BLD-IMP: POSITIVE — SIGNIFICANT CHANGE UP

## 2023-03-21 PROCEDURE — 22551 ARTHRD ANT NTRBDY CERVICAL: CPT | Mod: 80

## 2023-03-21 PROCEDURE — 22845 INSERT SPINE FIXATION DEVICE: CPT

## 2023-03-21 PROCEDURE — 22845 INSERT SPINE FIXATION DEVICE: CPT | Mod: 80

## 2023-03-21 PROCEDURE — 22551 ARTHRD ANT NTRBDY CERVICAL: CPT

## 2023-03-21 DEVICE — IMPLANTABLE DEVICE: Type: IMPLANTABLE DEVICE | Status: FUNCTIONAL

## 2023-03-21 DEVICE — SURGIFLO HEMOSTATIC MATRIX KIT: Type: IMPLANTABLE DEVICE | Status: FUNCTIONAL

## 2023-03-21 DEVICE — SURGIFOAM PAD 8CM X 12.5CM X 10MM (100): Type: IMPLANTABLE DEVICE | Status: FUNCTIONAL

## 2023-03-21 DEVICE — SCREW VAR 4.0X14MM: Type: IMPLANTABLE DEVICE | Status: FUNCTIONAL

## 2023-03-21 RX ORDER — POVIDONE-IODINE 5 %
1 AEROSOL (ML) TOPICAL ONCE
Refills: 0 | Status: COMPLETED | OUTPATIENT
Start: 2023-03-21 | End: 2023-03-21

## 2023-03-21 RX ORDER — HYDROMORPHONE HYDROCHLORIDE 2 MG/ML
0.5 INJECTION INTRAMUSCULAR; INTRAVENOUS; SUBCUTANEOUS
Refills: 0 | Status: DISCONTINUED | OUTPATIENT
Start: 2023-03-21 | End: 2023-03-21

## 2023-03-21 RX ORDER — OXYCODONE HYDROCHLORIDE 5 MG/1
5 TABLET ORAL EVERY 4 HOURS
Refills: 0 | Status: DISCONTINUED | OUTPATIENT
Start: 2023-03-21 | End: 2023-03-22

## 2023-03-21 RX ORDER — MAGNESIUM HYDROXIDE 400 MG/1
30 TABLET, CHEWABLE ORAL EVERY 12 HOURS
Refills: 0 | Status: DISCONTINUED | OUTPATIENT
Start: 2023-03-21 | End: 2023-03-22

## 2023-03-21 RX ORDER — PANTOPRAZOLE SODIUM 20 MG/1
40 TABLET, DELAYED RELEASE ORAL
Refills: 0 | Status: DISCONTINUED | OUTPATIENT
Start: 2023-03-21 | End: 2023-03-22

## 2023-03-21 RX ORDER — CHLORHEXIDINE GLUCONATE 213 G/1000ML
1 SOLUTION TOPICAL EVERY 12 HOURS
Refills: 0 | Status: DISCONTINUED | OUTPATIENT
Start: 2023-03-21 | End: 2023-03-21

## 2023-03-21 RX ORDER — DULOXETINE HYDROCHLORIDE 30 MG/1
60 CAPSULE, DELAYED RELEASE ORAL DAILY
Refills: 0 | Status: DISCONTINUED | OUTPATIENT
Start: 2023-03-21 | End: 2023-03-22

## 2023-03-21 RX ORDER — OXYCODONE HYDROCHLORIDE 5 MG/1
10 TABLET ORAL EVERY 4 HOURS
Refills: 0 | Status: DISCONTINUED | OUTPATIENT
Start: 2023-03-21 | End: 2023-03-22

## 2023-03-21 RX ORDER — METHOCARBAMOL 500 MG/1
500 TABLET, FILM COATED ORAL EVERY 8 HOURS
Refills: 0 | Status: DISCONTINUED | OUTPATIENT
Start: 2023-03-21 | End: 2023-03-22

## 2023-03-21 RX ORDER — CEFAZOLIN SODIUM 1 G
2000 VIAL (EA) INJECTION EVERY 8 HOURS
Refills: 0 | Status: COMPLETED | OUTPATIENT
Start: 2023-03-21 | End: 2023-03-22

## 2023-03-21 RX ORDER — APREPITANT 80 MG/1
40 CAPSULE ORAL ONCE
Refills: 0 | Status: COMPLETED | OUTPATIENT
Start: 2023-03-21 | End: 2023-03-21

## 2023-03-21 RX ORDER — ACETAMINOPHEN 500 MG
1000 TABLET ORAL ONCE
Refills: 0 | Status: COMPLETED | OUTPATIENT
Start: 2023-03-21 | End: 2023-03-21

## 2023-03-21 RX ORDER — ONDANSETRON 8 MG/1
4 TABLET, FILM COATED ORAL EVERY 6 HOURS
Refills: 0 | Status: DISCONTINUED | OUTPATIENT
Start: 2023-03-21 | End: 2023-03-22

## 2023-03-21 RX ORDER — SENNA PLUS 8.6 MG/1
2 TABLET ORAL AT BEDTIME
Refills: 0 | Status: DISCONTINUED | OUTPATIENT
Start: 2023-03-21 | End: 2023-03-22

## 2023-03-21 RX ORDER — SODIUM CHLORIDE 9 MG/ML
1000 INJECTION, SOLUTION INTRAVENOUS
Refills: 0 | Status: DISCONTINUED | OUTPATIENT
Start: 2023-03-21 | End: 2023-03-22

## 2023-03-21 RX ADMIN — OXYCODONE HYDROCHLORIDE 10 MILLIGRAM(S): 5 TABLET ORAL at 21:39

## 2023-03-21 RX ADMIN — SENNA PLUS 2 TABLET(S): 8.6 TABLET ORAL at 21:29

## 2023-03-21 RX ADMIN — OXYCODONE HYDROCHLORIDE 10 MILLIGRAM(S): 5 TABLET ORAL at 20:39

## 2023-03-21 RX ADMIN — METHOCARBAMOL 500 MILLIGRAM(S): 500 TABLET, FILM COATED ORAL at 14:57

## 2023-03-21 RX ADMIN — HYDROMORPHONE HYDROCHLORIDE 0.5 MILLIGRAM(S): 2 INJECTION INTRAMUSCULAR; INTRAVENOUS; SUBCUTANEOUS at 14:16

## 2023-03-21 RX ADMIN — HYDROMORPHONE HYDROCHLORIDE 0.5 MILLIGRAM(S): 2 INJECTION INTRAMUSCULAR; INTRAVENOUS; SUBCUTANEOUS at 14:31

## 2023-03-21 RX ADMIN — OXYCODONE HYDROCHLORIDE 10 MILLIGRAM(S): 5 TABLET ORAL at 16:31

## 2023-03-21 RX ADMIN — HYDROMORPHONE HYDROCHLORIDE 0.5 MILLIGRAM(S): 2 INJECTION INTRAMUSCULAR; INTRAVENOUS; SUBCUTANEOUS at 13:26

## 2023-03-21 RX ADMIN — HYDROMORPHONE HYDROCHLORIDE 0.5 MILLIGRAM(S): 2 INJECTION INTRAMUSCULAR; INTRAVENOUS; SUBCUTANEOUS at 13:08

## 2023-03-21 RX ADMIN — APREPITANT 40 MILLIGRAM(S): 80 CAPSULE ORAL at 08:38

## 2023-03-21 RX ADMIN — PANTOPRAZOLE SODIUM 40 MILLIGRAM(S): 20 TABLET, DELAYED RELEASE ORAL at 14:56

## 2023-03-21 RX ADMIN — METHOCARBAMOL 500 MILLIGRAM(S): 500 TABLET, FILM COATED ORAL at 21:30

## 2023-03-21 RX ADMIN — HYDROMORPHONE HYDROCHLORIDE 0.5 MILLIGRAM(S): 2 INJECTION INTRAMUSCULAR; INTRAVENOUS; SUBCUTANEOUS at 12:42

## 2023-03-21 RX ADMIN — OXYCODONE HYDROCHLORIDE 10 MILLIGRAM(S): 5 TABLET ORAL at 16:59

## 2023-03-21 RX ADMIN — Medication 200 MILLIGRAM(S): at 17:35

## 2023-03-21 RX ADMIN — DULOXETINE HYDROCHLORIDE 60 MILLIGRAM(S): 30 CAPSULE, DELAYED RELEASE ORAL at 22:01

## 2023-03-21 RX ADMIN — SODIUM CHLORIDE 150 MILLILITER(S): 9 INJECTION, SOLUTION INTRAVENOUS at 12:27

## 2023-03-21 RX ADMIN — Medication 1 APPLICATION(S): at 07:57

## 2023-03-21 RX ADMIN — HYDROMORPHONE HYDROCHLORIDE 0.5 MILLIGRAM(S): 2 INJECTION INTRAMUSCULAR; INTRAVENOUS; SUBCUTANEOUS at 12:27

## 2023-03-21 RX ADMIN — Medication 1000 MILLIGRAM(S): at 08:37

## 2023-03-21 RX ADMIN — Medication 100 MILLIGRAM(S): at 17:39

## 2023-03-21 NOTE — ASU DISCHARGE PLAN (ADULT/PEDIATRIC) - HAVE YOU HAD COVID IN THE LAST 60 DAYS?
Care Transitions Initial Call    Call within 2 business days of discharge: Yes     Patient: Luigi Chester Patient : 1941 MRN: [id number]    Last Discharge [address]       Complaint Diagnosis Description Type Department Provider    21 Positive For Covid-19 SAMANTHAID Raman Gtz . ED to Hosp-Admission (Discharged) (ADMIT) Amelia Duarte MD; Earlene Bales. .. Was this an external facility discharge? No     Challenges to be reviewed by the provider   Additional needs identified to be addressed with provider: yes  -  Patient states she is taking the following 2 medications:       1. Albuterol sulfate 2.5mg/0.5ml nebulizer solution. 2. Flonase Allergy Relief 50mcg/actuation nasal spray, 2 sprays by both nostrils daily. The above 2 medications were added to patient's current medication list as 'Patient Reported' medications. - Patient is requesting a prescription for Regeneron from PCP. Method of communication with provider : Chart routing to PCP. Component      Latest Ref Rng & Units 2021           8:37 PM   C-Reactive protein      0.00 - 0.60 mg/dL 2.55 (H)     Component      Latest Ref Rng & Units 2021           4:58 AM  8:37 PM   Sodium      136 - 145 mmol/L 130 (L) 129 (L)     Component      Latest Ref Rng & Units 2021           4:58 AM  8:37 PM   Glucose      65 - 100 mg/dL 117 (H) 114 (H)     Component      Latest Ref Rng & Units 2021           4:58 AM  8:37 PM   BUN/Creatinine ratio       -    24 (H) 18     Component      Latest Ref Rng & Units 2021           4:58 AM  8:37 PM   Calcium      8.5 - 10.1 MG/DL 8.1 (L) 9.4     Component      Latest Ref Rng & Units 2021           8:37 PM   Globulin      2.0 - 4.0 g/dL 4.3 (H)   A-G Ratio      1.1 - 2.2   0.9 (L)     Discussed COVID-19 related testing which was available at this time. Test results were positive.    Patient informed of results, if available? yes     Advance Care Planning:   Does patient have an Advance Directive:  yes; reviewed and current     Inpatient Readmission Risk score: No data recorded  Was this a readmission? no   Patient stated reason for the admission: \"Covid. \"     Patients top risk factors for readmission: Medical conditiion - Essential hypertension, dyslipidemia, recently Covid-19 positive. Interventions to address risk factors: Obtained and reviewed discharge summary and/or continuity of care documents and Education of patient/family/caregiver/guardian to support self-management-Education provided regarding signs/symptoms of Covid-19, both daughter and patient verbalized an understanding. Care Transition Nurse (CTN) contacted the patient and daughter, Siomara Pino, by telephone to perform post hospital discharge assessment. Patient gave verbal permission for this CTN to speak with her daughter, Siomara Pino, during today's phone conversation. Verified name and  with patient as identifiers. Provided introduction to self, and explanation of the CTN role. CTN reviewed discharge instructions, medical action plan and red flags with patient and daughter who verbalized understanding. Were discharge instructions available to patient? yes. Reviewed appropriate site of care based on symptoms and resources available to patient including: PCP and When to call 911. Patient and daughter given an opportunity to ask questions and does not have any further questions or concerns at this time. The patient and daughter agree to contact the PCP office for questions related to patient's healthcare. Medication reconciliation was performed with patient and daughter, who verbalizes understanding of administration of home medications. Advised obtaining a 90-day supply of all daily and as-needed medications.    Referral to Pharm D needed: no     Home Health/Outpatient orders at discharge: [address]: n/a  Date of initial visit: n/a    Durable Medical Equipment ordered at discharge: None  [address]: n/a  Durable Medical Equipment received: n/a    Covid Risk Education    Educated patient and daughter about risk for severe COVID-19 due to risk factors according to CDC guidelines. CTN reviewed discharge instructions, medical action plan and red flag symptoms with the patient and daughter who verbalized understanding. Discussed COVID vaccination status: yes. Education provided on COVID-19 vaccination as appropriate. Discussed exposure protocols and quarantine with CDC Guidelines. Patient and daughter were given an opportunity to verbalize any questions and concerns and agrees to contact CTN or health care provider for questions related to patient's healthcare. Was patient discharged with a pulse oximeter? no.     Discussed follow-up appointments. If no appointment was previously scheduled, appointment scheduling offered: yes. Is follow up appointment scheduled within 7 days of discharge? No, PCP follow-up appointment is scheduled for 1-7-2022, within 14 days of discharge. Patient states she will remain in quarantine until 1-7-2022. [facility] follow up appointment(s):   Future Appointments   Date Time Provider Whitney Chacko   1/7/2022  2:15 PM Sami Prado MD [facility] BS AMB   2/23/2022 10:20 AM Kristen Kumar DO [facility] BS AMB     [facility] follow [facility] appointment(s): [facility] Plan for follow-up call in 10-14 days based on severity of symptoms and risk factors. Plan for next call: symptom management-Review red flags of Covid-19, and follow up appointment-Evaluate if patient is attending follow-up appointment(s) as scheduled, offer assistance with scheduling as needed. CTN provided contact information for future needs. Goals Addressed                 This Visit's Progress     Understands red flags post discharge.         12-27-21: Red flags of Covid-19 virus reviewed with patient and patient's daughter, Anuel Cadet, and both verbalize an understanding. Patient reports cough and ongoing weakness and states cough is improving. Patient denies fever/chills, denies nausea/vomiting, denies aching muscles, and denies pain at this time. Patient states she will remain in quarantine until 1-7-2022. Patient states she is utilizing a regular diet at home, appetite is good. Patient states she has not had any falls in the last 12 months. Care Transitions Nurse will review red flags again on next phone conversation with patient.  Adama Odell No

## 2023-03-21 NOTE — PRE-OP CHECKLIST - NS PREOP CHK CHLOROHEX WASH
[facility]      Name: Prema INIGUEZ Stargardter  Clinic Number: [id number]    Therapy Diagnosis:   Encounter Diagnoses   Name Primary?    Postural instability of trunk Yes    Decreased range of motion of intervertebral discs of lumbar spine      Physician: Cara Valdez FNP    Visit Date: 2022      Physician Orders: PT Eval and Treat   Medical Diagnosis from Referral: M54.50 (ICD-10-CM) - Lumbago      Evaluation Date: 2022  Authorization Period Expiration: 2022   Plan of Care Expiration: 2022  Reassessment Due: 2022  Visit # / Visits authorized:  ( total visits 3)     Time In: 2:32 pm  Time Out: 3:45 pm  Total Billable Time: 60 minutes    Precautions: Standard    Pattern of pain determined: Pattern 1 PEP    Subjective   Prema reports improvement of symptoms.      Patient reports tolerating previous visit well. Spent a lot of time lifting and bending over the last couple days and woke up with some increased soreness following her exercises she felt a little better. Endorsing headache and soreness along the right side of the neck    Patient reports their pain to be 6/10 on a 0-10 scale with 0 being no pain and 10 being the worst pain imaginable.  Pain Location: Broad Lumbar spine     Work and leisure: Kayaking, biking, walking  Pt goals: To work out and exercise without pain, return to running    Objective       Baseline Isometric Testing on Med X equipment: Testing administered by PT  Date of testin2022  ROM 72 deg   Max Peak Torque 158    Min Peak Torque 22    Flex/Ext Ratio 8/1   % below normative data 41       Outcomes:  Initial score: 53% limitation  Visit 5 score:  Goal: 25% limitation      Treatment    Pt was instructed in and performed the following:     Prema received therapeutic exercises to develop/improved posture, cardiovascular endurance, muscular endurance, lumbar/cervical ROM, strength and muscular endurance for 45 minutes  "including the following exercises:       Open books 5 x 10" B  Double leg bridges on foam roll w/ UE press-down with knee flexion x 20  Repeated lumbar extension in standing x 20    - Cued to keep hands near the spine to allow for full end range motion    Prone hip extension w/ knee flexion 2# AW   Prone alt arm and leg extension x 15 B    - Tactile cues to prevent lumbar rotation ( more difficulty initiating with RUE and LLE combined)    Prone lower trap pattern # 1 x 20    - Intermittent tactile cues to encourage end range scapular retraction   Standing Upper trap pattern #1 with rolling foam roll x 20   Seated thoracic extension over chair back x 15      Med-ex flowsheet    [facility] 5/25/2022   Visit Number 4   VAS Pain Rating 2   Extension in Lying 15   Extension in Standing 15   Cervical Extension Seat Pad -   Seat Adjustment -   Top Dead Center -   Counterweight -   Cervical Flexion -   Cervical Extension -   Cervical Peak Torque -   Cervical Weight 69   Repetitions 20   Rating of Perceived Exertion 3   Lumbar Extension Seat Pad -   Femur Restraint -   Top Dead Center -   Counterweight -   Lumbar Flexion -   Lumbar Extension -   Lumbar Peak Torque -   Min Torque -   Test Percent Below Normative Data -   Lumbar Weight 75   Repetitions 18   Rating of Perceived Exertion 4   Ice - Z Lie (in min.) 10           Peripheral muscle strengthening which included 1 set of 15-20 repetitions at a slow, controlled 10-13 second per rep pace focused on strengthening supporting musculature for improved body mechanics and functional mobility.  Pt and therapist focused on proper form during treatment to ensure optimal strengthening of each targeted muscle group.  Machines were utilized including torso rotation, chest press, rowing, biceps, and triceps. Leg extension, leg curl, hip abd, hip add, and leg press added visit 3       Prema received the following manual therapy techniques: Joint mobilizations were applied to " the: Thoracic Spine and neck for 0 minutes.     IASTM right levator, Upper trap and lateral errector spinae  PA glides Grade 2/3 to improve mobility       Prema participated in neuromuscular re-education to address coordination, posture and maximum utilization of available lumbar motion including: for 10 minutes    - LE rolling prone to supine 1x1 for moderate hand over hand assistance  - Side-lying neutral gapping techniques x 10 B w/ moderate therapist overpressure  Quadruped donkey kicks at end range oscillation 2 x 10    ~ Tactile cues to maintain level lumbar spine       Home Exercises Provided and Patient Education Provided   Stretching: Open books, repeated lumbar extension  Strengthening: Chilango Pavon on BB  Cardio program (V5): Not yet provided  Lifting education (V11):Not yet provided  Using Lumbar Roll:Instructed, not yet using    Education provided:   - Purpose of each exercise and the need to stabilize her errector spinae muscles in order to maintain this achieved motion    Written Home Exercises Provided: yes.  Exercises were reviewed and Prema was able to demonstrate them prior to the end of the session.  Prema demonstrated good  understanding of the education provided.     See EMR under Patient Instructions for exercises provided 5/18/2022.          Assessment     Prema reported slight soreness in the neck and very minimal pain in the lumbar spine. With the performance of open book stretches noted very minimal movement in the lumbar spine with rotation and neutral gapping with overpressure was performed to improve this motion.   Noted poor glute activation with posterior glute med strength measuring at 3-/5 and poor sequencing with rolling noted. Following quadruped assisted oscillating donkey kicks she was able to better demonstrate carry-over/ teach back understanding with LE rolling.   Sent home with wall slides to further improve this and promote better gluteal activation.       Patient  is making good progress towards established goals.  Pt will continue to benefit from skilled outpatient physical therapy to address the deficits stated in the impairment chart, provide pt/family education and to maximize pt's level of independence in the home and community environment.     Anticipated Barriers for therapy: None  Pt's spiritual, cultural and educational needs considered and pt agreeable to plan of care and goals as stated below:       Goals:       Short term goals:  6 weeks or 10 visits   1.  Pt will demonstrate increased lumbar ROM by at least 0 degrees from the initial ROM value with improvements noted in functional ROM and ability to perform ADLs.  (approp and ongoing)  2.  Pt will demonstrate increased MedX average isometric strength value  by 20% from initial test resulting in improved ability to perform bending, lifting, and carrying activities safely, confidently.  (approp and ongoing)  3.  Patient report a reduction in worst pain score by 1-2 points for improved tolerance for being able to walk longer distances without pain.  (approp and ongoing)  4.  Pt able to perform HEP correctly with minimal cueing or supervision from therapist to encourage independent management of symptoms. (approp and ongoing)        Long term goals: 10 weeks or 20 visits   1. Pt will demonstrate increased lumbar ROM by at least 0 degrees from initial ROM value, resulting in improved ability to perform functional fwd bending while standing and sitting. (approp and ongoing)  2. Pt will demonstrate increased MedX average isometric strength value  by 35% from initial test resulting in improved ability to perform bending, lifting, and carrying activities safely, confidently.  (approp and ongoing)  3. Pt to demonstrate ability to independently control and reduce their pain through posture positioning and mechanical movements throughout a typical day.  (approp and ongoing)  4.  Pt will demonstrate reduced pain and improved  functional outcomes as reported on the FOTO by reaching a limitation score of < or = 25% or less in order to demonstrate subjective improvement in pt's condition.    (approp and ongoing)  5. Pt will demonstrate independence with the HEP at discharge  (approp and ongoing)  6.  Prema will tolerate walking or biking for an hour and kayaking without pain to allow her to return to her previously active lifestyle  (approp and ongoing)      Plan   Continue with established Plan of Care towards established PT goals.     Ritika Larkin, PT, DPT     in ASU:

## 2023-03-21 NOTE — ASU PATIENT PROFILE, ADULT - NSICDXPASTSURGICALHX_GEN_ALL_CORE_FT
PAST SURGICAL HISTORY:  Gastric bypass status for obesity     H/O varicose vein ligation left lower extremity 2011    History of hip replacement     S/P  section X3; ,,     S/P endoscopy Baseline

## 2023-03-21 NOTE — ASU DISCHARGE PLAN (ADULT/PEDIATRIC) - ASU DC SPECIAL INSTRUCTIONSFT
Should restart all home medications.   Will start additional 5mg of oxycodone every 6 hours for 3-5 days as needed  Tylenol 1000mg every 8 hours between doses of oxycodone  methocarbamol every 8 hours between tylenol and oxycodone

## 2023-03-21 NOTE — H&P ADULT - PROBLEM SELECTOR PLAN 1
Admit to Orthopedic Service for elective ACDF C5-6.    Medically cleared and optimized for surgery by Dr. Schwab

## 2023-03-21 NOTE — H&P ADULT - NSHPPHYSICALEXAM_GEN_ALL_CORE
Gen: 47F, NAD  MSK: Decreased neck ROM secondary to pain  Skin without erythema, ecchymosis, abrasions or lesions, signs of infection  Sensation intact to light touch bilateral upper extremities  Pulses: RP palpable, skin wwp, brisk capillary refill  AIN/PIN/ulnar/radial/medial intact bilaterally 5/5    Rest of PE per MD clearance Gen: 47F, NAD  MSK: Decreased neck ROM secondary to pain  Skin without erythema, ecchymosis, abrasions or lesions. multiple well healed scars to b/l upper extremities.  Sensation intact to light touch bilateral upper extremities  Pulses: +2 RP palpable, skin wwp, brisk capillary refill  AIN/PIN/ulnar/radial/medial intact bilaterally 5/5    Rest of PE per MD clearance

## 2023-03-21 NOTE — H&P ADULT - NSHPLABSRESULTS_GEN_ALL_CORE
Preop CBC, BMP, PT/INR, PTT within normal range and reviewed per medical clearance  Cr- 0.64  UA: negative  Preop CXR within normal limits and reviewed per medical clearance   Preop EKG NSR and reviewed per medical clearance  3M: DOS  T + S: DOS  COVID: 3/17 negative

## 2023-03-21 NOTE — H&P ADULT - NSICDXFAMILYHX_GEN_ALL_CORE_FT
FAMILY HISTORY:  Father  Still living? No  Family history of aortic aneurysm, Age at diagnosis: Age Unknown    Mother  Still living? No  Family history of pneumonia, Age at diagnosis: 61-70    Grandparent  Still living? No  Family history of myocardial infarction, Age at diagnosis: 71-80  Family history of myocardial infarction, Age at diagnosis: 81-90  Family history of ovarian cancer, Age at diagnosis: 81-90

## 2023-03-21 NOTE — ASU PATIENT PROFILE, ADULT - NSICDXPASTMEDICALHX_GEN_ALL_CORE_FT
PAST MEDICAL HISTORY:  Asthma     Bilateral sciatica     History of anemia     Lumbar stenosis with neurogenic claudication     Scoliosis (and kyphoscoliosis), idiopathic     Spondylisthesis     Varicose veins of both lower extremities

## 2023-03-21 NOTE — ASU DISCHARGE PLAN (ADULT/PEDIATRIC) - CARE PROVIDER_API CALL
Schwab, Frank J (MD)  Orthopaedic Surgery; Spine Surgery  [address]  [place], [place] [id number]  Phone: [phone]  Fax: [phone]  Follow Up Time:

## 2023-03-21 NOTE — H&P ADULT - HISTORY OF PRESENT ILLNESS
47F who presents with c/o neck pain x 5 years. Pain persists despite conservative treatment of physical therapy and steroid injections. Pt states she has intermittent tingling down right arm. Ambulates with cane time to time.     Denies history of blood clots, seizures, chest pain, SOB, nausea, vomiting, and denies taking blood thinners.     Pt elects to undergo ACDF C5-6 with Dr. Schwab.

## 2023-03-21 NOTE — ASU DISCHARGE PLAN (ADULT/PEDIATRIC) - NS MD DC FALL RISK RISK
For information on Fall & Injury Prevention, visit: [email or web address] OR  [email or web address] OR  https://www.cdc.gov/steadi/patient.html

## 2023-03-22 ENCOUNTER — TRANSCRIPTION ENCOUNTER (OUTPATIENT)
Age: 48
End: 2023-03-22

## 2023-03-22 VITALS
RESPIRATION RATE: 18 BRPM | TEMPERATURE: 98 F | DIASTOLIC BLOOD PRESSURE: 62 MMHG | OXYGEN SATURATION: 95 % | SYSTOLIC BLOOD PRESSURE: 108 MMHG | HEART RATE: 79 BPM

## 2023-03-22 LAB
ANION GAP SERPL CALC-SCNC: 10 MMOL/L — SIGNIFICANT CHANGE UP (ref 5–17)
BASOPHILS # BLD AUTO: 0.03 K/UL — SIGNIFICANT CHANGE UP (ref 0–0.2)
BASOPHILS NFR BLD AUTO: 0.3 % — SIGNIFICANT CHANGE UP (ref 0–2)
BUN SERPL-MCNC: 11 MG/DL — SIGNIFICANT CHANGE UP (ref 7–23)
CALCIUM SERPL-MCNC: 9.1 MG/DL — SIGNIFICANT CHANGE UP (ref 8.4–10.5)
CHLORIDE SERPL-SCNC: 104 MMOL/L — SIGNIFICANT CHANGE UP (ref 96–108)
CO2 SERPL-SCNC: 25 MMOL/L — SIGNIFICANT CHANGE UP (ref 22–31)
CREAT SERPL-MCNC: 0.58 MG/DL — SIGNIFICANT CHANGE UP (ref 0.5–1.3)
EGFR: 112 ML/MIN/1.73M2 — SIGNIFICANT CHANGE UP
EOSINOPHIL # BLD AUTO: 0.01 K/UL — SIGNIFICANT CHANGE UP (ref 0–0.5)
EOSINOPHIL NFR BLD AUTO: 0.1 % — SIGNIFICANT CHANGE UP (ref 0–6)
GLUCOSE SERPL-MCNC: 105 MG/DL — HIGH (ref 70–99)
HCT VFR BLD CALC: 34.2 % — LOW (ref 34.5–45)
HGB BLD-MCNC: 10.5 G/DL — LOW (ref 11.5–15.5)
IMM GRANULOCYTES NFR BLD AUTO: 0.3 % — SIGNIFICANT CHANGE UP (ref 0–0.9)
LYMPHOCYTES # BLD AUTO: 1.38 K/UL — SIGNIFICANT CHANGE UP (ref 1–3.3)
LYMPHOCYTES # BLD AUTO: 15.4 % — SIGNIFICANT CHANGE UP (ref 13–44)
MCHC RBC-ENTMCNC: 26 PG — LOW (ref 27–34)
MCHC RBC-ENTMCNC: 30.7 GM/DL — LOW (ref 32–36)
MCV RBC AUTO: 84.7 FL — SIGNIFICANT CHANGE UP (ref 80–100)
MONOCYTES # BLD AUTO: 0.66 K/UL — SIGNIFICANT CHANGE UP (ref 0–0.9)
MONOCYTES NFR BLD AUTO: 7.3 % — SIGNIFICANT CHANGE UP (ref 2–14)
NEUTROPHILS # BLD AUTO: 6.88 K/UL — SIGNIFICANT CHANGE UP (ref 1.8–7.4)
NEUTROPHILS NFR BLD AUTO: 76.6 % — SIGNIFICANT CHANGE UP (ref 43–77)
NRBC # BLD: 0 /100 WBCS — SIGNIFICANT CHANGE UP (ref 0–0)
PLATELET # BLD AUTO: 312 K/UL — SIGNIFICANT CHANGE UP (ref 150–400)
POTASSIUM SERPL-MCNC: 3.8 MMOL/L — SIGNIFICANT CHANGE UP (ref 3.5–5.3)
POTASSIUM SERPL-SCNC: 3.8 MMOL/L — SIGNIFICANT CHANGE UP (ref 3.5–5.3)
RBC # BLD: 4.04 M/UL — SIGNIFICANT CHANGE UP (ref 3.8–5.2)
RBC # FLD: 18.4 % — HIGH (ref 10.3–14.5)
SODIUM SERPL-SCNC: 139 MMOL/L — SIGNIFICANT CHANGE UP (ref 135–145)
WBC # BLD: 8.99 K/UL — SIGNIFICANT CHANGE UP (ref 3.8–10.5)
WBC # FLD AUTO: 8.99 K/UL — SIGNIFICANT CHANGE UP (ref 3.8–10.5)

## 2023-03-22 RX ADMIN — OXYCODONE HYDROCHLORIDE 10 MILLIGRAM(S): 5 TABLET ORAL at 13:49

## 2023-03-22 RX ADMIN — METHOCARBAMOL 500 MILLIGRAM(S): 500 TABLET, FILM COATED ORAL at 05:47

## 2023-03-22 RX ADMIN — Medication 200 MILLIGRAM(S): at 05:47

## 2023-03-22 RX ADMIN — DULOXETINE HYDROCHLORIDE 60 MILLIGRAM(S): 30 CAPSULE, DELAYED RELEASE ORAL at 11:43

## 2023-03-22 RX ADMIN — PANTOPRAZOLE SODIUM 40 MILLIGRAM(S): 20 TABLET, DELAYED RELEASE ORAL at 05:47

## 2023-03-22 RX ADMIN — OXYCODONE HYDROCHLORIDE 10 MILLIGRAM(S): 5 TABLET ORAL at 02:10

## 2023-03-22 RX ADMIN — OXYCODONE HYDROCHLORIDE 10 MILLIGRAM(S): 5 TABLET ORAL at 01:25

## 2023-03-22 RX ADMIN — OXYCODONE HYDROCHLORIDE 10 MILLIGRAM(S): 5 TABLET ORAL at 10:20

## 2023-03-22 RX ADMIN — Medication 100 MILLIGRAM(S): at 01:25

## 2023-03-22 RX ADMIN — OXYCODONE HYDROCHLORIDE 10 MILLIGRAM(S): 5 TABLET ORAL at 09:26

## 2023-03-22 RX ADMIN — METHOCARBAMOL 500 MILLIGRAM(S): 500 TABLET, FILM COATED ORAL at 15:19

## 2023-03-22 NOTE — DISCHARGE NOTE NURSING/CASE MANAGEMENT/SOCIAL WORK - PATIENT PORTAL LINK FT
You can access the FollowMyHealth Patient Portal offered by [facility] by registering at the following website: [email or web address]. By joining [employer]’s FollowMyHealth portal, you will also be able to view your health information using other applications (apps) compatible with our system.

## 2023-03-22 NOTE — OCCUPATIONAL THERAPY INITIAL EVALUATION ADULT - ADDITIONAL COMMENTS
Per pt. she lives in a two story home with her  and kids, 3 JAIR. Pt. resides on the first floor at all times, she recieves assistance for bed mob, and occasional assist ADLs and IADLs. She uses a SC and also owns a rolling walker. Pt. utilizes reacher when necessary

## 2023-03-22 NOTE — DISCHARGE NOTE PROVIDER - CARE PROVIDER_API CALL
Schwab, Frank J (MD)  Orthopaedic Surgery; Spine Surgery  [address]  [place], [place] [id number]  Phone: [phone]  Fax: [phone]  Follow Up Time:

## 2023-03-22 NOTE — DISCHARGE NOTE PROVIDER - NSDCMRMEDTOKEN_GEN_ALL_CORE_FT
Benadryl 25 mg oral capsule: 1 cap(s) orally once a day (at bedtime)  Cymbalta 60 mg oral delayed release capsule: 1 cap(s) orally once a day  fentaNYL 50 mcg/hr transdermal film, extended release: 1 film(s) transdermal every 72 hours  Lyrica CR: 200 milligram(s) orally 2 times a day  Nucynta 50 mg oral tablet: 1 tab(s) orally every 6 hours  omeprazole 40 mg oral delayed release capsule: 1 cap(s) orally once a day  tiZANidine 4 mg oral tablet: orally 3 times a day

## 2023-03-22 NOTE — OCCUPATIONAL THERAPY INITIAL EVALUATION ADULT - GENERAL OBSERVATIONS, REHAB EVAL
RN Dechen clearing pt. for session. Pt. received semi-supine in bed,+tele, +anterior neck bandage C/D/I+ b/l SCDs, +IV in NAD agreeable to therapy session

## 2023-03-22 NOTE — DISCHARGE NOTE PROVIDER - HOSPITAL COURSE
Admit to Orthopaedics 3/21/23 s/p ACDF C5-C6   Perioperative Antibiotics  DVT prophylaxis  Occupational Therapy  Pain Management

## 2023-03-22 NOTE — OCCUPATIONAL THERAPY INITIAL EVALUATION ADULT - DIAGNOSIS, OT EVAL
Pt. admitted to [facility] for elective ACDF at C5-C6 level. At this time, pt. is at her functional baseline in ADLs and functional mob transfers. Pt. educated on post-surgical precautions in order to maintain integrity of operation. Pt. noted w. good carryover of information. No further OT skilled needs at this time.

## 2023-03-22 NOTE — DISCHARGE NOTE PROVIDER - NSDCFUADDINST_GEN_ALL_CORE_FT
ACTIVITY:   - No extreme bending, extending, turning, twisting, or straining. No strenuous activity, heavy lifting, driving or returning to work until cleared by your surgeon.     DRESSING/SHOWERING:    -May shower post-op day 1, pat dry afterwards.  Do not soak in bathtubs. Do not need to cover with another dressing.  Do not apply ointments, creams or oils to incision area.     MEDICATION/ANTICOAGULATION:   - You have been prescribed medications for pain:     - Tylenol (Acetaminophen) for mild to moderate pain. Do not exceed 3,000mg daily.     - For more severe pain, you may continue to take the Tylenol with the addition of narcotic pain medication. Take this medication as prescribed. Do not take more than prescribed. Note that this medication may cause drowsiness or dizziness. Do not operate machinery. This medication may cause constipation.   - If you have been prescribed a muscle relaxer, take this medication as needed for muscle spasm. Follow instructions on bottle.   - Try to have regular bowel movements. Take stool softener or laxative if necessary. You may wish to take Miralax daily until you have regular bowel movements.    - Do not take antiinflammatories (Aleve, Advil, Naproxen, Ibuprofen, etc.) until cleared by your surgeon. Tylenol is not an anti-inflammatory and okay to take (see above).   - If you have a pain management physician, please follow-up with them postoperatively.    - If you experience any negative side effects of your medications, please call your surgeon's office to discuss.     FOLLOW UP:   - Call to schedule an appt with Dr. Schwab for follow up.    - Please follow-up with your primary care physician or any other specialist you see postoperatively, if needed.    - Contact your doctor or go to the emergency room if you experience: fever greater than 101.5, chills, chest pain, difficulty breathing, redness or excessive drainage around the incision, other concerns.

## 2023-03-22 NOTE — OCCUPATIONAL THERAPY INITIAL EVALUATION ADULT - MODIFIED CLINICAL TEST OF SENSORY INTEGRATION IN BALANCE TEST
Pt. performed functional mob in hallway I, no LOB noted. Pt. tends to walk slightly hunched over, reporting her baseline from previous injuries/surgeries

## 2023-03-22 NOTE — PROGRESS NOTE ADULT - SUBJECTIVE AND OBJECTIVE BOX
Orthopedics Post Op Check    Procedure: ACDF C5-C6  Surgeon: SCHWAB Pt. stable, c/o pain in her neck (where incision is reasurred normal post op pain).  Denies any SOB/CP/nausea/vomiting/ numbness/tingling in b/l ues.     Vital Signs Last 24 Hrs  T(C): 36.7 (21 Mar 2023 11:29), Max: 36.7 (21 Mar 2023 11:29)  T(F): 98.1 (21 Mar 2023 11:29), Max: 98.1 (21 Mar 2023 11:29)  HR: 74 (21 Mar 2023 12:29) (72 - 74)  BP: 134/78 (21 Mar 2023 12:29) (133/81 - 141/82)  BP(mean): 100 (21 Mar 2023 12:29) (100 - 106)  RR: 16 (21 Mar 2023 12:29) (16 - 18)  SpO2: 98% (21 Mar 2023 12:29) (94% - 100%)    Parameters below as of 21 Mar 2023 12:29  Patient On (Oxygen Delivery Method): nasal cannula  O2 Flow (L/min): 2      General- NAD, laying in bed comfortably  Dressing C/D/I  Pulses: Brachial/Radial 2+  SLT: intact M/U/R B/L UES   Motor:  5/5 b/l ues  Finger Int 5/5 b/l ues  Biceps/triceps/Delts 5/5 b/l ues        A/P: 47yoFemale POD#0 s/p ACDF C5-C6  - Stable  - Pain Control  - DVT ppx: SCDS  - Post op abx: ANCEF  - PT, WBS: WBAT B/L UES   - F/U AM Labs
Orthopedics AM Progress Note     Procedure: ACDF C5-C6  Surgeon: SCHWAB NAEON.   Pain well controlled. No new numbness or tingling.     Vital Signs Last 24 Hrs  T(C): 36.4 (22 Mar 2023 09:06), Max: 36.8 (21 Mar 2023 14:16)  T(F): 97.6 (22 Mar 2023 09:06), Max: 98.3 (21 Mar 2023 14:16)  HR: 73 (22 Mar 2023 09:06) (70 - 82)  BP: 119/77 (22 Mar 2023 09:06) (107/64 - 141/82)  BP(mean): 98 (21 Mar 2023 14:46) (91 - 106)  RR: 18 (22 Mar 2023 09:06) (16 - 18)  SpO2: 99% (22 Mar 2023 09:06) (94% - 100%)    Parameters below as of 22 Mar 2023 09:06  Patient On (Oxygen Delivery Method): room air          General- NAD, laying in bed comfortably  Dressing C/D/I  Pulses: Brachial/Radial 2+  SLT: intact M/U/R B/L UES   Motor:  5/5 b/l ues  Finger Int 5/5 b/l ues  Biceps/triceps/Delts 5/5 b/l ues        A/P: 47yoFemale POD#1 s/p ACDF C5-C6  - Stable  - Pain Control  - DVT ppx: SCDS  - Post op abx: ANCEF  - PT, WBS: WBAT B/L UES   - F/U AM Labs

## 2023-03-22 NOTE — OCCUPATIONAL THERAPY INITIAL EVALUATION ADULT - PERTINENT HX OF CURRENT PROBLEM, REHAB EVAL
47F who presents with c/o neck pain x 5 years. Pain persists despite conservative treatment of physical therapy and steroid injections. Pt states she has intermittent tingling down right arm.

## 2023-03-22 NOTE — DISCHARGE NOTE NURSING/CASE MANAGEMENT/SOCIAL WORK - NSDCPEFALRISK_GEN_ALL_CORE
For information on Fall & Injury Prevention, visit: [email or web address] OR  [email or web address] OR  https://www.cdc.gov/steadi/patient.html

## 2023-03-23 PROCEDURE — 76000 FLUOROSCOPY <1 HR PHYS/QHP: CPT

## 2023-03-23 PROCEDURE — 86901 BLOOD TYPING SEROLOGIC RH(D): CPT

## 2023-03-23 PROCEDURE — 86850 RBC ANTIBODY SCREEN: CPT

## 2023-03-23 PROCEDURE — 36415 COLL VENOUS BLD VENIPUNCTURE: CPT

## 2023-03-23 PROCEDURE — 97165 OT EVAL LOW COMPLEX 30 MIN: CPT

## 2023-03-23 PROCEDURE — 80048 BASIC METABOLIC PNL TOTAL CA: CPT

## 2023-03-23 PROCEDURE — C1889: CPT

## 2023-03-23 PROCEDURE — 85025 COMPLETE CBC W/AUTO DIFF WBC: CPT

## 2023-03-23 PROCEDURE — 86900 BLOOD TYPING SEROLOGIC ABO: CPT

## 2023-03-23 PROCEDURE — C1713: CPT

## 2023-03-24 DIAGNOSIS — M43.10 SPONDYLOLISTHESIS, SITE UNSPECIFIED: ICD-10-CM

## 2023-03-24 DIAGNOSIS — J45.909 UNSPECIFIED ASTHMA, UNCOMPLICATED: ICD-10-CM

## 2023-03-24 DIAGNOSIS — M48.02 SPINAL STENOSIS, CERVICAL REGION: ICD-10-CM

## 2023-03-24 DIAGNOSIS — M41.20 OTHER IDIOPATHIC SCOLIOSIS, SITE UNSPECIFIED: ICD-10-CM

## 2023-03-24 DIAGNOSIS — M50.122 CERVICAL DISC DISORDER AT C5-C6 LEVEL WITH RADICULOPATHY: ICD-10-CM

## 2023-03-24 DIAGNOSIS — Z88.8 ALLERGY STATUS TO OTHER DRUGS, MEDICAMENTS AND BIOLOGICAL SUBSTANCES STATUS: ICD-10-CM

## 2023-03-24 DIAGNOSIS — Z98.84 BARIATRIC SURGERY STATUS: ICD-10-CM

## 2023-03-24 DIAGNOSIS — M48.062 SPINAL STENOSIS, LUMBAR REGION WITH NEUROGENIC CLAUDICATION: ICD-10-CM

## 2023-03-24 DIAGNOSIS — Z96.649 PRESENCE OF UNSPECIFIED ARTIFICIAL HIP JOINT: ICD-10-CM

## 2023-03-24 DIAGNOSIS — Z91.013 ALLERGY TO SEAFOOD: ICD-10-CM

## 2023-04-27 ENCOUNTER — APPOINTMENT (OUTPATIENT)
Dept: ORTHOPEDIC SURGERY | Facility: CLINIC | Age: 48
End: 2023-04-27
Payer: COMMERCIAL

## 2023-04-27 VITALS
HEIGHT: 64 IN | SYSTOLIC BLOOD PRESSURE: 122 MMHG | HEART RATE: 71 BPM | DIASTOLIC BLOOD PRESSURE: 84 MMHG | BODY MASS INDEX: 32.1 KG/M2 | OXYGEN SATURATION: 99 % | WEIGHT: 188 LBS

## 2023-04-27 DIAGNOSIS — M54.12 RADICULOPATHY, CERVICAL REGION: ICD-10-CM

## 2023-04-27 PROCEDURE — 99024 POSTOP FOLLOW-UP VISIT: CPT

## 2023-05-04 PROBLEM — M54.12 CERVICAL RADICULOPATHY: Status: ACTIVE | Noted: 2023-01-09

## 2023-06-20 NOTE — HISTORY OF PRESENT ILLNESS
[de-identified] : Patient is approximately 4-1/2 years status post cervical spinal fusion.  She reports right-sided neck pain, improved since surgery.  She also has left low back pain that is knifelike in nature.  Her right leg is numb "dead".  She also has left anterior thigh pain with position changes particularly getting up from a chair.  She had a caudal epidural recently with no significant relief.  She is on opiates managed by pain management and Cymbalta.

## 2023-06-20 NOTE — PHYSICAL EXAM
[de-identified] : NVI. 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

## 2023-06-20 NOTE — DISCUSSION/SUMMARY
[Surgical risks reviewed] : Surgical risks reviewed [de-identified] : A lengthy discussion was held with the patient regarding her condition.  We discussed nonoperative treatment strategies including physical therapy, pharmacologic management and steroid injections.  We discussed surgical options (revision with bone graft L3-4) and the attendant benefits, alternatives, and risks, including but not limited to infection, bleeding, persistent pain, persistent neurologic deficit, neurologic injury, adjacent segment degeneration, and the need for future surgery.  The patient's questions were sought and answered satisfactorily.\par Pt will contact office when she is ready to schedule surgery.\par \par \par Marlin SHETH NP am acting as a scribe for Dr. Frank Schwab.\par \par \par \par \par

## 2023-09-21 ENCOUNTER — APPOINTMENT (OUTPATIENT)
Dept: ORTHOPEDIC SURGERY | Facility: CLINIC | Age: 48
End: 2023-09-21
Payer: COMMERCIAL

## 2023-09-21 VITALS
DIASTOLIC BLOOD PRESSURE: 77 MMHG | HEIGHT: 64 IN | BODY MASS INDEX: 32.1 KG/M2 | SYSTOLIC BLOOD PRESSURE: 113 MMHG | HEART RATE: 70 BPM | OXYGEN SATURATION: 96 % | WEIGHT: 188 LBS

## 2023-09-21 PROCEDURE — 99215 OFFICE O/P EST HI 40 MIN: CPT

## 2023-10-06 ENCOUNTER — APPOINTMENT (OUTPATIENT)
Dept: ORTHOPEDIC SURGERY | Facility: CLINIC | Age: 48
End: 2023-10-06
Payer: COMMERCIAL

## 2023-10-06 PROCEDURE — 99203 OFFICE O/P NEW LOW 30 MIN: CPT | Mod: 95

## 2023-10-06 PROCEDURE — 99213 OFFICE O/P EST LOW 20 MIN: CPT | Mod: 95

## 2023-11-07 NOTE — H&P PST ADULT - NS SC CAGE ALCOHOL CUT DOWN
Attempted to call Bertha Ratliff 3x via phone and once through the mail per your referral for cardiac rehab and have been unsuccessful.  We will file her information unless she contacts us wishing to start the program. Thank you for the referral! no

## 2023-11-08 ENCOUNTER — APPOINTMENT (OUTPATIENT)
Dept: PAIN MANAGEMENT | Facility: CLINIC | Age: 48
End: 2023-11-08
Payer: COMMERCIAL

## 2023-11-08 DIAGNOSIS — M96.1 POSTLAMINECTOMY SYNDROME, NOT ELSEWHERE CLASSIFIED: ICD-10-CM

## 2023-11-08 PROCEDURE — 99213 OFFICE O/P EST LOW 20 MIN: CPT | Mod: 95

## 2023-11-13 ENCOUNTER — TRANSCRIPTION ENCOUNTER (OUTPATIENT)
Age: 48
End: 2023-11-13

## 2023-11-13 VITALS
TEMPERATURE: 98 F | DIASTOLIC BLOOD PRESSURE: 80 MMHG | HEIGHT: 64 IN | WEIGHT: 185.41 LBS | SYSTOLIC BLOOD PRESSURE: 133 MMHG | HEART RATE: 66 BPM | RESPIRATION RATE: 16 BRPM | OXYGEN SATURATION: 100 %

## 2023-11-13 RX ORDER — DULOXETINE HYDROCHLORIDE 30 MG/1
1 CAPSULE, DELAYED RELEASE ORAL
Refills: 0 | DISCHARGE

## 2023-11-13 RX ORDER — CHLORHEXIDINE GLUCONATE 213 G/1000ML
1 SOLUTION TOPICAL ONCE
Refills: 0 | Status: DISCONTINUED | OUTPATIENT
Start: 2023-11-14 | End: 2023-11-22

## 2023-11-13 RX ORDER — OMEPRAZOLE 10 MG/1
1 CAPSULE, DELAYED RELEASE ORAL
Qty: 0 | Refills: 0 | DISCHARGE

## 2023-11-13 RX ORDER — TAPENTADOL HYDROCHLORIDE 50 MG/1
1 TABLET, FILM COATED ORAL
Qty: 0 | Refills: 0 | DISCHARGE

## 2023-11-13 RX ORDER — POVIDONE-IODINE 5 %
1 AEROSOL (ML) TOPICAL ONCE
Refills: 0 | Status: DISCONTINUED | OUTPATIENT
Start: 2023-11-14 | End: 2023-11-22

## 2023-11-13 NOTE — H&P ADULT - PROBLEM SELECTOR PLAN 1
This RN standing in doorway. Patient alert and oriented x4, speech is clear and fluent. Patient aware that he is waiting for a disposition. Patient denies needs.    Admit to Orthopedic Service for elective revision PSF with extension of fusion with instrumentation T3-pelvis, pedicle subtraction osteotomy (PSO) L4, tethers, iliac fixation, smith clark osteotomies T3-T10 with Plastics closure    Medically cleared and optimized for surgery by Dr. Mohseni

## 2023-11-13 NOTE — PATIENT PROFILE ADULT - FALL HARM RISK - HARM RISK INTERVENTIONS

## 2023-11-13 NOTE — H&P ADULT - NSHPPHYSICALEXAM_GEN_ALL_CORE
Gen: 48F NAD  MSK: Decreased lumbar ROM secondary to pain Gen: 48F NAD  MSK: Decreased lumbar ROM secondary to pain  Skin without erythema, ecchymosis, abrasions or lesions  Calves soft, nontender bilaterally   Sensation intact to light touch bilateral lower extremities  Pulses: +2 DP and + RP palpable, brisk capillary refill  EHL/FHL/TA/GS 5/5 bilaterally lower extremities   tricep/bicep/deltoid/wrist flex/wrist extension 5/5 b/l upper extremities  AIN/PIN/ulnar/median/radial intact bilaterally    Rest of PE per MD clearance

## 2023-11-13 NOTE — H&P ADULT - HISTORY OF PRESENT ILLNESS
48F with low back pain x    Patient currently sees outpatient pain management. She uses fentanyl 50 mcg/hr patch, nucynta 75mg, and pregabalin 200mg.     Presents for elective revision PSF with extension of fusion with instrumentation T3-pelvis, pedicle subtraction osteotomy (PSO) L4, tethers, iliac fixation, smith clark osteotomies T3-T10 with Plastics closure 48F with low back pain x  7 years, She says that she has an extensive history of back pain throughout her life and had her first spinal surgery in 2017 in [facility]. She said that after the initial surgery she had 10 screws loosen, and under went a revision with Dr. Schwab a few years later. Today she is having an extension of her fusion for progressive worsening of pain. She says that she had intermittent bilateral sciatica and right lower leg weakness and numbness that has caused her to fall due to not being able to feel the ground she is walking on. She is unable to find comfort and both sitting too long and standing too long provokes pain and numbness/tingling of her lower extremities. She ambulates with a cane at baseline. Despite conservative measure pain persists. She also has intermitted right upper extremity nerve pain, she under went a 1 level ACDF in April 2023.     Denies history of blood clots or seizures. She denies chest pain, shortness of breath, nausea or vomiting today.     Patient currently sees outpatient pain management. She uses fentanyl 50 mcg/hr patch, nucynta 75mg, and pregabalin 200mg for pain relief.     Presents for elective revision PSF with extension of fusion with instrumentation T3-pelvis, pedicle subtraction osteotomy (PSO) L4, tethers, iliac fixation, smith clark osteotomies T3-T10 with Plastics closure with Dr. Schwab, Dr. Gonzalez and Dr. Simpson.

## 2023-11-13 NOTE — H&P ADULT - NSHPLABSRESULTS_GEN_ALL_CORE
Preop CBC (hgb 12.3), BMP, PT/INR, PTT within normal range and reviewed per medical clearance  Cr- .64  UA: WNL  Preop CXR within normal limits and reviewed per medical clearance   Preop EKG within normal limits and reviewed per medical clearance; 61bpm  3M: DOS  T + S: DOS

## 2023-11-13 NOTE — H&P ADULT - NSICDXPASTSURGICALHX_GEN_ALL_CORE_FT
PAST SURGICAL HISTORY:  Gastric bypass status for obesity     H/O medial meniscus repair of left knee     H/O varicose vein ligation b/l thigh    History of bunionectomy b/l    History of cervical spinal surgery     History of hip replacement left    History of lumbosacral spine surgery + rods/screw    S/P  section X3; ,,

## 2023-11-13 NOTE — H&P ADULT - NSICDXPASTMEDICALHX_GEN_ALL_CORE_FT
"  SUBJECTIVE:                                                    Raul Wilhelm is a 70 year old male who presents to clinic today for the following health issues: small bump on the left pinky finger for about 3 month. Tried over the counter medications for wart treatment.     Concern - Bump on Left Pinky finger     Onset: 3 months    Description:   Red bump     Intensity: moderate     Progression of Symptoms:  Gradually has gotten bigger    Accompanying Signs & Symptoms:  None       Previous history of similar problem:   None    Precipitating factors:   Worsened by: If he presses on it    Alleviating factors:  Improved by: None       Therapies Tried and outcome: Patient thought it was a wart and tried some OTC stuff but made it worse.     Problem list and histories reviewed & adjusted, as indicated.  Additional history: as documented    Reviewed and updated as needed this visit by clinical staff  Tobacco  Allergies  Med Hx  Surg Hx  Fam Hx  Soc Hx      Reviewed and updated as needed this visit by Provider         ROS:  Constitutional, HEENT, cardiovascular, pulmonary, gi and gu systems are negative, except as otherwise noted.    OBJECTIVE:                                                    /68  Pulse 86  Temp 96.7  F (35.9  C) (Tympanic)  Ht 5' 7\" (1.702 m)  Wt 168 lb (76.2 kg)  BMI 26.31 kg/m2  Body mass index is 26.31 kg/(m^2).  GENERAL: healthy, alert and no distress  MS: few Herbenden's nodules   SKIN: small wart on the top of the left pinky finger     Diagnostic Test Results:  none      ASSESSMENT/PLAN:                                                      1. Viral wart on finger   All lesions are frozen with LN2 x5. Patient tolerated procedure well.     ASSESSMENT:  WART    PLAN:  WART CARE DISCUSSED. USE OF OTC PRODUCT STARTING IN FEW DAYS. GENTLE ABRAISION WITH PUMICE STONE OR EMERY BOARD WITH GOOD HANDWASHING AFTER. RETURN IN TWO WEEKS FOR REFREEZING UNTIL RESOLVED.      See Patient " PAST MEDICAL HISTORY:  Asthma     Bilateral sciatica     History of anemia     Lumbar stenosis with neurogenic claudication     Scoliosis (and kyphoscoliosis), idiopathic     Spondylisthesis     Varicose veins of both lower extremities      Instructions    [facility]   PAST MEDICAL HISTORY:  Asthma     Bilateral sciatica     History of anemia     HTN (hypertension)     Lumbar stenosis with neurogenic claudication     Scoliosis (and kyphoscoliosis), idiopathic     Spondylisthesis     Varicose veins of both lower extremities

## 2023-11-14 ENCOUNTER — TRANSCRIPTION ENCOUNTER (OUTPATIENT)
Age: 48
End: 2023-11-14

## 2023-11-14 ENCOUNTER — INPATIENT (INPATIENT)
Facility: HOSPITAL | Age: 48
LOS: 7 days | Discharge: NURSING, REHAB OR LONG-TERM CARE | DRG: 457 | End: 2023-11-22
Attending: ORTHOPAEDIC SURGERY | Admitting: ORTHOPAEDIC SURGERY
Payer: COMMERCIAL

## 2023-11-14 ENCOUNTER — APPOINTMENT (OUTPATIENT)
Dept: ORTHOPEDIC SURGERY | Facility: HOSPITAL | Age: 48
End: 2023-11-14
Payer: COMMERCIAL

## 2023-11-14 DIAGNOSIS — Z98.89 OTHER SPECIFIED POSTPROCEDURAL STATES: Chronic | ICD-10-CM

## 2023-11-14 DIAGNOSIS — Z86.2 PERSONAL HISTORY OF DISEASES OF THE BLOOD AND BLOOD-FORMING ORGANS AND CERTAIN DISORDERS INVOLVING THE IMMUNE MECHANISM: ICD-10-CM

## 2023-11-14 DIAGNOSIS — Z98.890 OTHER SPECIFIED POSTPROCEDURAL STATES: Chronic | ICD-10-CM

## 2023-11-14 DIAGNOSIS — Z98.84 BARIATRIC SURGERY STATUS: Chronic | ICD-10-CM

## 2023-11-14 DIAGNOSIS — M48.06 SPINAL STENOSIS, LUMBAR REGION: ICD-10-CM

## 2023-11-14 DIAGNOSIS — I10 ESSENTIAL (PRIMARY) HYPERTENSION: ICD-10-CM

## 2023-11-14 DIAGNOSIS — Z96.649 PRESENCE OF UNSPECIFIED ARTIFICIAL HIP JOINT: Chronic | ICD-10-CM

## 2023-11-14 LAB
BASE EXCESS BLDA CALC-SCNC: -0.4 MMOL/L — SIGNIFICANT CHANGE UP (ref -2–3)
BASE EXCESS BLDA CALC-SCNC: -0.4 MMOL/L — SIGNIFICANT CHANGE UP (ref -2–3)
BASE EXCESS BLDA CALC-SCNC: 1 MMOL/L — SIGNIFICANT CHANGE UP (ref -2–3)
BASE EXCESS BLDA CALC-SCNC: 1 MMOL/L — SIGNIFICANT CHANGE UP (ref -2–3)
BLD GP AB SCN SERPL QL: NEGATIVE — SIGNIFICANT CHANGE UP
BLD GP AB SCN SERPL QL: NEGATIVE — SIGNIFICANT CHANGE UP
CA-I BLDA-SCNC: 1.09 MMOL/L — LOW (ref 1.15–1.33)
CA-I BLDA-SCNC: 1.09 MMOL/L — LOW (ref 1.15–1.33)
CA-I BLDA-SCNC: 1.18 MMOL/L — SIGNIFICANT CHANGE UP (ref 1.15–1.33)
CA-I BLDA-SCNC: 1.18 MMOL/L — SIGNIFICANT CHANGE UP (ref 1.15–1.33)
CO2 BLDA-SCNC: 25 MMOL/L — HIGH (ref 19–24)
CO2 BLDA-SCNC: 25 MMOL/L — HIGH (ref 19–24)
CO2 BLDA-SCNC: 27 MMOL/L — HIGH (ref 19–24)
CO2 BLDA-SCNC: 27 MMOL/L — HIGH (ref 19–24)
COHGB MFR BLDA: 0.2 % — SIGNIFICANT CHANGE UP
COHGB MFR BLDA: 0.2 % — SIGNIFICANT CHANGE UP
COHGB MFR BLDA: 1 % — SIGNIFICANT CHANGE UP
COHGB MFR BLDA: 1 % — SIGNIFICANT CHANGE UP
GLUCOSE BLDA-MCNC: 107 MG/DL — HIGH (ref 70–99)
GLUCOSE BLDA-MCNC: 107 MG/DL — HIGH (ref 70–99)
GLUCOSE BLDA-MCNC: 85 MG/DL — SIGNIFICANT CHANGE UP (ref 70–99)
GLUCOSE BLDA-MCNC: 85 MG/DL — SIGNIFICANT CHANGE UP (ref 70–99)
GRAM STN FLD: SIGNIFICANT CHANGE UP
HCO3 BLDA-SCNC: 24 MMOL/L — SIGNIFICANT CHANGE UP (ref 21–28)
HCO3 BLDA-SCNC: 24 MMOL/L — SIGNIFICANT CHANGE UP (ref 21–28)
HCO3 BLDA-SCNC: 26 MMOL/L — SIGNIFICANT CHANGE UP (ref 21–28)
HCO3 BLDA-SCNC: 26 MMOL/L — SIGNIFICANT CHANGE UP (ref 21–28)
HGB BLDA-MCNC: 10 G/DL — LOW (ref 11.7–16.1)
HGB BLDA-MCNC: 10 G/DL — LOW (ref 11.7–16.1)
HGB BLDA-MCNC: 9.5 G/DL — LOW (ref 11.7–16.1)
HGB BLDA-MCNC: 9.5 G/DL — LOW (ref 11.7–16.1)
METHGB MFR BLDA: 0.7 % — SIGNIFICANT CHANGE UP
METHGB MFR BLDA: 0.7 % — SIGNIFICANT CHANGE UP
METHGB MFR BLDA: 0.8 % — SIGNIFICANT CHANGE UP
METHGB MFR BLDA: 0.8 % — SIGNIFICANT CHANGE UP
OXYHGB MFR BLDA: 98.2 % — HIGH (ref 90–95)
PCO2 BLDA: 38 MMHG — SIGNIFICANT CHANGE UP (ref 32–45)
PCO2 BLDA: 38 MMHG — SIGNIFICANT CHANGE UP (ref 32–45)
PCO2 BLDA: 42 MMHG — SIGNIFICANT CHANGE UP (ref 32–45)
PCO2 BLDA: 42 MMHG — SIGNIFICANT CHANGE UP (ref 32–45)
PH BLDA: 7.4 — SIGNIFICANT CHANGE UP (ref 7.35–7.45)
PH BLDA: 7.4 — SIGNIFICANT CHANGE UP (ref 7.35–7.45)
PH BLDA: 7.41 — SIGNIFICANT CHANGE UP (ref 7.35–7.45)
PH BLDA: 7.41 — SIGNIFICANT CHANGE UP (ref 7.35–7.45)
PO2 BLDA: 369 MMHG — HIGH (ref 83–108)
PO2 BLDA: 369 MMHG — HIGH (ref 83–108)
PO2 BLDA: 396 MMHG — HIGH (ref 83–108)
PO2 BLDA: 396 MMHG — HIGH (ref 83–108)
POTASSIUM BLDA-SCNC: 3.3 MMOL/L — LOW (ref 3.5–5.1)
POTASSIUM BLDA-SCNC: 3.3 MMOL/L — LOW (ref 3.5–5.1)
POTASSIUM BLDA-SCNC: 3.5 MMOL/L — SIGNIFICANT CHANGE UP (ref 3.5–5.1)
POTASSIUM BLDA-SCNC: 3.5 MMOL/L — SIGNIFICANT CHANGE UP (ref 3.5–5.1)
RH IG SCN BLD-IMP: POSITIVE — SIGNIFICANT CHANGE UP
RH IG SCN BLD-IMP: POSITIVE — SIGNIFICANT CHANGE UP
SAO2 % BLDA: 99.3 % — HIGH (ref 94–98)
SAO2 % BLDA: 99.3 % — HIGH (ref 94–98)
SAO2 % BLDA: 99.9 % — HIGH (ref 94–98)
SAO2 % BLDA: 99.9 % — HIGH (ref 94–98)
SODIUM BLDA-SCNC: 135 MMOL/L — LOW (ref 136–145)
SODIUM BLDA-SCNC: 135 MMOL/L — LOW (ref 136–145)
SODIUM BLDA-SCNC: 139 MMOL/L — SIGNIFICANT CHANGE UP (ref 136–145)
SODIUM BLDA-SCNC: 139 MMOL/L — SIGNIFICANT CHANGE UP (ref 136–145)
SPECIMEN SOURCE: SIGNIFICANT CHANGE UP

## 2023-11-14 PROCEDURE — 22207 INCIS SPINE 3 COLUMN LUMBAR: CPT | Mod: 80

## 2023-11-14 PROCEDURE — 22848 INSERT PELV FIXATION DEVICE: CPT | Mod: 80

## 2023-11-14 PROCEDURE — 22216 INCIS ADDL SPINE SEGMENT: CPT | Mod: 59

## 2023-11-14 PROCEDURE — 22212 INCIS 1 VERTEBRAL SEG THORAC: CPT | Mod: 80,59

## 2023-11-14 PROCEDURE — 22899 UNLISTED PROCEDURE SPINE: CPT | Mod: 80

## 2023-11-14 PROCEDURE — 22614 ARTHRD PST TQ 1NTRSPC EA ADD: CPT

## 2023-11-14 PROCEDURE — 22899 UNLISTED PROCEDURE SPINE: CPT

## 2023-11-14 PROCEDURE — 22848 INSERT PELV FIXATION DEVICE: CPT

## 2023-11-14 PROCEDURE — 22212 INCIS 1 VERTEBRAL SEG THORAC: CPT | Mod: 59

## 2023-11-14 PROCEDURE — 22207 INCIS SPINE 3 COLUMN LUMBAR: CPT

## 2023-11-14 PROCEDURE — 22216 INCIS ADDL SPINE SEGMENT: CPT | Mod: 80,59

## 2023-11-14 PROCEDURE — 22610 ARTHRD PST TQ 1NTRSPC THRC: CPT | Mod: 80

## 2023-11-14 PROCEDURE — 22843 INSERT SPINE FIXATION DEVICE: CPT

## 2023-11-14 PROCEDURE — 22610 ARTHRD PST TQ 1NTRSPC THRC: CPT

## 2023-11-14 PROCEDURE — 22843 INSERT SPINE FIXATION DEVICE: CPT | Mod: 80

## 2023-11-14 PROCEDURE — 22614 ARTHRD PST TQ 1NTRSPC EA ADD: CPT | Mod: 80

## 2023-11-14 DEVICE — SCREW POLY 6.5X45MM: Type: IMPLANTABLE DEVICE | Status: FUNCTIONAL

## 2023-11-14 DEVICE — ROD CONTOURED 5.5X65MM: Type: IMPLANTABLE DEVICE | Status: FUNCTIONAL

## 2023-11-14 DEVICE — MATRIX COLLAGEN PURAPLY AM 6X9CM: Type: IMPLANTABLE DEVICE | Status: FUNCTIONAL

## 2023-11-14 DEVICE — SCREW EVEREST POLY 6.5X40MM: Type: IMPLANTABLE DEVICE | Status: FUNCTIONAL

## 2023-11-14 DEVICE — SCREW EVEREST POLY 5.5X35MM: Type: IMPLANTABLE DEVICE | Status: FUNCTIONAL

## 2023-11-14 DEVICE — SET SCREW EVEREST ATR: Type: IMPLANTABLE DEVICE | Status: FUNCTIONAL

## 2023-11-14 DEVICE — SCREW EVEREST POLY 5.5X40MM: Type: IMPLANTABLE DEVICE | Status: FUNCTIONAL

## 2023-11-14 DEVICE — MATRIX COLLAGEN PURAPLY AM 5X5CM 25SQ CM: Type: IMPLANTABLE DEVICE | Status: FUNCTIONAL

## 2023-11-14 DEVICE — IMPLANTABLE DEVICE: Type: IMPLANTABLE DEVICE | Status: FUNCTIONAL

## 2023-11-14 DEVICE — GRAFT BONE INFUSE KIT LG: Type: IMPLANTABLE DEVICE | Status: FUNCTIONAL

## 2023-11-14 DEVICE — MATRIX COLLAGEN PURAPLY MZ POWDER 1000MG: Type: IMPLANTABLE DEVICE | Status: FUNCTIONAL

## 2023-11-14 DEVICE — SCREW EVEREST POLY 7.5X50MM: Type: IMPLANTABLE DEVICE | Status: FUNCTIONAL

## 2023-11-14 DEVICE — SCREW EVEREST SET 5.5X45MM: Type: IMPLANTABLE DEVICE | Status: FUNCTIONAL

## 2023-11-14 DEVICE — ROD STR 5.5X500MM: Type: IMPLANTABLE DEVICE | Status: FUNCTIONAL

## 2023-11-14 DEVICE — BAND WIDE NILE ALT FIXATION 4MM: Type: IMPLANTABLE DEVICE | Status: FUNCTIONAL

## 2023-11-14 DEVICE — SURGIFLO HEMOSTATIC MATRIX KIT: Type: IMPLANTABLE DEVICE | Status: FUNCTIONAL

## 2023-11-14 DEVICE — SURGIFOAM PAD 8CM X 12.5CM X 10MM (100): Type: IMPLANTABLE DEVICE | Status: FUNCTIONAL

## 2023-11-14 DEVICE — SCREW EVEREST POLY 6.5X50MM: Type: IMPLANTABLE DEVICE | Status: FUNCTIONAL

## 2023-11-14 DEVICE — ROD CONTOURED 5.5X35MM: Type: IMPLANTABLE DEVICE | Status: FUNCTIONAL

## 2023-11-14 DEVICE — CLAMP ASSEMBLY 5.5MM: Type: IMPLANTABLE DEVICE | Status: FUNCTIONAL

## 2023-11-14 DEVICE — SCREW EVEREST POLY 7.5X80MM: Type: IMPLANTABLE DEVICE | Status: FUNCTIONAL

## 2023-11-14 RX ORDER — ALBUTEROL 90 UG/1
2 AEROSOL, METERED ORAL EVERY 6 HOURS
Refills: 0 | Status: DISCONTINUED | OUTPATIENT
Start: 2023-11-14 | End: 2023-11-22

## 2023-11-14 RX ORDER — ONDANSETRON 8 MG/1
4 TABLET, FILM COATED ORAL ONCE
Refills: 0 | Status: DISCONTINUED | OUTPATIENT
Start: 2023-11-14 | End: 2023-11-22

## 2023-11-14 RX ORDER — SENNA PLUS 8.6 MG/1
2 TABLET ORAL AT BEDTIME
Refills: 0 | Status: DISCONTINUED | OUTPATIENT
Start: 2023-11-14 | End: 2023-11-22

## 2023-11-14 RX ORDER — ONDANSETRON 8 MG/1
4 TABLET, FILM COATED ORAL EVERY 6 HOURS
Refills: 0 | Status: DISCONTINUED | OUTPATIENT
Start: 2023-11-14 | End: 2023-11-22

## 2023-11-14 RX ORDER — HYDROMORPHONE HYDROCHLORIDE 2 MG/ML
30 INJECTION INTRAMUSCULAR; INTRAVENOUS; SUBCUTANEOUS
Refills: 0 | Status: DISCONTINUED | OUTPATIENT
Start: 2023-11-14 | End: 2023-11-15

## 2023-11-14 RX ORDER — ACETAMINOPHEN 500 MG
975 TABLET ORAL EVERY 8 HOURS
Refills: 0 | Status: DISCONTINUED | OUTPATIENT
Start: 2023-11-14 | End: 2023-11-14

## 2023-11-14 RX ORDER — HYDROMORPHONE HYDROCHLORIDE 2 MG/ML
0.5 INJECTION INTRAMUSCULAR; INTRAVENOUS; SUBCUTANEOUS
Refills: 0 | Status: DISCONTINUED | OUTPATIENT
Start: 2023-11-14 | End: 2023-11-15

## 2023-11-14 RX ORDER — MAGNESIUM HYDROXIDE 400 MG/1
30 TABLET, CHEWABLE ORAL EVERY 12 HOURS
Refills: 0 | Status: DISCONTINUED | OUTPATIENT
Start: 2023-11-14 | End: 2023-11-22

## 2023-11-14 RX ORDER — DIPHENHYDRAMINE HCL 50 MG
25 CAPSULE ORAL EVERY 4 HOURS
Refills: 0 | Status: DISCONTINUED | OUTPATIENT
Start: 2023-11-14 | End: 2023-11-22

## 2023-11-14 RX ORDER — ONDANSETRON 8 MG/1
4 TABLET, FILM COATED ORAL EVERY 6 HOURS
Refills: 0 | Status: DISCONTINUED | OUTPATIENT
Start: 2023-11-14 | End: 2023-11-14

## 2023-11-14 RX ORDER — METHOCARBAMOL 500 MG/1
500 TABLET, FILM COATED ORAL THREE TIMES A DAY
Refills: 0 | Status: DISCONTINUED | OUTPATIENT
Start: 2023-11-14 | End: 2023-11-14

## 2023-11-14 RX ORDER — TRIAMTERENE/HYDROCHLOROTHIAZID 75 MG-50MG
1 TABLET ORAL DAILY
Refills: 0 | Status: DISCONTINUED | OUTPATIENT
Start: 2023-11-14 | End: 2023-11-15

## 2023-11-14 RX ORDER — APREPITANT 80 MG/1
40 CAPSULE ORAL ONCE
Refills: 0 | Status: COMPLETED | OUTPATIENT
Start: 2023-11-14 | End: 2023-11-14

## 2023-11-14 RX ORDER — SODIUM CHLORIDE 9 MG/ML
1000 INJECTION, SOLUTION INTRAVENOUS
Refills: 0 | Status: DISCONTINUED | OUTPATIENT
Start: 2023-11-14 | End: 2023-11-16

## 2023-11-14 RX ORDER — ACETAMINOPHEN 500 MG
975 TABLET ORAL EVERY 8 HOURS
Refills: 0 | Status: DISCONTINUED | OUTPATIENT
Start: 2023-11-15 | End: 2023-11-22

## 2023-11-14 RX ORDER — CEFAZOLIN SODIUM 1 G
2000 VIAL (EA) INJECTION EVERY 8 HOURS
Refills: 0 | Status: COMPLETED | OUTPATIENT
Start: 2023-11-14 | End: 2023-11-15

## 2023-11-14 RX ORDER — HYDROMORPHONE HYDROCHLORIDE 2 MG/ML
1 INJECTION INTRAMUSCULAR; INTRAVENOUS; SUBCUTANEOUS
Refills: 0 | Status: DISCONTINUED | OUTPATIENT
Start: 2023-11-14 | End: 2023-11-14

## 2023-11-14 RX ORDER — HYDROMORPHONE HYDROCHLORIDE 2 MG/ML
30 INJECTION INTRAMUSCULAR; INTRAVENOUS; SUBCUTANEOUS
Refills: 0 | Status: DISCONTINUED | OUTPATIENT
Start: 2023-11-14 | End: 2023-11-14

## 2023-11-14 RX ORDER — NALOXONE HYDROCHLORIDE 4 MG/.1ML
0.1 SPRAY NASAL
Refills: 0 | Status: DISCONTINUED | OUTPATIENT
Start: 2023-11-14 | End: 2023-11-22

## 2023-11-14 RX ORDER — ACETAMINOPHEN 500 MG
1000 TABLET ORAL ONCE
Refills: 0 | Status: COMPLETED | OUTPATIENT
Start: 2023-11-14 | End: 2023-11-14

## 2023-11-14 RX ORDER — METHOCARBAMOL 500 MG/1
500 TABLET, FILM COATED ORAL THREE TIMES A DAY
Refills: 0 | Status: DISCONTINUED | OUTPATIENT
Start: 2023-11-14 | End: 2023-11-22

## 2023-11-14 RX ORDER — DULOXETINE HYDROCHLORIDE 30 MG/1
60 CAPSULE, DELAYED RELEASE ORAL DAILY
Refills: 0 | Status: DISCONTINUED | OUTPATIENT
Start: 2023-11-14 | End: 2023-11-22

## 2023-11-14 RX ORDER — NALOXONE HYDROCHLORIDE 4 MG/.1ML
0.1 SPRAY NASAL
Refills: 0 | Status: DISCONTINUED | OUTPATIENT
Start: 2023-11-14 | End: 2023-11-14

## 2023-11-14 RX ORDER — BENZOCAINE AND MENTHOL 5; 1 G/100ML; G/100ML
1 LIQUID ORAL
Refills: 0 | Status: DISCONTINUED | OUTPATIENT
Start: 2023-11-14 | End: 2023-11-22

## 2023-11-14 RX ORDER — PANTOPRAZOLE SODIUM 20 MG/1
40 TABLET, DELAYED RELEASE ORAL
Refills: 0 | Status: DISCONTINUED | OUTPATIENT
Start: 2023-11-14 | End: 2023-11-22

## 2023-11-14 RX ADMIN — METHOCARBAMOL 500 MILLIGRAM(S): 500 TABLET, FILM COATED ORAL at 21:37

## 2023-11-14 RX ADMIN — SENNA PLUS 2 TABLET(S): 8.6 TABLET ORAL at 21:38

## 2023-11-14 RX ADMIN — Medication 1000 MILLIGRAM(S): at 07:49

## 2023-11-14 RX ADMIN — APREPITANT 40 MILLIGRAM(S): 80 CAPSULE ORAL at 07:50

## 2023-11-14 RX ADMIN — HYDROMORPHONE HYDROCHLORIDE 30 MILLILITER(S): 2 INJECTION INTRAMUSCULAR; INTRAVENOUS; SUBCUTANEOUS at 17:48

## 2023-11-14 RX ADMIN — Medication 100 MILLIGRAM(S): at 21:37

## 2023-11-14 RX ADMIN — DULOXETINE HYDROCHLORIDE 60 MILLIGRAM(S): 30 CAPSULE, DELAYED RELEASE ORAL at 21:37

## 2023-11-14 RX ADMIN — Medication 200 MILLIGRAM(S): at 21:37

## 2023-11-14 RX ADMIN — Medication 400 MILLIGRAM(S): at 16:18

## 2023-11-14 NOTE — PRE-ANESTHESIA EVALUATION ADULT - NSANTHPEFT_GEN_ALL_CORE
+Fentanyl patch on chest    Patient reports occasional tingling/numbness that radiates from Right shoulder down to right elbow.  Pt states symptoms come and go and are not elicited by neck movement     NECK FROM, Patient denies neck motion eliciting numbness / tingling in upper extremities

## 2023-11-14 NOTE — DISCHARGE NOTE PROVIDER - NSDCMRMEDTOKEN_GEN_ALL_CORE_FT
Benadryl 25 mg oral capsule: 1 cap(s) orally once a day (at bedtime)  Cymbalta 60 mg oral delayed release capsule: 1 cap(s) orally once a day  Dyazide 25 mg-37.5 mg oral capsule: 1 cap(s) orally once a day  fentaNYL 50 mcg/hr transdermal film, extended release: 1 film(s) transdermal every 72 hours  Lyrica CR: 200 milligram(s) orally 2 times a day  Nucynta 75 mg oral tablet: 1 tab(s) orally prn  omeprazole 40 mg oral delayed release capsule: 1 cap(s) orally once a day  Proventil 90 mcg/inh inhalation aerosol: inhaled prn  tiZANidine 4 mg oral tablet: orally 3 times a day   acetaminophen 325 mg oral tablet: 3 tab(s) orally every 8 hours  Benadryl 25 mg oral capsule: 1 cap(s) orally once a day (at bedtime)  Cymbalta 60 mg oral delayed release capsule: 1 cap(s) orally once a day  Dyazide 25 mg-37.5 mg oral capsule: 1 cap(s) orally once a day  enoxaparin 40 mg/0.4 mL injectable solution: 40 milligram(s) injectable once a day for DVT prophylaxis while at rehab  fentaNYL 50 mcg/hr transdermal film, extended release: 1 film(s) transdermal every 72 hours  methocarbamol 500 mg oral tablet: 1 tab(s) orally 3 times a day  naloxegol 12.5 mg oral tablet: 1 tab(s) orally once a day  Nucynta 75 mg oral tablet: 1 tab(s) orally every 6 hours (home dosing ~ 4x/day)  omeprazole 40 mg oral delayed release capsule: 1 cap(s) orally once a day  polyethylene glycol 3350 oral powder for reconstitution: 17 gram(s) orally  pregabalin 200 mg oral capsule: 1 cap(s) orally 3 times a day  Proventil 90 mcg/inh inhalation aerosol: inhaled prn  senna leaf extract oral tablet: 2 tab(s) orally once a day (at bedtime)   acetaminophen 325 mg oral tablet: 3 tab(s) orally every 8 hours  Benadryl 25 mg oral capsule: 1 cap(s) orally once a day (at bedtime)  Cymbalta 60 mg oral delayed release capsule: 1 cap(s) orally once a day  Dyazide 25 mg-37.5 mg oral capsule: 1 cap(s) orally once a day  enoxaparin 40 mg/0.4 mL injectable solution: 40 milligram(s) injectable once a day for DVT prophylaxis while at rehab  fentaNYL 50 mcg/hr transdermal film, extended release: 1 film(s) transdermal every 72 hours  methocarbamol 500 mg oral tablet: 1 tab(s) orally 3 times a day  naloxegol 12.5 mg oral tablet: 1 tab(s) orally once a day  Nucynta 75 mg oral tablet: 1 tab(s) orally every 6 hours (home dosing ~ 4x/day)  omeprazole 40 mg oral delayed release capsule: 1 cap(s) orally once a day  polyethylene glycol 3350 oral powder for reconstitution: 17 gram(s) orally once a day  pregabalin 200 mg oral capsule: 1 cap(s) orally 3 times a day  Proventil 90 mcg/inh inhalation aerosol: inhaled prn  senna leaf extract oral tablet: 2 tab(s) orally once a day (at bedtime)

## 2023-11-14 NOTE — DISCHARGE NOTE PROVIDER - NSDCFUADDINST_GEN_ALL_CORE_FT
ACTIVITY:  - Weightbear as tolerated with assistive device. No strenuous activity, heavy lifting, driving or returning to work until cleared by MD.  - You may experience postoperative swelling on the operative extremity. You may ice the surgery site for 20 minute intervals.    DRESSING: ***  - You may shower, your dressing is water-resistant. Do not soak in bathtubs. Remove dressing after postop day 7, then leave incision open to air.  - Keep your incision clean and dry. Do not pick at your incision. Do not apply creams, ointments or oils to your incision until cleared by your surgeon. Do not soak your incision in sitting water (ie tubs, pools, lakes, etc.) until cleared by your surgeon. You may let clean, running water fall over your incision.    MEDICATION/ANTICOAGULATION:  -You have been prescribed ***, as a preventative to help prevent postoperative blood clots. Please take this medication as prescribed.   - You have been prescribed medications for pain:    - Tylenol for mild to moderate pain. Do not exceed 3,000mg daily.    - For more severe pain, take Tylenol with the addition of narcotic pain medication. Take this medication as prescribed. This medication may cause drowsiness or dizziness. Do not operate machinery. This medication may cause constipation.  - For any additional medications, follow instructions on the bottle.   -Try to have regular bowel movements. Take stool softener or laxative if necessary. You may wish to take Miralax daily until you have regular bowel movements.   - If you have been prescribed Aspirin or an anti-inflammatory, please take Prilosec once a day, before breakfast, until no longer taking Aspirin or anti-inflammatory. This will help protect your stomach.  - If you have a pain management physician, please follow-up with them postoperatively.   - If you experience any negative side effects of your medications, please call your surgeon's office to discuss.    Follow-up:  - Call to schedule an appt with  *** for follow up. If you have staples or sutures they will be removed in office.  - Please follow-up with your primary care physician or any other specialist you see postoperatively, if needed.   - Contact your doctor if you experience: fever greater than 101.5, chills, chest pain, difficulty breathing, redness or excessive drainage around the incision, other concerns.  ACTIVITY:  - Weightbear as tolerated with assistive device. No strenuous activity, heavy lifting, driving or returning to work until cleared by MD.  - You may experience postoperative swelling on the operative extremity. You may ice the surgery site for 20 minute intervals.    DRESSING: ***  - You may shower, your dressing is water-resistant. Do not soak in bathtubs. Remove dressing after postop day 7, then leave incision open to air.  - Keep your incision clean and dry. Do not pick at your incision. Do not apply creams, ointments or oils to your incision until cleared by your surgeon. Do not soak your incision in sitting water (ie tubs, pools, lakes, etc.) until cleared by your surgeon. You may let clean, running water fall over your incision.    MEDICATION/ANTICOAGULATION:  -You have been prescribed lovenox, as a preventative to help prevent postoperative blood clots. This should be continued while you are at rehab.  - You have been prescribed medications for pain:    - Tylenol for mild to moderate pain. Do not exceed 3,000mg daily.    - For more severe pain, take Tylenol with the addition of narcotic pain medication. Take this medication as prescribed. This medication may cause drowsiness or dizziness. Do not operate machinery. This medication may cause constipation.  - For any additional medications, follow instructions on the bottle.   -Try to have regular bowel movements. Take stool softener or laxative if necessary. You may wish to take Miralax daily until you have regular bowel movements.   - If you have been prescribed Aspirin or an anti-inflammatory, please take Prilosec once a day, before breakfast, until no longer taking Aspirin or anti-inflammatory. This will help protect your stomach.  - If you have a pain management physician, please follow-up with them postoperatively.   - If you experience any negative side effects of your medications, please call your surgeon's office to discuss.    Follow-up:  - Call to schedule an appt with Dr. Schwab for follow up. If you have staples or sutures they will be removed in office.  - Please follow-up with your primary care physician or any other specialist you see postoperatively, if needed.   - Contact your doctor if you experience: fever greater than 101.5, chills, chest pain, difficulty breathing, redness or excessive drainage around the incision, other concerns.  ACTIVITY:  - Weightbear as tolerated with assistive device. No strenuous activity, heavy lifting, driving or returning to work until cleared by MD.  - You may experience postoperative swelling on the operative extremity. You may ice the surgery site for 20 minute intervals.    DRESSING: *** Aquacel   - You may shower, your dressing is water-resistant. Do not soak in bathtubs. Remove dressing after postop day 7, then leave incision open to air.  - Keep your incision clean and dry. Do not pick at your incision. Do not apply creams, ointments or oils to your incision until cleared by your surgeon. Do not soak your incision in sitting water (ie tubs, pools, lakes, etc.) until cleared by your surgeon. You may let clean, running water fall over your incision.    MEDICATION/ANTICOAGULATION:  -You have been prescribed lovenox, as a preventative to help prevent postoperative blood clots. This should be continued while you are at rehab.  - You have been prescribed medications for pain:    - Tylenol for mild to moderate pain. Do not exceed 3,000mg daily.    - For more severe pain, take Tylenol with the addition of narcotic pain medication. Take this medication as prescribed. This medication may cause drowsiness or dizziness. Do not operate machinery. This medication may cause constipation.  - For any additional medications, follow instructions on the bottle.   -Try to have regular bowel movements. Take stool softener or laxative if necessary. You may wish to take Miralax daily until you have regular bowel movements.   - If you have been prescribed Aspirin or an anti-inflammatory, please take Prilosec once a day, before breakfast, until no longer taking Aspirin or anti-inflammatory. This will help protect your stomach.  - If you have a pain management physician, please follow-up with them postoperatively.   - If you experience any negative side effects of your medications, please call your surgeon's office to discuss.    Follow-up:  - Call to schedule an appt with Dr. Schwab for follow up. If you have staples or sutures they will be removed in office.  - Please follow-up with your primary care physician or any other specialist you see postoperatively, if needed.   - Contact your doctor if you experience: fever greater than 101.5, chills, chest pain, difficulty breathing, redness or excessive drainage around the incision, other concerns.     You received a blood transfusion for a hgb of 7.8 on 11-. Your hemoglobin improved to 8.7 on discharge. Followup with your PCP in 1 week to check your hemoglobin.   You required blood transfusions for acute blood loss anemia related to surgery. Your hemoglobin on day of discharge was 7.8, and stable. You were asymptomatic. Your labs includidn CBC should be monitored at acute rehab (next draw- Friday 11/24).   Additionally you were seen by pain management at [facility]. On discharge you should continue your home dose of nucynta 75mg 4x/day, fentanyl patch, as well as robaxin 500mg PO 3x/day and lyrica 200mg PO 3x/day. If the acute rehab does not have nucynta, morphine 30mg IR q6h prn severe pain can be used in place as this is what you were taking while hospitalized at [facility].    ACTIVITY:  - Weightbear as tolerated with assistive device. No strenuous activity, heavy lifting, driving or returning to work until cleared by MD.  - You may experience postoperative swelling on the operative extremity. You may ice the surgery site for 20 minute intervals.    DRESSING: *** Aquacel   - You may shower, your dressing is water-resistant. Do not soak in bathtubs. Remove dressing in 7 days, then leave incision open to air.  - Keep your incision clean and dry. Do not pick at your incision. Do not apply creams, ointments or oils to your incision until cleared by your surgeon. Do not soak your incision in sitting water (ie tubs, pools, lakes, etc.) until cleared by your surgeon. You may let clean, running water fall over your incision.    MEDICATION/ANTICOAGULATION:  -You have been prescribed lovenox, as a preventative to help prevent postoperative blood clots. This should be continued while you are at rehab.  - You have been prescribed medications for pain:    - Tylenol for mild to moderate pain. Do not exceed 3,000mg daily.    - For more severe pain, take Tylenol with the addition of narcotic pain medication. Take this medication as prescribed. This medication may cause drowsiness or dizziness. Do not operate machinery. This medication may cause constipation.  - For any additional medications, follow instructions on the bottle.   -Try to have regular bowel movements. Take stool softener or laxative if necessary. You may wish to take Miralax daily until you have regular bowel movements.   - If you have been prescribed Aspirin or an anti-inflammatory, please take Prilosec once a day, before breakfast, until no longer taking Aspirin or anti-inflammatory. This will help protect your stomach.  - If you have a pain management physician, please follow-up with them postoperatively.   - If you experience any negative side effects of your medications, please call your surgeon's office to discuss.    Follow-up:  - Call to schedule an appt with Dr. Schwab for follow up. If you have staples or sutures they will be removed in office.  - Please follow-up with your primary care physician or any other specialist you see postoperatively, if needed.   - Contact your doctor if you experience: fever greater than 101.5, chills, chest pain, difficulty breathing, redness or excessive drainage around the incision, other concerns.

## 2023-11-14 NOTE — DISCHARGE NOTE PROVIDER - CARE PROVIDER_API CALL
Schwab, Frank Johann  Orthopaedic Surgery  [address]  [place] [address]  Phone: [phone]  Fax: [phone]  Follow Up Time:

## 2023-11-14 NOTE — PRE-ANESTHESIA EVALUATION ADULT - NSANTHADDINFOFT_GEN_ALL_CORE
GA  R/B/A d/w patient including risk of CVA, MI, intraop transfusion, postop intubation, and postop facial redness/edema.  All questions answered.

## 2023-11-14 NOTE — DISCHARGE NOTE PROVIDER - NSDCCPTREATMENT_GEN_ALL_CORE_FT
PRINCIPAL PROCEDURE  Procedure: Pedicle subtraction osteotomy, lumbar spine, posterior approach  Findings and Treatment: lumbar stenosis

## 2023-11-14 NOTE — DISCHARGE NOTE PROVIDER - HOSPITAL COURSE
Admitted  Surgery Revision PSF T3-PELVIS, PSO L4 TETHERS, ILIAC FIXATION, SMITH SCHILLING OSTEOTOMIES T3-T10  Chio-op Antibiotics  Pain control  DVT prophylaxis  OOB/Physical Therapy Admitted 11/14/23 for Surgery Revision PSF T3-PELVIS, PSO L4 TETHERS, ILIAC FIXATION, SMITH SCHILLING OSTEOTOMIES T3-T10  Chio-op Antibiotics  Pain control- pain management consult  DVT prophylaxis- lovenox 40mg subq q24 started PO #1 PM  OOB/Physical Therapy / Occupational Therapy- recommended for acute rehab  HVx2, SHAWNA x 4- continued until appropriately low  Cobb removed for TOV when tolerating standing  Post-op xrays when tolerating    Inpatient Events:   Pre-op pain management consult- Seen by Dr. Stanley outpatient before surgery. Takes nucynta, lyrica 200mg, and fentanyl 50mcg patch/72h at baseline.  Immediate post-op recommendations were ERAS + lyrica 200mg PO tid, and PCA dilaudid 0.3mg q6min. Was transitioned from PCA to oral dilaudid 4-6mg PO on post-op day 1 (with rest of plan the same).    Seen by PT/ OT post-op day 1 and recommendation was made for acute rehab placement.    Acute blood loss anemia- Transfused 3u PRBCs intra-op. Hgb 8.6 on post-op day 1, but still draining into drains x 4. Admitted 11/14/23 for Surgery Revision PSF T3-PELVIS, PSO L4 TETHERS, ILIAC FIXATION, SMITH SCHILLING OSTEOTOMIES T3-T10  Chio-op Antibiotics  Pain control- pain management consult  DVT prophylaxis- lovenox 40mg subq q24 started PO #1 PM  OOB/Physical Therapy / Occupational Therapy- recommended for acute rehab  HVx2, SHAWNA x 4- continued until appropriately low  Cobb removed for TOV when tolerating standing  Post-op xrays when tolerating    Inpatient Events:   Pre-op pain management consult- Seen by Dr. Stanley outpatient before surgery. Takes nucynta, lyrica 200mg, and fentanyl 50mcg patch/72h at baseline.  Immediate post-op recommendations were ERAS + lyrica 200mg PO tid, and PCA dilaudid 0.3mg q6min. Was transitioned from PCA to oral dilaudid 4-6mg PO on post-op day 1 (with rest of plan the same).    Seen by PT/ OT post-op day 1 and recommendation was made for acute rehab placement.    Acute blood loss anemia- Transfused 3u PRBCs intra-op. Hgb 8.6 on post-op day 1, but still draining into drains x 4. Hgb further decreased to 7.8 on post-op day 2, but patient was asymptomatic. Admitted 11/14/23 for Surgery Revision PSF T3-PELVIS, PSO L4 TETHERS, ILIAC FIXATION, SMITH SCHILLING OSTEOTOMIES T3-T10  Chio-op Antibiotics  Pain control- pain management consult  DVT prophylaxis- lovenox 40mg subq q24 started PO #1 PM  OOB/Physical Therapy / Occupational Therapy- recommended for acute rehab  HVx2, SHAWNA x 4- continued until appropriately low  Cobb removed for TOV when tolerating standing  Post-op xrays when tolerating    Inpatient Events:   Pre-op pain management consult- Seen by Dr. Stanley outpatient before surgery. Takes nucynta, lyrica 200mg, and fentanyl 50mcg patch/72h at baseline.  Immediate post-op recommendations were ERAS + lyrica 200mg PO tid, and PCA dilaudid 0.3mg q6min. Was transitioned from PCA to oral dilaudid 4-6mg PO on post-op day 1 (with rest of plan the same).    Seen by PT/ OT post-op day 1 and recommendation was made for acute rehab placement.    Acute blood loss anemia- Transfused 3u PRBCs intra-op. Hgb 8.6 on post-op day 1, but still draining into drains x 4. Hgb further decreased to 7.8 and received another 1u PRBCs.    Cobb was removed on post- op day3 for TOV Admitted 11/14/23 for Surgery Revision PSF T3-PELVIS, PSO L4 TETHERS, ILIAC FIXATION, SMITH SCHILLING OSTEOTOMIES T3-T10  Chio-op Antibiotics  Pain control- pain management consult  DVT prophylaxis- lovenox 40mg subq q24 started PO #1 PM  OOB/Physical Therapy / Occupational Therapy- recommended for acute rehab  HVx2, SHAWNA x 4- continued until appropriately low  Paulino removed for TOV when tolerating standing  Post-op xrays when tolerating    Inpatient Events:   Pre-op pain management consult- Seen by Dr. Stanley outpatient before surgery. Takes nucynta, lyrica 200mg, and fentanyl 50mcg patch/72h at baseline.  Immediate post-op recommendations were ERAS + lyrica 200mg PO tid, and PCA dilaudid 0.3mg q6min. Was transitioned from PCA to oral dilaudid 4-6mg PO on post-op day 1 (with rest of plan the same).    Seen by PT/ OT post-op day 1 and recommendation was made for acute rehab placement.    Acute blood loss anemia- Transfused 3u PRBCs intra-op. Hgb 8.6 on post-op day 1, but still draining into drains x 4. Hgb further decreased to 7.8 and received another 1u PRBCs.    Paulino was removed on post- op day3 for TOV - paulino reinserted on 11-18 due to urinary retention, urology ocnsulted, , insutrcuted to dc paulino if patient ambultes and patients has a bm , last bm 11-20 paulino dcd on 11-21 Admitted 11/14/23 for Surgery Revision PSF T3-PELVIS, PSO L4 TETHERS, ILIAC FIXATION, SMITH SCHILLING OSTEOTOMIES T3-T10  Chio-op Antibiotics  Pain control- pain management consult  DVT prophylaxis- lovenox 40mg subq q24 started PO #1 PM  OOB/Physical Therapy / Occupational Therapy- recommended for acute rehab  HVx2, SHAWNA x 4- dcd on 11-  Pauilno removed for TOV when tolerating standing - paulino dcd on 11-  Post-op xrays when tolerating    Inpatient Events:   Pre-op pain management consult- Seen by Dr. Stanley outpatient before surgery. Takes nucynta, lyrica 200mg, and fentanyl 50mcg patch/72h at baseline.  Immediate post-op recommendations were ERAS + lyrica 200mg PO tid, and PCA dilaudid 0.3mg q6min. Was transitioned from PCA to oral dilaudid 4-6mg PO on post-op day 1 (with rest of plan the same).    Seen by PT/ OT post-op day 1 and recommendation was made for acute rehab placement.    Acute blood loss anemia- Transfused 3u PRBCs intra-op. Hgb 8.6 on post-op day 1, but still draining into drains x 4. Hgb further decreased to 7.8 and received another 1u PRBCs.    Paulino was removed on post- op day3 for TOV - paulino reinserted on 11-18 due to urinary retention, urology ocnsulted, , insutrcuted to dc paulino if patient ambultes and patients has a bm , last bm 11-20 paulino dcd on 11-21 (  ) Admitted 11/14/23 for Surgery Revision PSF T3-PELVIS, PSO L4 TETHERS, ILIAC FIXATION, SMITH SCHILLING OSTEOTOMIES T3-T10  Chio-op Antibiotics  Pain control- pain management consult  DVT prophylaxis- lovenox 40mg subq q24 started PO #1 PM  OOB/Physical Therapy / Occupational Therapy- recommended for acute rehab  HVx2, SHAWNA x 4- dcd on 11-  Paulino removed for TOV when tolerating standing - paulino dcd on 11-  Post-op xrays when tolerating    Inpatient Events:   Pre-op pain management consult- Seen by Dr. Stanley outpatient before surgery. Takes nucynta, lyrica 200mg, and fentanyl 50mcg patch/72h at baseline.  Immediate post-op recommendations were ERAS + lyrica 200mg PO tid, and PCA dilaudid 0.3mg q6min. Was transitioned from PCA to oral dilaudid 4-6mg PO on post-op day 1 (with rest of plan the same). Reports PO dilaudid did not help her pain much and required IV pushes for breakthrough. Switched to morphine 30mg IR q4-6h PRN which she otlerated better.    Seen by PT/ OT post-op day 1 and recommendation was made for acute rehab placement.    Acute blood loss anemia- Transfused 3u PRBCs intra-op. Hgb 8.6 on post-op day 1, but still draining into drains x 4. Hgb further decreased to 7.8 and received another 1u PRBCs.    Paulino was removed on post- op day3 for TOV - paulino reinserted on 11-18 due to urinary retention, urology consulted , instructed to dc paulino if patient ambulates andhas a bm , last Void trialed on 11/21 and passed.

## 2023-11-14 NOTE — DISCHARGE NOTE PROVIDER - NSDCCPCAREPLAN_GEN_ALL_CORE_FT
PRINCIPAL DISCHARGE DIAGNOSIS  Diagnosis: Lumbar stenosis with neurogenic claudication  Assessment and Plan of Treatment:      PRINCIPAL DISCHARGE DIAGNOSIS  Diagnosis: Lumbar stenosis with neurogenic claudication  Assessment and Plan of Treatment: revision PSF

## 2023-11-14 NOTE — CONSULT NOTE ADULT - ASSESSMENT
48y F presenting for elective revision PSF with extension of fusion with instrumentation T3-pelvis, pedicle subtraction osteotomy (PSO) L4, tethers, iliac fixation, smith clark osteotomies T3-T10 with Plastics closure with Dr. Schwab, Dr. Gonzalez and Dr. Simpson 11/14    Post-op recommendations:  1.) Tylenol 975mg PO q8h  2.) Lyrica 200mg PO q8h  3.) Robaxin 500mg PO q8h  4.) Continue home fentanyl patch 50mcg q72h  4.) dilaudid PCA 0.3mg every 6min, max dose of 16mg in 4 hours  5.) Can receive 1mg dilaudid IV bolus q1h prn severe uncontrolled pain    Monitor oxygenation, BP and for signs and symptoms of oversedation on PCA  Aggressive bowel regimen  Plan discussed with and approved by Dr. Stanley      48y F presenting for elective revision PSF with extension of fusion with instrumentation T3-pelvis, pedicle subtraction osteotomy (PSO) L4, tethers, iliac fixation, smith clark osteotomies T3-T10 with Plastics closure with Dr. Schwab, Dr. Gonzalez and Dr. Simpson 11/14    Post-op recommendations:  1.) Tylenol 975mg PO q8h  2.) Lyrica 200mg PO q8h  3.) Robaxin 500mg PO q8h  4.) Continue home fentanyl patch 50mcg q72h  4.) dilaudid PCA 0.3mg every 6min, max dose of 16mg in 4 hours  5.) Can receive 0.5mg dilaudid IV bolus q1h prn severe uncontrolled pain    Monitor oxygenation, BP and for signs and symptoms of oversedation on PCA  Aggressive bowel regimen  Plan discussed with and approved by Dr. Stanley

## 2023-11-14 NOTE — PROGRESS NOTE ADULT - SUBJECTIVE AND OBJECTIVE BOX
Orthopaedic Surgery POC Note    Procedure: T4-Pelvis rev PSF, L5 SO and proximal tethering   Surgeon: Schwab    Pt comfortable without complaints, pain controlled with current regimen and when lying still in bed.  Denies CP, SOB, N/V, numbness/tingling     Vital Signs Last 24 Hrs  T(C): 36.2 (11-14-23 @ 15:39), Max: 36.2 (11-14-23 @ 15:39)  T(F): 97.2 (11-14-23 @ 15:39), Max: 97.2 (11-14-23 @ 15:39)  HR: 66 (11-14-23 @ 17:30) (60 - 66)  BP: 97/58 (11-14-23 @ 17:14) (88/54 - 105/59)  BP(mean): 71 (11-14-23 @ 17:14) (67 - 76)  RR: 12 (11-14-23 @ 17:30) (10 - 20)  SpO2: 98% (11-14-23 @ 17:30) (98% - 100%)  I&O's Summary    14 Nov 2023 07:01  -  14 Nov 2023 18:15  --------------------------------------------------------  IN: 400 mL / OUT: 360 mL / NET: 40 mL        Physical Exam:  General: Pt Alert and oriented, NAD  DSG C/D/I, HVx2, JPx2, paulino in place  Vascular: No edema, calf tenderness, wwp, peripheral pulses +2 bilaterally  Sensation: SILT in T2-S1 dermatomes bilaterally  Motor:   RLE:  Q 5/5, HS 5/5, TA 5/5, GS 5/5, EHL 5/5, FHL 5/5  LLE:  Q 5/5, HS 5/5, TA 5/5, GS 5/5, EHL 5/5, FHL 5/5              A/P: 48yFemale POD#0 s/p T4-Pelvis rev PSF, L5 SO and proximal tethering 11/14  - Stable  - Pain Control  - continue to monitor drain output - 2HV, 2JP  - patient required 1U of RBC's intraop  - dc paulino when oob with PT  - medicine consult for co-mgmt  - pain mgmt following, appreciate recs  - ABX: periop ancef  - DVT ppx: SCDs, LVX POD2  - PT, WBS: WBAT  - Dispo: pending PT/OT, drains    Willie Burleson, PGY-3  Ortho Pager [phone]

## 2023-11-14 NOTE — CONSULT NOTE ADULT - SUBJECTIVE AND OBJECTIVE BOX
History of Present Illness:   48F with low back pain x  7 years, She says that she has an extensive history of back pain throughout her life and had her first spinal surgery in 2017 in [facility]. She said that after the initial surgery she had 10 screws loosen, and under went a revision with Dr. Schwab a few years later. Today she is having an extension of her fusion for progressive worsening of pain. She says that she had intermittent bilateral sciatica and right lower leg weakness and numbness that has caused her to fall due to not being able to feel the ground she is walking on. She is unable to find comfort and both sitting too long and standing too long provokes pain and numbness/tingling of her lower extremities. She ambulates with a cane at baseline. Despite conservative measure pain persists. She also has intermitted right upper extremity nerve pain, she under went a 1 level ACDF in April 2023.     Denies history of blood clots or seizures. She denies chest pain, shortness of breath, nausea or vomiting today.     Patient currently sees outpatient pain management. She uses fentanyl 50 mcg/hr patch, nucynta 75mg, and pregabalin 200mg for pain relief.     Presents for elective revision PSF with extension of fusion with instrumentation T3-pelvis, pedicle subtraction osteotomy (PSO) L4, tethers, iliac fixation, smith clark osteotomies T3-T10 with Plastics closure with Dr. Schwab, Dr. Gonzalez and Dr. Simpson.         Patient follows with pain management doctor, Dr. Stanley outpatient who saw and evaluated patient pre-operatively.   Post-op recommendations were made at that time, and patient will be followed by pain management (Dr. Stanley) throughout admission for medication regimen adjustments.

## 2023-11-14 NOTE — BRIEF OPERATIVE NOTE - NSICDXBRIEFPROCEDURE_GEN_ALL_CORE_FT
PROCEDURES:  Pedicle subtraction osteotomy, lumbar spine, posterior approach 14-Nov-2023 15:04:31  Justin Tipton

## 2023-11-15 LAB
ANION GAP SERPL CALC-SCNC: 7 MMOL/L — SIGNIFICANT CHANGE UP (ref 5–17)
ANION GAP SERPL CALC-SCNC: 7 MMOL/L — SIGNIFICANT CHANGE UP (ref 5–17)
BASOPHILS # BLD AUTO: 0.01 K/UL — SIGNIFICANT CHANGE UP (ref 0–0.2)
BASOPHILS # BLD AUTO: 0.01 K/UL — SIGNIFICANT CHANGE UP (ref 0–0.2)
BASOPHILS NFR BLD AUTO: 0.1 % — SIGNIFICANT CHANGE UP (ref 0–2)
BASOPHILS NFR BLD AUTO: 0.1 % — SIGNIFICANT CHANGE UP (ref 0–2)
BUN SERPL-MCNC: 8 MG/DL — SIGNIFICANT CHANGE UP (ref 7–23)
BUN SERPL-MCNC: 8 MG/DL — SIGNIFICANT CHANGE UP (ref 7–23)
CALCIUM SERPL-MCNC: 8.2 MG/DL — LOW (ref 8.4–10.5)
CALCIUM SERPL-MCNC: 8.2 MG/DL — LOW (ref 8.4–10.5)
CHLORIDE SERPL-SCNC: 108 MMOL/L — SIGNIFICANT CHANGE UP (ref 96–108)
CHLORIDE SERPL-SCNC: 108 MMOL/L — SIGNIFICANT CHANGE UP (ref 96–108)
CO2 SERPL-SCNC: 25 MMOL/L — SIGNIFICANT CHANGE UP (ref 22–31)
CO2 SERPL-SCNC: 25 MMOL/L — SIGNIFICANT CHANGE UP (ref 22–31)
CREAT SERPL-MCNC: 0.61 MG/DL — SIGNIFICANT CHANGE UP (ref 0.5–1.3)
CREAT SERPL-MCNC: 0.61 MG/DL — SIGNIFICANT CHANGE UP (ref 0.5–1.3)
EGFR: 110 ML/MIN/1.73M2 — SIGNIFICANT CHANGE UP
EGFR: 110 ML/MIN/1.73M2 — SIGNIFICANT CHANGE UP
EOSINOPHIL # BLD AUTO: 0 K/UL — SIGNIFICANT CHANGE UP (ref 0–0.5)
EOSINOPHIL # BLD AUTO: 0 K/UL — SIGNIFICANT CHANGE UP (ref 0–0.5)
EOSINOPHIL NFR BLD AUTO: 0 % — SIGNIFICANT CHANGE UP (ref 0–6)
EOSINOPHIL NFR BLD AUTO: 0 % — SIGNIFICANT CHANGE UP (ref 0–6)
GLUCOSE BLDC GLUCOMTR-MCNC: 110 MG/DL — HIGH (ref 70–99)
GLUCOSE BLDC GLUCOMTR-MCNC: 110 MG/DL — HIGH (ref 70–99)
GLUCOSE SERPL-MCNC: 132 MG/DL — HIGH (ref 70–99)
GLUCOSE SERPL-MCNC: 132 MG/DL — HIGH (ref 70–99)
HCT VFR BLD CALC: 26.4 % — LOW (ref 34.5–45)
HCT VFR BLD CALC: 26.4 % — LOW (ref 34.5–45)
HGB BLD-MCNC: 8.6 G/DL — LOW (ref 11.5–15.5)
HGB BLD-MCNC: 8.6 G/DL — LOW (ref 11.5–15.5)
IMM GRANULOCYTES NFR BLD AUTO: 0.3 % — SIGNIFICANT CHANGE UP (ref 0–0.9)
IMM GRANULOCYTES NFR BLD AUTO: 0.3 % — SIGNIFICANT CHANGE UP (ref 0–0.9)
LYMPHOCYTES # BLD AUTO: 0.7 K/UL — LOW (ref 1–3.3)
LYMPHOCYTES # BLD AUTO: 0.7 K/UL — LOW (ref 1–3.3)
LYMPHOCYTES # BLD AUTO: 8.1 % — LOW (ref 13–44)
LYMPHOCYTES # BLD AUTO: 8.1 % — LOW (ref 13–44)
MCHC RBC-ENTMCNC: 28.4 PG — SIGNIFICANT CHANGE UP (ref 27–34)
MCHC RBC-ENTMCNC: 28.4 PG — SIGNIFICANT CHANGE UP (ref 27–34)
MCHC RBC-ENTMCNC: 32.6 GM/DL — SIGNIFICANT CHANGE UP (ref 32–36)
MCHC RBC-ENTMCNC: 32.6 GM/DL — SIGNIFICANT CHANGE UP (ref 32–36)
MCV RBC AUTO: 87.1 FL — SIGNIFICANT CHANGE UP (ref 80–100)
MCV RBC AUTO: 87.1 FL — SIGNIFICANT CHANGE UP (ref 80–100)
MONOCYTES # BLD AUTO: 0.66 K/UL — SIGNIFICANT CHANGE UP (ref 0–0.9)
MONOCYTES # BLD AUTO: 0.66 K/UL — SIGNIFICANT CHANGE UP (ref 0–0.9)
MONOCYTES NFR BLD AUTO: 7.6 % — SIGNIFICANT CHANGE UP (ref 2–14)
MONOCYTES NFR BLD AUTO: 7.6 % — SIGNIFICANT CHANGE UP (ref 2–14)
NEUTROPHILS # BLD AUTO: 7.26 K/UL — SIGNIFICANT CHANGE UP (ref 1.8–7.4)
NEUTROPHILS # BLD AUTO: 7.26 K/UL — SIGNIFICANT CHANGE UP (ref 1.8–7.4)
NEUTROPHILS NFR BLD AUTO: 83.9 % — HIGH (ref 43–77)
NEUTROPHILS NFR BLD AUTO: 83.9 % — HIGH (ref 43–77)
NRBC # BLD: 0 /100 WBCS — SIGNIFICANT CHANGE UP (ref 0–0)
NRBC # BLD: 0 /100 WBCS — SIGNIFICANT CHANGE UP (ref 0–0)
PLATELET # BLD AUTO: 178 K/UL — SIGNIFICANT CHANGE UP (ref 150–400)
PLATELET # BLD AUTO: 178 K/UL — SIGNIFICANT CHANGE UP (ref 150–400)
POTASSIUM SERPL-MCNC: 4.6 MMOL/L — SIGNIFICANT CHANGE UP (ref 3.5–5.3)
POTASSIUM SERPL-MCNC: 4.6 MMOL/L — SIGNIFICANT CHANGE UP (ref 3.5–5.3)
POTASSIUM SERPL-SCNC: 4.6 MMOL/L — SIGNIFICANT CHANGE UP (ref 3.5–5.3)
POTASSIUM SERPL-SCNC: 4.6 MMOL/L — SIGNIFICANT CHANGE UP (ref 3.5–5.3)
RBC # BLD: 3.03 M/UL — LOW (ref 3.8–5.2)
RBC # BLD: 3.03 M/UL — LOW (ref 3.8–5.2)
RBC # FLD: 13.8 % — SIGNIFICANT CHANGE UP (ref 10.3–14.5)
RBC # FLD: 13.8 % — SIGNIFICANT CHANGE UP (ref 10.3–14.5)
SODIUM SERPL-SCNC: 140 MMOL/L — SIGNIFICANT CHANGE UP (ref 135–145)
SODIUM SERPL-SCNC: 140 MMOL/L — SIGNIFICANT CHANGE UP (ref 135–145)
WBC # BLD: 8.66 K/UL — SIGNIFICANT CHANGE UP (ref 3.8–10.5)
WBC # BLD: 8.66 K/UL — SIGNIFICANT CHANGE UP (ref 3.8–10.5)
WBC # FLD AUTO: 8.66 K/UL — SIGNIFICANT CHANGE UP (ref 3.8–10.5)
WBC # FLD AUTO: 8.66 K/UL — SIGNIFICANT CHANGE UP (ref 3.8–10.5)

## 2023-11-15 PROCEDURE — 99255 IP/OBS CONSLTJ NEW/EST HI 80: CPT

## 2023-11-15 RX ORDER — HYDROMORPHONE HYDROCHLORIDE 2 MG/ML
4 INJECTION INTRAMUSCULAR; INTRAVENOUS; SUBCUTANEOUS EVERY 4 HOURS
Refills: 0 | Status: DISCONTINUED | OUTPATIENT
Start: 2023-11-15 | End: 2023-11-20

## 2023-11-15 RX ORDER — TRIAMTERENE/HYDROCHLOROTHIAZID 75 MG-50MG
1 TABLET ORAL DAILY
Refills: 0 | Status: DISCONTINUED | OUTPATIENT
Start: 2023-11-16 | End: 2023-11-22

## 2023-11-15 RX ORDER — HYDROMORPHONE HYDROCHLORIDE 2 MG/ML
0.5 INJECTION INTRAMUSCULAR; INTRAVENOUS; SUBCUTANEOUS
Refills: 0 | Status: DISCONTINUED | OUTPATIENT
Start: 2023-11-15 | End: 2023-11-20

## 2023-11-15 RX ORDER — HYDROMORPHONE HYDROCHLORIDE 2 MG/ML
6 INJECTION INTRAMUSCULAR; INTRAVENOUS; SUBCUTANEOUS EVERY 4 HOURS
Refills: 0 | Status: DISCONTINUED | OUTPATIENT
Start: 2023-11-15 | End: 2023-11-20

## 2023-11-15 RX ORDER — POLYETHYLENE GLYCOL 3350 17 G/17G
17 POWDER, FOR SOLUTION ORAL DAILY
Refills: 0 | Status: DISCONTINUED | OUTPATIENT
Start: 2023-11-15 | End: 2023-11-17

## 2023-11-15 RX ORDER — ENOXAPARIN SODIUM 100 MG/ML
40 INJECTION SUBCUTANEOUS EVERY 24 HOURS
Refills: 0 | Status: DISCONTINUED | OUTPATIENT
Start: 2023-11-15 | End: 2023-11-22

## 2023-11-15 RX ADMIN — HYDROMORPHONE HYDROCHLORIDE 6 MILLIGRAM(S): 2 INJECTION INTRAMUSCULAR; INTRAVENOUS; SUBCUTANEOUS at 10:50

## 2023-11-15 RX ADMIN — METHOCARBAMOL 500 MILLIGRAM(S): 500 TABLET, FILM COATED ORAL at 14:14

## 2023-11-15 RX ADMIN — Medication 975 MILLIGRAM(S): at 14:37

## 2023-11-15 RX ADMIN — Medication 975 MILLIGRAM(S): at 00:27

## 2023-11-15 RX ADMIN — Medication 100 MILLIGRAM(S): at 06:37

## 2023-11-15 RX ADMIN — SENNA PLUS 2 TABLET(S): 8.6 TABLET ORAL at 21:02

## 2023-11-15 RX ADMIN — METHOCARBAMOL 500 MILLIGRAM(S): 500 TABLET, FILM COATED ORAL at 21:03

## 2023-11-15 RX ADMIN — Medication 975 MILLIGRAM(S): at 07:01

## 2023-11-15 RX ADMIN — DULOXETINE HYDROCHLORIDE 60 MILLIGRAM(S): 30 CAPSULE, DELAYED RELEASE ORAL at 11:43

## 2023-11-15 RX ADMIN — HYDROMORPHONE HYDROCHLORIDE 6 MILLIGRAM(S): 2 INJECTION INTRAMUSCULAR; INTRAVENOUS; SUBCUTANEOUS at 16:45

## 2023-11-15 RX ADMIN — HYDROMORPHONE HYDROCHLORIDE 6 MILLIGRAM(S): 2 INJECTION INTRAMUSCULAR; INTRAVENOUS; SUBCUTANEOUS at 15:47

## 2023-11-15 RX ADMIN — SODIUM CHLORIDE 150 MILLILITER(S): 9 INJECTION, SOLUTION INTRAVENOUS at 14:38

## 2023-11-15 RX ADMIN — POLYETHYLENE GLYCOL 3350 17 GRAM(S): 17 POWDER, FOR SOLUTION ORAL at 11:43

## 2023-11-15 RX ADMIN — Medication 200 MILLIGRAM(S): at 14:36

## 2023-11-15 RX ADMIN — SODIUM CHLORIDE 150 MILLILITER(S): 9 INJECTION, SOLUTION INTRAVENOUS at 00:29

## 2023-11-15 RX ADMIN — HYDROMORPHONE HYDROCHLORIDE 0.5 MILLIGRAM(S): 2 INJECTION INTRAMUSCULAR; INTRAVENOUS; SUBCUTANEOUS at 12:10

## 2023-11-15 RX ADMIN — PANTOPRAZOLE SODIUM 40 MILLIGRAM(S): 20 TABLET, DELAYED RELEASE ORAL at 06:38

## 2023-11-15 RX ADMIN — HYDROMORPHONE HYDROCHLORIDE 0.5 MILLIGRAM(S): 2 INJECTION INTRAMUSCULAR; INTRAVENOUS; SUBCUTANEOUS at 11:43

## 2023-11-15 RX ADMIN — ONDANSETRON 4 MILLIGRAM(S): 8 TABLET, FILM COATED ORAL at 11:44

## 2023-11-15 RX ADMIN — METHOCARBAMOL 500 MILLIGRAM(S): 500 TABLET, FILM COATED ORAL at 06:39

## 2023-11-15 RX ADMIN — ENOXAPARIN SODIUM 40 MILLIGRAM(S): 100 INJECTION SUBCUTANEOUS at 22:13

## 2023-11-15 RX ADMIN — ONDANSETRON 4 MILLIGRAM(S): 8 TABLET, FILM COATED ORAL at 06:38

## 2023-11-15 RX ADMIN — Medication 1 TABLET(S): at 06:41

## 2023-11-15 RX ADMIN — ONDANSETRON 4 MILLIGRAM(S): 8 TABLET, FILM COATED ORAL at 23:05

## 2023-11-15 RX ADMIN — Medication 200 MILLIGRAM(S): at 22:14

## 2023-11-15 RX ADMIN — Medication 975 MILLIGRAM(S): at 23:04

## 2023-11-15 RX ADMIN — SODIUM CHLORIDE 150 MILLILITER(S): 9 INJECTION, SOLUTION INTRAVENOUS at 21:04

## 2023-11-15 RX ADMIN — ONDANSETRON 4 MILLIGRAM(S): 8 TABLET, FILM COATED ORAL at 00:27

## 2023-11-15 RX ADMIN — HYDROMORPHONE HYDROCHLORIDE 6 MILLIGRAM(S): 2 INJECTION INTRAMUSCULAR; INTRAVENOUS; SUBCUTANEOUS at 09:50

## 2023-11-15 RX ADMIN — Medication 200 MILLIGRAM(S): at 06:39

## 2023-11-15 RX ADMIN — SODIUM CHLORIDE 150 MILLILITER(S): 9 INJECTION, SOLUTION INTRAVENOUS at 00:42

## 2023-11-15 NOTE — PHYSICAL THERAPY INITIAL EVALUATION ADULT - GAIT DISTANCE, PT EVAL
3 sidesteps at EOB; limited distance 2/2 impaired weight shifting ability and difficulty maintaining eyes open 2/2 lethargy. VSS throughout OOB mobility.

## 2023-11-15 NOTE — PHYSICAL THERAPY INITIAL EVALUATION ADULT - TRANSFER SKILLS, REHAB EVAL
PATIENT MADE APPOINTMENT FOR 8/4  
Rx Refill Note  Requested Prescriptions     Pending Prescriptions Disp Refills   • Synthroid 200 MCG tablet [Pharmacy Med Name: Synthroid 200 MCG Oral Tablet] 30 tablet 0     Sig: Take 1 tablet by mouth once daily      Last office visit with prescribing clinician: 5/24/2022      Next office visit with prescribing clinician: Visit date not found            Luis Vidal Rep  07/12/22, 09:47 CDT     Patient needs f/u appt  
independent

## 2023-11-15 NOTE — OCCUPATIONAL THERAPY INITIAL EVALUATION ADULT - MANUAL MUSCLE TESTING RESULTS, REHAB EVAL
BUE >3+/5 based on functional mobility against gravity. BLE grossly assessed 2/2 pain but pt demo >3/5 based on functional mobility against gravity

## 2023-11-15 NOTE — PHYSICAL THERAPY INITIAL EVALUATION ADULT - NEUROVASCULAR ASSESSMENT RLE
Numbness on lateral aspect of RLE from knee to ankle; Numbness on lateral aspect of R foot and numbness and tingling on digits 1-5/numbness present/tingling present/warm/no discoloration

## 2023-11-15 NOTE — PHYSICAL THERAPY INITIAL EVALUATION ADULT - GENERAL OBSERVATIONS, REHAB EVAL
PT IE completed. Patient received semi supine in bed +tele, +IV, +spinal dressing C/D/I, +hemovac x2, +SHAWNA drain x2, +paulino, +(B) SCDs, NAD, willing to work with PT.

## 2023-11-15 NOTE — PROGRESS NOTE ADULT - SUBJECTIVE AND OBJECTIVE BOX
History of Present Illness:   48F with low back pain x  7 years, She says that she has an extensive history of back pain throughout her life and had her first spinal surgery in 2017 in [facility]. She said that after the initial surgery she had 10 screws loosen, and under went a revision with Dr. Schwab a few years later. Today she is having an extension of her fusion for progressive worsening of pain. She says that she had intermittent bilateral sciatica and right lower leg weakness and numbness that has caused her to fall due to not being able to feel the ground she is walking on. She is unable to find comfort and both sitting too long and standing too long provokes pain and numbness/tingling of her lower extremities. She ambulates with a cane at baseline. Despite conservative measure pain persists. She also has intermitted right upper extremity nerve pain, she under went a 1 level ACDF in April 2023.     Denies history of blood clots or seizures. She denies chest pain, shortness of breath, nausea or vomiting today.     Patient currently sees outpatient pain management. She uses fentanyl 50 mcg/hr patch, nucynta 75mg, and pregabalin 200mg for pain relief.     Presented for elective revision PSF with extension of fusion with instrumentation T3-pelvis, pedicle subtraction osteotomy (PSO) L4, tethers, iliac fixation, smith clark osteotomies T3-T10 with Plastics closure with Dr. Schwab, Dr. Gonzalez and Dr. Simpson on 11/14/23.        Patient follows with pain management doctor, Dr. Stanley outpatient who saw and evaluated patient pre-operatively.   Post-op recommendations were made at that time, and included PCA that patient was on overnight.      Patient reports this AM that she is "in agony". Pain is localized to lower back. States PCA helps when she presses it, but only lasts short amount of time. Is open to transitioning to oral regimen with IV pushes prn breakthrough pain given longer lasting and she will eventually need to be discharged on oral regimen. States feels robaxin and home dose of lyrica and fentanyl patch are also helping.

## 2023-11-15 NOTE — PHYSICAL THERAPY INITIAL EVALUATION ADULT - CRITERIA FOR SKILLED THERAPEUTIC INTERVENTIONS
Labs done 8-, read by me, reviewed with pt.Potassium was 4.4 down from 4.7   impairments found/rehab potential/therapy frequency/anticipated equipment needs at discharge/anticipated discharge recommendation

## 2023-11-15 NOTE — OCCUPATIONAL THERAPY INITIAL EVALUATION ADULT - GENERAL OBSERVATIONS, REHAB EVAL
Pt received semi-supine in bed, +hemovac x2, +SHAWNA x2, +tele, +spine dressing C/D/I, in NAD and agreeable to OT. Cleared by THOMPSON Maya to see. PT Mickey present.

## 2023-11-15 NOTE — PHYSICAL THERAPY INITIAL EVALUATION ADULT - IMPAIRED TRANSFERS: SIT/STAND, REHAB EVAL
impaired balance/decreased flexibility/pain/impaired postural control/decreased ROM/decreased sensation/decreased strength

## 2023-11-15 NOTE — CONSULT NOTE ADULT - SUBJECTIVE AND OBJECTIVE BOX
HPI "48F with low back pain x  7 years, She says that she has an extensive history of back pain throughout her life and had her first spinal surgery in 2017 in [facility]. She said that after the initial surgery she had 10 screws loosen, and under went a revision with Dr. Schwab a few years later. Today she is having an extension of her fusion for progressive worsening of pain. She says that she had intermittent bilateral sciatica and right lower leg weakness and numbness that has caused her to fall due to not being able to feel the ground she is walking on. She is unable to find comfort and both sitting too long and standing too long provokes pain and numbness/tingling of her lower extremities. She ambulates with a cane at baseline. Despite conservative measure pain persists. She also has intermitted right upper extremity nerve pain, she under went a 1 level ACDF in 2023.     Denies history of blood clots or seizures. She denies chest pain, shortness of breath, nausea or vomiting today.     Patient currently sees outpatient pain management. She uses fentanyl 50 mcg/hr patch, nucynta 75mg, and pregabalin 200mg for pain relief.     Presents for elective revision PSF with extension of fusion with instrumentation T3-pelvis, pedicle subtraction osteotomy (PSO) L4, tethers, iliac fixation, smith clark osteotomies T3-T10 with Plastics closure with Dr. Schwab, Dr. Gonzalez and Dr. Simpson.  (2023 11:29)"    48F w obesity s/p gastric bypass sx, extensive back pain w spinal surgeries, revision w Dr. Schwab, p/w persistent back pain and RLE sciatica sxs and weakness w ambulation issues, follows w pain mgmt, here for elective PSF w extension of fusion T3-Pelvis, pedicle subtraction osteotomy w Dr. Schwab     #Post-op state - pain __. PPx: SCDs. On bowel regimen and incentive spirometer  #Spinal stensosis  --On PCA - 0.3mg IV dilaudid; IV lockout 6min. Max 14mg over 4 hr per pain mgmt  --Scheduled robaxin 500 q8, lyrica 200 q8    #HTN - home on triamterene/hctz 37.5/25  #Anemia - 8.6 from baseline __    #Asthma - on albuterol inhaler PRN  #Chronic pain - on cymbalta 60 ER, Fentanyl patch 50mcg q72, lyrica 200 BID, zanaflex 4mg q8   - also on nucynta (tapatendol 75 PRN)  #Obesity - BMi 31.8 - affects all aspects of care    Plan  Pain mgmt   No NSAIDs d/t gastric bypass hx  PT - TOV once OOB  Start SQL for VTE PPx once post-op bleed risk allows  Incomplete          PAST MEDICAL & SURGICAL HISTORY:  Asthma      Scoliosis (and kyphoscoliosis), idiopathic      Lumbar stenosis with neurogenic claudication      Spondylisthesis      Bilateral sciatica      History of anemia      Varicose veins of both lower extremities      HTN (hypertension)      S/P  section  X3; ,,       Gastric bypass status for obesity        H/O varicose vein ligation  b/l thigh      History of hip replacement  left      History of bunionectomy  b/l      History of cervical spinal surgery      History of lumbosacral spine surgery  + rods/screw      H/O medial meniscus repair of left knee    Home Meds: Home Medications:  Benadryl 25 mg oral capsule: 1 cap(s) orally once a day (at bedtime) (2023 08:33)  Cymbalta 60 mg oral delayed release capsule: 1 cap(s) orally once a day (2023 07:34)  Dyazide 25 mg-37.5 mg oral capsule: 1 cap(s) orally once a day (2023 07:34)  fentaNYL 50 mcg/hr transdermal film, extended release: 1 film(s) transdermal every 72 hours (2023 10:56)  Lyrica CR: 200 milligram(s) orally 2 times a day (2023 07:34)  Nucynta 75 mg oral tablet: 1 tab(s) orally prn (2023 07:34)  omeprazole 40 mg oral delayed release capsule: 1 cap(s) orally once a day (2023 07:34)  Proventil 90 mcg/inh inhalation aerosol: inhaled prn (2023 07:34)  tiZANidine 4 mg oral tablet: orally 3 times a day (2023 07:34)    Allergies: Allergies    shellfish (Hives; Short breath)  diclofenac (Other)    Intolerances      Soc:   Advanced Directives: Presumed Full Code     CURRENT MEDICATIONS:   --------------------------------------------------------------------------------------  Neurologic Medications  acetaminophen     Tablet .. 975 milliGRAM(s) Oral every 8 hours  diphenhydrAMINE 25 milliGRAM(s) Oral every 4 hours PRN Rash and/or Itching  DULoxetine 60 milliGRAM(s) Oral daily  fentaNYL   Patch  50 MICROgram(s)/Hr 1 Patch Transdermal every 72 hours  HYDROmorphone PCA (1 mG/mL) 30 milliLiter(s) PCA Continuous PCA Continuous  HYDROmorphone PCA (1 mG/mL) Rescue Clinician Bolus 0.5 milliGRAM(s) IV Push every 1 hour PRN severe breakthrough pain uncontrolled by demand dose  methocarbamol 500 milliGRAM(s) Oral three times a day  ondansetron   Disintegrating Tablet 4 milliGRAM(s) Oral every 6 hours  ondansetron Injectable 4 milliGRAM(s) IV Push every 6 hours PRN Nausea and/or Vomiting  ondansetron Injectable 4 milliGRAM(s) IV Push once PRN Nausea  pregabalin 200 milliGRAM(s) Oral three times a day    Respiratory Medications  albuterol    90 MICROgram(s) HFA Inhaler 2 Puff(s) Inhalation every 6 hours PRN Shortness of Breath and/or Wheezing    Cardiovascular Medications  triamterene 37.5 mG/hydrochlorothiazide 25 mG Tablet 1 Tablet(s) Oral daily    Gastrointestinal Medications  aluminum hydroxide/magnesium hydroxide/simethicone Suspension 30 milliLiter(s) Oral every 4 hours PRN Dyspepsia  bisacodyl 5 milliGRAM(s) Oral every 12 hours PRN Constipation  lactated ringers. 1000 milliLiter(s) IV Continuous <Continuous>  magnesium hydroxide Suspension 30 milliLiter(s) Oral every 12 hours PRN Constipation  pantoprazole    Tablet 40 milliGRAM(s) Oral before breakfast  polyethylene glycol 3350 17 Gram(s) Oral daily  senna 2 Tablet(s) Oral at bedtime    Genitourinary Medications    Hematologic/Oncologic Medications    Antimicrobial/Immunologic Medications    Endocrine/Metabolic Medications    Topical/Other Medications  benzocaine/menthol Lozenge 1 Lozenge Oral four times a day PRN Sore Throat  chlorhexidine 2% Cloths 1 Application(s) Topical once  naloxone Injectable 0.1 milliGRAM(s) IV Push every 3 minutes PRN For ANY of the following changes in patient status:  A. RR LESS THAN 10 breaths per minute, B. Oxygen saturation LESS THAN 90%, C. Sedation score of 6  povidone iodine 5% Nasal Swab 1 Application(s) Both Nostrils once    --------------------------------------------------------------------------------------    VITAL SIGNS, INS/OUTS (last 24 hours):  --------------------------------------------------------------------------------------  ICU Vital Signs Last 24 Hrs  T(C): 36.7 (15 Nov 2023 05:17), Max: 36.8 (15 Nov 2023 00:23)  T(F): 98.1 (15 Nov 2023 05:17), Max: 98.3 (15 Nov 2023 00:23)  HR: 80 (15 Nov 2023 05:17) (60 - 85)  BP: 106/59 (15 Nov 2023 05:17) (88/54 - 106/59)  BP(mean): 74 (2023 19:31) (67 - 76)  ABP: 108/58 (2023 18:13) (96/54 - 108/58)  ABP(mean): 76 (2023 18:13) (68 - 78)  RR: 16 (15 Nov 2023 05:17) (10 - 20)  SpO2: 95% (15 Nov 2023 05:17) (95% - 100%)    O2 Parameters below as of 15 Nov 2023 05:17  Patient On (Oxygen Delivery Method): room air          I&O's Summary    2023 07:01  -  15 Nov 2023 07:00  --------------------------------------------------------  IN: 2225 mL / OUT: 2300 mL / NET: -75 mL      --------------------------------------------------------------------------------------    EXAM:    LABS  --------------------------------------------------------------------------------------  Labs:  CAPILLARY BLOOD GLUCOSE                              8.6    8.66  )-----------( 178      ( 15 Nov 2023 05:30 )             26.4       Auto Neutrophil %: 83.9 % (11-15-23 @ 05:30)  Auto Immature Granulocyte %: 0.3 % (11-15-23 @ 05:30)    11-15    140  |  108  |  8   ----------------------------<  132<H>  4.6   |  25  |  0.61      Calcium: 8.2 mg/dL (11-15-23 @ 05:30)      LFTs:       ABG - ( 2023 13:11 )  pH: 7.41  /  pCO2: 38    /  pO2: 369   / HCO3: 24    / Base Excess: -0.4  /  SaO2: x               ABG - ( 2023 10:24 )  pH: 7.40  /  pCO2: 42    /  pO2: 396   / HCO3: 26    / Base Excess: 1.0   /  SaO2: x                 Coags:            Urinalysis Basic - ( 15 Nov 2023 05:30 )    Color: x / Appearance: x / SG: x / pH: x  Gluc: 132 mg/dL / Ketone: x  / Bili: x / Urobili: x   Blood: x / Protein: x / Nitrite: x   Leuk Esterase: x / RBC: x / WBC x   Sq Epi: x / Non Sq Epi: x / Bacteria: x        Culture - Surgical Swab (collected 2023 11:45)  Source: .Surgical Swab 5: Deep Spine #5  Gram Stain (2023 17:51):    No organisms seen    Rare WBC's  Preliminary Report (15 Nov 2023 08:36):    No growth to date    Culture - Fungal, Other (collected 2023 11:45)  Source: .Other 5: Deep Spine #5  Preliminary Report (15 Nov 2023 07:19):    Testing in progress    Culture - Surgical Swab (collected 2023 11:45)  Source: .Surgical Swab 4: Deep Spine #4  Gram Stain (2023 17:51):    No organisms seen    No WBC's seen.  Preliminary Report (15 Nov 2023 08:40):    No growth to date    Culture - Fungal, Other (collected 2023 11:45)  Source: .Other 4: Deep Spine #4  Preliminary Report (15 Nov 2023 07:19):    Testing in progress    Culture - Fungal, Other (collected 2023 11:45)  Source: .Other 3: Deep Spine #3  Preliminary Report (15 Nov 2023 07:19):    Testing in progress    Culture - Surgical Swab (collected 2023 11:45)  Source: .Surgical Swab 3: Deep Spine #3  Gram Stain (2023 17:27):    No organisms seen    No WBC's seen.  Preliminary Report (15 Nov 2023 08:21):    No growth to date    Culture - Fungal, Other (collected 2023 11:45)  Source: .Other 2: Deep Spine #2  Preliminary Report (15 Nov 2023 07:19):    Testing in progress    Culture - Surgical Swab (collected 2023 11:45)  Source: .Surgical Swab 2: Deep Spine #2  Gram Stain (2023 17:27):    No organisms seen    Rare WBC's  Preliminary Report (15 Nov 2023 08:34):    No growth to date    Culture - Fungal, Other (collected 2023 11:45)  Source: .Other 1: Deep Spine #1  Preliminary Report (15 Nov 2023 07:19):    Testing in progress    Culture - Surgical Swab (collected 2023 11:45)  Source: .Surgical Swab 1: Deep Spine #1  Gram Stain (2023 17:27):    No organisms seen    No WBC's seen.  Preliminary Report (15 Nov 2023 08:37):    No growth to date        --------------------------------------------------------------------------------------    OTHER LABS    IMAGING RESULTS  ****************     HPI "48F with low back pain x  7 years, She says that she has an extensive history of back pain throughout her life and had her first spinal surgery in 2017 in [facility]. She said that after the initial surgery she had 10 screws loosen, and under went a revision with Dr. Schwab a few years later. Today she is having an extension of her fusion for progressive worsening of pain. She says that she had intermittent bilateral sciatica and right lower leg weakness and numbness that has caused her to fall due to not being able to feel the ground she is walking on. She is unable to find comfort and both sitting too long and standing too long provokes pain and numbness/tingling of her lower extremities. She ambulates with a cane at baseline. Despite conservative measure pain persists. She also has intermitted right upper extremity nerve pain, she under went a 1 level ACDF in 2023.     Denies history of blood clots or seizures. She denies chest pain, shortness of breath, nausea or vomiting today.     Patient currently sees outpatient pain management. She uses fentanyl 50 mcg/hr patch, nucynta 75mg, and pregabalin 200mg for pain relief.     Presents for elective revision PSF with extension of fusion with instrumentation T3-pelvis, pedicle subtraction osteotomy (PSO) L4, tethers, iliac fixation, smith clark osteotomies T3-T10 with Plastics closure with Dr. Schwab, Dr. Gonzalez and Dr. Simpson.  (2023 11:29)"    48F w obesity s/p gastric bypass sx, extensive back pain w spinal surgeries, revision w Dr. Schwab, p/w persistent back pain and RLE sciatica sxs and weakness w ambulation issues, follows w pain mgmt, here for elective PSF w extension of fusion T3-Pelvis, pedicle subtraction osteotomy w Dr. Schwab     Pt reports progressive pain in her back w pain radiating down RLE. Today reports pain is moderately controlled. Denies any numbness/tingling. Has not yet tried getting OOB. Cobb remains in place. Had breakfast wo N/V/Abd pain. +flatus. No fever, sore throat, chest pain, dyspnea.    ROS: 12 point ROS reviewed and otherwise negative per HPI  FH: No DVT/PE   SH: Non smoker        PAST MEDICAL & SURGICAL HISTORY:  Asthma      Scoliosis (and kyphoscoliosis), idiopathic      Lumbar stenosis with neurogenic claudication      Spondylisthesis      Bilateral sciatica      History of anemia      Varicose veins of both lower extremities      HTN (hypertension)      S/P  section  X3; ,,       Gastric bypass status for obesity        H/O varicose vein ligation  b/l thigh      History of hip replacement  left      History of bunionectomy  b/l      History of cervical spinal surgery      History of lumbosacral spine surgery  + rods/screw      H/O medial meniscus repair of left knee    Home Meds: Home Medications:  Benadryl 25 mg oral capsule: 1 cap(s) orally once a day (at bedtime) (2023 08:33)  Cymbalta 60 mg oral delayed release capsule: 1 cap(s) orally once a day (2023 07:34)  Dyazide 25 mg-37.5 mg oral capsule: 1 cap(s) orally once a day (2023 07:34)  fentaNYL 50 mcg/hr transdermal film, extended release: 1 film(s) transdermal every 72 hours (2023 10:56)  Lyrica CR: 200 milligram(s) orally 2 times a day (2023 07:34)  Nucynta 75 mg oral tablet: 1 tab(s) orally prn (2023 07:34)  omeprazole 40 mg oral delayed release capsule: 1 cap(s) orally once a day (2023 07:34)  Proventil 90 mcg/inh inhalation aerosol: inhaled prn (2023 07:34)  tiZANidine 4 mg oral tablet: orally 3 times a day (2023 07:34)    Allergies: Allergies    shellfish (Hives; Short breath)  diclofenac (Other)    Intolerances      Soc:   Advanced Directives: Presumed Full Code     CURRENT MEDICATIONS:   --------------------------------------------------------------------------------------  Neurologic Medications  acetaminophen     Tablet .. 975 milliGRAM(s) Oral every 8 hours  diphenhydrAMINE 25 milliGRAM(s) Oral every 4 hours PRN Rash and/or Itching  DULoxetine 60 milliGRAM(s) Oral daily  fentaNYL   Patch  50 MICROgram(s)/Hr 1 Patch Transdermal every 72 hours  HYDROmorphone PCA (1 mG/mL) 30 milliLiter(s) PCA Continuous PCA Continuous  HYDROmorphone PCA (1 mG/mL) Rescue Clinician Bolus 0.5 milliGRAM(s) IV Push every 1 hour PRN severe breakthrough pain uncontrolled by demand dose  methocarbamol 500 milliGRAM(s) Oral three times a day  ondansetron   Disintegrating Tablet 4 milliGRAM(s) Oral every 6 hours  ondansetron Injectable 4 milliGRAM(s) IV Push every 6 hours PRN Nausea and/or Vomiting  ondansetron Injectable 4 milliGRAM(s) IV Push once PRN Nausea  pregabalin 200 milliGRAM(s) Oral three times a day    Respiratory Medications  albuterol    90 MICROgram(s) HFA Inhaler 2 Puff(s) Inhalation every 6 hours PRN Shortness of Breath and/or Wheezing    Cardiovascular Medications  triamterene 37.5 mG/hydrochlorothiazide 25 mG Tablet 1 Tablet(s) Oral daily    Gastrointestinal Medications  aluminum hydroxide/magnesium hydroxide/simethicone Suspension 30 milliLiter(s) Oral every 4 hours PRN Dyspepsia  bisacodyl 5 milliGRAM(s) Oral every 12 hours PRN Constipation  lactated ringers. 1000 milliLiter(s) IV Continuous <Continuous>  magnesium hydroxide Suspension 30 milliLiter(s) Oral every 12 hours PRN Constipation  pantoprazole    Tablet 40 milliGRAM(s) Oral before breakfast  polyethylene glycol 3350 17 Gram(s) Oral daily  senna 2 Tablet(s) Oral at bedtime    Genitourinary Medications    Hematologic/Oncologic Medications    Antimicrobial/Immunologic Medications    Endocrine/Metabolic Medications    Topical/Other Medications  benzocaine/menthol Lozenge 1 Lozenge Oral four times a day PRN Sore Throat  chlorhexidine 2% Cloths 1 Application(s) Topical once  naloxone Injectable 0.1 milliGRAM(s) IV Push every 3 minutes PRN For ANY of the following changes in patient status:  A. RR LESS THAN 10 breaths per minute, B. Oxygen saturation LESS THAN 90%, C. Sedation score of 6  povidone iodine 5% Nasal Swab 1 Application(s) Both Nostrils once    --------------------------------------------------------------------------------------    VITAL SIGNS, INS/OUTS (last 24 hours):  --------------------------------------------------------------------------------------  ICU Vital Signs Last 24 Hrs  T(C): 36.7 (15 Nov 2023 05:17), Max: 36.8 (15 Nov 2023 00:23)  T(F): 98.1 (15 Nov 2023 05:17), Max: 98.3 (15 Nov 2023 00:23)  HR: 80 (15 Nov 2023 05:17) (60 - 85)  BP: 106/59 (15 Nov 2023 05:17) (88/54 - 106/59)  BP(mean): 74 (2023 19:31) (67 - 76)  ABP: 108/58 (2023 18:13) (96/54 - 108/58)  ABP(mean): 76 (2023 18:13) (68 - 78)  RR: 16 (15 Nov 2023 05:17) (10 - 20)  SpO2: 95% (15 Nov 2023 05:17) (95% - 100%)    O2 Parameters below as of 15 Nov 2023 05:17  Patient On (Oxygen Delivery Method): room air          I&O's Summary    2023 07:01  -  15 Nov 2023 07:00  --------------------------------------------------------  IN: 2225 mL / OUT: 2300 mL / NET: -75 mL      --------------------------------------------------------------------------------------    EXAM:  GEN: female in NAD on RA  HEENT: NC/AT, MMM  CV: RRR, nml S1S2, no murmurs  PULM: nml effort, CTAB  ABD: Soft, non-distended, NABS, non-tender  : Cobb catheter in place  NEURO  A/O x3, moving all extremities, 5/5 in BLE. 4 drains in place. Sensation intact  PSYCH: Appropriate    LABS  --------------------------------------------------------------------------------------  Labs:  CAPILLARY BLOOD GLUCOSE                              8.6    8.66  )-----------( 178      ( 15 Nov 2023 05:30 )             26.4       Auto Neutrophil %: 83.9 % (11-15-23 @ 05:30)  Auto Immature Granulocyte %: 0.3 % (11-15-23 @ 05:30)    11-15    140  |  108  |  8   ----------------------------<  132<H>  4.6   |  25  |  0.61      Calcium: 8.2 mg/dL (11-15-23 @ 05:30)      LFTs:       ABG - ( 2023 13:11 )  pH: 7.41  /  pCO2: 38    /  pO2: 369   / HCO3: 24    / Base Excess: -0.4  /  SaO2: x               ABG - ( 2023 10:24 )  pH: 7.40  /  pCO2: 42    /  pO2: 396   / HCO3: 26    / Base Excess: 1.0   /  SaO2: x                 Coags:            Urinalysis Basic - ( 15 Nov 2023 05:30 )    Color: x / Appearance: x / SG: x / pH: x  Gluc: 132 mg/dL / Ketone: x  / Bili: x / Urobili: x   Blood: x / Protein: x / Nitrite: x   Leuk Esterase: x / RBC: x / WBC x   Sq Epi: x / Non Sq Epi: x / Bacteria: x        Culture - Surgical Swab (collected 2023 11:45)  Source: .Surgical Swab 5: Deep Spine #5  Gram Stain (2023 17:51):    No organisms seen    Rare WBC's  Preliminary Report (15 Nov 2023 08:36):    No growth to date    Culture - Fungal, Other (collected 2023 11:45)  Source: .Other 5: Deep Spine #5  Preliminary Report (15 Nov 2023 07:19):    Testing in progress    Culture - Surgical Swab (collected 2023 11:45)  Source: .Surgical Swab 4: Deep Spine #4  Gram Stain (2023 17:51):    No organisms seen    No WBC's seen.  Preliminary Report (15 Nov 2023 08:40):    No growth to date    Culture - Fungal, Other (collected 2023 11:45)  Source: .Other 4: Deep Spine #4  Preliminary Report (15 Nov 2023 07:19):    Testing in progress    Culture - Fungal, Other (collected 2023 11:45)  Source: .Other 3: Deep Spine #3  Preliminary Report (15 Nov 2023 07:19):    Testing in progress    Culture - Surgical Swab (collected 2023 11:45)  Source: .Surgical Swab 3: Deep Spine #3  Gram Stain (2023 17:27):    No organisms seen    No WBC's seen.  Preliminary Report (15 Nov 2023 08:21):    No growth to date    Culture - Fungal, Other (collected 2023 11:45)  Source: .Other 2: Deep Spine #2  Preliminary Report (15 Nov 2023 07:19):    Testing in progress    Culture - Surgical Swab (collected 2023 11:45)  Source: .Surgical Swab 2: Deep Spine #2  Gram Stain (2023 17:27):    No organisms seen    Rare WBC's  Preliminary Report (15 Nov 2023 08:34):    No growth to date    Culture - Fungal, Other (collected 2023 11:45)  Source: .Other 1: Deep Spine #1  Preliminary Report (15 Nov 2023 07:19):    Testing in progress    Culture - Surgical Swab (collected 2023 11:45)  Source: .Surgical Swab 1: Deep Spine #1  Gram Stain (2023 17:27):    No organisms seen    No WBC's seen.  Preliminary Report (15 Nov 2023 08:37):    No growth to date        --------------------------------------------------------------------------------------    OTHER LABS    IMAGING RESULTS  ****************

## 2023-11-15 NOTE — PHYSICAL THERAPY INITIAL EVALUATION ADULT - GAIT DEVIATIONS NOTED, PT EVAL
decreased tricia/increased time in double stance/decreased velocity of limb motion/decreased step length/decreased stride length/decreased weight-shifting ability

## 2023-11-15 NOTE — PHYSICAL THERAPY INITIAL EVALUATION ADULT - MANUAL MUSCLE TESTING RESULTS, REHAB EVAL
BUE and BLE >3/5 grossly assessed via functional mobility; L TA/GS/EHL/FHL 5/5 strength; L TA 3-/5; L GS 3+/5; L EHL 1+/5; L FHL 2-/5

## 2023-11-15 NOTE — CONSULT NOTE ADULT - ASSESSMENT
48F w obesity s/p gastric bypass sx, extensive back pain w spinal surgeries, revision w Dr. Schwab, p/w persistent back pain and RLE sciatica sxs and weakness w ambulation issues, follows w pain mgmt, here for elective PSF w extension of fusion T3-Pelvis, pedicle subtraction osteotomy w Dr. Schwab 11/14    #Post-op state - pain __. PPx: SCDs. On bowel regimen and incentive spirometer  #Spinal stensosis  --On PCA - 0.3mg IV dilaudid; IV lockout 6min. Max 14mg over 4 hr per pain mgmt  --Scheduled robaxin 500 q8, lyrica 200 q8    #HTN - home on triamterene/hctz 37.5/25  #Anemia - 8.6 from baseline 12.8    #Asthma - on albuterol inhaler PRN  #Chronic pain - on cymbalta 60 ER, Fentanyl patch 50mcg q72, lyrica 200 BID, zanaflex 4mg q8   - also on nucynta (tapatendol 75 PRN)  #Obesity - BMi 31.8 - affects all aspects of care    Plan  Pain mgmt   No NSAIDs d/t gastric bypass hx  PT - TOV once OOB. Monitor for orthostasis  Start SQL for VTE PPx once post-op bleed risk allows  Incomplete 48F w obesity s/p gastric bypass sx, extensive back pain w spinal surgeries, revision w Dr. Schwab, p/w persistent back pain and RLE sciatica sxs and weakness w ambulation issues, follows w pain mgmt, here for elective PSF w extension of fusion T3-Pelvis, pedicle subtraction osteotomy w Dr. Schwab 11/14    #Post-op state - pain controlled. PPx: SCDs. On bowel regimen and incentive spirometer  #Spinal stensosis  --On PCA - 0.3mg IV dilaudid; IV lockout 6min. Max 14mg over 4 hr per pain mgmt --- discontinued this AM  --Scheduled robaxin 500 q8, lyrica 200 q8  --PRN: dilaudid 4/6 q4h for mod/severe pain    #HTN - home on triamterene/hctz 37.5/25 -- would hold at this time  #Anemia - 8.6 from baseline 12.8.    #Asthma - on albuterol inhaler PRN  #Chronic pain - on cymbalta 60 ER, Fentanyl patch 50mcg q72, lyrica 200 BID, zanaflex 4mg q8   - also on nucynta (tapatendol 75 PRN)  #Obesity - BMi 31.8 - affects all aspects of care    Plan  Pain mgmt recs  No NSAIDs d/t gastric bypass hx  PT - TOV once OOB. Monitor for orthostasis  Start SQL for VTE PPx once post-op bleed risk allows  Added hold parameters to triamterene - hctz

## 2023-11-15 NOTE — OCCUPATIONAL THERAPY INITIAL EVALUATION ADULT - GROSSLY INTACT, SENSORY
Pt reporting numbness on lateral aspect of RLE from knee to ankle; Numbness on lateral aspect of R foot and numbness and tingling on digits 1-5

## 2023-11-15 NOTE — OCCUPATIONAL THERAPY INITIAL EVALUATION ADULT - PERTINENT HX OF CURRENT PROBLEM, REHAB EVAL
48F with low back pain x  7 years, She says that she has an extensive history of back pain throughout her life and had her first spinal surgery in 2017 in [facility]. She said that after the initial surgery she had 10 screws loosen, and under went a revision with Dr. Schwab a few years later. Today she is having an extension of her fusion for progressive worsening of pain. She says that she had intermittent bilateral sciatica and right lower leg weakness and numbness that has caused her to fall due to not being able to feel the ground she is walking on. She is unable to find comfort and both sitting too long and standing too long provokes pain and numbness/tingling of her lower extremities. She ambulates with a cane at baseline. Despite conservative measure pain persists. She also has intermitted right upper extremity nerve pain, she under went a 1 level ACDF in April 2023.

## 2023-11-15 NOTE — OCCUPATIONAL THERAPY INITIAL EVALUATION ADULT - ADDITIONAL COMMENTS
Pt lives with her  and 2 teenage children in a 2 story house on [address], ~3STE and 18 steps inside. Prior to admit, pt reports being mostly independent with ADLs/IADLs,  would help with LB dressing and household chores at times, and pt able to cook from seated position to conserve energy. Pt has a tub/shower. Pt reports being independent with mobility using a SC.

## 2023-11-15 NOTE — PHYSICAL THERAPY INITIAL EVALUATION ADULT - ADDITIONAL COMMENTS
Limited community ambulator who lives with her  and 2 teenage children in private house +3 JAIR +18 JAIR. Ambulates with occasional use of SC and states she is mainly independent with all ADLs prior, however,  assist with a few household chores.

## 2023-11-15 NOTE — PROGRESS NOTE ADULT - SUBJECTIVE AND OBJECTIVE BOX
Ortho Note    Pt seen and examined. Reports severe pain, but states medications have been working. As per pain management attending who evaluated patient pre-op, plan is to transition to oral pain medications today. Reza in place. Reports numbness to lateral aspect of right leg distal to knee through her toes. States had this "come and go" pre-op and has since surgery also been "coming and going." Has associated right lower extremity weakness at baseline which sometimes causes her to fall at home.   Denies CP, SOB, N/V, numbness/tingling     Vital Signs Last 24 Hrs  T(C): 36.3 (11-15-23 @ 09:22), Max: 36.3 (11-15-23 @ 09:22)  T(F): 97.4 (11-15-23 @ 09:22), Max: 97.4 (11-15-23 @ 09:22)  HR: 96 (11-15-23 @ 09:22) (96 - 96)  BP: 100/57 (11-15-23 @ 09:22) (100/57 - 100/57)  BP(mean): --  RR: 16 (11-15-23 @ 09:22) (16 - 16)  SpO2: 95% (11-15-23 @ 09:22) (95% - 95%)  AVSS    General: Pt Alert and oriented, NAD  DSG- aquacel x 2 C/D/I, HV x 2 and SHAWNA x 2 in place  Pulses: +DP BLE, WWP feet  Sensation: diminished in S1 distribution to RLE; otherwise SILT BLE  Motor: 4/5 EHL/FHL/TA/GS LLE; 2/5 EHL/ GS RLE; 3/5 FHL/TA RLE; Right quad/hamstring mildly weaker than left in bed; exam limited to pain  Patient reports this was also baseline at times ("on and off") prior to surgery                          8.6    8.66  )-----------( 178      ( 15 Nov 2023 05:30 )             26.4     11-15    140  |  108  |  8   ----------------------------<  132<H>  4.6   |  25  |  0.61    Ca    8.2<L>      15 Nov 2023 05:30        A/P: 48yFemale POD#1 s/p T4-Pelvis rev PSF, L5 SO and proximal tethering 11/14  - acute blood loss anemia- s/p 3u PRBCs intra-op with Hgb 8.6 today; continue to monitor drain output and CBC, transfuse if < 7.0 or less than 8.0 and symptomatic  - Pain Control- appreciate pain management recommendations, transitioning off PCA to oral meds today  - DVT ppx: SCDS, to start lovenox 40mg subq daily when safe from bleed standpoint (this evening vs. tomorrow)  - PT, WBS: WBAT, OT consut  - OOB for meal as tolerated, I/S  - bowel regimen  - monitor drains x 4  - removed paulino for TOV when OOB with PT  - post-op xrays when can tolerate prior to discharge  - appreciate internal medicine recs- will place hold parameters on triamterene/ hctz   - dispo: pending PT/OT eval    Ortho Pager [phone] Ortho Note    Pt seen and examined. Reports severe pain, but states medications have been working. As per pain management attending who evaluated patient pre-op, plan is to transition to oral pain medications today. Reza in place. Reports numbness to lateral aspect of right leg distal to knee through her toes. States had this "come and go" pre-op and has since surgery also been "coming and going." Has associated right lower extremity weakness at baseline which sometimes causes her to fall at home.   Denies CP, SOB, N/V, numbness/tingling     Vital Signs Last 24 Hrs  T(C): 36.3 (11-15-23 @ 09:22), Max: 36.3 (11-15-23 @ 09:22)  T(F): 97.4 (11-15-23 @ 09:22), Max: 97.4 (11-15-23 @ 09:22)  HR: 96 (11-15-23 @ 09:22) (96 - 96)  BP: 100/57 (11-15-23 @ 09:22) (100/57 - 100/57)  BP(mean): --  RR: 16 (11-15-23 @ 09:22) (16 - 16)  SpO2: 95% (11-15-23 @ 09:22) (95% - 95%)  AVSS    General: Pt Alert and oriented, NAD  DSG- aquacel x 2 C/D/I, HV x 2 and SHAWNA x 2 in place  Pulses: +DP BLE, WWP feet  Sensation: diminished in S1 distribution to RLE; otherwise SILT BLE  Motor: 4/5 EHL/FHL/TA/GS LLE; 2/5 EHL/ GS RLE; 3/5 FHL/TA RLE; Right quad/hamstring mildly weaker than left in bed; exam limited to pain  Patient reports this was also baseline at times ("on and off") prior to surgery                          8.6    8.66  )-----------( 178      ( 15 Nov 2023 05:30 )             26.4     11-15    140  |  108  |  8   ----------------------------<  132<H>  4.6   |  25  |  0.61    Ca    8.2<L>      15 Nov 2023 05:30        A/P: 48yFemale POD#1 s/p T4-Pelvis rev PSF, L5 SO and proximal tethering 11/14  - acute blood loss anemia- s/p 3u PRBCs intra-op with Hgb 8.6 today; continue to monitor drain output and CBC, transfuse if < 7.0 or less than 8.0 and symptomatic  - Pain Control- appreciate pain management recommendations, transitioning off PCA to oral meds today  - DVT ppx: SCDS, to start lovenox 40mg subq daily when safe from bleed standpoint (this evening vs. tomorrow)  - PT, WBS: WBAT, OT consut  - OOB for meal as tolerated, I/S  - bowel regimen  - monitor drains x 4  - removed paulino for TOV when OOB with PT  - post-op xrays when can tolerate prior to discharge  - appreciate internal medicine recs- will place hold parameters on triamterene/ hctz   - dispo: acute rehab    Ortho Pager [phone] Ortho Note    Pt seen and examined. Reports severe pain, but states medications have been working. As per pain management attending who evaluated patient pre-op, plan is to transition to oral pain medications today. Reza in place. Reports numbness to lateral aspect of right leg distal to knee through her toes. States had this "come and go" pre-op and has since surgery also been "coming and going." Has associated right lower extremity weakness at baseline which sometimes causes her to fall at home.   Denies CP, SOB, N/V, numbness/tingling     Vital Signs Last 24 Hrs  T(C): 36.3 (11-15-23 @ 09:22), Max: 36.3 (11-15-23 @ 09:22)  T(F): 97.4 (11-15-23 @ 09:22), Max: 97.4 (11-15-23 @ 09:22)  HR: 96 (11-15-23 @ 09:22) (96 - 96)  BP: 100/57 (11-15-23 @ 09:22) (100/57 - 100/57)  BP(mean): --  RR: 16 (11-15-23 @ 09:22) (16 - 16)  SpO2: 95% (11-15-23 @ 09:22) (95% - 95%)  AVSS    General: Pt Alert and oriented, NAD  DSG- aquacel x 2 C/D/I, HV x 2 and SHAWNA x 2 in place  Pulses: +DP BLE, WWP feet  Sensation: diminished in S1 distribution to RLE; otherwise SILT BLE  Motor: 4/5 EHL/FHL/TA/GS LLE; 2/5 EHL/ GS RLE; 3/5 FHL/TA RLE; Right quad/hamstring mildly weaker than left in bed; exam limited to pain  Patient reports this was also baseline at times ("on and off") prior to surgery                          8.6    8.66  )-----------( 178      ( 15 Nov 2023 05:30 )             26.4     11-15    140  |  108  |  8   ----------------------------<  132<H>  4.6   |  25  |  0.61    Ca    8.2<L>      15 Nov 2023 05:30        A/P: 48yFemale POD#1 s/p T4-Pelvis rev PSF, L5 SO and proximal tethering 11/14  - acute blood loss anemia- s/p 3u PRBCs intra-op with Hgb 8.6 today; continue to monitor drain output and CBC, transfuse if < 7.0 or less than 8.0 and symptomatic  - Pain Control- appreciate pain management recommendations, transitioning off PCA to oral meds today  - DVT ppx: SCDS, to start lovenox 40mg subq daily when safe from bleed standpoint (this evening vs. tomorrow)  - PT, WBS: WBAT, OT consut  - OOB for meal as tolerated, I/S  - bowel regimen  - monitor drains x 4  - remove paulino for TOV when able to tolerate bed mobility or OOB to commode, likely TOV tomorrow  - post-op xrays when can tolerate prior to discharge  - appreciate internal medicine recs- will place hold parameters on triamterene/ hctz   - dispo: acute rehab    Ortho Pager [phone]

## 2023-11-15 NOTE — OCCUPATIONAL THERAPY INITIAL EVALUATION ADULT - MODIFIED CLINICAL TEST OF SENSORY INTEGRATION IN BALANCE TEST
With mod x2 person assist with RW pt able to take ~2 side steps, cues to stand upright and for sequencing weight shifting.

## 2023-11-15 NOTE — OCCUPATIONAL THERAPY INITIAL EVALUATION ADULT - DIAGNOSIS, OT EVAL
Pt is s/p T4-Pelvis rev PSF, L5 SO and proximal tethering 11/14 presents with generalized weakness, back pain, and decreased activity tolerance impacting overall ease of completing ADLs and functional mobility tasks at PLOF.

## 2023-11-15 NOTE — PROGRESS NOTE ADULT - ASSESSMENT
48y F presenting for elective revision PSF with extension of fusion with instrumentation T3-pelvis, pedicle subtraction osteotomy (PSO) L4, tethers, iliac fixation, smith clark osteotomies T3-T10 with Plastics closure with Dr. Schwab, Dr. Gonzalez and Dr. Simpson 11/14    Recommendations:  1.) Tylenol 975mg PO q8h  2.) Lyrica 200mg PO q8h  3.) Robaxin 500mg PO q8h  4.) Continue home fentanyl patch 50mcg q72h (new patch placed this AM, next due 11/18)  4.) d/c dilaudid PCA   5.) Start dilaudid 4-6mg PO q4h prn moderate to severe pain  5.) Can receive 0.5mg dilaudid IV bolus q3h prn severe uncontrolled pain    Monitor oxygenation, BP and for signs and symptoms of oversedation on PCA  Aggressive bowel regimen  Plan discussed with and approved by Dr. Stanley

## 2023-11-16 LAB
ANION GAP SERPL CALC-SCNC: 6 MMOL/L — SIGNIFICANT CHANGE UP (ref 5–17)
ANION GAP SERPL CALC-SCNC: 6 MMOL/L — SIGNIFICANT CHANGE UP (ref 5–17)
BASOPHILS # BLD AUTO: 0.01 K/UL — SIGNIFICANT CHANGE UP (ref 0–0.2)
BASOPHILS # BLD AUTO: 0.01 K/UL — SIGNIFICANT CHANGE UP (ref 0–0.2)
BASOPHILS NFR BLD AUTO: 0.1 % — SIGNIFICANT CHANGE UP (ref 0–2)
BASOPHILS NFR BLD AUTO: 0.1 % — SIGNIFICANT CHANGE UP (ref 0–2)
BLD GP AB SCN SERPL QL: NEGATIVE — SIGNIFICANT CHANGE UP
BLD GP AB SCN SERPL QL: NEGATIVE — SIGNIFICANT CHANGE UP
BUN SERPL-MCNC: 11 MG/DL — SIGNIFICANT CHANGE UP (ref 7–23)
BUN SERPL-MCNC: 11 MG/DL — SIGNIFICANT CHANGE UP (ref 7–23)
CALCIUM SERPL-MCNC: 7.6 MG/DL — LOW (ref 8.4–10.5)
CALCIUM SERPL-MCNC: 7.6 MG/DL — LOW (ref 8.4–10.5)
CHLORIDE SERPL-SCNC: 102 MMOL/L — SIGNIFICANT CHANGE UP (ref 96–108)
CHLORIDE SERPL-SCNC: 102 MMOL/L — SIGNIFICANT CHANGE UP (ref 96–108)
CO2 SERPL-SCNC: 28 MMOL/L — SIGNIFICANT CHANGE UP (ref 22–31)
CO2 SERPL-SCNC: 28 MMOL/L — SIGNIFICANT CHANGE UP (ref 22–31)
CREAT SERPL-MCNC: 0.66 MG/DL — SIGNIFICANT CHANGE UP (ref 0.5–1.3)
CREAT SERPL-MCNC: 0.66 MG/DL — SIGNIFICANT CHANGE UP (ref 0.5–1.3)
EGFR: 107 ML/MIN/1.73M2 — SIGNIFICANT CHANGE UP
EGFR: 107 ML/MIN/1.73M2 — SIGNIFICANT CHANGE UP
EOSINOPHIL # BLD AUTO: 0.06 K/UL — SIGNIFICANT CHANGE UP (ref 0–0.5)
EOSINOPHIL # BLD AUTO: 0.06 K/UL — SIGNIFICANT CHANGE UP (ref 0–0.5)
EOSINOPHIL NFR BLD AUTO: 0.8 % — SIGNIFICANT CHANGE UP (ref 0–6)
EOSINOPHIL NFR BLD AUTO: 0.8 % — SIGNIFICANT CHANGE UP (ref 0–6)
GLUCOSE SERPL-MCNC: 111 MG/DL — HIGH (ref 70–99)
GLUCOSE SERPL-MCNC: 111 MG/DL — HIGH (ref 70–99)
HCT VFR BLD CALC: 23.5 % — LOW (ref 34.5–45)
HCT VFR BLD CALC: 23.5 % — LOW (ref 34.5–45)
HCT VFR BLD CALC: 24.3 % — LOW (ref 34.5–45)
HCT VFR BLD CALC: 24.3 % — LOW (ref 34.5–45)
HCT VFR BLD CALC: 24.9 % — LOW (ref 34.5–45)
HCT VFR BLD CALC: 24.9 % — LOW (ref 34.5–45)
HCT VFR BLD CALC: 25 % — LOW (ref 34.5–45)
HCT VFR BLD CALC: 25 % — LOW (ref 34.5–45)
HGB BLD-MCNC: 7.8 G/DL — LOW (ref 11.5–15.5)
HGB BLD-MCNC: 7.8 G/DL — LOW (ref 11.5–15.5)
HGB BLD-MCNC: 8.2 G/DL — LOW (ref 11.5–15.5)
HGB BLD-MCNC: 8.2 G/DL — LOW (ref 11.5–15.5)
HGB BLD-MCNC: 8.3 G/DL — LOW (ref 11.5–15.5)
IMM GRANULOCYTES NFR BLD AUTO: 0.5 % — SIGNIFICANT CHANGE UP (ref 0–0.9)
IMM GRANULOCYTES NFR BLD AUTO: 0.5 % — SIGNIFICANT CHANGE UP (ref 0–0.9)
LYMPHOCYTES # BLD AUTO: 1.12 K/UL — SIGNIFICANT CHANGE UP (ref 1–3.3)
LYMPHOCYTES # BLD AUTO: 1.12 K/UL — SIGNIFICANT CHANGE UP (ref 1–3.3)
LYMPHOCYTES # BLD AUTO: 15 % — SIGNIFICANT CHANGE UP (ref 13–44)
LYMPHOCYTES # BLD AUTO: 15 % — SIGNIFICANT CHANGE UP (ref 13–44)
MCHC RBC-ENTMCNC: 29 PG — SIGNIFICANT CHANGE UP (ref 27–34)
MCHC RBC-ENTMCNC: 29 PG — SIGNIFICANT CHANGE UP (ref 27–34)
MCHC RBC-ENTMCNC: 29.2 PG — SIGNIFICANT CHANGE UP (ref 27–34)
MCHC RBC-ENTMCNC: 29.2 PG — SIGNIFICANT CHANGE UP (ref 27–34)
MCHC RBC-ENTMCNC: 29.3 PG — SIGNIFICANT CHANGE UP (ref 27–34)
MCHC RBC-ENTMCNC: 29.3 PG — SIGNIFICANT CHANGE UP (ref 27–34)
MCHC RBC-ENTMCNC: 29.9 PG — SIGNIFICANT CHANGE UP (ref 27–34)
MCHC RBC-ENTMCNC: 29.9 PG — SIGNIFICANT CHANGE UP (ref 27–34)
MCHC RBC-ENTMCNC: 32.9 GM/DL — SIGNIFICANT CHANGE UP (ref 32–36)
MCHC RBC-ENTMCNC: 32.9 GM/DL — SIGNIFICANT CHANGE UP (ref 32–36)
MCHC RBC-ENTMCNC: 33.2 GM/DL — SIGNIFICANT CHANGE UP (ref 32–36)
MCHC RBC-ENTMCNC: 34.2 GM/DL — SIGNIFICANT CHANGE UP (ref 32–36)
MCHC RBC-ENTMCNC: 34.2 GM/DL — SIGNIFICANT CHANGE UP (ref 32–36)
MCV RBC AUTO: 87.4 FL — SIGNIFICANT CHANGE UP (ref 80–100)
MCV RBC AUTO: 88.3 FL — SIGNIFICANT CHANGE UP (ref 80–100)
MCV RBC AUTO: 88.3 FL — SIGNIFICANT CHANGE UP (ref 80–100)
MCV RBC AUTO: 88.6 FL — SIGNIFICANT CHANGE UP (ref 80–100)
MCV RBC AUTO: 88.6 FL — SIGNIFICANT CHANGE UP (ref 80–100)
MONOCYTES # BLD AUTO: 0.8 K/UL — SIGNIFICANT CHANGE UP (ref 0–0.9)
MONOCYTES # BLD AUTO: 0.8 K/UL — SIGNIFICANT CHANGE UP (ref 0–0.9)
MONOCYTES NFR BLD AUTO: 10.7 % — SIGNIFICANT CHANGE UP (ref 2–14)
MONOCYTES NFR BLD AUTO: 10.7 % — SIGNIFICANT CHANGE UP (ref 2–14)
NEUTROPHILS # BLD AUTO: 5.45 K/UL — SIGNIFICANT CHANGE UP (ref 1.8–7.4)
NEUTROPHILS # BLD AUTO: 5.45 K/UL — SIGNIFICANT CHANGE UP (ref 1.8–7.4)
NEUTROPHILS NFR BLD AUTO: 72.9 % — SIGNIFICANT CHANGE UP (ref 43–77)
NEUTROPHILS NFR BLD AUTO: 72.9 % — SIGNIFICANT CHANGE UP (ref 43–77)
NRBC # BLD: 0 /100 WBCS — SIGNIFICANT CHANGE UP (ref 0–0)
PLATELET # BLD AUTO: 164 K/UL — SIGNIFICANT CHANGE UP (ref 150–400)
PLATELET # BLD AUTO: 164 K/UL — SIGNIFICANT CHANGE UP (ref 150–400)
PLATELET # BLD AUTO: 171 K/UL — SIGNIFICANT CHANGE UP (ref 150–400)
PLATELET # BLD AUTO: 171 K/UL — SIGNIFICANT CHANGE UP (ref 150–400)
PLATELET # BLD AUTO: 172 K/UL — SIGNIFICANT CHANGE UP (ref 150–400)
PLATELET # BLD AUTO: 172 K/UL — SIGNIFICANT CHANGE UP (ref 150–400)
PLATELET # BLD AUTO: 173 K/UL — SIGNIFICANT CHANGE UP (ref 150–400)
PLATELET # BLD AUTO: 173 K/UL — SIGNIFICANT CHANGE UP (ref 150–400)
POTASSIUM SERPL-MCNC: 3.6 MMOL/L — SIGNIFICANT CHANGE UP (ref 3.5–5.3)
POTASSIUM SERPL-MCNC: 3.6 MMOL/L — SIGNIFICANT CHANGE UP (ref 3.5–5.3)
POTASSIUM SERPL-SCNC: 3.6 MMOL/L — SIGNIFICANT CHANGE UP (ref 3.5–5.3)
POTASSIUM SERPL-SCNC: 3.6 MMOL/L — SIGNIFICANT CHANGE UP (ref 3.5–5.3)
RBC # BLD: 2.66 M/UL — LOW (ref 3.8–5.2)
RBC # BLD: 2.66 M/UL — LOW (ref 3.8–5.2)
RBC # BLD: 2.78 M/UL — LOW (ref 3.8–5.2)
RBC # BLD: 2.78 M/UL — LOW (ref 3.8–5.2)
RBC # BLD: 2.81 M/UL — LOW (ref 3.8–5.2)
RBC # BLD: 2.81 M/UL — LOW (ref 3.8–5.2)
RBC # BLD: 2.86 M/UL — LOW (ref 3.8–5.2)
RBC # BLD: 2.86 M/UL — LOW (ref 3.8–5.2)
RBC # FLD: 14 % — SIGNIFICANT CHANGE UP (ref 10.3–14.5)
RBC # FLD: 14.2 % — SIGNIFICANT CHANGE UP (ref 10.3–14.5)
RBC # FLD: 14.2 % — SIGNIFICANT CHANGE UP (ref 10.3–14.5)
RH IG SCN BLD-IMP: POSITIVE — SIGNIFICANT CHANGE UP
RH IG SCN BLD-IMP: POSITIVE — SIGNIFICANT CHANGE UP
SODIUM SERPL-SCNC: 136 MMOL/L — SIGNIFICANT CHANGE UP (ref 135–145)
SODIUM SERPL-SCNC: 136 MMOL/L — SIGNIFICANT CHANGE UP (ref 135–145)
WBC # BLD: 7.48 K/UL — SIGNIFICANT CHANGE UP (ref 3.8–10.5)
WBC # BLD: 7.48 K/UL — SIGNIFICANT CHANGE UP (ref 3.8–10.5)
WBC # BLD: 7.7 K/UL — SIGNIFICANT CHANGE UP (ref 3.8–10.5)
WBC # BLD: 7.7 K/UL — SIGNIFICANT CHANGE UP (ref 3.8–10.5)
WBC # BLD: 7.86 K/UL — SIGNIFICANT CHANGE UP (ref 3.8–10.5)
WBC # BLD: 7.86 K/UL — SIGNIFICANT CHANGE UP (ref 3.8–10.5)
WBC # BLD: 9.02 K/UL — SIGNIFICANT CHANGE UP (ref 3.8–10.5)
WBC # BLD: 9.02 K/UL — SIGNIFICANT CHANGE UP (ref 3.8–10.5)
WBC # FLD AUTO: 7.48 K/UL — SIGNIFICANT CHANGE UP (ref 3.8–10.5)
WBC # FLD AUTO: 7.48 K/UL — SIGNIFICANT CHANGE UP (ref 3.8–10.5)
WBC # FLD AUTO: 7.7 K/UL — SIGNIFICANT CHANGE UP (ref 3.8–10.5)
WBC # FLD AUTO: 7.7 K/UL — SIGNIFICANT CHANGE UP (ref 3.8–10.5)
WBC # FLD AUTO: 7.86 K/UL — SIGNIFICANT CHANGE UP (ref 3.8–10.5)
WBC # FLD AUTO: 7.86 K/UL — SIGNIFICANT CHANGE UP (ref 3.8–10.5)
WBC # FLD AUTO: 9.02 K/UL — SIGNIFICANT CHANGE UP (ref 3.8–10.5)
WBC # FLD AUTO: 9.02 K/UL — SIGNIFICANT CHANGE UP (ref 3.8–10.5)

## 2023-11-16 PROCEDURE — 93010 ELECTROCARDIOGRAM REPORT: CPT

## 2023-11-16 PROCEDURE — 99222 1ST HOSP IP/OBS MODERATE 55: CPT | Mod: GC

## 2023-11-16 RX ADMIN — METHOCARBAMOL 500 MILLIGRAM(S): 500 TABLET, FILM COATED ORAL at 13:52

## 2023-11-16 RX ADMIN — Medication 200 MILLIGRAM(S): at 22:10

## 2023-11-16 RX ADMIN — HYDROMORPHONE HYDROCHLORIDE 6 MILLIGRAM(S): 2 INJECTION INTRAMUSCULAR; INTRAVENOUS; SUBCUTANEOUS at 17:48

## 2023-11-16 RX ADMIN — PANTOPRAZOLE SODIUM 40 MILLIGRAM(S): 20 TABLET, DELAYED RELEASE ORAL at 06:38

## 2023-11-16 RX ADMIN — HYDROMORPHONE HYDROCHLORIDE 4 MILLIGRAM(S): 2 INJECTION INTRAMUSCULAR; INTRAVENOUS; SUBCUTANEOUS at 13:52

## 2023-11-16 RX ADMIN — HYDROMORPHONE HYDROCHLORIDE 0.5 MILLIGRAM(S): 2 INJECTION INTRAMUSCULAR; INTRAVENOUS; SUBCUTANEOUS at 21:35

## 2023-11-16 RX ADMIN — SODIUM CHLORIDE 150 MILLILITER(S): 9 INJECTION, SOLUTION INTRAVENOUS at 03:58

## 2023-11-16 RX ADMIN — ONDANSETRON 4 MILLIGRAM(S): 8 TABLET, FILM COATED ORAL at 17:48

## 2023-11-16 RX ADMIN — HYDROMORPHONE HYDROCHLORIDE 4 MILLIGRAM(S): 2 INJECTION INTRAMUSCULAR; INTRAVENOUS; SUBCUTANEOUS at 09:24

## 2023-11-16 RX ADMIN — ENOXAPARIN SODIUM 40 MILLIGRAM(S): 100 INJECTION SUBCUTANEOUS at 21:50

## 2023-11-16 RX ADMIN — Medication 200 MILLIGRAM(S): at 15:09

## 2023-11-16 RX ADMIN — HYDROMORPHONE HYDROCHLORIDE 6 MILLIGRAM(S): 2 INJECTION INTRAMUSCULAR; INTRAVENOUS; SUBCUTANEOUS at 22:50

## 2023-11-16 RX ADMIN — Medication 975 MILLIGRAM(S): at 06:36

## 2023-11-16 RX ADMIN — Medication 975 MILLIGRAM(S): at 21:50

## 2023-11-16 RX ADMIN — HYDROMORPHONE HYDROCHLORIDE 0.5 MILLIGRAM(S): 2 INJECTION INTRAMUSCULAR; INTRAVENOUS; SUBCUTANEOUS at 16:35

## 2023-11-16 RX ADMIN — SENNA PLUS 2 TABLET(S): 8.6 TABLET ORAL at 21:50

## 2023-11-16 RX ADMIN — HYDROMORPHONE HYDROCHLORIDE 0.5 MILLIGRAM(S): 2 INJECTION INTRAMUSCULAR; INTRAVENOUS; SUBCUTANEOUS at 15:35

## 2023-11-16 RX ADMIN — HYDROMORPHONE HYDROCHLORIDE 4 MILLIGRAM(S): 2 INJECTION INTRAMUSCULAR; INTRAVENOUS; SUBCUTANEOUS at 10:24

## 2023-11-16 RX ADMIN — HYDROMORPHONE HYDROCHLORIDE 4 MILLIGRAM(S): 2 INJECTION INTRAMUSCULAR; INTRAVENOUS; SUBCUTANEOUS at 14:52

## 2023-11-16 RX ADMIN — HYDROMORPHONE HYDROCHLORIDE 0.5 MILLIGRAM(S): 2 INJECTION INTRAMUSCULAR; INTRAVENOUS; SUBCUTANEOUS at 20:35

## 2023-11-16 RX ADMIN — ONDANSETRON 4 MILLIGRAM(S): 8 TABLET, FILM COATED ORAL at 05:48

## 2023-11-16 RX ADMIN — Medication 200 MILLIGRAM(S): at 06:38

## 2023-11-16 RX ADMIN — HYDROMORPHONE HYDROCHLORIDE 6 MILLIGRAM(S): 2 INJECTION INTRAMUSCULAR; INTRAVENOUS; SUBCUTANEOUS at 21:50

## 2023-11-16 RX ADMIN — HYDROMORPHONE HYDROCHLORIDE 0.5 MILLIGRAM(S): 2 INJECTION INTRAMUSCULAR; INTRAVENOUS; SUBCUTANEOUS at 12:38

## 2023-11-16 RX ADMIN — METHOCARBAMOL 500 MILLIGRAM(S): 500 TABLET, FILM COATED ORAL at 21:50

## 2023-11-16 RX ADMIN — HYDROMORPHONE HYDROCHLORIDE 6 MILLIGRAM(S): 2 INJECTION INTRAMUSCULAR; INTRAVENOUS; SUBCUTANEOUS at 18:48

## 2023-11-16 RX ADMIN — METHOCARBAMOL 500 MILLIGRAM(S): 500 TABLET, FILM COATED ORAL at 05:47

## 2023-11-16 NOTE — CONSULT NOTE ADULT - SUBJECTIVE AND OBJECTIVE BOX
48yF was admitted on     HPI:  Per initial HPI  "48F with low back pain x  7 years, She says that she has an extensive history of back pain throughout her life and had her first spinal surgery in 2017 in [facility]. She said that after the initial surgery she had 10 screws loosen, and under went a revision with Dr. Schwab a few years later. Today she is having an extension of her fusion for progressive worsening of pain. She says that she had intermittent bilateral sciatica and right lower leg weakness and numbness that has caused her to fall due to not being able to feel the ground she is walking on. She is unable to find comfort and both sitting too long and standing too long provokes pain and numbness/tingling of her lower extremities. She ambulates with a cane at baseline. Despite conservative measure pain persists. She also has intermitted right upper extremity nerve pain, she under went a 1 level ACDF in 2023.     Denies history of blood clots or seizures. She denies chest pain, shortness of breath, nausea or vomiting today.     Patient currently sees outpatient pain management. She uses fentanyl 50 mcg/hr patch, nucynta 75mg, and pregabalin 200mg for pain relief.     Presents for elective revision PSF with extension of fusion with instrumentation T3-pelvis, pedicle subtraction osteotomy (PSO) L4, tethers, iliac fixation, smith clark osteotomies T3-T10 with Plastics closure with Dr. Schwab, Dr. Gonzalez and Dr. Simpson.  (2023 11:29)"    INTERVAL HISTORY:  Underwent T4-Pelvis rev PSF, L5 SO and proximal tethering on .  Course notable for acute blood loss anemia s/p transfusion with improvement. Hemodynamically stable.  Pain medicine following for pain control.  Planning TOV tomorrow.  Working with PT/OT - rec'd for AR    -----------------------------------------------------------------------  REVIEW OF SYSTEMS      -----------------------------------------------------------------------  FUNCTIONAL HISTORY  Lives with her  and two children in a 2 story private home on [place] with 3 JAIR and a flight of stair inside.   PTA was independent with ADLs/iADLs. Used SC for ambulation    CURRENT FUNCTIONAL STATUS  Date: 11/15  PT/OT    Bed Mobility: Scooting/Bridging:     · Level of [place]	moderate assist (50% patients effort)  · Physical Assist/Nonphysical Assist	2 person assist; nonverbal cues (demo/gestures); verbal cues    Bed Mobility: Sit to Supine:     · Level of [place]	maximum assist (25% patients effort)  · Physical Assist/Nonphysical Assist	2 person assist; nonverbal cues (demo/gestures); verbal cues    Bed Mobility: Supine to Sit:     · Level of [place]	moderate assist (50% patients effort)  · Physical Assist/Nonphysical Assist	2 person assist; nonverbal cues (demo/gestures); verbal cues    Bed Mobility Analysis:     · Bed Mobility Limitations	decreased ability to use legs for bridging/pushing; impaired ability to control trunk for mobility  · Impairments Contributing to Impaired Bed Mobility	impaired balance; decreased strength; pain; decreased flexibility    Transfer: Sit to Stand:     · Level of [place]	moderate assist (50% patients effort)  · Physical Assist/Nonphysical Assist	2 person assist; nonverbal cues (demo/gestures); verbal cues    Transfer: Stand to Sit:     · Level of [place]	moderate assist (50% patients effort)  · Physical Assist/Nonphysical Assist	2 person assist; nonverbal cues (demo/gestures); verbal cues  · Assistive Device	rolling walker      -----------------------------------------------------------------------  PAST MEDICAL & SURGICAL HISTORY  Asthma  Herpes genitalis in women  Scoliosis (and kyphoscoliosis), idiopathic  Lumbar stenosis with neurogenic claudication  Spondylisthesis  Bilateral sciatica  History of anemia  Varicose veins of both lower extremities  HTN (hypertension)  S/P  section  Gastric bypass status for obesity  Tubal ligation status  H/O varicose vein ligation  S/P endoscopy  History of hip replacement  History of bunionectomy  History of cervical spinal surgery  History of lumbosacral spine surgery  H/O medial meniscus repair of left knee        FAMILY HISTORY   Family history of aortic aneurysm (Father)  Family history of pneumonia (Mother)  Family istory of ovarian cancer (Grandparent)  Family history of myocardial infarction (Grandparent)  Family history of myocardial infarction (Grandparent)    SOCIAL HISTORY  as above  -----------------------------------------------------------------------  VITALS  T(C): 37.3 (23 @ 12:55), Max: 37.3 (23 @ 12:55)  HR: 87 (23 @ 12:55) (82 - 96)  BP: 112/63 (23 @ 12:55) (97/59 - 112/65)  RR: 19 (23 @ 12:55) (16 - 19)  SpO2: 92% (23 @ 12:55) (92% - 97%)  Wt(kg): --    PHYSICAL EXAM        -----------------------------------------------------------------------  RECENT LABS  CBC Full  -  ( 2023 07:12 )  WBC Count : 7.48 K/uL  RBC Count : 2.66 M/uL  Hemoglobin : 7.8 g/dL  Hematocrit : 23.5 %  Platelet Count - Automated : 171 K/uL  Mean Cell Volume : 88.3 fl  Mean Cell Hemoglobin : 29.3 pg  Mean Cell Hemoglobin Concentration : 33.2 gm/dL  Auto Neutrophil # : 5.45 K/uL  Auto Lymphocyte # : 1.12 K/uL  Auto Monocyte # : 0.80 K/uL  Auto Eosinophil # : 0.06 K/uL  Auto Basophil # : 0.01 K/uL  Auto Neutrophil % : 72.9 %  Auto Lymphocyte % : 15.0 %  Auto Monocyte % : 10.7 %  Auto Eosinophil % : 0.8 %  Auto Basophil % : 0.1 %        136  |  102  |  11  ----------------------------<  111<H>  3.6   |  28  |  0.66    Ca    7.6<L>      2023 07:12      Urinalysis Basic - ( 2023 07:12 )    Color: x / Appearance: x / SG: x / pH: x  Gluc: 111 mg/dL / Ketone: x  / Bili: x / Urobili: x   Blood: x / Protein: x / Nitrite: x   Leuk Esterase: x / RBC: x / WBC x   Sq Epi: x / Non Sq Epi: x / Bacteria: x        -----------------------------------------------------------------------  IMAGING:      -----------------------------------------------------------------------  ALLERGIES  shellfish (Hives; Short breath)  diclofenac (Other)      MEDICATIONS   acetaminophen     Tablet .. 975 milliGRAM(s) Oral every 8 hours  albuterol    90 MICROgram(s) HFA Inhaler 2 Puff(s) Inhalation every 6 hours PRN  aluminum hydroxide/magnesium hydroxide/simethicone Suspension 30 milliLiter(s) Oral every 4 hours PRN  benzocaine/menthol Lozenge 1 Lozenge Oral four times a day PRN  bisacodyl 5 milliGRAM(s) Oral every 12 hours PRN  chlorhexidine 2% Cloths 1 Application(s) Topical once  diphenhydrAMINE 25 milliGRAM(s) Oral every 4 hours PRN  DULoxetine 60 milliGRAM(s) Oral daily  enoxaparin Injectable 40 milliGRAM(s) SubCutaneous every 24 hours  fentaNYL   Patch  50 MICROgram(s)/Hr 1 Patch Transdermal every 72 hours  HYDROmorphone   Tablet 4 milliGRAM(s) Oral every 4 hours PRN  HYDROmorphone   Tablet 6 milliGRAM(s) Oral every 4 hours PRN  HYDROmorphone  Injectable 0.5 milliGRAM(s) IV Push every 3 hours PRN  lactated ringers. 1000 milliLiter(s) IV Continuous <Continuous>  magnesium hydroxide Suspension 30 milliLiter(s) Oral every 12 hours PRN  methocarbamol 500 milliGRAM(s) Oral three times a day  naloxone Injectable 0.1 milliGRAM(s) IV Push every 3 minutes PRN  ondansetron   Disintegrating Tablet 4 milliGRAM(s) Oral every 6 hours  ondansetron Injectable 4 milliGRAM(s) IV Push every 6 hours PRN  ondansetron Injectable 4 milliGRAM(s) IV Push once PRN  pantoprazole    Tablet 40 milliGRAM(s) Oral before breakfast  polyethylene glycol 3350 17 Gram(s) Oral daily  povidone iodine 5% Nasal Swab 1 Application(s) Both Nostrils once  pregabalin 200 milliGRAM(s) Oral three times a day  senna 2 Tablet(s) Oral at bedtime  triamterene 37.5 mG/hydrochlorothiazide 25 mG Tablet 1 Tablet(s) Oral daily    -----------------------------------------------------------------------   48yF was admitted on     HPI:  Per initial HPI  "48F with low back pain x  7 years, She says that she has an extensive history of back pain throughout her life and had her first spinal surgery in 2017 in [facility]. She said that after the initial surgery she had 10 screws loosen, and under went a revision with Dr. Schwab a few years later. Today she is having an extension of her fusion for progressive worsening of pain. She says that she had intermittent bilateral sciatica and right lower leg weakness and numbness that has caused her to fall due to not being able to feel the ground she is walking on. She is unable to find comfort and both sitting too long and standing too long provokes pain and numbness/tingling of her lower extremities. She ambulates with a cane at baseline. Despite conservative measure pain persists. She also has intermitted right upper extremity nerve pain, she under went a 1 level ACDF in 2023.     Denies history of blood clots or seizures. She denies chest pain, shortness of breath, nausea or vomiting today.     Patient currently sees outpatient pain management. She uses fentanyl 50 mcg/hr patch, nucynta 75mg, and pregabalin 200mg for pain relief.     Presents for elective revision PSF with extension of fusion with instrumentation T3-pelvis, pedicle subtraction osteotomy (PSO) L4, tethers, iliac fixation, smith clark osteotomies T3-T10 with Plastics closure with Dr. Schwab, Dr. Gonzalez and Dr. Simpson.  (2023 11:29)"    INTERVAL HISTORY:  Underwent T4-Pelvis rev PSF, L5 SO and proximal tethering on .  Course notable for acute blood loss anemia s/p transfusion with improvement.  +Orthostasis with therapy.  BP low at bedside. Getting transfusion.  Pain medicine following for pain control.  Planning TOV tomorrow.  Working with PT/OT - rec'd for AR    -----------------------------------------------------------------------  REVIEW OF SYSTEMS    Constitutional: No fever, No Chills, +fatigue  Pulm: No cough,  No shortness of breath  Cardio: No chest pain, No palpitations  GI:  No abdominal pain, No nausea, No vomiting, No diarrhea, +constipation  : No dysuria, No frequency, No hematuria  Neuro: No headaches, No memory loss, +loss of strength, +numbness, No tremors  MSK: +back pain    -----------------------------------------------------------------------  FUNCTIONAL HISTORY  Lives with her  and two children in a 2 story private home on [address] with 3 JAIR and a flight of stair inside.   PTA was independent with ADLs/iADLs. Used SC for ambulation    CURRENT FUNCTIONAL STATUS  Date: 11/15  PT/OT    Bed Mobility: Scooting/Bridging:     · Level of [place]	moderate assist (50% patients effort)  · Physical Assist/Nonphysical Assist	2 person assist; nonverbal cues (demo/gestures); verbal cues    Bed Mobility: Sit to Supine:     · Level of [place]	maximum assist (25% patients effort)  · Physical Assist/Nonphysical Assist	2 person assist; nonverbal cues (demo/gestures); verbal cues    Bed Mobility: Supine to Sit:     · Level of [place]	moderate assist (50% patients effort)  · Physical Assist/Nonphysical Assist	2 person assist; nonverbal cues (demo/gestures); verbal cues    Bed Mobility Analysis:     · Bed Mobility Limitations	decreased ability to use legs for bridging/pushing; impaired ability to control trunk for mobility  · Impairments Contributing to Impaired Bed Mobility	impaired balance; decreased strength; pain; decreased flexibility    Transfer: Sit to Stand:     · Level of [place]	moderate assist (50% patients effort)  · Physical Assist/Nonphysical Assist	2 person assist; nonverbal cues (demo/gestures); verbal cues    Transfer: Stand to Sit:     · Level of [place]	moderate assist (50% patients effort)  · Physical Assist/Nonphysical Assist	2 person assist; nonverbal cues (demo/gestures); verbal cues  · Assistive Device	rolling walker      -----------------------------------------------------------------------  PAST MEDICAL & SURGICAL HISTORY  Asthma  Herpes genitalis in women  Scoliosis (and kyphoscoliosis), idiopathic  Lumbar stenosis with neurogenic claudication  Spondylisthesis  Bilateral sciatica  History of anemia  Varicose veins of both lower extremities  HTN (hypertension)  S/P  section  Gastric bypass status for obesity  Tubal ligation status  H/O varicose vein ligation  S/P endoscopy  History of hip replacement  History of bunionectomy  History of cervical spinal surgery  History of lumbosacral spine surgery  H/O medial meniscus repair of left knee        FAMILY HISTORY   Family history of aortic aneurysm (Father)  Family history of pneumonia (Mother)  Family istory of ovarian cancer (Grandparent)  Family history of myocardial infarction (Grandparent)  Family history of myocardial infarction (Grandparent)    SOCIAL HISTORY  as above  -----------------------------------------------------------------------  VITALS  T(C): 37.3 (23 @ 12:55), Max: 37.3 (23 @ 12:55)  HR: 87 (23 @ 12:55) (82 - 96)  BP: 112/63 (23 @ 12:55) (97/59 - 112/65)  RR: 19 (23 @ 12:55) (16 - 19)  SpO2: 92% (23 @ 12:55) (92% - 97%)  Wt(kg): --    PHYSICAL EXAM    Constitutional - NAD, Comfortable  HEENT - NCAT  Neck - Supple, No limited ROM  Chest - Breathing comfortably, No Respiratory distress  Cardiovascular - Regular pulse  Abdomen - No visible abdominal distension  Extremities - No deformities of upper or lower limbs. No calf tenderness   Neurologic Exam -                    Cognitive - Awake, Alert, AAO to self, place, date, year, situation; follows commands     Communication - Fluent, No dysarthria, repetition intact, attention/concentration intact     Cranial Nerves -  EOMI, No facial asymmetry, V1-V3 sensation intact, Tongue midline, EOMI, Shoulder shrug intact     Motor - No focal deficits. unable to tolerate manual muscle testing due to discomfort. Demonstrates at least 3/5 in upper extremities and 2/5 in lower extremities while in bed. 1/5 for right ankle dorsiflexion     Sensory - decreased to LT in lateral aspect of right leg  Psychiatric - Mood stable, Affect WNL     -----------------------------------------------------------------------  RECENT LABS  CBC Full  -  ( 2023 07:12 )  WBC Count : 7.48 K/uL  RBC Count : 2.66 M/uL  Hemoglobin : 7.8 g/dL  Hematocrit : 23.5 %  Platelet Count - Automated : 171 K/uL  Mean Cell Volume : 88.3 fl  Mean Cell Hemoglobin : 29.3 pg  Mean Cell Hemoglobin Concentration : 33.2 gm/dL  Auto Neutrophil # : 5.45 K/uL  Auto Lymphocyte # : 1.12 K/uL  Auto Monocyte # : 0.80 K/uL  Auto Eosinophil # : 0.06 K/uL  Auto Basophil # : 0.01 K/uL  Auto Neutrophil % : 72.9 %  Auto Lymphocyte % : 15.0 %  Auto Monocyte % : 10.7 %  Auto Eosinophil % : 0.8 %  Auto Basophil % : 0.1 %        136  |  102  |  11  ----------------------------<  111<H>  3.6   |  28  |  0.66    Ca    7.6<L>      2023 07:12      Urinalysis Basic - ( 2023 07:12 )    Color: x / Appearance: x / SG: x / pH: x  Gluc: 111 mg/dL / Ketone: x  / Bili: x / Urobili: x   Blood: x / Protein: x / Nitrite: x   Leuk Esterase: x / RBC: x / WBC x   Sq Epi: x / Non Sq Epi: x / Bacteria: x        -----------------------------------------------------------------------  IMAGING:      -----------------------------------------------------------------------  ALLERGIES  shellfish (Hives; Short breath)  diclofenac (Other)      MEDICATIONS   acetaminophen     Tablet .. 975 milliGRAM(s) Oral every 8 hours  albuterol    90 MICROgram(s) HFA Inhaler 2 Puff(s) Inhalation every 6 hours PRN  aluminum hydroxide/magnesium hydroxide/simethicone Suspension 30 milliLiter(s) Oral every 4 hours PRN  benzocaine/menthol Lozenge 1 Lozenge Oral four times a day PRN  bisacodyl 5 milliGRAM(s) Oral every 12 hours PRN  chlorhexidine 2% Cloths 1 Application(s) Topical once  diphenhydrAMINE 25 milliGRAM(s) Oral every 4 hours PRN  DULoxetine 60 milliGRAM(s) Oral daily  enoxaparin Injectable 40 milliGRAM(s) SubCutaneous every 24 hours  fentaNYL   Patch  50 MICROgram(s)/Hr 1 Patch Transdermal every 72 hours  HYDROmorphone   Tablet 4 milliGRAM(s) Oral every 4 hours PRN  HYDROmorphone   Tablet 6 milliGRAM(s) Oral every 4 hours PRN  HYDROmorphone  Injectable 0.5 milliGRAM(s) IV Push every 3 hours PRN  lactated ringers. 1000 milliLiter(s) IV Continuous <Continuous>  magnesium hydroxide Suspension 30 milliLiter(s) Oral every 12 hours PRN  methocarbamol 500 milliGRAM(s) Oral three times a day  naloxone Injectable 0.1 milliGRAM(s) IV Push every 3 minutes PRN  ondansetron   Disintegrating Tablet 4 milliGRAM(s) Oral every 6 hours  ondansetron Injectable 4 milliGRAM(s) IV Push every 6 hours PRN  ondansetron Injectable 4 milliGRAM(s) IV Push once PRN  pantoprazole    Tablet 40 milliGRAM(s) Oral before breakfast  polyethylene glycol 3350 17 Gram(s) Oral daily  povidone iodine 5% Nasal Swab 1 Application(s) Both Nostrils once  pregabalin 200 milliGRAM(s) Oral three times a day  senna 2 Tablet(s) Oral at bedtime  triamterene 37.5 mG/hydrochlorothiazide 25 mG Tablet 1 Tablet(s) Oral daily    -----------------------------------------------------------------------

## 2023-11-16 NOTE — PROGRESS NOTE ADULT - SUBJECTIVE AND OBJECTIVE BOX
INTERVAL HPI/OVERNIGHT EVENTS: david o/n    SUBJECTIVE: Patient seen and examined at bedside.   Feels pain is mildly improved vs yesterday in back. Still has R foot drop which is chronic. Reports lightheadedness while sitting up w PT yesterday. No fever, chest pain, dyspnea. Voiding. +Flatus wo BM.     OBJECTIVE:    VITAL SIGNS:  ICU Vital Signs Last 24 Hrs  T(C): 37.7 (16 Nov 2023 16:15), Max: 37.7 (16 Nov 2023 16:15)  T(F): 99.8 (16 Nov 2023 16:15), Max: 99.8 (16 Nov 2023 16:15)  HR: 92 (16 Nov 2023 16:15) (82 - 96)  BP: 99/64 (16 Nov 2023 16:15) (97/59 - 112/65)  BP(mean): --  ABP: --  ABP(mean): --  RR: 20 (16 Nov 2023 16:15) (16 - 20)  SpO2: 96% (16 Nov 2023 16:15) (92% - 97%)    O2 Parameters below as of 16 Nov 2023 16:15  Patient On (Oxygen Delivery Method): nasal cannula  O2 Flow (L/min): 2            11-15 @ 07:01  -  11-16 @ 07:00  --------------------------------------------------------  IN: 4680 mL / OUT: 2500 mL / NET: 2180 mL    11-16 @ 07:01  -  11-16 @ 16:57  --------------------------------------------------------  IN: 0 mL / OUT: 205 mL / NET: -205 mL      CAPILLARY BLOOD GLUCOSE      POCT Blood Glucose.: 110 mg/dL (15 Nov 2023 12:00)      PHYSICAL EXAM:  GEN: female in NAD on RA  HEENT: NC/AT, MMM  CV: RRR, nml S1S2, no murmurs  PULM: nml effort, CTAB  ABD: Soft, non-distended, NABS, non-tender  : Cobb catheter in place  NEURO  A/O x3, moving all extremities, 5/5 in BLE. 4 drains in place. Sensation intact  PSYCH: Appropriate    MEDICATIONS:  MEDICATIONS  (STANDING):  acetaminophen     Tablet .. 975 milliGRAM(s) Oral every 8 hours  chlorhexidine 2% Cloths 1 Application(s) Topical once  DULoxetine 60 milliGRAM(s) Oral daily  enoxaparin Injectable 40 milliGRAM(s) SubCutaneous every 24 hours  fentaNYL   Patch  50 MICROgram(s)/Hr 1 Patch Transdermal every 72 hours  lactated ringers. 1000 milliLiter(s) (150 mL/Hr) IV Continuous <Continuous>  methocarbamol 500 milliGRAM(s) Oral three times a day  ondansetron   Disintegrating Tablet 4 milliGRAM(s) Oral every 6 hours  pantoprazole    Tablet 40 milliGRAM(s) Oral before breakfast  polyethylene glycol 3350 17 Gram(s) Oral daily  povidone iodine 5% Nasal Swab 1 Application(s) Both Nostrils once  pregabalin 200 milliGRAM(s) Oral three times a day  senna 2 Tablet(s) Oral at bedtime  triamterene 37.5 mG/hydrochlorothiazide 25 mG Tablet 1 Tablet(s) Oral daily    MEDICATIONS  (PRN):  albuterol    90 MICROgram(s) HFA Inhaler 2 Puff(s) Inhalation every 6 hours PRN Shortness of Breath and/or Wheezing  aluminum hydroxide/magnesium hydroxide/simethicone Suspension 30 milliLiter(s) Oral every 4 hours PRN Dyspepsia  benzocaine/menthol Lozenge 1 Lozenge Oral four times a day PRN Sore Throat  bisacodyl 5 milliGRAM(s) Oral every 12 hours PRN Constipation  diphenhydrAMINE 25 milliGRAM(s) Oral every 4 hours PRN Rash and/or Itching  HYDROmorphone   Tablet 4 milliGRAM(s) Oral every 4 hours PRN Moderate Pain (4 - 6)  HYDROmorphone   Tablet 6 milliGRAM(s) Oral every 4 hours PRN Severe Pain (7 - 10)  HYDROmorphone  Injectable 0.5 milliGRAM(s) IV Push every 3 hours PRN severe breakthrough pain  magnesium hydroxide Suspension 30 milliLiter(s) Oral every 12 hours PRN Constipation  naloxone Injectable 0.1 milliGRAM(s) IV Push every 3 minutes PRN For ANY of the following changes in patient status:  A. RR LESS THAN 10 breaths per minute, B. Oxygen saturation LESS THAN 90%, C. Sedation score of 6  ondansetron Injectable 4 milliGRAM(s) IV Push every 6 hours PRN Nausea and/or Vomiting  ondansetron Injectable 4 milliGRAM(s) IV Push once PRN Nausea      ALLERGIES:  Allergies    shellfish (Hives; Short breath)  diclofenac (Other)    Intolerances        LABS:                        7.8    7.48  )-----------( 171      ( 16 Nov 2023 07:12 )             23.5     11-16    136  |  102  |  11  ----------------------------<  111<H>  3.6   |  28  |  0.66    Ca    7.6<L>      16 Nov 2023 07:12        Urinalysis Basic - ( 16 Nov 2023 07:12 )    Color: x / Appearance: x / SG: x / pH: x  Gluc: 111 mg/dL / Ketone: x  / Bili: x / Urobili: x   Blood: x / Protein: x / Nitrite: x   Leuk Esterase: x / RBC: x / WBC x   Sq Epi: x / Non Sq Epi: x / Bacteria: x        RADIOLOGY & ADDITIONAL TESTS: Reviewed.

## 2023-11-16 NOTE — PROGRESS NOTE ADULT - SUBJECTIVE AND OBJECTIVE BOX
Ortho Note    Pt seen and examined. Reports severe pain this AM, but had not taken PO prn meds since 3pm yesterday.  Still reports "numbness" to RLE in S1 distribution. Has RLE weakness as well which she states is not new, and was "on and off" pre-op; which seems mildly improved today.  Patient with O2 desaturation overnight while sleeping but is currently saturating adequately on room air.   Denies CP, SOB, N/V, numbness/tingling     Vital Signs Last 24 Hrs  T(C): 36.7 (11-16-23 @ 09:19), Max: 36.7 (11-16-23 @ 09:19)  T(F): 98.1 (11-16-23 @ 09:19), Max: 98.1 (11-16-23 @ 09:19)  HR: 93 (11-16-23 @ 09:19) (82 - 93)  BP: 97/59 (11-16-23 @ 09:19) (97/59 - 105/59)  BP(mean): --  RR: 18 (11-16-23 @ 09:19) (16 - 18)  SpO2: 95% (11-16-23 @ 09:19) (95% - 96%)  AVSS    General: Pt Alert and oriented, NAD  DSG- aquacel x 2 C/D/I, HV x 2 and SHAWNA x 2 in place  Pulses: +DP BLE, WWP feet  Sensation: diminished in S1 distribution to RLE; otherwise SILT BLE  Motor: 5/5 EHL/FHL/TA/GS LLE; 3/5 EHL/FHL/GS/TA RLE; Right quad/hamstring mildly weaker than left in bed; exam limited to pain  Patient reports this was also baseline at times ("on and off") prior to surgery                          7.8    7.48  )-----------( 171      ( 16 Nov 2023 07:12 )             23.5     11-16    136  |  102  |  11  ----------------------------<  111<H>  3.6   |  28  |  0.66    Ca    7.6<L>      16 Nov 2023 07:12        A/P: 48yFemale POD#2 s/p T4-Pelvis rev PSF, L5 SO and proximal tethering 11/14  - acute blood loss anemia- s/p 3u PRBCs intra-op, Hgb 7.8 today, continue to monitor drain output and CBC, transfuse if < 7.0 or if symptomatic today  - Pain Control- appreciate pain management recommendations, transitioning off PCA to oral meds today  - DVT ppx: SCDS, to start lovenox 40mg subq daily  - PT, WBS: WBAT, OT consut  - OOB for meal as tolerated, I/S  - bowel regimen  - monitor drains x 4  - remove paulino for TOV when able to tolerate bed mobility or OOB to commode  - post-op xrays when can tolerate prior to discharge  - appreciate internal medicine recs- will place hold parameters on triamterene/ hctz   - dispo: acute rehab    Ortho Pager [phone]

## 2023-11-16 NOTE — PROGRESS NOTE ADULT - ASSESSMENT
48y F presenting for elective revision PSF with extension of fusion with instrumentation T3-pelvis, pedicle subtraction osteotomy (PSO) L4, tethers, iliac fixation, smith clark osteotomies T3-T10 with Plastics closure with Dr. Schwab, Dr. Gonzalez and Dr. Simpson 11/14    Recommendations:  1.) Tylenol 975mg PO q8h  2.) Lyrica 200mg PO q8h  3.) Robaxin 500mg PO q8h  4.) Continue home fentanyl patch 50mcg q72h (new patch placed this AM, next due 11/18)  5.) Continue dilaudid 4-6mg PO q4h prn moderate to severe pain  5.) Can receive 0.5mg dilaudid IV bolus q3h prn severe uncontrolled pain    Monitor oxygenation, BP and for signs and symptoms of oversedation  Aggressive bowel regimen  Plan discussed with and approved by Dr. Stanley

## 2023-11-16 NOTE — PROGRESS NOTE ADULT - ASSESSMENT
48F w obesity s/p gastric bypass sx, extensive back pain w spinal surgeries, revision w Dr. Schwab, p/w persistent back pain and RLE sciatica sxs and weakness w ambulation issues, follows w pain mgmt, here for elective PSF w extension of fusion T3-Pelvis, pedicle subtraction osteotomy w Dr. Schwab 11/14    #Post-op state - pain controlled. PPx: SQL. On bowel regimen and incentive spirometer  #Spinal stensosis  --Scheduled robaxin 500 q8, lyrica 200 q8  --PRN: dilaudid 4/6 q4h for mod/severe pain    #HTN - home on triamterene/hctz 37.5/25 -- hold parameters added  #Anemia -7.8 from 8.6 from baseline 12.8.    #Asthma - on albuterol inhaler PRN  #Chronic pain - on cymbalta 60 ER, Fentanyl patch 50mcg q72, lyrica 200 BID, zanaflex 4mg q8   - also on nucynta (tapatendol 75 PRN)  #Obesity - BMi 31.8 - affects all aspects of care    Plan  agree w 1u RBC for symptomatic anemia today - hgb 7.8 from 8.3 -- baseline 12.8  f/u orthostasis    Pain mgmt recs  No NSAIDs d/t gastric bypass hx  Added hold parameters to triamterene - hctz    DISPO: Acute rehab

## 2023-11-16 NOTE — CONSULT NOTE ADULT - ASSESSMENT
48 year old female with chronic back pain in the setting of spinal stenosis with functional deficits following elective revision PSF with extension of fusion with instrumentation T3-pelvis, pedicle subtraction osteotomy (PSO) L4, tethers, iliac fixation, smith clark osteotomies T3-T10 with Plastics closure.       48 year old female with chronic back pain in the setting of spinal stenosis with functional deficits following elective revision PSF with extension of fusion with instrumentation T3-pelvis, pedicle subtraction osteotomy (PSO) L4, tethers, iliac fixation, smith clark osteotomies T3-T10 with Plastics closure.    PLAN / RECOMMENDATIONS:     Rehab / Mobilization:   - Initial therapy assessment: [X] PT  [X] OT   - Continue skilled therapy while admitted to prevent secondary complications of immobility focus on transfer training, bed mobility, progressive ambulation, and equipment evaluation.   - Educated patient and family members on post-acute rehabilitation. Discussed anticipated rehab course.  - Encourage OOB throughout the day with staff or OOB in chair with meals     Low BP/ Orthostasis  - receiving blood transfusion   - Encouraged HOB elevation to at least 30-40 deg to prevent orthostasis   - Educated patient on bed-level exercises to perform when not working with therapy including ankle pumps to promote venous return  - Recommend use of abdominal binder and TEDs compression stockings for additional BP support.     Bracing/Splinting:   - None indicated at this time      Pain Management:   - appreciate pain management involvement and recommendations    GI/ Bowel: +Constipation  - Continue to monitor bowel patterns. Agree with current bowel regimen. Consider dose of lactulose or MOM for constipation.     / Bladder:  - Continue to monitor bladder patterns.    - [  ] TOV pending.     DVT Prophylaxis:   -SCDs, chemoprophylaxis per primary team  - On Lovenox    Disposition:  Acute Inpatient Rehabilitation  - Patient has significant functional impairments and would benefit from continued rehabilitation in the ACUTE inpatient rehab setting. She has both medical and functional needs appropriate for acute inpatient rehabilitation and would benefit from comprehensive interdisciplinary care, including a physiatrist, rehabilitation nursing, PT, OT and case management/social work. We anticipate that she would be able to tolerate 3 hours of PT/OT per day for 5 to 6 days per week to focus on mobility, transfers, gait training with assistive devices, ADL training, cognition, and patient education/safety. Medical necessity includes medication management, bowel/bladder management, wound care, DVT prophylaxis.   - Patient will need to be off IV pain medications prior to discharge to [facility].  - Medically active with low BP, pending transfusion.

## 2023-11-17 LAB
ANION GAP SERPL CALC-SCNC: 10 MMOL/L — SIGNIFICANT CHANGE UP (ref 5–17)
ANION GAP SERPL CALC-SCNC: 10 MMOL/L — SIGNIFICANT CHANGE UP (ref 5–17)
BASOPHILS # BLD AUTO: 0.01 K/UL — SIGNIFICANT CHANGE UP (ref 0–0.2)
BASOPHILS # BLD AUTO: 0.01 K/UL — SIGNIFICANT CHANGE UP (ref 0–0.2)
BASOPHILS NFR BLD AUTO: 0.1 % — SIGNIFICANT CHANGE UP (ref 0–2)
BASOPHILS NFR BLD AUTO: 0.1 % — SIGNIFICANT CHANGE UP (ref 0–2)
BUN SERPL-MCNC: 9 MG/DL — SIGNIFICANT CHANGE UP (ref 7–23)
BUN SERPL-MCNC: 9 MG/DL — SIGNIFICANT CHANGE UP (ref 7–23)
CALCIUM SERPL-MCNC: 7.6 MG/DL — LOW (ref 8.4–10.5)
CALCIUM SERPL-MCNC: 7.6 MG/DL — LOW (ref 8.4–10.5)
CHLORIDE SERPL-SCNC: 104 MMOL/L — SIGNIFICANT CHANGE UP (ref 96–108)
CHLORIDE SERPL-SCNC: 104 MMOL/L — SIGNIFICANT CHANGE UP (ref 96–108)
CO2 SERPL-SCNC: 27 MMOL/L — SIGNIFICANT CHANGE UP (ref 22–31)
CO2 SERPL-SCNC: 27 MMOL/L — SIGNIFICANT CHANGE UP (ref 22–31)
CREAT SERPL-MCNC: 0.51 MG/DL — SIGNIFICANT CHANGE UP (ref 0.5–1.3)
CREAT SERPL-MCNC: 0.51 MG/DL — SIGNIFICANT CHANGE UP (ref 0.5–1.3)
EGFR: 114 ML/MIN/1.73M2 — SIGNIFICANT CHANGE UP
EGFR: 114 ML/MIN/1.73M2 — SIGNIFICANT CHANGE UP
EOSINOPHIL # BLD AUTO: 0.11 K/UL — SIGNIFICANT CHANGE UP (ref 0–0.5)
EOSINOPHIL # BLD AUTO: 0.11 K/UL — SIGNIFICANT CHANGE UP (ref 0–0.5)
EOSINOPHIL NFR BLD AUTO: 1.5 % — SIGNIFICANT CHANGE UP (ref 0–6)
EOSINOPHIL NFR BLD AUTO: 1.5 % — SIGNIFICANT CHANGE UP (ref 0–6)
GLUCOSE SERPL-MCNC: 120 MG/DL — HIGH (ref 70–99)
GLUCOSE SERPL-MCNC: 120 MG/DL — HIGH (ref 70–99)
HCT VFR BLD CALC: 25 % — LOW (ref 34.5–45)
HCT VFR BLD CALC: 25 % — LOW (ref 34.5–45)
HGB BLD-MCNC: 8 G/DL — LOW (ref 11.5–15.5)
HGB BLD-MCNC: 8 G/DL — LOW (ref 11.5–15.5)
IMM GRANULOCYTES NFR BLD AUTO: 0.4 % — SIGNIFICANT CHANGE UP (ref 0–0.9)
IMM GRANULOCYTES NFR BLD AUTO: 0.4 % — SIGNIFICANT CHANGE UP (ref 0–0.9)
LYMPHOCYTES # BLD AUTO: 1.01 K/UL — SIGNIFICANT CHANGE UP (ref 1–3.3)
LYMPHOCYTES # BLD AUTO: 1.01 K/UL — SIGNIFICANT CHANGE UP (ref 1–3.3)
LYMPHOCYTES # BLD AUTO: 14 % — SIGNIFICANT CHANGE UP (ref 13–44)
LYMPHOCYTES # BLD AUTO: 14 % — SIGNIFICANT CHANGE UP (ref 13–44)
MCHC RBC-ENTMCNC: 28.5 PG — SIGNIFICANT CHANGE UP (ref 27–34)
MCHC RBC-ENTMCNC: 28.5 PG — SIGNIFICANT CHANGE UP (ref 27–34)
MCHC RBC-ENTMCNC: 32 GM/DL — SIGNIFICANT CHANGE UP (ref 32–36)
MCHC RBC-ENTMCNC: 32 GM/DL — SIGNIFICANT CHANGE UP (ref 32–36)
MCV RBC AUTO: 89 FL — SIGNIFICANT CHANGE UP (ref 80–100)
MCV RBC AUTO: 89 FL — SIGNIFICANT CHANGE UP (ref 80–100)
MONOCYTES # BLD AUTO: 0.71 K/UL — SIGNIFICANT CHANGE UP (ref 0–0.9)
MONOCYTES # BLD AUTO: 0.71 K/UL — SIGNIFICANT CHANGE UP (ref 0–0.9)
MONOCYTES NFR BLD AUTO: 9.8 % — SIGNIFICANT CHANGE UP (ref 2–14)
MONOCYTES NFR BLD AUTO: 9.8 % — SIGNIFICANT CHANGE UP (ref 2–14)
NEUTROPHILS # BLD AUTO: 5.34 K/UL — SIGNIFICANT CHANGE UP (ref 1.8–7.4)
NEUTROPHILS # BLD AUTO: 5.34 K/UL — SIGNIFICANT CHANGE UP (ref 1.8–7.4)
NEUTROPHILS NFR BLD AUTO: 74.2 % — SIGNIFICANT CHANGE UP (ref 43–77)
NEUTROPHILS NFR BLD AUTO: 74.2 % — SIGNIFICANT CHANGE UP (ref 43–77)
NRBC # BLD: 0 /100 WBCS — SIGNIFICANT CHANGE UP (ref 0–0)
NRBC # BLD: 0 /100 WBCS — SIGNIFICANT CHANGE UP (ref 0–0)
PLATELET # BLD AUTO: 178 K/UL — SIGNIFICANT CHANGE UP (ref 150–400)
PLATELET # BLD AUTO: 178 K/UL — SIGNIFICANT CHANGE UP (ref 150–400)
POTASSIUM SERPL-MCNC: 3.5 MMOL/L — SIGNIFICANT CHANGE UP (ref 3.5–5.3)
POTASSIUM SERPL-MCNC: 3.5 MMOL/L — SIGNIFICANT CHANGE UP (ref 3.5–5.3)
POTASSIUM SERPL-SCNC: 3.5 MMOL/L — SIGNIFICANT CHANGE UP (ref 3.5–5.3)
POTASSIUM SERPL-SCNC: 3.5 MMOL/L — SIGNIFICANT CHANGE UP (ref 3.5–5.3)
RBC # BLD: 2.81 M/UL — LOW (ref 3.8–5.2)
RBC # BLD: 2.81 M/UL — LOW (ref 3.8–5.2)
RBC # FLD: 14 % — SIGNIFICANT CHANGE UP (ref 10.3–14.5)
RBC # FLD: 14 % — SIGNIFICANT CHANGE UP (ref 10.3–14.5)
SODIUM SERPL-SCNC: 141 MMOL/L — SIGNIFICANT CHANGE UP (ref 135–145)
SODIUM SERPL-SCNC: 141 MMOL/L — SIGNIFICANT CHANGE UP (ref 135–145)
WBC # BLD: 7.21 K/UL — SIGNIFICANT CHANGE UP (ref 3.8–10.5)
WBC # BLD: 7.21 K/UL — SIGNIFICANT CHANGE UP (ref 3.8–10.5)
WBC # FLD AUTO: 7.21 K/UL — SIGNIFICANT CHANGE UP (ref 3.8–10.5)
WBC # FLD AUTO: 7.21 K/UL — SIGNIFICANT CHANGE UP (ref 3.8–10.5)

## 2023-11-17 PROCEDURE — 99233 SBSQ HOSP IP/OBS HIGH 50: CPT

## 2023-11-17 RX ORDER — POLYETHYLENE GLYCOL 3350 17 G/17G
17 POWDER, FOR SOLUTION ORAL
Refills: 0 | Status: DISCONTINUED | OUTPATIENT
Start: 2023-11-17 | End: 2023-11-22

## 2023-11-17 RX ADMIN — HYDROMORPHONE HYDROCHLORIDE 6 MILLIGRAM(S): 2 INJECTION INTRAMUSCULAR; INTRAVENOUS; SUBCUTANEOUS at 16:11

## 2023-11-17 RX ADMIN — HYDROMORPHONE HYDROCHLORIDE 0.5 MILLIGRAM(S): 2 INJECTION INTRAMUSCULAR; INTRAVENOUS; SUBCUTANEOUS at 18:49

## 2023-11-17 RX ADMIN — HYDROMORPHONE HYDROCHLORIDE 0.5 MILLIGRAM(S): 2 INJECTION INTRAMUSCULAR; INTRAVENOUS; SUBCUTANEOUS at 06:00

## 2023-11-17 RX ADMIN — HYDROMORPHONE HYDROCHLORIDE 0.5 MILLIGRAM(S): 2 INJECTION INTRAMUSCULAR; INTRAVENOUS; SUBCUTANEOUS at 14:23

## 2023-11-17 RX ADMIN — HYDROMORPHONE HYDROCHLORIDE 6 MILLIGRAM(S): 2 INJECTION INTRAMUSCULAR; INTRAVENOUS; SUBCUTANEOUS at 13:05

## 2023-11-17 RX ADMIN — HYDROMORPHONE HYDROCHLORIDE 6 MILLIGRAM(S): 2 INJECTION INTRAMUSCULAR; INTRAVENOUS; SUBCUTANEOUS at 04:35

## 2023-11-17 RX ADMIN — HYDROMORPHONE HYDROCHLORIDE 0.5 MILLIGRAM(S): 2 INJECTION INTRAMUSCULAR; INTRAVENOUS; SUBCUTANEOUS at 13:23

## 2023-11-17 RX ADMIN — HYDROMORPHONE HYDROCHLORIDE 6 MILLIGRAM(S): 2 INJECTION INTRAMUSCULAR; INTRAVENOUS; SUBCUTANEOUS at 17:11

## 2023-11-17 RX ADMIN — HYDROMORPHONE HYDROCHLORIDE 6 MILLIGRAM(S): 2 INJECTION INTRAMUSCULAR; INTRAVENOUS; SUBCUTANEOUS at 08:48

## 2023-11-17 RX ADMIN — HYDROMORPHONE HYDROCHLORIDE 6 MILLIGRAM(S): 2 INJECTION INTRAMUSCULAR; INTRAVENOUS; SUBCUTANEOUS at 21:57

## 2023-11-17 RX ADMIN — HYDROMORPHONE HYDROCHLORIDE 6 MILLIGRAM(S): 2 INJECTION INTRAMUSCULAR; INTRAVENOUS; SUBCUTANEOUS at 07:48

## 2023-11-17 RX ADMIN — METHOCARBAMOL 500 MILLIGRAM(S): 500 TABLET, FILM COATED ORAL at 13:25

## 2023-11-17 RX ADMIN — HYDROMORPHONE HYDROCHLORIDE 0.5 MILLIGRAM(S): 2 INJECTION INTRAMUSCULAR; INTRAVENOUS; SUBCUTANEOUS at 17:49

## 2023-11-17 RX ADMIN — Medication 975 MILLIGRAM(S): at 05:14

## 2023-11-17 RX ADMIN — DULOXETINE HYDROCHLORIDE 60 MILLIGRAM(S): 30 CAPSULE, DELAYED RELEASE ORAL at 12:05

## 2023-11-17 RX ADMIN — HYDROMORPHONE HYDROCHLORIDE 0.5 MILLIGRAM(S): 2 INJECTION INTRAMUSCULAR; INTRAVENOUS; SUBCUTANEOUS at 22:01

## 2023-11-17 RX ADMIN — ENOXAPARIN SODIUM 40 MILLIGRAM(S): 100 INJECTION SUBCUTANEOUS at 21:27

## 2023-11-17 RX ADMIN — HYDROMORPHONE HYDROCHLORIDE 0.5 MILLIGRAM(S): 2 INJECTION INTRAMUSCULAR; INTRAVENOUS; SUBCUTANEOUS at 23:01

## 2023-11-17 RX ADMIN — HYDROMORPHONE HYDROCHLORIDE 0.5 MILLIGRAM(S): 2 INJECTION INTRAMUSCULAR; INTRAVENOUS; SUBCUTANEOUS at 05:14

## 2023-11-17 RX ADMIN — Medication 200 MILLIGRAM(S): at 21:57

## 2023-11-17 RX ADMIN — POLYETHYLENE GLYCOL 3350 17 GRAM(S): 17 POWDER, FOR SOLUTION ORAL at 21:27

## 2023-11-17 RX ADMIN — ONDANSETRON 4 MILLIGRAM(S): 8 TABLET, FILM COATED ORAL at 17:49

## 2023-11-17 RX ADMIN — HYDROMORPHONE HYDROCHLORIDE 6 MILLIGRAM(S): 2 INJECTION INTRAMUSCULAR; INTRAVENOUS; SUBCUTANEOUS at 03:35

## 2023-11-17 RX ADMIN — HYDROMORPHONE HYDROCHLORIDE 0.5 MILLIGRAM(S): 2 INJECTION INTRAMUSCULAR; INTRAVENOUS; SUBCUTANEOUS at 09:07

## 2023-11-17 RX ADMIN — POLYETHYLENE GLYCOL 3350 17 GRAM(S): 17 POWDER, FOR SOLUTION ORAL at 12:06

## 2023-11-17 RX ADMIN — METHOCARBAMOL 500 MILLIGRAM(S): 500 TABLET, FILM COATED ORAL at 05:14

## 2023-11-17 RX ADMIN — HYDROMORPHONE HYDROCHLORIDE 6 MILLIGRAM(S): 2 INJECTION INTRAMUSCULAR; INTRAVENOUS; SUBCUTANEOUS at 20:57

## 2023-11-17 RX ADMIN — Medication 200 MILLIGRAM(S): at 13:25

## 2023-11-17 RX ADMIN — ONDANSETRON 4 MILLIGRAM(S): 8 TABLET, FILM COATED ORAL at 00:14

## 2023-11-17 RX ADMIN — METHOCARBAMOL 500 MILLIGRAM(S): 500 TABLET, FILM COATED ORAL at 21:27

## 2023-11-17 RX ADMIN — Medication 200 MILLIGRAM(S): at 05:14

## 2023-11-17 RX ADMIN — PANTOPRAZOLE SODIUM 40 MILLIGRAM(S): 20 TABLET, DELAYED RELEASE ORAL at 05:14

## 2023-11-17 RX ADMIN — HYDROMORPHONE HYDROCHLORIDE 0.5 MILLIGRAM(S): 2 INJECTION INTRAMUSCULAR; INTRAVENOUS; SUBCUTANEOUS at 10:07

## 2023-11-17 RX ADMIN — Medication 975 MILLIGRAM(S): at 13:23

## 2023-11-17 RX ADMIN — Medication 975 MILLIGRAM(S): at 21:27

## 2023-11-17 RX ADMIN — HYDROMORPHONE HYDROCHLORIDE 6 MILLIGRAM(S): 2 INJECTION INTRAMUSCULAR; INTRAVENOUS; SUBCUTANEOUS at 12:05

## 2023-11-17 RX ADMIN — SENNA PLUS 2 TABLET(S): 8.6 TABLET ORAL at 21:27

## 2023-11-17 RX ADMIN — ONDANSETRON 4 MILLIGRAM(S): 8 TABLET, FILM COATED ORAL at 12:05

## 2023-11-17 NOTE — PROGRESS NOTE ADULT - SUBJECTIVE AND OBJECTIVE BOX
Patient is a 48y old  Female who presents with a chief complaint of Low back pain (17 Nov 2023 15:18)    INTERVAL EVENTS:  tolerating diet, no nausea  no dysuria    SUBJECTIVE:  Patient was seen and examined at bedside.    Review of systems: No CP, dyspnea, nausea or vomiting, dysuria,     Diet, Regular (11-14-23 @ 21:12) [Active]      MEDICATIONS:  MEDICATIONS  (STANDING):  acetaminophen     Tablet .. 975 milliGRAM(s) Oral every 8 hours  chlorhexidine 2% Cloths 1 Application(s) Topical once  DULoxetine 60 milliGRAM(s) Oral daily  enoxaparin Injectable 40 milliGRAM(s) SubCutaneous every 24 hours  fentaNYL   Patch  50 MICROgram(s)/Hr 1 Patch Transdermal every 72 hours  methocarbamol 500 milliGRAM(s) Oral three times a day  ondansetron   Disintegrating Tablet 4 milliGRAM(s) Oral every 6 hours  pantoprazole    Tablet 40 milliGRAM(s) Oral before breakfast  polyethylene glycol 3350 17 Gram(s) Oral <User Schedule>  povidone iodine 5% Nasal Swab 1 Application(s) Both Nostrils once  pregabalin 200 milliGRAM(s) Oral three times a day  senna 2 Tablet(s) Oral at bedtime  triamterene 37.5 mG/hydrochlorothiazide 25 mG Tablet 1 Tablet(s) Oral daily    MEDICATIONS  (PRN):  albuterol    90 MICROgram(s) HFA Inhaler 2 Puff(s) Inhalation every 6 hours PRN Shortness of Breath and/or Wheezing  aluminum hydroxide/magnesium hydroxide/simethicone Suspension 30 milliLiter(s) Oral every 4 hours PRN Dyspepsia  benzocaine/menthol Lozenge 1 Lozenge Oral four times a day PRN Sore Throat  bisacodyl 5 milliGRAM(s) Oral every 12 hours PRN Constipation  bisacodyl Suppository 10 milliGRAM(s) Rectal daily PRN Constipation  diphenhydrAMINE 25 milliGRAM(s) Oral every 4 hours PRN Rash and/or Itching  HYDROmorphone   Tablet 4 milliGRAM(s) Oral every 4 hours PRN Moderate Pain (4 - 6)  HYDROmorphone   Tablet 6 milliGRAM(s) Oral every 4 hours PRN Severe Pain (7 - 10)  HYDROmorphone  Injectable 0.5 milliGRAM(s) IV Push every 3 hours PRN severe breakthrough pain  magnesium hydroxide Suspension 30 milliLiter(s) Oral every 12 hours PRN Constipation  naloxone Injectable 0.1 milliGRAM(s) IV Push every 3 minutes PRN For ANY of the following changes in patient status:  A. RR LESS THAN 10 breaths per minute, B. Oxygen saturation LESS THAN 90%, C. Sedation score of 6  ondansetron Injectable 4 milliGRAM(s) IV Push every 6 hours PRN Nausea and/or Vomiting  ondansetron Injectable 4 milliGRAM(s) IV Push once PRN Nausea      Allergies    shellfish (Hives; Short breath)  diclofenac (Other)    Intolerances        OBJECTIVE:  Vital Signs Last 24 Hrs  T(C): 36.4 (18 Nov 2023 00:18), Max: 36.8 (17 Nov 2023 12:06)  T(F): 97.5 (18 Nov 2023 00:18), Max: 98.2 (17 Nov 2023 12:06)  HR: 76 (18 Nov 2023 00:18) (76 - 85)  BP: 94/63 (18 Nov 2023 00:18) (94/63 - 114/62)  BP(mean): --  RR: 18 (18 Nov 2023 00:18) (18 - 18)  SpO2: 95% (18 Nov 2023 00:18) (94% - 98%)    Parameters below as of 18 Nov 2023 00:18  Patient On (Oxygen Delivery Method): room air      I&O's Summary    16 Nov 2023 07:01  -  17 Nov 2023 07:00  --------------------------------------------------------  IN: 0 mL / OUT: 3365 mL / NET: -3365 mL    17 Nov 2023 07:01  -  18 Nov 2023 06:40  --------------------------------------------------------  IN: 0 mL / OUT: 1285 mL / NET: -1285 mL        PHYSICAL EXAM:  Gen: Reclining in bed at time of exam, appears stated age  HEENT: NCAT, MMM, clear OP  Neck: supple, trachea at midline  CV: RRR, +S1/S2  Pulm: adequate respiratory effort, no increase in work of breathing  Abd: soft, ND  Skin: warm and dry,   Ext: no LE edema   Neuro: AOx3, speaking in full sentences  Psych: affect and behavior appropriate, pleasant at time of interview    LABS:                        8.0    7.21  )-----------( 178      ( 17 Nov 2023 05:30 )             25.0     11-17    141  |  104  |  9   ----------------------------<  120<H>  3.5   |  27  |  0.51    Ca    7.6<L>      17 Nov 2023 05:30          CAPILLARY BLOOD GLUCOSE        Urinalysis Basic - ( 17 Nov 2023 05:30 )    Color: x / Appearance: x / SG: x / pH: x  Gluc: 120 mg/dL / Ketone: x  / Bili: x / Urobili: x   Blood: x / Protein: x / Nitrite: x   Leuk Esterase: x / RBC: x / WBC x   Sq Epi: x / Non Sq Epi: x / Bacteria: x        MICRODATA:      RADIOLOGY/OTHER STUDIES:

## 2023-11-17 NOTE — PROGRESS NOTE ADULT - ASSESSMENT
48F w obesity s/p gastric bypass sx, extensive back pain w spinal surgeries, revision w Dr. Schwab, p/w persistent back pain and RLE sciatica sxs and weakness w ambulation issues, follows w pain mgmt, here for elective PSF w extension of fusion T3-Pelvis, pedicle subtraction osteotomy w Dr. Schwab 11/14    #Post-op state - pain controlled. PPx: SQL. On bowel regimen and incentive spirometer  #Spinal stensosis  --Scheduled robaxin 500 q8, lyrica 200 q8  --PRN: dilaudid 4/6 q4h for mod/severe pain  [ ] increase miralax to BID on 11.17. if still no BM 11.18, add movantik     #HTN - home on triamterene/hctz 37.5/25 -- hold parameters added  #Anemia -7.8 from 8.6 from baseline 12.8.    #Asthma - on albuterol inhaler PRN  #Chronic pain - on cymbalta 60 ER, Fentanyl patch 50mcg q72, lyrica 200 BID, zanaflex 4mg q8   - also on nucynta (tapatendol 75 PRN)  #Obesity - BMi 31.8 - affects all aspects of care    Plan  agree w 1u RBC for symptomatic anemia today - hgb 7.8 from 8.3 -- baseline 12.8  f/u orthostasis    Pain mgmt recs  No NSAIDs d/t gastric bypass hx    High MDM   reviewed hgb, cr, K   IV hydromorphone for pain   discussed plan of care with ortho ACP, rec for bowel regimen    DISPO: Acute rehab

## 2023-11-17 NOTE — PROGRESS NOTE ADULT - SUBJECTIVE AND OBJECTIVE BOX
Ortho Note    Pt seen and examined. Pain is better controlled with PO dilaudid.  Reports "numbness" in RLE is improving from post-op day 1.   Has RLE weakness as well which she states is not new, and was "on and off" pre-op; which seems mildly improved today.  Patient with O2 desaturation overnight while sleeping but is currently saturating adequately on room air.   Denies CP, SOB, N/V, numbness/tingling     Vital Signs Last 24 Hrs  T(C): 36.8 (11-17-23 @ 12:06), Max: 36.8 (11-17-23 @ 12:06)  T(F): 98.2 (11-17-23 @ 12:06), Max: 98.2 (11-17-23 @ 12:06)  HR: 84 (11-17-23 @ 12:06) (79 - 84)  BP: 106/64 (11-17-23 @ 12:06) (106/64 - 106/64)  BP(mean): --  RR: 18 (11-17-23 @ 12:06) (18 - 18)  SpO2: 94% (11-17-23 @ 12:06) (94% - 98%)  AVSS    General: Pt Alert and oriented, NAD  DSG- aquacel x 2 C/D/I, HV x 1 and SHAWNA x 2 in place (right HV removed)  Pulses: +DP BLE, WWP feet  Sensation: diminished in S1 distribution to RLE but improving from prior days; otherwise SILT BLE  Motor: 5/5 EHL/FHL/TA/GS LLE; 4/5 EHL/GS/TA RLE, 3/5 FHL RLE; Right quad/hamstring mildly weaker than left in bed; exam limited to pain  Patient reports this was also baseline at times ("on and off") prior to surgery                          8.0    7.21  )-----------( 178      ( 17 Nov 2023 05:30 )             25.0     11-17    141  |  104  |  9   ----------------------------<  120<H>  3.5   |  27  |  0.51    Ca    7.6<L>      17 Nov 2023 05:30        A/P: 48yFemale POD#3 s/p T4-Pelvis rev PSF, L5 SO and proximal tethering 11/14  - acute blood loss anemia- s/p 3u PRBCs intra-op, 1u on 11/16 with Hgb 8.0 today, continue to monitor drain output and CBC, transfuse if < 7.0 or if symptomatic today  - Pain Control- appreciate pain management recommendations  - DVT ppx: SCDS, LVX 40mg subq daily  - PT, WBS: WBAT, OT consut  - OOB for meal as tolerated, I/S  - bowel regimen  - monitor drains x 3  - remove paulino for TOV when able to tolerate bed mobility or OOB to commode  - post-op xrays when can tolerate prior to discharge  - appreciate internal medicine recs- hold parameters on triamterene/ hctz   - dispo: acute rehab    Ortho Pager [phone]

## 2023-11-17 NOTE — PROGRESS NOTE ADULT - ASSESSMENT
48y F presenting for elective revision PSF with extension of fusion with instrumentation T3-pelvis, pedicle subtraction osteotomy (PSO) L4, tethers, iliac fixation, smith clark osteotomies T3-T10 with Plastics closure with Dr. Schwab, Dr. Gonzalez and Dr. Simpson 11/14    Recommendations:  1.) Tylenol 975mg PO q8h  2.) Lyrica 200mg PO q8h  3.) Robaxin 500mg PO q8h  4.) Continue home fentanyl patch 50mcg q72h ( next due 11/18)  5.) Continue dilaudid 4-6mg PO q4h prn moderate to severe pain  5.) Can receive 0.5mg dilaudid IV bolus q3h prn severe uncontrolled pain    Monitor oxygenation, BP and for signs and symptoms of oversedation  Aggressive bowel regimen  Plan discussed with and approved by Dr. Stanley

## 2023-11-17 NOTE — PROGRESS NOTE ADULT - SUBJECTIVE AND OBJECTIVE BOX
History of Present Illness:   48F with low back pain x  7 years, She says that she has an extensive history of back pain throughout her life and had her first spinal surgery in 2017 in [facility]. She said that after the initial surgery she had 10 screws loosen, and under went a revision with Dr. Schwab a few years later. Today she is having an extension of her fusion for progressive worsening of pain. She says that she had intermittent bilateral sciatica and right lower leg weakness and numbness that has caused her to fall due to not being able to feel the ground she is walking on. She is unable to find comfort and both sitting too long and standing too long provokes pain and numbness/tingling of her lower extremities. She ambulates with a cane at baseline. Despite conservative measure pain persists. She also has intermitted right upper extremity nerve pain, she under went a 1 level ACDF in April 2023.     Denies history of blood clots or seizures. She denies chest pain, shortness of breath, nausea or vomiting today.     Patient currently sees outpatient pain management. She uses fentanyl 50 mcg/hr patch, nucynta 75mg, and pregabalin 200mg for pain relief.     Presented for elective revision PSF with extension of fusion with instrumentation T3-pelvis, pedicle subtraction osteotomy (PSO) L4, tethers, iliac fixation, smith clark osteotomies T3-T10 with Plastics closure with Dr. Schwab, Dr. Gonzalez and Dr. Simpson on 11/14/23.        Patient follows with pain management doctor, Dr. Stanley outpatient who saw and evaluated patient pre-operatively.   Post-op recommendations were made at that time, and included PCA that patient was on overnight.      Patient reports dilaudid is helping her pain when she takes it. Pain is mostly in posterior spine in thoracic region. Feels RLE strength and numbness are improving.

## 2023-11-17 NOTE — PROVIDER CONTACT NOTE (OTHER) - ACTION/TREATMENT ORDERED:
No intervention at this time. Will endorse to day shift RN. MD Angelia stated he will see pt and change dressing.

## 2023-11-18 LAB
ANION GAP SERPL CALC-SCNC: 6 MMOL/L — SIGNIFICANT CHANGE UP (ref 5–17)
ANION GAP SERPL CALC-SCNC: 6 MMOL/L — SIGNIFICANT CHANGE UP (ref 5–17)
BLD GP AB SCN SERPL QL: NEGATIVE — SIGNIFICANT CHANGE UP
BLD GP AB SCN SERPL QL: NEGATIVE — SIGNIFICANT CHANGE UP
BUN SERPL-MCNC: 9 MG/DL — SIGNIFICANT CHANGE UP (ref 7–23)
BUN SERPL-MCNC: 9 MG/DL — SIGNIFICANT CHANGE UP (ref 7–23)
CALCIUM SERPL-MCNC: 7.7 MG/DL — LOW (ref 8.4–10.5)
CALCIUM SERPL-MCNC: 7.7 MG/DL — LOW (ref 8.4–10.5)
CHLORIDE SERPL-SCNC: 105 MMOL/L — SIGNIFICANT CHANGE UP (ref 96–108)
CHLORIDE SERPL-SCNC: 105 MMOL/L — SIGNIFICANT CHANGE UP (ref 96–108)
CO2 SERPL-SCNC: 30 MMOL/L — SIGNIFICANT CHANGE UP (ref 22–31)
CO2 SERPL-SCNC: 30 MMOL/L — SIGNIFICANT CHANGE UP (ref 22–31)
CREAT SERPL-MCNC: 0.52 MG/DL — SIGNIFICANT CHANGE UP (ref 0.5–1.3)
CREAT SERPL-MCNC: 0.52 MG/DL — SIGNIFICANT CHANGE UP (ref 0.5–1.3)
EGFR: 114 ML/MIN/1.73M2 — SIGNIFICANT CHANGE UP
EGFR: 114 ML/MIN/1.73M2 — SIGNIFICANT CHANGE UP
GLUCOSE SERPL-MCNC: 94 MG/DL — SIGNIFICANT CHANGE UP (ref 70–99)
GLUCOSE SERPL-MCNC: 94 MG/DL — SIGNIFICANT CHANGE UP (ref 70–99)
HCT VFR BLD CALC: 25.3 % — LOW (ref 34.5–45)
HCT VFR BLD CALC: 25.3 % — LOW (ref 34.5–45)
HGB BLD-MCNC: 8.2 G/DL — LOW (ref 11.5–15.5)
HGB BLD-MCNC: 8.2 G/DL — LOW (ref 11.5–15.5)
MCHC RBC-ENTMCNC: 29 PG — SIGNIFICANT CHANGE UP (ref 27–34)
MCHC RBC-ENTMCNC: 29 PG — SIGNIFICANT CHANGE UP (ref 27–34)
MCHC RBC-ENTMCNC: 32.4 GM/DL — SIGNIFICANT CHANGE UP (ref 32–36)
MCHC RBC-ENTMCNC: 32.4 GM/DL — SIGNIFICANT CHANGE UP (ref 32–36)
MCV RBC AUTO: 89.4 FL — SIGNIFICANT CHANGE UP (ref 80–100)
MCV RBC AUTO: 89.4 FL — SIGNIFICANT CHANGE UP (ref 80–100)
NRBC # BLD: 0 /100 WBCS — SIGNIFICANT CHANGE UP (ref 0–0)
NRBC # BLD: 0 /100 WBCS — SIGNIFICANT CHANGE UP (ref 0–0)
PLATELET # BLD AUTO: 228 K/UL — SIGNIFICANT CHANGE UP (ref 150–400)
PLATELET # BLD AUTO: 228 K/UL — SIGNIFICANT CHANGE UP (ref 150–400)
POTASSIUM SERPL-MCNC: 3.9 MMOL/L — SIGNIFICANT CHANGE UP (ref 3.5–5.3)
POTASSIUM SERPL-MCNC: 3.9 MMOL/L — SIGNIFICANT CHANGE UP (ref 3.5–5.3)
POTASSIUM SERPL-SCNC: 3.9 MMOL/L — SIGNIFICANT CHANGE UP (ref 3.5–5.3)
POTASSIUM SERPL-SCNC: 3.9 MMOL/L — SIGNIFICANT CHANGE UP (ref 3.5–5.3)
RBC # BLD: 2.83 M/UL — LOW (ref 3.8–5.2)
RBC # BLD: 2.83 M/UL — LOW (ref 3.8–5.2)
RBC # FLD: 14.4 % — SIGNIFICANT CHANGE UP (ref 10.3–14.5)
RBC # FLD: 14.4 % — SIGNIFICANT CHANGE UP (ref 10.3–14.5)
RH IG SCN BLD-IMP: POSITIVE — SIGNIFICANT CHANGE UP
RH IG SCN BLD-IMP: POSITIVE — SIGNIFICANT CHANGE UP
SODIUM SERPL-SCNC: 141 MMOL/L — SIGNIFICANT CHANGE UP (ref 135–145)
SODIUM SERPL-SCNC: 141 MMOL/L — SIGNIFICANT CHANGE UP (ref 135–145)
WBC # BLD: 6.53 K/UL — SIGNIFICANT CHANGE UP (ref 3.8–10.5)
WBC # BLD: 6.53 K/UL — SIGNIFICANT CHANGE UP (ref 3.8–10.5)
WBC # FLD AUTO: 6.53 K/UL — SIGNIFICANT CHANGE UP (ref 3.8–10.5)
WBC # FLD AUTO: 6.53 K/UL — SIGNIFICANT CHANGE UP (ref 3.8–10.5)

## 2023-11-18 PROCEDURE — 99233 SBSQ HOSP IP/OBS HIGH 50: CPT

## 2023-11-18 RX ADMIN — HYDROMORPHONE HYDROCHLORIDE 6 MILLIGRAM(S): 2 INJECTION INTRAMUSCULAR; INTRAVENOUS; SUBCUTANEOUS at 13:33

## 2023-11-18 RX ADMIN — HYDROMORPHONE HYDROCHLORIDE 6 MILLIGRAM(S): 2 INJECTION INTRAMUSCULAR; INTRAVENOUS; SUBCUTANEOUS at 09:19

## 2023-11-18 RX ADMIN — HYDROMORPHONE HYDROCHLORIDE 6 MILLIGRAM(S): 2 INJECTION INTRAMUSCULAR; INTRAVENOUS; SUBCUTANEOUS at 21:29

## 2023-11-18 RX ADMIN — POLYETHYLENE GLYCOL 3350 17 GRAM(S): 17 POWDER, FOR SOLUTION ORAL at 21:27

## 2023-11-18 RX ADMIN — HYDROMORPHONE HYDROCHLORIDE 6 MILLIGRAM(S): 2 INJECTION INTRAMUSCULAR; INTRAVENOUS; SUBCUTANEOUS at 06:07

## 2023-11-18 RX ADMIN — Medication 975 MILLIGRAM(S): at 13:33

## 2023-11-18 RX ADMIN — HYDROMORPHONE HYDROCHLORIDE 0.5 MILLIGRAM(S): 2 INJECTION INTRAMUSCULAR; INTRAVENOUS; SUBCUTANEOUS at 07:26

## 2023-11-18 RX ADMIN — SENNA PLUS 2 TABLET(S): 8.6 TABLET ORAL at 21:29

## 2023-11-18 RX ADMIN — Medication 200 MILLIGRAM(S): at 14:46

## 2023-11-18 RX ADMIN — HYDROMORPHONE HYDROCHLORIDE 0.5 MILLIGRAM(S): 2 INJECTION INTRAMUSCULAR; INTRAVENOUS; SUBCUTANEOUS at 11:15

## 2023-11-18 RX ADMIN — PANTOPRAZOLE SODIUM 40 MILLIGRAM(S): 20 TABLET, DELAYED RELEASE ORAL at 06:19

## 2023-11-18 RX ADMIN — HYDROMORPHONE HYDROCHLORIDE 0.5 MILLIGRAM(S): 2 INJECTION INTRAMUSCULAR; INTRAVENOUS; SUBCUTANEOUS at 10:49

## 2023-11-18 RX ADMIN — DULOXETINE HYDROCHLORIDE 60 MILLIGRAM(S): 30 CAPSULE, DELAYED RELEASE ORAL at 12:18

## 2023-11-18 RX ADMIN — METHOCARBAMOL 500 MILLIGRAM(S): 500 TABLET, FILM COATED ORAL at 05:06

## 2023-11-18 RX ADMIN — POLYETHYLENE GLYCOL 3350 17 GRAM(S): 17 POWDER, FOR SOLUTION ORAL at 12:18

## 2023-11-18 RX ADMIN — ONDANSETRON 4 MILLIGRAM(S): 8 TABLET, FILM COATED ORAL at 00:18

## 2023-11-18 RX ADMIN — HYDROMORPHONE HYDROCHLORIDE 6 MILLIGRAM(S): 2 INJECTION INTRAMUSCULAR; INTRAVENOUS; SUBCUTANEOUS at 10:15

## 2023-11-18 RX ADMIN — HYDROMORPHONE HYDROCHLORIDE 0.5 MILLIGRAM(S): 2 INJECTION INTRAMUSCULAR; INTRAVENOUS; SUBCUTANEOUS at 17:20

## 2023-11-18 RX ADMIN — Medication 200 MILLIGRAM(S): at 07:03

## 2023-11-18 RX ADMIN — Medication 200 MILLIGRAM(S): at 23:52

## 2023-11-18 RX ADMIN — ONDANSETRON 4 MILLIGRAM(S): 8 TABLET, FILM COATED ORAL at 05:06

## 2023-11-18 RX ADMIN — HYDROMORPHONE HYDROCHLORIDE 0.5 MILLIGRAM(S): 2 INJECTION INTRAMUSCULAR; INTRAVENOUS; SUBCUTANEOUS at 17:00

## 2023-11-18 RX ADMIN — HYDROMORPHONE HYDROCHLORIDE 0.5 MILLIGRAM(S): 2 INJECTION INTRAMUSCULAR; INTRAVENOUS; SUBCUTANEOUS at 06:26

## 2023-11-18 RX ADMIN — Medication 975 MILLIGRAM(S): at 21:29

## 2023-11-18 RX ADMIN — Medication 975 MILLIGRAM(S): at 05:06

## 2023-11-18 RX ADMIN — HYDROMORPHONE HYDROCHLORIDE 6 MILLIGRAM(S): 2 INJECTION INTRAMUSCULAR; INTRAVENOUS; SUBCUTANEOUS at 05:07

## 2023-11-18 RX ADMIN — HYDROMORPHONE HYDROCHLORIDE 6 MILLIGRAM(S): 2 INJECTION INTRAMUSCULAR; INTRAVENOUS; SUBCUTANEOUS at 14:33

## 2023-11-18 RX ADMIN — ENOXAPARIN SODIUM 40 MILLIGRAM(S): 100 INJECTION SUBCUTANEOUS at 21:29

## 2023-11-18 RX ADMIN — HYDROMORPHONE HYDROCHLORIDE 6 MILLIGRAM(S): 2 INJECTION INTRAMUSCULAR; INTRAVENOUS; SUBCUTANEOUS at 22:14

## 2023-11-18 RX ADMIN — METHOCARBAMOL 500 MILLIGRAM(S): 500 TABLET, FILM COATED ORAL at 13:33

## 2023-11-18 RX ADMIN — METHOCARBAMOL 500 MILLIGRAM(S): 500 TABLET, FILM COATED ORAL at 21:29

## 2023-11-18 NOTE — PROVIDER CONTACT NOTE (OTHER) - ASSESSMENT
A/O x4, Calm, BP 99/57 HR 88, O2 Sat 88-92% on room air, denies shortness of breath,  in no distress,
Pt asymptomatic
drowsy, able to follow simple commands. neuro intact.
Pt asymptomatic
Pt's right hemovac would not stay to suction. Tubing appears intact. Insertion site still covered with biopatch and tegaderm.
Pt's new right Hemovac still didn't stay to suction. Right upper back drain site is leaking serosanguinous drainage.
Pt's denies dizziness. SBP in the 90's

## 2023-11-18 NOTE — PROVIDER CONTACT NOTE (OTHER) - DATE AND TIME:
15-Nov-2023 01:50
18-Nov-2023 07:29
16-Nov-2023 22:16
17-Nov-2023 05:46
16-Nov-2023 03:49
14-Nov-2023 16:19
17-Nov-2023 23:45

## 2023-11-18 NOTE — PROVIDER CONTACT NOTE (OTHER) - SITUATION
Pt's right Hemovac is not holding suction her left Hemovac put out 240 ml since the start of shift.
s/p T4-Pelvis Revision PSF L5-S1 Proximal Tethering with B/L SHAWNA , B/L GURJIT
Pt due to void after paulino catheter taken out this afternoon. Bladder scan showed 654 ml of urine.
Bladder scan showed 938 ml of urine.
patient s/p revision PSF with 4 drains to self suction.

## 2023-11-18 NOTE — PROVIDER CONTACT NOTE (OTHER) - REASON
Urinary retention
Drain output
Generalized chest pain & B/L shoulder pain
Hemovac Off Suction
Urinary retention

## 2023-11-18 NOTE — PROVIDER CONTACT NOTE (OTHER) - BACKGROUND
47 y/o Female s/p T4-Pelvis rev PSF, L5 SO and proximal tethering
48 Y female s/p T4-pelvis rev PSF, L5 SO and proximal tethering
right hemovac with another 100cc serosangenous output.  during admission, 100cc was emptied from the drian.
48 Y female s/p T-4 - Pelvis rev PSF, L5 S0 and proximal tethering
resting in bed, on cardiac monitor,. IV fluids LR @150ml/hr infusing. Cobb in place.

## 2023-11-18 NOTE — PROGRESS NOTE ADULT - ASSESSMENT
48F w obesity s/p gastric bypass sx, extensive back pain w spinal surgeries, revision w Dr. Schwab, p/w persistent back pain and RLE sciatica sxs and weakness w ambulation issues, follows w pain mgmt, here for elective PSF w extension of fusion T3-Pelvis, pedicle subtraction osteotomy w Dr. Schwab 11/14. Required 1u for Hb 7.8 and symptomatic anemia     #Anemia -7.8 from 8.6 from baseline 12.8.  Hb has been stable 8.2s. No BMs. Otherwise HD stable   -Trend CBC qd     #Urinary Retention requiring paulino     #Post-op state   PPx: SQL. On bowel regimen and incentive spirometer    #Spinal stensosis  --Scheduled robaxin 500 q8, lyrica 200 q8  --PRN: dilaudid 4/6 q4h for mod/severe pain  [ ] increase miralax to BID on 11.17. if still no BM 11.18, add movantik     #HTN - home on triamterene/hctz 37.5/25 -- hold parameters added    #Asthma - on albuterol inhaler PRN  #Chronic pain - on cymbalta 60 ER, Fentanyl patch 50mcg q72, lyrica 200 BID, zanaflex 4mg q8   - also on nucynta (tapatendol 75 PRN)  #Obesity - BMi 31.8 - affects all aspects of care      DISPO: Acute rehab 48F w obesity s/p gastric bypass sx, extensive back pain w spinal surgeries, revision w Dr. Schwab, p/w persistent back pain and RLE sciatica sxs and weakness w ambulation issues, follows w pain mgmt, here for elective PSF w extension of fusion T3-Pelvis, pedicle subtraction osteotomy w Dr. Schwab 11/14. Required 1u for Hb 7.8 and symptomatic anemia     #Anemia -7.8 from 8.6 from baseline 12.8.  Hb has been stable 8.2s. No BMs. Otherwise HD stable   -Trend CBC qd     #Intermittent urinary retention  Cobb ylusill49/17, still requring straight caths  C/w bladder scan q6, SC PVR >250cc     #Post-op state   PPx: SQL. On bowel regimen and incentive spirometer    #Spinal stensosis  --Scheduled robaxin 500 q8, lyrica 200 q8  --PRN: dilaudid 4/6 q4h for mod/severe pain  [ ] increase miralax to BID on 11.17. if still no BM 11.18, add movantik     #HTN - home on triamterene/hctz 37.5/25 -- hold parameters added    #Asthma - on albuterol inhaler PRN  #Chronic pain - on cymbalta 60 ER, Fentanyl patch 50mcg q72, lyrica 200 BID, zanaflex 4mg q8   - also on nucynta (tapatendol 75 PRN)  #Obesity - BMi 31.8 - affects all aspects of care      DISPO: Acute rehab

## 2023-11-18 NOTE — PROGRESS NOTE ADULT - SUBJECTIVE AND OBJECTIVE BOX
Ortho Note    Pt seen and examined on morning rounds. Pt comfortable without complaints, pain controlled.  Denies CP, SOB, N/V, numbness/tingling     Vital Signs Last 24 Hrs  T(C): 36.8 (11-18-23 @ 09:15), Max: 36.8 (11-18-23 @ 09:15)  T(F): 98.2 (11-18-23 @ 09:15), Max: 98.2 (11-18-23 @ 09:15)  HR: 81 (11-18-23 @ 09:15) (76 - 81)  BP: 105/52 (11-18-23 @ 09:15) (105/52 - 107/68)  BP(mean): --  RR: 17 (11-18-23 @ 09:15) (17 - 18)  SpO2: 96% (11-18-23 @ 09:15) (96% - 96%)  I&O's Summary    17 Nov 2023 07:01  -  18 Nov 2023 07:00  --------------------------------------------------------  IN: 0 mL / OUT: 1310 mL / NET: -1310 mL    18 Nov 2023 07:01  -  18 Nov 2023 09:46  --------------------------------------------------------  IN: 0 mL / OUT: 1200 mL / NET: -1200 mL        Physical Exam:  General: Pt Alert and oriented, NAD  DSG- aquacel x 2 C/D/I, HV x 1 and SHAWNA x 2 in place (right HV removed)  Pulses: +DP BLE, WWP feet  Sensation: diminished in S1 distribution to RLE but improving from prior days; otherwise SILT BLE  Motor: 5/5 EHL/FHL/TA/GS LLE; 4/5 EHL/GS/TA RLE, 3/5 FHL RLE; Right quad/hamstring mildly weaker than left in bed; exam limited to pain  Patient reports this was also baseline at times ("on and off") prior to surgery                          8.2    6.53  )-----------( 228      ( 18 Nov 2023 07:37 )             25.3     11-18    141  |  105  |  9   ----------------------------<  94  3.9   |  30  |  0.52    Ca    7.7<L>      18 Nov 2023 07:37        A/P: 48yFemale POD#4 s/p T4-Pelvis rev PSF, L5 SO and proximal tethering 11/14  - acute blood loss anemia- s/p 3u PRBCs intra-op, 1u on 11/16 with Hgb 8.0 today, continue to monitor drain output and CBC, transfuse if < 7.0 or if symptomatic today  - Pain Control- appreciate pain management recommendations  - DVT ppx: SCDS, LVX 40mg subq daily  - PT, WBS: WBAT, OT consut  - OOB for meal as tolerated, I/S  - bowel regimen  - monitor drains x 3  - remove paulino for TOV when able to tolerate bed mobility or OOB to commode  - post-op xrays when can tolerate prior to discharge  - appreciate internal medicine recs- hold parameters on triamterene/ hctz   - dispo: acute rehab    Willie Burleson, PGY-3  Ortho Pager [phone]

## 2023-11-18 NOTE — PROGRESS NOTE ADULT - SUBJECTIVE AND OBJECTIVE BOX
INTERVAL HPI/OVERNIGHT EVENTS:  Patient was seen and examined at bedside. As per nurse and patient, no o/n events, patient resting comfortably. Reports pain worse today, was not able to sit in chair for as long. Patient denies: fever, chills, dizziness, weakness, HA, Changes in vision, CP, palpitations, SOB, cough, N/V/D/C, dysuria, changes in bowel movements, LE edema. ROS otherwise negative.    VITAL SIGNS:  T(F): 98.6 (11-18-23 @ 13:32)  HR: 72 (11-18-23 @ 13:32)  BP: 109/76 (11-18-23 @ 13:32)  RR: 17 (11-18-23 @ 13:32)  SpO2: 100% (11-18-23 @ 13:32)  Wt(kg): --    PHYSICAL EXAM:    Constitutional: WDWN, NAD obese   HEENT: PERRL, EOMI, sclera non-icteric, neck supple, trachea midline, no masses, no JVD, MMM, good dentition  Respiratory: CTA b/l, good air entry b/l, no wheezing, no rhonchi, no rales, without accessory muscle use and no intercostal retractions  Cardiovascular: RRR, normal S1S2, no M/R/G  Gastrointestinal: soft, NTND, no masses palpable, BS normal  Extremities: Warm, well perfused, pulses equal bilateral upper and lower extremities, no edema, no clubbing  Neurological: AAOx3, CN Grossly intact  Skin: Normal temperature, warm, dry  Cobb in place   drain in place minimal dark blood     MEDICATIONS  (STANDING):  acetaminophen     Tablet .. 975 milliGRAM(s) Oral every 8 hours  chlorhexidine 2% Cloths 1 Application(s) Topical once  DULoxetine 60 milliGRAM(s) Oral daily  enoxaparin Injectable 40 milliGRAM(s) SubCutaneous every 24 hours  fentaNYL   Patch  50 MICROgram(s)/Hr 1 Patch Transdermal every 72 hours  methocarbamol 500 milliGRAM(s) Oral three times a day  ondansetron   Disintegrating Tablet 4 milliGRAM(s) Oral every 6 hours  pantoprazole    Tablet 40 milliGRAM(s) Oral before breakfast  polyethylene glycol 3350 17 Gram(s) Oral <User Schedule>  povidone iodine 5% Nasal Swab 1 Application(s) Both Nostrils once  pregabalin 200 milliGRAM(s) Oral three times a day  senna 2 Tablet(s) Oral at bedtime  triamterene 37.5 mG/hydrochlorothiazide 25 mG Tablet 1 Tablet(s) Oral daily    MEDICATIONS  (PRN):  albuterol    90 MICROgram(s) HFA Inhaler 2 Puff(s) Inhalation every 6 hours PRN Shortness of Breath and/or Wheezing  aluminum hydroxide/magnesium hydroxide/simethicone Suspension 30 milliLiter(s) Oral every 4 hours PRN Dyspepsia  benzocaine/menthol Lozenge 1 Lozenge Oral four times a day PRN Sore Throat  bisacodyl 5 milliGRAM(s) Oral every 12 hours PRN Constipation  bisacodyl Suppository 10 milliGRAM(s) Rectal daily PRN Constipation  diphenhydrAMINE 25 milliGRAM(s) Oral every 4 hours PRN Rash and/or Itching  HYDROmorphone   Tablet 4 milliGRAM(s) Oral every 4 hours PRN Moderate Pain (4 - 6)  HYDROmorphone   Tablet 6 milliGRAM(s) Oral every 4 hours PRN Severe Pain (7 - 10)  HYDROmorphone  Injectable 0.5 milliGRAM(s) IV Push every 3 hours PRN severe breakthrough pain  magnesium hydroxide Suspension 30 milliLiter(s) Oral every 12 hours PRN Constipation  naloxone Injectable 0.1 milliGRAM(s) IV Push every 3 minutes PRN For ANY of the following changes in patient status:  A. RR LESS THAN 10 breaths per minute, B. Oxygen saturation LESS THAN 90%, C. Sedation score of 6  ondansetron Injectable 4 milliGRAM(s) IV Push every 6 hours PRN Nausea and/or Vomiting  ondansetron Injectable 4 milliGRAM(s) IV Push once PRN Nausea      Allergies    shellfish (Hives; Short breath)  diclofenac (Other)    Intolerances        LABS:                        8.2    6.53  )-----------( 228      ( 18 Nov 2023 07:37 )             25.3     11-18    141  |  105  |  9   ----------------------------<  94  3.9   |  30  |  0.52    Ca    7.7<L>      18 Nov 2023 07:37        Urinalysis Basic - ( 18 Nov 2023 07:37 )    Color: x / Appearance: x / SG: x / pH: x  Gluc: 94 mg/dL / Ketone: x  / Bili: x / Urobili: x   Blood: x / Protein: x / Nitrite: x   Leuk Esterase: x / RBC: x / WBC x   Sq Epi: x / Non Sq Epi: x / Bacteria: x        RADIOLOGY & ADDITIONAL TESTS:  Reviewed INTERVAL HPI/OVERNIGHT EVENTS:  Patient was seen and examined at bedside. As per nurse and patient, no o/n events, patient resting comfortably. Reports pain worse today, was not able to sit in chair for as long. Required straight cath last night. Patient denies: fever, chills, dizziness, weakness, HA, Changes in vision, CP, palpitations, SOB, cough, N/V/D/C, dysuria, changes in bowel movements, LE edema. ROS otherwise negative.    VITAL SIGNS:  T(F): 98.6 (11-18-23 @ 13:32)  HR: 72 (11-18-23 @ 13:32)  BP: 109/76 (11-18-23 @ 13:32)  RR: 17 (11-18-23 @ 13:32)  SpO2: 100% (11-18-23 @ 13:32)  Wt(kg): --    PHYSICAL EXAM:    Constitutional: WDWN, NAD obese   HEENT: PERRL, EOMI, sclera non-icteric, neck supple, trachea midline, no masses, no JVD, MMM, good dentition  Respiratory: CTA b/l, good air entry b/l, no wheezing, no rhonchi, no rales, without accessory muscle use and no intercostal retractions  Cardiovascular: RRR, normal S1S2, no M/R/G  Gastrointestinal: soft, NTND, no masses palpable, BS normal  Extremities: Warm, well perfused, pulses equal bilateral upper and lower extremities, no edema, no clubbing  Neurological: AAOx3, CN Grossly intact  Skin: Normal temperature, warm, dry  drain in place minimal dark blood     MEDICATIONS  (STANDING):  acetaminophen     Tablet .. 975 milliGRAM(s) Oral every 8 hours  chlorhexidine 2% Cloths 1 Application(s) Topical once  DULoxetine 60 milliGRAM(s) Oral daily  enoxaparin Injectable 40 milliGRAM(s) SubCutaneous every 24 hours  fentaNYL   Patch  50 MICROgram(s)/Hr 1 Patch Transdermal every 72 hours  methocarbamol 500 milliGRAM(s) Oral three times a day  ondansetron   Disintegrating Tablet 4 milliGRAM(s) Oral every 6 hours  pantoprazole    Tablet 40 milliGRAM(s) Oral before breakfast  polyethylene glycol 3350 17 Gram(s) Oral <User Schedule>  povidone iodine 5% Nasal Swab 1 Application(s) Both Nostrils once  pregabalin 200 milliGRAM(s) Oral three times a day  senna 2 Tablet(s) Oral at bedtime  triamterene 37.5 mG/hydrochlorothiazide 25 mG Tablet 1 Tablet(s) Oral daily    MEDICATIONS  (PRN):  albuterol    90 MICROgram(s) HFA Inhaler 2 Puff(s) Inhalation every 6 hours PRN Shortness of Breath and/or Wheezing  aluminum hydroxide/magnesium hydroxide/simethicone Suspension 30 milliLiter(s) Oral every 4 hours PRN Dyspepsia  benzocaine/menthol Lozenge 1 Lozenge Oral four times a day PRN Sore Throat  bisacodyl 5 milliGRAM(s) Oral every 12 hours PRN Constipation  bisacodyl Suppository 10 milliGRAM(s) Rectal daily PRN Constipation  diphenhydrAMINE 25 milliGRAM(s) Oral every 4 hours PRN Rash and/or Itching  HYDROmorphone   Tablet 4 milliGRAM(s) Oral every 4 hours PRN Moderate Pain (4 - 6)  HYDROmorphone   Tablet 6 milliGRAM(s) Oral every 4 hours PRN Severe Pain (7 - 10)  HYDROmorphone  Injectable 0.5 milliGRAM(s) IV Push every 3 hours PRN severe breakthrough pain  magnesium hydroxide Suspension 30 milliLiter(s) Oral every 12 hours PRN Constipation  naloxone Injectable 0.1 milliGRAM(s) IV Push every 3 minutes PRN For ANY of the following changes in patient status:  A. RR LESS THAN 10 breaths per minute, B. Oxygen saturation LESS THAN 90%, C. Sedation score of 6  ondansetron Injectable 4 milliGRAM(s) IV Push every 6 hours PRN Nausea and/or Vomiting  ondansetron Injectable 4 milliGRAM(s) IV Push once PRN Nausea      Allergies    shellfish (Hives; Short breath)  diclofenac (Other)    Intolerances        LABS:                        8.2    6.53  )-----------( 228      ( 18 Nov 2023 07:37 )             25.3     11-18    141  |  105  |  9   ----------------------------<  94  3.9   |  30  |  0.52    Ca    7.7<L>      18 Nov 2023 07:37        Urinalysis Basic - ( 18 Nov 2023 07:37 )    Color: x / Appearance: x / SG: x / pH: x  Gluc: 94 mg/dL / Ketone: x  / Bili: x / Urobili: x   Blood: x / Protein: x / Nitrite: x   Leuk Esterase: x / RBC: x / WBC x   Sq Epi: x / Non Sq Epi: x / Bacteria: x        RADIOLOGY & ADDITIONAL TESTS:  Reviewed

## 2023-11-18 NOTE — PROVIDER CONTACT NOTE (OTHER) - NAME OF MD/NP/PA/DO NOTIFIED:
Deon Galan MD
Deon Galan MD
Willie Burleson MD
Benjy Tapia MD
Willie Burleson MD
Dr. Prerna Soler
[facility], Ortho PA

## 2023-11-19 LAB
ANION GAP SERPL CALC-SCNC: 7 MMOL/L — SIGNIFICANT CHANGE UP (ref 5–17)
ANION GAP SERPL CALC-SCNC: 7 MMOL/L — SIGNIFICANT CHANGE UP (ref 5–17)
BUN SERPL-MCNC: 9 MG/DL — SIGNIFICANT CHANGE UP (ref 7–23)
BUN SERPL-MCNC: 9 MG/DL — SIGNIFICANT CHANGE UP (ref 7–23)
CALCIUM SERPL-MCNC: 8 MG/DL — LOW (ref 8.4–10.5)
CALCIUM SERPL-MCNC: 8 MG/DL — LOW (ref 8.4–10.5)
CHLORIDE SERPL-SCNC: 101 MMOL/L — SIGNIFICANT CHANGE UP (ref 96–108)
CHLORIDE SERPL-SCNC: 101 MMOL/L — SIGNIFICANT CHANGE UP (ref 96–108)
CO2 SERPL-SCNC: 27 MMOL/L — SIGNIFICANT CHANGE UP (ref 22–31)
CO2 SERPL-SCNC: 27 MMOL/L — SIGNIFICANT CHANGE UP (ref 22–31)
CREAT SERPL-MCNC: 0.59 MG/DL — SIGNIFICANT CHANGE UP (ref 0.5–1.3)
CREAT SERPL-MCNC: 0.59 MG/DL — SIGNIFICANT CHANGE UP (ref 0.5–1.3)
EGFR: 110 ML/MIN/1.73M2 — SIGNIFICANT CHANGE UP
EGFR: 110 ML/MIN/1.73M2 — SIGNIFICANT CHANGE UP
GLUCOSE SERPL-MCNC: 105 MG/DL — HIGH (ref 70–99)
GLUCOSE SERPL-MCNC: 105 MG/DL — HIGH (ref 70–99)
HCT VFR BLD CALC: 28.1 % — LOW (ref 34.5–45)
HCT VFR BLD CALC: 28.1 % — LOW (ref 34.5–45)
HGB BLD-MCNC: 8.9 G/DL — LOW (ref 11.5–15.5)
HGB BLD-MCNC: 8.9 G/DL — LOW (ref 11.5–15.5)
MCHC RBC-ENTMCNC: 28.4 PG — SIGNIFICANT CHANGE UP (ref 27–34)
MCHC RBC-ENTMCNC: 28.4 PG — SIGNIFICANT CHANGE UP (ref 27–34)
MCHC RBC-ENTMCNC: 31.7 GM/DL — LOW (ref 32–36)
MCHC RBC-ENTMCNC: 31.7 GM/DL — LOW (ref 32–36)
MCV RBC AUTO: 89.8 FL — SIGNIFICANT CHANGE UP (ref 80–100)
MCV RBC AUTO: 89.8 FL — SIGNIFICANT CHANGE UP (ref 80–100)
NRBC # BLD: 0 /100 WBCS — SIGNIFICANT CHANGE UP (ref 0–0)
NRBC # BLD: 0 /100 WBCS — SIGNIFICANT CHANGE UP (ref 0–0)
PLATELET # BLD AUTO: 328 K/UL — SIGNIFICANT CHANGE UP (ref 150–400)
PLATELET # BLD AUTO: 328 K/UL — SIGNIFICANT CHANGE UP (ref 150–400)
POTASSIUM SERPL-MCNC: 4.3 MMOL/L — SIGNIFICANT CHANGE UP (ref 3.5–5.3)
POTASSIUM SERPL-MCNC: 4.3 MMOL/L — SIGNIFICANT CHANGE UP (ref 3.5–5.3)
POTASSIUM SERPL-SCNC: 4.3 MMOL/L — SIGNIFICANT CHANGE UP (ref 3.5–5.3)
POTASSIUM SERPL-SCNC: 4.3 MMOL/L — SIGNIFICANT CHANGE UP (ref 3.5–5.3)
RBC # BLD: 3.13 M/UL — LOW (ref 3.8–5.2)
RBC # BLD: 3.13 M/UL — LOW (ref 3.8–5.2)
RBC # FLD: 14.6 % — HIGH (ref 10.3–14.5)
RBC # FLD: 14.6 % — HIGH (ref 10.3–14.5)
SODIUM SERPL-SCNC: 135 MMOL/L — SIGNIFICANT CHANGE UP (ref 135–145)
SODIUM SERPL-SCNC: 135 MMOL/L — SIGNIFICANT CHANGE UP (ref 135–145)
WBC # BLD: 6.72 K/UL — SIGNIFICANT CHANGE UP (ref 3.8–10.5)
WBC # BLD: 6.72 K/UL — SIGNIFICANT CHANGE UP (ref 3.8–10.5)
WBC # FLD AUTO: 6.72 K/UL — SIGNIFICANT CHANGE UP (ref 3.8–10.5)
WBC # FLD AUTO: 6.72 K/UL — SIGNIFICANT CHANGE UP (ref 3.8–10.5)

## 2023-11-19 PROCEDURE — 99232 SBSQ HOSP IP/OBS MODERATE 35: CPT

## 2023-11-19 RX ORDER — NALOXEGOL OXALATE 12.5 MG/1
12.5 TABLET, FILM COATED ORAL DAILY
Refills: 0 | Status: DISCONTINUED | OUTPATIENT
Start: 2023-11-19 | End: 2023-11-22

## 2023-11-19 RX ADMIN — Medication 200 MILLIGRAM(S): at 14:19

## 2023-11-19 RX ADMIN — Medication 975 MILLIGRAM(S): at 21:34

## 2023-11-19 RX ADMIN — HYDROMORPHONE HYDROCHLORIDE 6 MILLIGRAM(S): 2 INJECTION INTRAMUSCULAR; INTRAVENOUS; SUBCUTANEOUS at 11:40

## 2023-11-19 RX ADMIN — HYDROMORPHONE HYDROCHLORIDE 0.5 MILLIGRAM(S): 2 INJECTION INTRAMUSCULAR; INTRAVENOUS; SUBCUTANEOUS at 01:06

## 2023-11-19 RX ADMIN — Medication 200 MILLIGRAM(S): at 06:39

## 2023-11-19 RX ADMIN — HYDROMORPHONE HYDROCHLORIDE 0.5 MILLIGRAM(S): 2 INJECTION INTRAMUSCULAR; INTRAVENOUS; SUBCUTANEOUS at 19:00

## 2023-11-19 RX ADMIN — NALOXEGOL OXALATE 12.5 MILLIGRAM(S): 12.5 TABLET, FILM COATED ORAL at 14:19

## 2023-11-19 RX ADMIN — HYDROMORPHONE HYDROCHLORIDE 0.5 MILLIGRAM(S): 2 INJECTION INTRAMUSCULAR; INTRAVENOUS; SUBCUTANEOUS at 08:52

## 2023-11-19 RX ADMIN — SENNA PLUS 2 TABLET(S): 8.6 TABLET ORAL at 21:34

## 2023-11-19 RX ADMIN — HYDROMORPHONE HYDROCHLORIDE 0.5 MILLIGRAM(S): 2 INJECTION INTRAMUSCULAR; INTRAVENOUS; SUBCUTANEOUS at 18:32

## 2023-11-19 RX ADMIN — PANTOPRAZOLE SODIUM 40 MILLIGRAM(S): 20 TABLET, DELAYED RELEASE ORAL at 06:23

## 2023-11-19 RX ADMIN — ENOXAPARIN SODIUM 40 MILLIGRAM(S): 100 INJECTION SUBCUTANEOUS at 21:35

## 2023-11-19 RX ADMIN — POLYETHYLENE GLYCOL 3350 17 GRAM(S): 17 POWDER, FOR SOLUTION ORAL at 12:12

## 2023-11-19 RX ADMIN — Medication 975 MILLIGRAM(S): at 14:19

## 2023-11-19 RX ADMIN — METHOCARBAMOL 500 MILLIGRAM(S): 500 TABLET, FILM COATED ORAL at 14:20

## 2023-11-19 RX ADMIN — Medication 975 MILLIGRAM(S): at 07:00

## 2023-11-19 RX ADMIN — HYDROMORPHONE HYDROCHLORIDE 0.5 MILLIGRAM(S): 2 INJECTION INTRAMUSCULAR; INTRAVENOUS; SUBCUTANEOUS at 01:25

## 2023-11-19 RX ADMIN — ONDANSETRON 4 MILLIGRAM(S): 8 TABLET, FILM COATED ORAL at 06:23

## 2023-11-19 RX ADMIN — DULOXETINE HYDROCHLORIDE 60 MILLIGRAM(S): 30 CAPSULE, DELAYED RELEASE ORAL at 12:12

## 2023-11-19 RX ADMIN — Medication 975 MILLIGRAM(S): at 06:14

## 2023-11-19 RX ADMIN — HYDROMORPHONE HYDROCHLORIDE 6 MILLIGRAM(S): 2 INJECTION INTRAMUSCULAR; INTRAVENOUS; SUBCUTANEOUS at 06:14

## 2023-11-19 RX ADMIN — Medication 200 MILLIGRAM(S): at 21:34

## 2023-11-19 RX ADMIN — HYDROMORPHONE HYDROCHLORIDE 6 MILLIGRAM(S): 2 INJECTION INTRAMUSCULAR; INTRAVENOUS; SUBCUTANEOUS at 19:30

## 2023-11-19 RX ADMIN — HYDROMORPHONE HYDROCHLORIDE 6 MILLIGRAM(S): 2 INJECTION INTRAMUSCULAR; INTRAVENOUS; SUBCUTANEOUS at 15:30

## 2023-11-19 RX ADMIN — METHOCARBAMOL 500 MILLIGRAM(S): 500 TABLET, FILM COATED ORAL at 21:33

## 2023-11-19 RX ADMIN — HYDROMORPHONE HYDROCHLORIDE 6 MILLIGRAM(S): 2 INJECTION INTRAMUSCULAR; INTRAVENOUS; SUBCUTANEOUS at 16:32

## 2023-11-19 RX ADMIN — HYDROMORPHONE HYDROCHLORIDE 6 MILLIGRAM(S): 2 INJECTION INTRAMUSCULAR; INTRAVENOUS; SUBCUTANEOUS at 10:44

## 2023-11-19 RX ADMIN — HYDROMORPHONE HYDROCHLORIDE 6 MILLIGRAM(S): 2 INJECTION INTRAMUSCULAR; INTRAVENOUS; SUBCUTANEOUS at 20:30

## 2023-11-19 RX ADMIN — HYDROMORPHONE HYDROCHLORIDE 0.5 MILLIGRAM(S): 2 INJECTION INTRAMUSCULAR; INTRAVENOUS; SUBCUTANEOUS at 09:15

## 2023-11-19 RX ADMIN — METHOCARBAMOL 500 MILLIGRAM(S): 500 TABLET, FILM COATED ORAL at 06:14

## 2023-11-19 RX ADMIN — HYDROMORPHONE HYDROCHLORIDE 6 MILLIGRAM(S): 2 INJECTION INTRAMUSCULAR; INTRAVENOUS; SUBCUTANEOUS at 07:00

## 2023-11-19 NOTE — PROGRESS NOTE ADULT - ASSESSMENT
48F w obesity s/p gastric bypass sx, extensive back pain w spinal surgeries, revision w Dr. Schwab, p/w persistent back pain and RLE sciatica sxs and weakness w ambulation issues, follows w pain mgmt, here for elective PSF w extension of fusion T3-Pelvis, pedicle subtraction osteotomy w Dr. Schwab 11/14. Required 1u for Hb 7.8 and symptomatic anemia,     #Anemia -7.8 from 8.6 from baseline 12.8.  Hb has been stable 8.2s. No BMs. Otherwise HD stable   -Trend CBC qd     #urinary retention  Cobb [id number] but replaced 11/18 due to large vol retentions.       #Post-op state   PPx: SQL. On bowel regimen and incentive spirometer    #Spinal stensosis  --Scheduled robaxin 500 q8, lyrica 200 q8  --PRN: dilaudid 4/6 q4h for mod/severe pain  [ ] increase miralax to BID on 11.17. if still no BM 11.18, add movantik     #HTN - home on triamterene/hctz 37.5/25 -- hold parameters added    #Asthma - on albuterol inhaler PRN  #Chronic pain - on cymbalta 60 ER, Fentanyl patch 50mcg q72, lyrica 200 BID, zanaflex 4mg q8   - also on nucynta (tapatendol 75 PRN)  #Obesity - BMi 31.8 - affects all aspects of care      DISPO: Acute rehab

## 2023-11-19 NOTE — CONSULT NOTE ADULT - ASSESSMENT
47 yo female with an extensive long history of back pain and corrective surgeries, now with urinary retention requiring a Cobb Catheter. Some of the medications she is currently taking Methocarbamol, Lyrica and the opioids do/ can contribute to urinary retention.  Consider leaving the Cobb catheter in place for a few days to rest the bladder or  CIC 4-6 times.  Only dc Cobb when patient is ambulating and having regular bowel movements.

## 2023-11-19 NOTE — PROGRESS NOTE ADULT - SUBJECTIVE AND OBJECTIVE BOX
Ortho Note    Pt comfortable without complaints, pain controlled  Denies CP, SOB, N/V, numbness/tingling. Paulino placed ovn for urinary retention    Vital Signs Last 24 Hrs  T(C): 36.8 (11-19-23 @ 08:49), Max: 36.8 (11-19-23 @ 08:49)  T(F): 98.3 (11-19-23 @ 08:49), Max: 98.3 (11-19-23 @ 08:49)  HR: 76 (11-19-23 @ 09:36) (76 - 78)  BP: 127/69 (11-19-23 @ 09:36) (102/59 - 127/69)  BP(mean): --  RR: 15 (11-19-23 @ 08:49) (15 - 16)  SpO2: 99% (11-19-23 @ 08:49) (97% - 99%)  I&O's Summary    18 Nov 2023 07:01  -  19 Nov 2023 07:00  --------------------------------------------------------  IN: 1050 mL / OUT: 3945 mL / NET: -2895 mL      Physical Exam:  General: Pt Alert and oriented, NAD  DSG- aquacel x 2 C/D/I, HV x 1 and SHAWNA x 2 in place (right HV removed)  Pulses: +DP BLE, WWP feet  Sensation: diminished in S1 distribution to RLE but improving from prior days; otherwise SILT BLE  Motor: 5/5 EHL/FHL/TA/GS LLE; 4/5 EHL/GS/TA RLE, 3/5 FHL RLE; Right quad/hamstring mildly weaker than left in bed; exam limited to pain  Patient reports this was also baseline at times ("on and off") prior to surgery                          8.2    6.53  )-----------( 228      ( 18 Nov 2023 07:37 )             25.3     11-18    141  |  105  |  9   ----------------------------<  94  3.9   |  30  |  0.52    Ca    7.7<L>      18 Nov 2023 07:37        A/P: 48yFemale POD#4 s/p T4-Pelvis rev PSF, L5 SO and proximal tethering 11/14  - acute blood loss anemia- s/p 3u PRBCs intra-op, 1u on 11/16 with Hgb 8.0 today, continue to monitor drain output and CBC, transfuse if < 7.0 or if symptomatic today  - Pain Control- appreciate pain management recommendations  - DVT ppx: SCDS, LVX 40mg subq daily  - PT, WBS: WBAT, OT consut  - OOB for meal as tolerated, I/S  - bowel regimen  - monitor drains x 3  - remove paulino for TOV when able to tolerate bed mobility or OOB to commode  - post-op xrays when can tolerate prior to discharge  - appreciate internal medicine recs- hold parameters on triamterene/ hctz   - dispo: acute rehab  -F/u urology recs

## 2023-11-19 NOTE — CONSULT NOTE ADULT - SUBJECTIVE AND OBJECTIVE BOX
This is a 49yo female with an extensive history of lower back pain for the past 7 years.   She stated  that she has an long history of back pain throughout her life and had her first spinal surgery in .  After the initial surgery she had 10 screws loosen, and underwent a revision with Dr. Schwab a few years later. She was now admitted for an extension of her fusion for progressive worsening of her  pain. She also has intermittent sciatica bilaterally, and right lower leg weakness and numbness that has caused her to fall due to not being able to feel the ground she is walking on. She has been  unable to find comfort with both sitting and standing too long, because it provokes pain and numbness/tingling of her lower extremities. She ambulates with a cane at baseline. Despite conservative measure pain persists. She also has intermitted right upper extremity nerve pain, she under went a 1 level ACDF in 2023.     She currently sees a pain management Dr as an outpatient, and uses fentanyl 50 mcg/hr patch, nucynta 75mg, and pregabalin 200mg for pain relief.     On  she underwent a pedicle subtraction osteotomy, lumbar spine, posterior approach.  A Cobb catheter was placed post op and later removed once the she was able to ambulate.  In the days to follow she required a straight catheter and then eventually they placed a Cobb () and 950cc drained.    Urology has been consulted for urinary retention    PAST MEDICAL & SURGICAL HISTORY:  Asthma  Scoliosis (and kyphoscoliosis), idiopathic  Lumbar stenosis with neurogenic claudication  Spondylolisthesis  Bilateral sciatica  History of anemia  Varicose veins of both lower extremities  HTN (hypertension)  S/P  section X3; ,,   Gastric bypass status for obesity-  Varicose vein ligation- b/l thigh  History of hip replacement-left  History of bunionectomy-b/l  History of cervical spinal surgery  History of lumbosacral spine surgery+ rods/screw  H/O medial meniscus repair of left knee    Home Medications:  Benadryl 25 mg oral capsule: 1 cap(s) orally once a day (at bedtime) (2023 08:33)  Cymbalta 60 mg oral delayed release capsule: 1 cap(s) orally once a day (2023 07:34)  Dyazide 25 mg-37.5 mg oral capsule: 1 cap(s) orally once a day (2023 07:34)  fentaNYL 50 mcg/hr transdermal film, extended release: 1 film(s) transdermal every 72 hours (2023 10:56)  Lyrica CR: 200 milligram(s) orally 2 times a day (2023 07:34)  Nucynta 75 mg oral tablet: 1 tab(s) orally prn (2023 07:34)  omeprazole 40 mg oral delayed release capsule: 1 cap(s) orally once a day (2023 07:34)  Proventil 90 mcg/inh inhalation aerosol: inhaled prn (2023 07:34)  tiZANidine 4 mg oral tablet: orally 3 times a day (2023 07:34)      MEDICATIONS  (STANDING):  acetaminophen     Tablet .. 975 milliGRAM(s) Oral every 8 hours  chlorhexidine 2% Cloths 1 Application(s) Topical once  DULoxetine 60 milliGRAM(s) Oral daily  enoxaparin Injectable 40 milliGRAM(s) SubCutaneous every 24 hours  fentaNYL   Patch  50 MICROgram(s)/Hr 1 Patch Transdermal every 72 hours  methocarbamol 500 milliGRAM(s) Oral three times a day  naloxegol 12.5 milliGRAM(s) Oral daily  ondansetron   Disintegrating Tablet 4 milliGRAM(s) Oral every 6 hours  pantoprazole    Tablet 40 milliGRAM(s) Oral before breakfast  polyethylene glycol 3350 17 Gram(s) Oral <User Schedule>  povidone iodine 5% Nasal Swab 1 Application(s) Both Nostrils once  pregabalin 200 milliGRAM(s) Oral three times a day  senna 2 Tablet(s) Oral at bedtime  triamterene 37.5 mG/hydrochlorothiazide 25 mG Tablet 1 Tablet(s) Oral daily    MEDICATIONS  (PRN):  albuterol    90 MICROgram(s) HFA Inhaler 2 Puff(s) Inhalation every 6 hours PRN Shortness of Breath and/or Wheezing  aluminum hydroxide/magnesium hydroxide/simethicone Suspension 30 milliLiter(s) Oral every 4 hours PRN Dyspepsia  benzocaine/menthol Lozenge 1 Lozenge Oral four times a day PRN Sore Throat  bisacodyl 5 milliGRAM(s) Oral every 12 hours PRN Constipation  bisacodyl Suppository 10 milliGRAM(s) Rectal daily PRN Constipation  diphenhydrAMINE 25 milliGRAM(s) Oral every 4 hours PRN Rash and/or Itching  HYDROmorphone   Tablet 4 milliGRAM(s) Oral every 4 hours PRN Moderate Pain (4 - 6)  HYDROmorphone   Tablet 6 milliGRAM(s) Oral every 4 hours PRN Severe Pain (7 - 10)  HYDROmorphone  Injectable 0.5 milliGRAM(s) IV Push every 3 hours PRN severe breakthrough pain  magnesium hydroxide Suspension 30 milliLiter(s) Oral every 12 hours PRN Constipation  naloxone Injectable 0.1 milliGRAM(s) IV Push every 3 minutes PRN For ANY of the following changes in patient status:  A. RR LESS THAN 10 breaths per minute, B. Oxygen saturation LESS THAN 90%, C. Sedation score of 6  ondansetron Injectable 4 milliGRAM(s) IV Push once PRN Nausea  ondansetron Injectable 4 milliGRAM(s) IV Push every 6 hours PRN Nausea and/or Vomiting    Vital Signs Last 24 Hrs  T(C): 36.8 (2023 14:18), Max: 37.9 (2023 21:22)  T(F): 98.2 (2023 14:18), Max: 100.3 (2023 21:22)  HR: 89 (2023 14:18) (76 - 89)  BP: 114/57 (2023 14:18) (102/59 - 127/69)  BP(mean): --  RR: 18 (2023 14:18) (15 - 18)  SpO2: 98% (2023 14:18) (95% - 99%)    Parameters below as of 2023 08:49  Patient On (Oxygen Delivery Method): room air                          8.9    6.72  )-----------( 328      ( 2023 16:37 )             28.1   -    135  |  101  |  9   ----------------------------<  105<H>  4.3   |  27  |  0.59    Ca    8.0<L>      2023 16:37    Urinalysis Basic - ( 2023 16:37 )    Color: x / Appearance: x / SG: x / pH: x  Gluc: 105 mg/dL / Ketone: x  / Bili: x / Urobili: x   Blood: x / Protein: x / Nitrite: x   Leuk Esterase: x / RBC: x / WBC x   Sq Epi: x / Non Sq Epi: x / Bacteria: x

## 2023-11-20 LAB
ANION GAP SERPL CALC-SCNC: 5 MMOL/L — SIGNIFICANT CHANGE UP (ref 5–17)
ANION GAP SERPL CALC-SCNC: 5 MMOL/L — SIGNIFICANT CHANGE UP (ref 5–17)
BUN SERPL-MCNC: 9 MG/DL — SIGNIFICANT CHANGE UP (ref 7–23)
BUN SERPL-MCNC: 9 MG/DL — SIGNIFICANT CHANGE UP (ref 7–23)
CALCIUM SERPL-MCNC: 7.9 MG/DL — LOW (ref 8.4–10.5)
CALCIUM SERPL-MCNC: 7.9 MG/DL — LOW (ref 8.4–10.5)
CHLORIDE SERPL-SCNC: 105 MMOL/L — SIGNIFICANT CHANGE UP (ref 96–108)
CHLORIDE SERPL-SCNC: 105 MMOL/L — SIGNIFICANT CHANGE UP (ref 96–108)
CO2 SERPL-SCNC: 27 MMOL/L — SIGNIFICANT CHANGE UP (ref 22–31)
CO2 SERPL-SCNC: 27 MMOL/L — SIGNIFICANT CHANGE UP (ref 22–31)
CREAT SERPL-MCNC: 0.47 MG/DL — LOW (ref 0.5–1.3)
CREAT SERPL-MCNC: 0.47 MG/DL — LOW (ref 0.5–1.3)
EGFR: 117 ML/MIN/1.73M2 — SIGNIFICANT CHANGE UP
EGFR: 117 ML/MIN/1.73M2 — SIGNIFICANT CHANGE UP
GLUCOSE SERPL-MCNC: 103 MG/DL — HIGH (ref 70–99)
GLUCOSE SERPL-MCNC: 103 MG/DL — HIGH (ref 70–99)
HCT VFR BLD CALC: 25 % — LOW (ref 34.5–45)
HCT VFR BLD CALC: 25 % — LOW (ref 34.5–45)
HGB BLD-MCNC: 8 G/DL — LOW (ref 11.5–15.5)
HGB BLD-MCNC: 8 G/DL — LOW (ref 11.5–15.5)
MCHC RBC-ENTMCNC: 28.5 PG — SIGNIFICANT CHANGE UP (ref 27–34)
MCHC RBC-ENTMCNC: 28.5 PG — SIGNIFICANT CHANGE UP (ref 27–34)
MCHC RBC-ENTMCNC: 32 GM/DL — SIGNIFICANT CHANGE UP (ref 32–36)
MCHC RBC-ENTMCNC: 32 GM/DL — SIGNIFICANT CHANGE UP (ref 32–36)
MCV RBC AUTO: 89 FL — SIGNIFICANT CHANGE UP (ref 80–100)
MCV RBC AUTO: 89 FL — SIGNIFICANT CHANGE UP (ref 80–100)
NRBC # BLD: 0 /100 WBCS — SIGNIFICANT CHANGE UP (ref 0–0)
NRBC # BLD: 0 /100 WBCS — SIGNIFICANT CHANGE UP (ref 0–0)
PLATELET # BLD AUTO: 301 K/UL — SIGNIFICANT CHANGE UP (ref 150–400)
PLATELET # BLD AUTO: 301 K/UL — SIGNIFICANT CHANGE UP (ref 150–400)
POTASSIUM SERPL-MCNC: 4 MMOL/L — SIGNIFICANT CHANGE UP (ref 3.5–5.3)
POTASSIUM SERPL-MCNC: 4 MMOL/L — SIGNIFICANT CHANGE UP (ref 3.5–5.3)
POTASSIUM SERPL-SCNC: 4 MMOL/L — SIGNIFICANT CHANGE UP (ref 3.5–5.3)
POTASSIUM SERPL-SCNC: 4 MMOL/L — SIGNIFICANT CHANGE UP (ref 3.5–5.3)
RBC # BLD: 2.81 M/UL — LOW (ref 3.8–5.2)
RBC # BLD: 2.81 M/UL — LOW (ref 3.8–5.2)
RBC # FLD: 14.3 % — SIGNIFICANT CHANGE UP (ref 10.3–14.5)
RBC # FLD: 14.3 % — SIGNIFICANT CHANGE UP (ref 10.3–14.5)
SODIUM SERPL-SCNC: 137 MMOL/L — SIGNIFICANT CHANGE UP (ref 135–145)
SODIUM SERPL-SCNC: 137 MMOL/L — SIGNIFICANT CHANGE UP (ref 135–145)
WBC # BLD: 6.07 K/UL — SIGNIFICANT CHANGE UP (ref 3.8–10.5)
WBC # BLD: 6.07 K/UL — SIGNIFICANT CHANGE UP (ref 3.8–10.5)
WBC # FLD AUTO: 6.07 K/UL — SIGNIFICANT CHANGE UP (ref 3.8–10.5)
WBC # FLD AUTO: 6.07 K/UL — SIGNIFICANT CHANGE UP (ref 3.8–10.5)

## 2023-11-20 PROCEDURE — 99232 SBSQ HOSP IP/OBS MODERATE 35: CPT | Mod: GC

## 2023-11-20 PROCEDURE — 99233 SBSQ HOSP IP/OBS HIGH 50: CPT

## 2023-11-20 RX ORDER — KETOROLAC TROMETHAMINE 30 MG/ML
15 SYRINGE (ML) INJECTION EVERY 6 HOURS
Refills: 0 | Status: DISCONTINUED | OUTPATIENT
Start: 2023-11-20 | End: 2023-11-21

## 2023-11-20 RX ORDER — MORPHINE SULFATE 50 MG/1
30 CAPSULE, EXTENDED RELEASE ORAL EVERY 4 HOURS
Refills: 0 | Status: DISCONTINUED | OUTPATIENT
Start: 2023-11-20 | End: 2023-11-22

## 2023-11-20 RX ORDER — MORPHINE SULFATE 50 MG/1
15 CAPSULE, EXTENDED RELEASE ORAL EVERY 4 HOURS
Refills: 0 | Status: DISCONTINUED | OUTPATIENT
Start: 2023-11-20 | End: 2023-11-22

## 2023-11-20 RX ORDER — MORPHINE SULFATE 50 MG/1
15 CAPSULE, EXTENDED RELEASE ORAL EVERY 4 HOURS
Refills: 0 | Status: DISCONTINUED | OUTPATIENT
Start: 2023-11-20 | End: 2023-11-20

## 2023-11-20 RX ADMIN — PANTOPRAZOLE SODIUM 40 MILLIGRAM(S): 20 TABLET, DELAYED RELEASE ORAL at 05:51

## 2023-11-20 RX ADMIN — Medication 200 MILLIGRAM(S): at 06:22

## 2023-11-20 RX ADMIN — Medication 975 MILLIGRAM(S): at 13:37

## 2023-11-20 RX ADMIN — MORPHINE SULFATE 30 MILLIGRAM(S): 50 CAPSULE, EXTENDED RELEASE ORAL at 15:00

## 2023-11-20 RX ADMIN — HYDROMORPHONE HYDROCHLORIDE 0.5 MILLIGRAM(S): 2 INJECTION INTRAMUSCULAR; INTRAVENOUS; SUBCUTANEOUS at 09:26

## 2023-11-20 RX ADMIN — MORPHINE SULFATE 30 MILLIGRAM(S): 50 CAPSULE, EXTENDED RELEASE ORAL at 14:11

## 2023-11-20 RX ADMIN — Medication 975 MILLIGRAM(S): at 05:51

## 2023-11-20 RX ADMIN — Medication 15 MILLIGRAM(S): at 17:53

## 2023-11-20 RX ADMIN — ONDANSETRON 4 MILLIGRAM(S): 8 TABLET, FILM COATED ORAL at 13:37

## 2023-11-20 RX ADMIN — POLYETHYLENE GLYCOL 3350 17 GRAM(S): 17 POWDER, FOR SOLUTION ORAL at 13:37

## 2023-11-20 RX ADMIN — Medication 200 MILLIGRAM(S): at 22:48

## 2023-11-20 RX ADMIN — ENOXAPARIN SODIUM 40 MILLIGRAM(S): 100 INJECTION SUBCUTANEOUS at 21:34

## 2023-11-20 RX ADMIN — DULOXETINE HYDROCHLORIDE 60 MILLIGRAM(S): 30 CAPSULE, DELAYED RELEASE ORAL at 13:37

## 2023-11-20 RX ADMIN — METHOCARBAMOL 500 MILLIGRAM(S): 500 TABLET, FILM COATED ORAL at 13:37

## 2023-11-20 RX ADMIN — ONDANSETRON 4 MILLIGRAM(S): 8 TABLET, FILM COATED ORAL at 17:53

## 2023-11-20 RX ADMIN — HYDROMORPHONE HYDROCHLORIDE 4 MILLIGRAM(S): 2 INJECTION INTRAMUSCULAR; INTRAVENOUS; SUBCUTANEOUS at 05:54

## 2023-11-20 RX ADMIN — HYDROMORPHONE HYDROCHLORIDE 0.5 MILLIGRAM(S): 2 INJECTION INTRAMUSCULAR; INTRAVENOUS; SUBCUTANEOUS at 10:08

## 2023-11-20 RX ADMIN — Medication 15 MILLIGRAM(S): at 18:27

## 2023-11-20 RX ADMIN — METHOCARBAMOL 500 MILLIGRAM(S): 500 TABLET, FILM COATED ORAL at 05:51

## 2023-11-20 RX ADMIN — Medication 975 MILLIGRAM(S): at 14:06

## 2023-11-20 RX ADMIN — MORPHINE SULFATE 30 MILLIGRAM(S): 50 CAPSULE, EXTENDED RELEASE ORAL at 22:15

## 2023-11-20 RX ADMIN — NALOXEGOL OXALATE 12.5 MILLIGRAM(S): 12.5 TABLET, FILM COATED ORAL at 13:37

## 2023-11-20 RX ADMIN — Medication 975 MILLIGRAM(S): at 21:34

## 2023-11-20 RX ADMIN — ONDANSETRON 4 MILLIGRAM(S): 8 TABLET, FILM COATED ORAL at 05:51

## 2023-11-20 RX ADMIN — Medication 200 MILLIGRAM(S): at 14:57

## 2023-11-20 RX ADMIN — METHOCARBAMOL 500 MILLIGRAM(S): 500 TABLET, FILM COATED ORAL at 21:34

## 2023-11-20 RX ADMIN — HYDROMORPHONE HYDROCHLORIDE 4 MILLIGRAM(S): 2 INJECTION INTRAMUSCULAR; INTRAVENOUS; SUBCUTANEOUS at 06:50

## 2023-11-20 RX ADMIN — MORPHINE SULFATE 30 MILLIGRAM(S): 50 CAPSULE, EXTENDED RELEASE ORAL at 21:34

## 2023-11-20 NOTE — DIETITIAN INITIAL EVALUATION ADULT - PROBLEM SELECTOR PLAN 1
Admit to Orthopedic Service for elective revision PSF with extension of fusion with instrumentation T3-pelvis, pedicle subtraction osteotomy (PSO) L4, tethers, iliac fixation, smith clark osteotomies T3-T10 with Plastics closure    Medically cleared and optimized for surgery by Dr. Mohseni

## 2023-11-20 NOTE — DIETITIAN INITIAL EVALUATION ADULT - ADD RECOMMEND
1. Continue with current diet order (regular diet)  2. Encourage pt to meet nutritional needs as able  3. Monitor PO intakes, trend weights (weekly), monitor skin integrity, monitor labs (electrolytes, CMP), monitor GI function  4. Encourage adherence to diet education (reinforce as able)  5. Pain and bowel regimen per team   6. Will continue to assess/honor preferences as able   7. Align nutrition interventions with goals of care at all times

## 2023-11-20 NOTE — DIETITIAN INITIAL EVALUATION ADULT - PERTINENT MEDS FT
MEDICATIONS  (STANDING):  acetaminophen     Tablet .. 975 milliGRAM(s) Oral every 8 hours  chlorhexidine 2% Cloths 1 Application(s) Topical once  DULoxetine 60 milliGRAM(s) Oral daily  enoxaparin Injectable 40 milliGRAM(s) SubCutaneous every 24 hours  fentaNYL   Patch  50 MICROgram(s)/Hr 1 Patch Transdermal every 72 hours  ketorolac   Injectable 15 milliGRAM(s) IV Push every 6 hours  methocarbamol 500 milliGRAM(s) Oral three times a day  naloxegol 12.5 milliGRAM(s) Oral daily  ondansetron   Disintegrating Tablet 4 milliGRAM(s) Oral every 6 hours  pantoprazole    Tablet 40 milliGRAM(s) Oral before breakfast  polyethylene glycol 3350 17 Gram(s) Oral <User Schedule>  povidone iodine 5% Nasal Swab 1 Application(s) Both Nostrils once  pregabalin 200 milliGRAM(s) Oral three times a day  senna 2 Tablet(s) Oral at bedtime  triamterene 37.5 mG/hydrochlorothiazide 25 mG Tablet 1 Tablet(s) Oral daily    MEDICATIONS  (PRN):  albuterol    90 MICROgram(s) HFA Inhaler 2 Puff(s) Inhalation every 6 hours PRN Shortness of Breath and/or Wheezing  aluminum hydroxide/magnesium hydroxide/simethicone Suspension 30 milliLiter(s) Oral every 4 hours PRN Dyspepsia  benzocaine/menthol Lozenge 1 Lozenge Oral four times a day PRN Sore Throat  bisacodyl 5 milliGRAM(s) Oral every 12 hours PRN Constipation  bisacodyl Suppository 10 milliGRAM(s) Rectal daily PRN Constipation  diphenhydrAMINE 25 milliGRAM(s) Oral every 4 hours PRN Rash and/or Itching  magnesium hydroxide Suspension 30 milliLiter(s) Oral every 12 hours PRN Constipation  morphine  IR 30 milliGRAM(s) Oral every 4 hours PRN Severe Pain (7 - 10)  morphine  IR 15 milliGRAM(s) Oral every 4 hours PRN Moderate Pain (4 - 6)  naloxone Injectable 0.1 milliGRAM(s) IV Push every 3 minutes PRN For ANY of the following changes in patient status:  A. RR LESS THAN 10 breaths per minute, B. Oxygen saturation LESS THAN 90%, C. Sedation score of 6  ondansetron Injectable 4 milliGRAM(s) IV Push every 6 hours PRN Nausea and/or Vomiting  ondansetron Injectable 4 milliGRAM(s) IV Push once PRN Nausea

## 2023-11-20 NOTE — DIETITIAN INITIAL EVALUATION ADULT - PERSON TAUGHT/METHOD
Pt amenable to education; RD provided education in regards to the importance of adequate macro and micronutrients, as well as hydration to support ADLs, maintain energy levels and overall functional/nutritional status. General healthful education provided. Nutrient-dense foods promoted. RD discussed pt's elevated nutrient needs related to postoperative demands, emphasizing the role that protein plays in the healing process. Pt was receptive and verbalized understanding./verbal instruction/patient instructed

## 2023-11-20 NOTE — PROGRESS NOTE ADULT - ASSESSMENT
HPI     DLS: 12/7/2016  Pt states distance va has decreased with CL. Denies f/f  Pt states he does sleep in CL and replace every month     No gtts     Myopic astig ou --wearing OASYS ASTIG CLs      Last edited by Parviz Jones, OD on 12/15/2017  9:25 AM. (History)        ROS     Negative for: Constitutional, Gastrointestinal, Neurological, Skin,   Genitourinary, Musculoskeletal, HENT, Endocrine, Cardiovascular, Eyes,   Respiratory, Psychiatric, Allergic/Imm, Heme/Lymph    Last edited by Parviz Jones, OD on 12/15/2017  9:25 AM. (History)        Assessment /Plan     For exam results, see Encounter Report.    Myopic astigmatism of both eyes    Contact lens overwear of both eyes      1. Good fit w AOSYS ASTIG CLs, but having mild K edema due to EWSCL overwear.  Discussed in detail risks of EW!!!    PLAN:    1. Wrote new spex/CLRx  2. Start Daily Wear schedule.  NO SLEEPING IN CONTACT LENSES. Clean and disinfect nightly.  exchange monthly.    3. rtc 1 yr                  48 year old female with chronic back pain in the setting of spinal stenosis with functional deficits following elective revision PSF with extension of fusion with instrumentation T3-pelvis, pedicle subtraction osteotomy (PSO) L4, tethers, iliac fixation, smith clark osteotomies T3-T10 with Plastics closure.    Spinal stenosis s/p decompression/fusion  Orthostasis  Anemia  Urinary retention  Constipation    PLAN / RECOMMENDATIONS:     Rehab / Mobilization:   - Initial therapy assessment: [X] PT  [X] OT   - Continue skilled therapy while admitted to prevent secondary complications of immobility focus on transfer training, bed mobility, progressive ambulation, and equipment evaluation.   - Educated patient and family members on post-acute rehabilitation. Discussed anticipated rehab course.  - Encourage OOB throughout the day with staff or OOB in chair with meals     Low BP/ Orthostasis  - s/p blood transfusion, stable  - Encouraged HOB elevation to at least 30-40 deg to prevent orthostasis   - Educated patient on bed-level exercises to perform when not working with therapy including ankle pumps to promote venous return  - Recommend use of abdominal binder and TEDs compression stockings for additional BP support.     Bracing/Splinting:   - None indicated at this time      Pain Management:   - appreciate pain management involvement and recommendations  - Trial warm compress to upper back/trapezius for myofascial pain    GI/ Bowel: +Constipation  - Continue to monitor bowel patterns. Agree with current bowel regimen.  - Trial of Movantik per medicine     / Bladder:  - +Cobb inserted by urology.   - Optimize bowel regimen and mobilize    DVT Prophylaxis:   -SCDs, chemoprophylaxis per primary team  - On Lovenox    Disposition:  Acute Inpatient Rehabilitation  - Patient has significant functional impairments and would benefit from continued rehabilitation in the ACUTE inpatient rehab setting. She has both medical and functional needs appropriate for acute inpatient rehabilitation and would benefit from comprehensive interdisciplinary care, including a physiatrist, rehabilitation nursing, PT, OT and case management/social work. We anticipate that she would be able to tolerate 3 hours of PT/OT per day for 5 to 6 days per week to focus on mobility, transfers, gait training with assistive devices, ADL training, cognition, and patient education/safety. Medical necessity includes medication management, bowel/bladder management, wound care, DVT prophylaxis.

## 2023-11-20 NOTE — DIETITIAN INITIAL EVALUATION ADULT - NSICDXPASTMEDICALHX_GEN_ALL_CORE_FT
PAST MEDICAL HISTORY:  Asthma     Bilateral sciatica     History of anemia     HTN (hypertension)     Lumbar stenosis with neurogenic claudication     Scoliosis (and kyphoscoliosis), idiopathic     Spondylisthesis     Varicose veins of both lower extremities

## 2023-11-20 NOTE — DIETITIAN INITIAL EVALUATION ADULT - PERTINENT LABORATORY DATA
11-20    137  |  105  |  9   ----------------------------<  103<H>  4.0   |  27  |  0.47<L>    Ca    7.9<L>      20 Nov 2023 05:30

## 2023-11-20 NOTE — PROGRESS NOTE ADULT - SUBJECTIVE AND OBJECTIVE BOX
INTERVAL HPI/OVERNIGHT EVENTS: david o/n    SUBJECTIVE: Patient seen and examined at bedside.   seated in chair - feels pain moderately controlled and localized in low back around incision site. +flatus wo BM. No N/V/Abd pain. No LH/dizziness, chest pain, dyspnea. denies any fevers.   Cobb placed overnight.     OBJECTIVE:    VITAL SIGNS:  ICU Vital Signs Last 24 Hrs  T(C): 37.1 (20 Nov 2023 05:04), Max: 37.2 (19 Nov 2023 18:01)  T(F): 98.7 (20 Nov 2023 05:04), Max: 99 (19 Nov 2023 18:01)  HR: 75 (20 Nov 2023 13:25) (74 - 85)  BP: 129/63 (20 Nov 2023 13:25) (92/51 - 129/63)  BP(mean): --  ABP: --  ABP(mean): --  RR: 14 (20 Nov 2023 05:04) (14 - 16)  SpO2: 100% (20 Nov 2023 05:04) (96% - 100%)    O2 Parameters below as of 20 Nov 2023 05:04  Patient On (Oxygen Delivery Method): room air              11-19 @ 07:01  -  11-20 @ 07:00  --------------------------------------------------------  IN: 1120 mL / OUT: 3057.5 mL / NET: -1937.5 mL      CAPILLARY BLOOD GLUCOSE          PHYSICAL EXAM:  GEN: female in NAD on RA  HEENT: NC/AT, MMM  CV: RRR, nml S1S2, no murmurs  PULM: nml effort, CTAB  ABD: Soft, non-distended, NABS, non-tender  : Cobb catheter in place  NEURO  A/O x3, moving all extremities, 5/5 in BLE. 3 drains in place. Sensation intact  PSYCH: Appropriate    MEDICATIONS:  MEDICATIONS  (STANDING):  acetaminophen     Tablet .. 975 milliGRAM(s) Oral every 8 hours  chlorhexidine 2% Cloths 1 Application(s) Topical once  DULoxetine 60 milliGRAM(s) Oral daily  enoxaparin Injectable 40 milliGRAM(s) SubCutaneous every 24 hours  fentaNYL   Patch  50 MICROgram(s)/Hr 1 Patch Transdermal every 72 hours  ketorolac   Injectable 15 milliGRAM(s) IV Push every 6 hours  methocarbamol 500 milliGRAM(s) Oral three times a day  naloxegol 12.5 milliGRAM(s) Oral daily  ondansetron   Disintegrating Tablet 4 milliGRAM(s) Oral every 6 hours  pantoprazole    Tablet 40 milliGRAM(s) Oral before breakfast  polyethylene glycol 3350 17 Gram(s) Oral <User Schedule>  povidone iodine 5% Nasal Swab 1 Application(s) Both Nostrils once  pregabalin 200 milliGRAM(s) Oral three times a day  senna 2 Tablet(s) Oral at bedtime  triamterene 37.5 mG/hydrochlorothiazide 25 mG Tablet 1 Tablet(s) Oral daily    MEDICATIONS  (PRN):  albuterol    90 MICROgram(s) HFA Inhaler 2 Puff(s) Inhalation every 6 hours PRN Shortness of Breath and/or Wheezing  aluminum hydroxide/magnesium hydroxide/simethicone Suspension 30 milliLiter(s) Oral every 4 hours PRN Dyspepsia  benzocaine/menthol Lozenge 1 Lozenge Oral four times a day PRN Sore Throat  bisacodyl 5 milliGRAM(s) Oral every 12 hours PRN Constipation  bisacodyl Suppository 10 milliGRAM(s) Rectal daily PRN Constipation  diphenhydrAMINE 25 milliGRAM(s) Oral every 4 hours PRN Rash and/or Itching  magnesium hydroxide Suspension 30 milliLiter(s) Oral every 12 hours PRN Constipation  morphine  IR 15 milliGRAM(s) Oral every 4 hours PRN Moderate Pain (4 - 6)  morphine  IR 30 milliGRAM(s) Oral every 4 hours PRN Severe Pain (7 - 10)  naloxone Injectable 0.1 milliGRAM(s) IV Push every 3 minutes PRN For ANY of the following changes in patient status:  A. RR LESS THAN 10 breaths per minute, B. Oxygen saturation LESS THAN 90%, C. Sedation score of 6  ondansetron Injectable 4 milliGRAM(s) IV Push every 6 hours PRN Nausea and/or Vomiting  ondansetron Injectable 4 milliGRAM(s) IV Push once PRN Nausea      ALLERGIES:  Allergies    shellfish (Hives; Short breath)  diclofenac (Other)    Intolerances        LABS:                        8.0    6.07  )-----------( 301      ( 20 Nov 2023 05:30 )             25.0     11-20    137  |  105  |  9   ----------------------------<  103<H>  4.0   |  27  |  0.47<L>    Ca    7.9<L>      20 Nov 2023 05:30        Urinalysis Basic - ( 20 Nov 2023 05:30 )    Color: x / Appearance: x / SG: x / pH: x  Gluc: 103 mg/dL / Ketone: x  / Bili: x / Urobili: x   Blood: x / Protein: x / Nitrite: x   Leuk Esterase: x / RBC: x / WBC x   Sq Epi: x / Non Sq Epi: x / Bacteria: x        RADIOLOGY & ADDITIONAL TESTS: Reviewed.

## 2023-11-20 NOTE — CONSULT NOTE ADULT - SUBJECTIVE AND OBJECTIVE BOX
Patient seen at bedside today  She reports that her pain is controlled on her current regimen - but she received 6mg q4h hydromorphone + 0.5mg iv hydromorphone x 2 in the past 24h  At home she takes the followinmg q6h nucynta, confirmed on nys   tizanidine  cymbalta   pregabalin    HPI:  48F with low back pain x  7 years, She says that she has an extensive history of back pain throughout her life and had her first spinal surgery in 2017 in [facility]. She said that after the initial surgery she had 10 screws loosen, and under went a revision with Dr. Schwab a few years later. Today she is having an extension of her fusion for progressive worsening of pain. She says that she had intermittent bilateral sciatica and right lower leg weakness and numbness that has caused her to fall due to not being able to feel the ground she is walking on. She is unable to find comfort and both sitting too long and standing too long provokes pain and numbness/tingling of her lower extremities. She ambulates with a cane at baseline. Despite conservative measure pain persists. She also has intermitted right upper extremity nerve pain, she under went a 1 level ACDF in 2023.     Denies history of blood clots or seizures. She denies chest pain, shortness of breath, nausea or vomiting today.     Patient currently sees outpatient pain management. She uses fentanyl 50 mcg/hr patch, nucynta 75mg, and pregabalin 200mg for pain relief.     Presents for elective revision PSF with extension of fusion with instrumentation T3-pelvis, pedicle subtraction osteotomy (PSO) L4, tethers, iliac fixation, smith clark osteotomies T3-T10 with Plastics closure with Dr. Schwab, Dr. Gonzalez and Dr. Simpson.  (2023 11:29)      PAST MEDICAL & SURGICAL HISTORY:  Asthma  Scoliosis (and kyphoscoliosis), idiopathic  Lumbar stenosis with neurogenic claudication  Spondylisthesis  Bilateral sciatica  History of anemia  Varicose veins of both lower extremities  HTN (hypertension)  S/P  section X3; ,,   Gastric bypass status for obesity   H/O varicose vein ligation b/l thigh  History of hip replacement left  History of bunionectomy b/l  History of cervical spinal surgery  History of lumbosacral spine surgery + rods/screw  H/O medial meniscus repair of left knee    FAMILY HISTORY:  Family history of aortic aneurysm (Father)  Father    Family history of pneumonia (Mother)  Mother    Family history of ovarian cancer (Grandparent)  Grandmother-maternal    Family history of myocardial infarction (Grandparent)  Grandfather-paternal    Family history of myocardial infarction (Grandparent)  Grandmother-paternal        SOCIAL HISTORY:  [ ] Denies Smoking, Alcohol, or Drug Use    HOME MEDICATIONS:   Please refer to initial HNP    PAIN HOME MEDICATIONS:    Allergies    shellfish (Hives; Short breath)  diclofenac (Other)    Intolerances        PAIN MEDICATIONS:  acetaminophen     Tablet .. 975 milliGRAM(s) Oral every 8 hours  diphenhydrAMINE 25 milliGRAM(s) Oral every 4 hours PRN  DULoxetine 60 milliGRAM(s) Oral daily  fentaNYL   Patch  50 MICROgram(s)/Hr 1 Patch Transdermal every 72 hours  HYDROmorphone   Tablet 4 milliGRAM(s) Oral every 4 hours PRN  HYDROmorphone   Tablet 6 milliGRAM(s) Oral every 4 hours PRN  HYDROmorphone  Injectable 0.5 milliGRAM(s) IV Push every 3 hours PRN  methocarbamol 500 milliGRAM(s) Oral three times a day  ondansetron   Disintegrating Tablet 4 milliGRAM(s) Oral every 6 hours  ondansetron Injectable 4 milliGRAM(s) IV Push every 6 hours PRN  ondansetron Injectable 4 milliGRAM(s) IV Push once PRN  pregabalin 200 milliGRAM(s) Oral three times a day    Heme:  enoxaparin Injectable 40 milliGRAM(s) SubCutaneous every 24 hours    Antibiotics:    Cardiovascular:  triamterene 37.5 mG/hydrochlorothiazide 25 mG Tablet 1 Tablet(s) Oral daily    GI:  aluminum hydroxide/magnesium hydroxide/simethicone Suspension 30 milliLiter(s) Oral every 4 hours PRN  bisacodyl 5 milliGRAM(s) Oral every 12 hours PRN  bisacodyl Suppository 10 milliGRAM(s) Rectal daily PRN  magnesium hydroxide Suspension 30 milliLiter(s) Oral every 12 hours PRN  naloxegol 12.5 milliGRAM(s) Oral daily  pantoprazole    Tablet 40 milliGRAM(s) Oral before breakfast  polyethylene glycol 3350 17 Gram(s) Oral <User Schedule>  senna 2 Tablet(s) Oral at bedtime    Endocrine:    All Other Medications:  benzocaine/menthol Lozenge 1 Lozenge Oral four times a day PRN  chlorhexidine 2% Cloths 1 Application(s) Topical once  naloxone Injectable 0.1 milliGRAM(s) IV Push every 3 minutes PRN  povidone iodine 5% Nasal Swab 1 Application(s) Both Nostrils once      Vital Signs Last 24 Hrs  T(C): 37.1 (2023 05:04), Max: 37.2 (2023 18:01)  T(F): 98.7 (2023 05:04), Max: 99 (2023 18:01)  HR: 74 (2023 05:04) (74 - 89)  BP: 99/56 (2023 05:04) (92/51 - 115/66)  BP(mean): --  RR: 14 (2023 05:04) (14 - 18)  SpO2: 100% (2023 05:04) (96% - 100%)    Parameters below as of 2023 05:04  Patient On (Oxygen Delivery Method): room air        LABS:                        8.0    6.07  )-----------( 301      ( 2023 05:30 )             25.0     11-20    137  |  105  |  9   ----------------------------<  103<H>  4.0   |  27  |  0.47<L>    Ca    7.9<L>      2023 05:30        Urinalysis Basic - ( 2023 05:30 )    Color: x / Appearance: x / SG: x / pH: x  Gluc: 103 mg/dL / Ketone: x  / Bili: x / Urobili: x   Blood: x / Protein: x / Nitrite: x   Leuk Esterase: x / RBC: x / WBC x   Sq Epi: x / Non Sq Epi: x / Bacteria: x        RADIOLOGY:    Drug Screen:   Patient seen at bedside today  She reports that her pain is controlled on her current regimen - but she received 6mg q4h hydromorphone + 0.5mg iv hydromorphone x 2 in the past 24h  At home she takes the followinmg q6h nucynta, confirmed on nys   tizanidine  cymbalta   pregabalin    HPI:  48F with low back pain x  7 years, She says that she has an extensive history of back pain throughout her life and had her first spinal surgery in 2017 in [facility]. She said that after the initial surgery she had 10 screws loosen, and under went a revision with Dr. Schwab a few years later. Today she is having an extension of her fusion for progressive worsening of pain. She says that she had intermittent bilateral sciatica and right lower leg weakness and numbness that has caused her to fall due to not being able to feel the ground she is walking on. She is unable to find comfort and both sitting too long and standing too long provokes pain and numbness/tingling of her lower extremities. She ambulates with a cane at baseline. Despite conservative measure pain persists. She also has intermitted right upper extremity nerve pain, she under went a 1 level ACDF in 2023.     Denies history of blood clots or seizures. She denies chest pain, shortness of breath, nausea or vomiting today.     Patient currently sees outpatient pain management. She uses fentanyl 50 mcg/hr patch, nucynta 75mg, and pregabalin 200mg for pain relief.     Presents for elective revision PSF with extension of fusion with instrumentation T3-pelvis, pedicle subtraction osteotomy (PSO) L4, tethers, iliac fixation, smith clark osteotomies T3-T10 with Plastics closure with Dr. Schwab, Dr. Gonzalez and Dr. Simpson.  (2023 11:29)      PAST MEDICAL & SURGICAL HISTORY:  Asthma  Scoliosis (and kyphoscoliosis), idiopathic  Lumbar stenosis with neurogenic claudication  Spondylisthesis  Bilateral sciatica  History of anemia  Varicose veins of both lower extremities  HTN (hypertension)  S/P  section X3; ,,   Gastric bypass status for obesity   H/O varicose vein ligation b/l thigh  History of hip replacement left  History of bunionectomy b/l  History of cervical spinal surgery  History of lumbosacral spine surgery + rods/screw  H/O medial meniscus repair of left knee    FAMILY HISTORY:  Family history of aortic aneurysm (Father)  Father    Family history of pneumonia (Mother)  Mother    Family history of ovarian cancer (Grandparent)  Grandmother-maternal    Family history of myocardial infarction (Grandparent)  Grandfather-paternal    Family history of myocardial infarction (Grandparent)  Grandmother-paternal        SOCIAL HISTORY:  [ ] Denies Smoking, Alcohol, or Drug Use    HOME MEDICATIONS:   Please refer to initial HNP    PAIN HOME MEDICATIONS:    Allergies    shellfish (Hives; Short breath)  diclofenac (Other)    Intolerances        PAIN MEDICATIONS:  acetaminophen     Tablet .. 975 milliGRAM(s) Oral every 8 hours  diphenhydrAMINE 25 milliGRAM(s) Oral every 4 hours PRN  DULoxetine 60 milliGRAM(s) Oral daily  fentaNYL   Patch  50 MICROgram(s)/Hr 1 Patch Transdermal every 72 hours  HYDROmorphone   Tablet 4 milliGRAM(s) Oral every 4 hours PRN  HYDROmorphone   Tablet 6 milliGRAM(s) Oral every 4 hours PRN  HYDROmorphone  Injectable 0.5 milliGRAM(s) IV Push every 3 hours PRN  methocarbamol 500 milliGRAM(s) Oral three times a day  ondansetron   Disintegrating Tablet 4 milliGRAM(s) Oral every 6 hours  ondansetron Injectable 4 milliGRAM(s) IV Push every 6 hours PRN  ondansetron Injectable 4 milliGRAM(s) IV Push once PRN  pregabalin 200 milliGRAM(s) Oral three times a day    Heme:  enoxaparin Injectable 40 milliGRAM(s) SubCutaneous every 24 hours    Antibiotics:    Cardiovascular:  triamterene 37.5 mG/hydrochlorothiazide 25 mG Tablet 1 Tablet(s) Oral daily    GI:  aluminum hydroxide/magnesium hydroxide/simethicone Suspension 30 milliLiter(s) Oral every 4 hours PRN  bisacodyl 5 milliGRAM(s) Oral every 12 hours PRN  bisacodyl Suppository 10 milliGRAM(s) Rectal daily PRN  magnesium hydroxide Suspension 30 milliLiter(s) Oral every 12 hours PRN  naloxegol 12.5 milliGRAM(s) Oral daily  pantoprazole    Tablet 40 milliGRAM(s) Oral before breakfast  polyethylene glycol 3350 17 Gram(s) Oral <User Schedule>  senna 2 Tablet(s) Oral at bedtime    Endocrine:    All Other Medications:  benzocaine/menthol Lozenge 1 Lozenge Oral four times a day PRN  chlorhexidine 2% Cloths 1 Application(s) Topical once  naloxone Injectable 0.1 milliGRAM(s) IV Push every 3 minutes PRN  povidone iodine 5% Nasal Swab 1 Application(s) Both Nostrils once      Vital Signs Last 24 Hrs  T(C): 37.1 (2023 05:04), Max: 37.2 (2023 18:01)  T(F): 98.7 (2023 05:04), Max: 99 (2023 18:01)  HR: 74 (2023 05:04) (74 - 89)  BP: 99/56 (2023 05:04) (92/51 - 115/66)  BP(mean): --  RR: 14 (2023 05:04) (14 - 18)  SpO2: 100% (2023 05:04) (96% - 100%)    Parameters below as of 2023 05:04  Patient On (Oxygen Delivery Method): room air        LABS:                        8.0    6.07  )-----------( 301      ( 2023 05:30 )             25.0     11-20    137  |  105  |  9   ----------------------------<  103<H>  4.0   |  27  |  0.47<L>    Ca    7.9<L>      2023 05:30        Urinalysis Basic - ( 2023 05:30 )    Color: x / Appearance: x / SG: x / pH: x  Gluc: 103 mg/dL / Ketone: x  / Bili: x / Urobili: x   Blood: x / Protein: x / Nitrite: x   Leuk Esterase: x / RBC: x / WBC x   Sq Epi: x / Non Sq Epi: x / Bacteria: x        RADIOLOGY:    Drug Screen:   Patient seen at bedside today  She reports that her pain is controlled on her current regimen - but she received 6mg q4h hydromorphone + 0.5mg iv hydromorphone x 2 in the past 24h  At home she takes the followinmg q6h nucynta, confirmed on nys   tizanidine  cymbalta   pregabalin    HPI:  48F with low back pain x  7 years, She says that she has an extensive history of back pain throughout her life and had her first spinal surgery in 2017 in [facility]. She said that after the initial surgery she had 10 screws loosen, and under went a revision with Dr. Schwab a few years later. Today she is having an extension of her fusion for progressive worsening of pain. She says that she had intermittent bilateral sciatica and right lower leg weakness and numbness that has caused her to fall due to not being able to feel the ground she is walking on. She is unable to find comfort and both sitting too long and standing too long provokes pain and numbness/tingling of her lower extremities. She ambulates with a cane at baseline. Despite conservative measure pain persists. She also has intermitted right upper extremity nerve pain, she under went a 1 level ACDF in 2023.     Denies history of blood clots or seizures. She denies chest pain, shortness of breath, nausea or vomiting today.     Patient currently sees outpatient pain management. She uses fentanyl 50 mcg/hr patch, nucynta 75mg, and pregabalin 200mg for pain relief.     Presents for elective revision PSF with extension of fusion with instrumentation T3-pelvis, pedicle subtraction osteotomy (PSO) L4, tethers, iliac fixation, smith clark osteotomies T3-T10 with Plastics closure with Dr. Schwab, Dr. Gonzalez and Dr. Simpson.  (2023 11:29)      PAST MEDICAL & SURGICAL HISTORY:  Asthma  Scoliosis (and kyphoscoliosis), idiopathic  Lumbar stenosis with neurogenic claudication  Spondylisthesis  Bilateral sciatica  History of anemia  Varicose veins of both lower extremities  HTN (hypertension)  S/P  section X3; ,,   Gastric bypass status for obesity   H/O varicose vein ligation b/l thigh  History of hip replacement left  History of bunionectomy b/l  History of cervical spinal surgery  History of lumbosacral spine surgery + rods/screw  H/O medial meniscus repair of left knee    FAMILY HISTORY:  Family history of aortic aneurysm (Father)  Father    Family history of pneumonia (Mother)  Mother    Family history of ovarian cancer (Grandparent)  Grandmother-maternal    Family history of myocardial infarction (Grandparent)  Grandfather-paternal    Family history of myocardial infarction (Grandparent)  Grandmother-paternal        SOCIAL HISTORY:  [ ] Denies Smoking, Alcohol, or Drug Use    HOME MEDICATIONS:   Please refer to initial HNP    PAIN HOME MEDICATIONS:    Allergies    shellfish (Hives; Short breath)  diclofenac (Other)    Intolerances        PAIN MEDICATIONS:  acetaminophen     Tablet .. 975 milliGRAM(s) Oral every 8 hours  diphenhydrAMINE 25 milliGRAM(s) Oral every 4 hours PRN  DULoxetine 60 milliGRAM(s) Oral daily  fentaNYL   Patch  50 MICROgram(s)/Hr 1 Patch Transdermal every 72 hours  HYDROmorphone   Tablet 4 milliGRAM(s) Oral every 4 hours PRN  HYDROmorphone   Tablet 6 milliGRAM(s) Oral every 4 hours PRN  HYDROmorphone  Injectable 0.5 milliGRAM(s) IV Push every 3 hours PRN  methocarbamol 500 milliGRAM(s) Oral three times a day  ondansetron   Disintegrating Tablet 4 milliGRAM(s) Oral every 6 hours  ondansetron Injectable 4 milliGRAM(s) IV Push every 6 hours PRN  ondansetron Injectable 4 milliGRAM(s) IV Push once PRN  pregabalin 200 milliGRAM(s) Oral three times a day    Heme:  enoxaparin Injectable 40 milliGRAM(s) SubCutaneous every 24 hours    Antibiotics:    Cardiovascular:  triamterene 37.5 mG/hydrochlorothiazide 25 mG Tablet 1 Tablet(s) Oral daily    GI:  aluminum hydroxide/magnesium hydroxide/simethicone Suspension 30 milliLiter(s) Oral every 4 hours PRN  bisacodyl 5 milliGRAM(s) Oral every 12 hours PRN  bisacodyl Suppository 10 milliGRAM(s) Rectal daily PRN  magnesium hydroxide Suspension 30 milliLiter(s) Oral every 12 hours PRN  naloxegol 12.5 milliGRAM(s) Oral daily  pantoprazole    Tablet 40 milliGRAM(s) Oral before breakfast  polyethylene glycol 3350 17 Gram(s) Oral <User Schedule>  senna 2 Tablet(s) Oral at bedtime    Endocrine:    All Other Medications:  benzocaine/menthol Lozenge 1 Lozenge Oral four times a day PRN  chlorhexidine 2% Cloths 1 Application(s) Topical once  naloxone Injectable 0.1 milliGRAM(s) IV Push every 3 minutes PRN  povidone iodine 5% Nasal Swab 1 Application(s) Both Nostrils once      Vital Signs Last 24 Hrs  T(C): 37.1 (2023 05:04), Max: 37.2 (2023 18:01)  T(F): 98.7 (2023 05:04), Max: 99 (2023 18:01)  HR: 74 (2023 05:04) (74 - 89)  BP: 99/56 (2023 05:04) (92/51 - 115/66)  BP(mean): --  RR: 14 (2023 05:04) (14 - 18)  SpO2: 100% (2023 05:04) (96% - 100%)    Parameters below as of 2023 05:04  Patient On (Oxygen Delivery Method): room air        LABS:                        8.0    6.07  )-----------( 301      ( 2023 05:30 )             25.0     11-20    137  |  105  |  9   ----------------------------<  103<H>  4.0   |  27  |  0.47<L>    Ca    7.9<L>      2023 05:30        Urinalysis Basic - ( 2023 05:30 )    Color: x / Appearance: x / SG: x / pH: x  Gluc: 103 mg/dL / Ketone: x  / Bili: x / Urobili: x   Blood: x / Protein: x / Nitrite: x   Leuk Esterase: x / RBC: x / WBC x   Sq Epi: x / Non Sq Epi: x / Bacteria: x        RADIOLOGY:    Drug Screen:

## 2023-11-20 NOTE — DIETITIAN INITIAL EVALUATION ADULT - OTHER INFO
48y Female s/p T4-Pelvis rev PSF, L5 SO and proximal tethering     Pt seen in room for nutrition assessment. Pt reports fair appetite PTA and during hospital stay. As per diet recall PTA: pt endorsed she eats eggs, salads, chicken, pasta sometimes, fruits, vegetables. Currently on regular diet, tolerating fairly well, noted with ~50-75% PO intakes overall. No cultural, Taoism, or ethnic food preferences noted. Noted with a shellfish, severe, allergy. Denies significant wt changes, reports wt stability at current wt. Dosing wt: 185.5 pounds, Ideal body weight: 120 pounds, pt is 155% of ideal body weight. Denies nausea, vomiting, diarrhea, noted with some constipation, last BM on 11/14/23. No edema. Skin: surgical incisions to back (left and right back SHAWNA drains). Monty: 20. No issues chewing or swallowing noted. Decrease in pain from level 8 to level 6. Labs reviewed: low Cr (0.47), elevated serum Glucose (103), low Ca (7.9); RD to continue to monitor trends. Nutritionally pertinent medications/supplements: bisacodyl, MIRALAX, senna, protonix, zofran. RD observed pt with no overt signs of muscle or fat wasting. Based on ASPEN guidelines, pt does not meet criteria for malnutrition at this time. Pt amenable to education; RD provided education in regards to the importance of adequate macro and micronutrients, as well as hydration to support ADLs, maintain energy levels and overall functional/nutritional status. General healthful education provided. Nutrient-dense foods promoted. RD discussed pt's elevated nutrient needs related to postoperative demands, emphasizing the role that protein plays in the healing process. Pt was receptive and verbalized understanding. No additional nutrition-related concerns. Will continue to follow. Additional nutrition recommendations below to follow.

## 2023-11-20 NOTE — PROGRESS NOTE ADULT - SUBJECTIVE AND OBJECTIVE BOX
Ortho Note    Subjective:  Patient seen and examined today   Patient reports chronic pain lumbar spine pain as well as  surgical site pain described as stabbing and burning, with some pain relief from current pain mediation regimen   Denies CP, SOB, N/V, numbness/tingling   Reviewed plan of care with patient at bedside  discussed plan to dc drains, give movantik po and ambulate with pt today  patient reports last bm 6x days ago    Vital Signs Last 24 Hrs  T(C): --  T(F): --  HR: --  BP: --  BP(mean): --  RR: --  SpO2: --  AVSS    Objective:    Physical Exam:  General: Pt Alert and oriented, NAD  DSG- aquacel x2 dressing cdi,  HV x 1 and SHAWNA x 2 in place - all drains dcd 11-  Pulses: +DP BLE, WWP feet  Sensation: diminished in S1 distribution to RLE but improving otherwise silt intact  Motor: 5/5 EHL/FHL/TA/GS LLE; 5/5 EHL/GS/TA RLE, 5/5    abdomen soft non distended  fentanyl 50mcg/hour patch noted to Right scapula       Plan of Care:  A/P: 48yFemale POD#5 s/p T4-Pelvis rev PSF, L5 SO and proximal tethering 11/14  - afebrile   - Pain Control- switch Dilaudid to morphine 15-30mg Q4h prn moderate to severe pain, dc dilaudid IVP, continue fentanyl 50mcg patch, methocarbamol 500mg PO Q8h, lyrica 200mg PO TID  - DVT ppx: lovenox 40mg Subq Q24h  - PT, WBS: WBAT  - appreciate medicine recs  - appreciate pain management recs  - bowel regimen, IS use, PPI - Movantik 25mg PO x1  - paulino placed- appreciate urology recs , continue until patient has a bm and can ambulate   - dispo - [place] pending auth and acceptance, pending auth and acceptance     Ortho Pager [phone]

## 2023-11-20 NOTE — PROGRESS NOTE ADULT - ASSESSMENT
48F with no prior  hx but an extensive long history of back pain and corrective surgeries, now with urinary retention requiring a Paulino Catheter. Some of the medications she is currently taking Methocarbamol, Lyrica and the opioids do/ can contribute to urinary retention.  Paulino draining clear yellow urine. Pt denies ambulation since surgery and no BM since last tuesday.    Recs:  TOV only once pt is ambulating and regular BM  Bowel regimen  If by discharge pt is not ambulating regularly with regular BMs, consider dc with paulino and TOV outpatient  F/u with Dr. Candelaria for retention work up and possible TOV  Discussed with attending

## 2023-11-20 NOTE — PROGRESS NOTE ADULT - SUBJECTIVE AND OBJECTIVE BOX
SUBJECTIVE: Seen and evaluated at bedside. MASON. States she has pain at incision state and denies any ambulation. Denies BM since last Tuesday. Denies any hematuria, fever, chills, flank pain. Denied prior  hx.       MEDICATIONS  (STANDING):  acetaminophen     Tablet .. 975 milliGRAM(s) Oral every 8 hours  chlorhexidine 2% Cloths 1 Application(s) Topical once  DULoxetine 60 milliGRAM(s) Oral daily  enoxaparin Injectable 40 milliGRAM(s) SubCutaneous every 24 hours  fentaNYL   Patch  50 MICROgram(s)/Hr 1 Patch Transdermal every 72 hours  methocarbamol 500 milliGRAM(s) Oral three times a day  naloxegol 12.5 milliGRAM(s) Oral daily  ondansetron   Disintegrating Tablet 4 milliGRAM(s) Oral every 6 hours  pantoprazole    Tablet 40 milliGRAM(s) Oral before breakfast  polyethylene glycol 3350 17 Gram(s) Oral <User Schedule>  povidone iodine 5% Nasal Swab 1 Application(s) Both Nostrils once  pregabalin 200 milliGRAM(s) Oral three times a day  senna 2 Tablet(s) Oral at bedtime  triamterene 37.5 mG/hydrochlorothiazide 25 mG Tablet 1 Tablet(s) Oral daily    MEDICATIONS  (PRN):  albuterol    90 MICROgram(s) HFA Inhaler 2 Puff(s) Inhalation every 6 hours PRN Shortness of Breath and/or Wheezing  aluminum hydroxide/magnesium hydroxide/simethicone Suspension 30 milliLiter(s) Oral every 4 hours PRN Dyspepsia  benzocaine/menthol Lozenge 1 Lozenge Oral four times a day PRN Sore Throat  bisacodyl 5 milliGRAM(s) Oral every 12 hours PRN Constipation  bisacodyl Suppository 10 milliGRAM(s) Rectal daily PRN Constipation  diphenhydrAMINE 25 milliGRAM(s) Oral every 4 hours PRN Rash and/or Itching  HYDROmorphone   Tablet 4 milliGRAM(s) Oral every 4 hours PRN Moderate Pain (4 - 6)  HYDROmorphone   Tablet 6 milliGRAM(s) Oral every 4 hours PRN Severe Pain (7 - 10)  HYDROmorphone  Injectable 0.5 milliGRAM(s) IV Push every 3 hours PRN severe breakthrough pain  magnesium hydroxide Suspension 30 milliLiter(s) Oral every 12 hours PRN Constipation  naloxone Injectable 0.1 milliGRAM(s) IV Push every 3 minutes PRN For ANY of the following changes in patient status:  A. RR LESS THAN 10 breaths per minute, B. Oxygen saturation LESS THAN 90%, C. Sedation score of 6  ondansetron Injectable 4 milliGRAM(s) IV Push every 6 hours PRN Nausea and/or Vomiting  ondansetron Injectable 4 milliGRAM(s) IV Push once PRN Nausea      Vital Signs Last 24 Hrs  T(C): 37.1 (20 Nov 2023 05:04), Max: 37.2 (19 Nov 2023 18:01)  T(F): 98.7 (20 Nov 2023 05:04), Max: 99 (19 Nov 2023 18:01)  HR: 74 (20 Nov 2023 05:04) (74 - 89)  BP: 99/56 (20 Nov 2023 05:04) (92/51 - 127/69)  BP(mean): --  RR: 14 (20 Nov 2023 05:04) (14 - 18)  SpO2: 100% (20 Nov 2023 05:04) (96% - 100%)    Parameters below as of 20 Nov 2023 05:04  Patient On (Oxygen Delivery Method): room air        Physical Exam:  General: NAD, resting comfortably in bed  Pulmonary: Nonlabored breathing, no respiratory distress  Cardiovascular: NSR  : paulino draining clear yellow urine     I&O's Summary    19 Nov 2023 07:01  -  20 Nov 2023 07:00  --------------------------------------------------------  IN: 1120 mL / OUT: 3057.5 mL / NET: -1937.5 mL        LABS:                        8.0    6.07  )-----------( 301      ( 20 Nov 2023 05:30 )             25.0     11-20    137  |  105  |  9   ----------------------------<  103<H>  4.0   |  27  |  0.47<L>    Ca    7.9<L>      20 Nov 2023 05:30        Urinalysis Basic - ( 20 Nov 2023 05:30 )    Color: x / Appearance: x / SG: x / pH: x  Gluc: 103 mg/dL / Ketone: x  / Bili: x / Urobili: x   Blood: x / Protein: x / Nitrite: x   Leuk Esterase: x / RBC: x / WBC x   Sq Epi: x / Non Sq Epi: x / Bacteria: x      CAPILLARY BLOOD GLUCOSE            RADIOLOGY & ADDITIONAL STUDIES:

## 2023-11-20 NOTE — PROGRESS NOTE ADULT - SUBJECTIVE AND OBJECTIVE BOX
INTERVAL COURSE:  Seen and examined at bedside this afternoon prior to therapy session.  Sitting up in chair without lightheadedness/dizziness.   Reports upper back burning type pain radiating to bilateral shoulders.  +constipation: team plans to start movantik  Cobb inserted by urology due to failed TOV  -----------------------------------------------------------------------  FUNCTIONAL PROGRESS:  PT 11/19    Bed Mobility  Bed Mobility Training Rolling/Turning: minimum assist (75% patient effort);  1 person assist  Bed Mobility Training Scooting: minimum assist (75% patient effort)  Bed Mobility Training Supine-to-Sit: minimum assist (75% patient effort);  1 person assist  Bed Mobility Training Limitations: decreased flexibility;  decreased ROM;  decreased strength;  impaired balance;  pain    Sit-Stand Transfer Training  Transfer Training Sit-to-Stand Transfer: minimum assist (75% patient effort);  1 person assist;  weight-bearing as tolerated   rolling walker  Transfer Training Stand-to-Sit Transfer: minimum assist (75% patient effort);  1 person assist;  rolling walker  Sit-to-Stand Transfer Training Transfer Safety Analysis: decreased flexibility;  decreased ROM;  decreased strength;  impaired balance;  pain    Gait Training  Gait Training: minimum assist (75% patient effort);  1 person assist;  rolling walker;  25 feet  Gait Analysis: 3-point gait   decreased flexibility;  decreased ROM;  decreased strength;  pain;  25 feet;  rolling walker      -----------------------------------------------------------------------  REVIEW OF SYSTEMS:  Constitutional - No fever,  No fatigue  HEENT - No vertigo, No neck pain  Neurological - No headaches, +loss of strength, No numbness, No tremors  Skin - No rashes, No lesions   Musculoskeletal - +back pain as above  Bowel - +constipation  Bladder - +urinary retention  Psychiatric - No depression, No anxiety  -----------------------------------------------------------------------  VITALS  T(C): 37.1 (11-20-23 @ 05:04), Max: 37.2 (11-19-23 @ 18:01)  HR: 74 (11-20-23 @ 05:04) (74 - 89)  BP: 99/56 (11-20-23 @ 05:04) (92/51 - 115/66)  RR: 14 (11-20-23 @ 05:04) (14 - 18)  SpO2: 100% (11-20-23 @ 05:04) (96% - 100%)  Wt(kg): --    PHYSICAL EXAM    Constitutional - NAD, Comfortable  HEENT - NCAT  Neck - Supple, No limited ROM  Chest - Breathing comfortably, No Respiratory distress  Cardiovascular - Regular pulse  Abdomen - No visible abdominal distension  Extremities - No deformities of upper or lower limbs. No calf tenderness   Neurologic Exam -                    Motor - No focal deficits. unable to tolerate manual muscle testing due to discomfort. Demonstrates at least 3/5 in upper extremities and 2/5 in lower extremities while in bed. 1/5 for right ankle dorsiflexion     Sensory - decreased to LT in lateral aspect of right leg  Psychiatric - Mood stable, Affect WNL     -----------------------------------------------------------------------    MEDICATIONS   acetaminophen     Tablet .. 975 milliGRAM(s) every 8 hours  albuterol    90 MICROgram(s) HFA Inhaler 2 Puff(s) every 6 hours PRN  aluminum hydroxide/magnesium hydroxide/simethicone Suspension 30 milliLiter(s) every 4 hours PRN  benzocaine/menthol Lozenge 1 Lozenge four times a day PRN  bisacodyl 5 milliGRAM(s) every 12 hours PRN  bisacodyl Suppository 10 milliGRAM(s) daily PRN  chlorhexidine 2% Cloths 1 Application(s) once  diphenhydrAMINE 25 milliGRAM(s) every 4 hours PRN  DULoxetine 60 milliGRAM(s) daily  enoxaparin Injectable 40 milliGRAM(s) every 24 hours  fentaNYL   Patch  50 MICROgram(s)/Hr 1 Patch every 72 hours  ketorolac   Injectable 15 milliGRAM(s) every 6 hours  magnesium hydroxide Suspension 30 milliLiter(s) every 12 hours PRN  methocarbamol 500 milliGRAM(s) three times a day  morphine  IR 15 milliGRAM(s) every 4 hours PRN  morphine  IR 30 milliGRAM(s) every 4 hours PRN  naloxegol 12.5 milliGRAM(s) daily  naloxone Injectable 0.1 milliGRAM(s) every 3 minutes PRN  ondansetron   Disintegrating Tablet 4 milliGRAM(s) every 6 hours  ondansetron Injectable 4 milliGRAM(s) every 6 hours PRN  ondansetron Injectable 4 milliGRAM(s) once PRN  pantoprazole    Tablet 40 milliGRAM(s) before breakfast  polyethylene glycol 3350 17 Gram(s) <User Schedule>  povidone iodine 5% Nasal Swab 1 Application(s) once  pregabalin 200 milliGRAM(s) three times a day  senna 2 Tablet(s) at bedtime  triamterene 37.5 mG/hydrochlorothiazide 25 mG Tablet 1 Tablet(s) daily      RECENT LABS  CBC Full  -  ( 20 Nov 2023 05:30 )  WBC Count : 6.07 K/uL  RBC Count : 2.81 M/uL  Hemoglobin : 8.0 g/dL  Hematocrit : 25.0 %  Platelet Count - Automated : 301 K/uL  Mean Cell Volume : 89.0 fl  Mean Cell Hemoglobin : 28.5 pg  Mean Cell Hemoglobin Concentration : 32.0 gm/dL  Auto Neutrophil # : x  Auto Lymphocyte # : x  Auto Monocyte # : x  Auto Eosinophil # : x  Auto Basophil # : x  Auto Neutrophil % : x  Auto Lymphocyte % : x  Auto Monocyte % : x  Auto Eosinophil % : x  Auto Basophil % : x    11-20    137  |  105  |  9   ----------------------------<  103<H>  4.0   |  27  |  0.47<L>    Ca    7.9<L>      20 Nov 2023 05:30      Urinalysis Basic - ( 20 Nov 2023 05:30 )    Color: x / Appearance: x / SG: x / pH: x  Gluc: 103 mg/dL / Ketone: x  / Bili: x / Urobili: x   Blood: x / Protein: x / Nitrite: x   Leuk Esterase: x / RBC: x / WBC x   Sq Epi: x / Non Sq Epi: x / Bacteria: x        -----------------------------------------------------------------------  IMAGING      -----------------------------------------------------------------------

## 2023-11-20 NOTE — PROGRESS NOTE ADULT - SUBJECTIVE AND OBJECTIVE BOX
Ortho AM Progress Note    able to sleep throughout the night and did not require meds.   Denies CP, SOB, N/V, numbness/tingling.       Vital Signs Last 24 Hrs  T(C): 37.1 (20 Nov 2023 05:04), Max: 37.2 (19 Nov 2023 18:01)  T(F): 98.7 (20 Nov 2023 05:04), Max: 99 (19 Nov 2023 18:01)  HR: 74 (20 Nov 2023 05:04) (74 - 89)  BP: 99/56 (20 Nov 2023 05:04) (92/51 - 127/69)  BP(mean): --  RR: 14 (20 Nov 2023 05:04) (14 - 18)  SpO2: 100% (20 Nov 2023 05:04) (96% - 100%)    Parameters below as of 20 Nov 2023 05:04  Patient On (Oxygen Delivery Method): room air      Physical Exam:  General: Pt Alert and oriented, NAD  DSG- aquacel x 2 C/D/I, HV x 1 and SHAWNA x 2 in place (right HV removed)  Pulses: +DP BLE, WWP feet  Sensation: diminished in S1 distribution to RLE but improving from prior days; otherwise SILT BLE  Motor: 5/5 EHL/FHL/TA/GS LLE; 5/5 EHL/GS/TA RLE,  RLE; Right quad/hamstring mildly weaker than left in bed; exam limited to pain  Patient reports this was also baseline at times ("on and off") prior to surgery                 A/P: 48yFemale POD#5 s/p T4-Pelvis rev PSF, L5 SO and proximal tethering 11/14  - acute blood loss anemia- follow up labs  - Pain Control- appreciate pain management recommendations  - DVT ppx: SCDS, LVX 40mg subq daily  - PT, WBS: WBAT, OT consut  - OOB for meal as tolerated, I/S  - bowel regimen  - monitor drains x 3 - remove all drains today 11/20  - remove paulino for TOV when able to tolerate bed mobility or OOB to commode  - post-op xrays when can tolerate prior to discharge  - appreciate internal medicine recs- hold parameters on triamterene/ hctz   - dispo: acute rehab  -F/u urology recs

## 2023-11-20 NOTE — PROGRESS NOTE ADULT - ASSESSMENT
48F w obesity s/p gastric bypass sx, extensive back pain w spinal surgeries, revision w Dr. Schwab, p/w persistent back pain and RLE sciatica sxs and weakness w ambulation issues, follows w pain mgmt, here for elective PSF w extension of fusion T3-Pelvis, pedicle subtraction osteotomy w Dr. Schwab 11/14, c/b urinary retention w paulino placed 11/19    #Acute urinary retention - paulino placed 11/19. Urology following. Constipation, decreased mobility likely contributing  #Constipation - no BM since prior to sx. +flatus wo BM. Benign abd exam    #Post-op state - pain controlled. PPx: SQL. On bowel regimen and incentive spirometer  #Spinal stenosis  --Scheduled tylenol 975 q8, robaxin 500 q8, lyrica 200 q8  --PRN: morphine 15/30 q4h for mod/severe pain    #HTN - home on triamterene/hctz 37.5/25 -- hold parameters added  #Anemia - 8 today. baseline 12.8.    #Asthma - on albuterol inhaler PRN  #Chronic pain - on cymbalta 60 ER, Fentanyl patch 50mcg q72, lyrica 200 q8   - also on nucynta (tapatendol 75 PRN)  #Obesity - BMi 31.8 - affects all aspects of care    Plan  Agree w movantik for constipation  Would keep paulino until pt passed BM and mobilizing more.   Monitor for orthostasis.     Pain mgmt recs  Caution w NSAIDs d/t gastric bypass hx  Added hold parameters to triamterene - hctz    DISPO: Acute rehab

## 2023-11-20 NOTE — DIETITIAN INITIAL EVALUATION ADULT - WEIGHT (LBS)
120
Pt to follow up in 2 weeks. If not better, then will biopsy at next visit.\\nhas hx of prostate cancer, advised biologics may increase risk of malignancy
Detail Level: Detailed

## 2023-11-20 NOTE — CONSULT NOTE ADULT - ASSESSMENT
48F with chronic low back pain s/p spinal surgery, multiple revisions, and now most recently on 11/14 T4-pelvis revision.    She is on home 75mg q6h [place]cy[place]ta [place] .  75mg tapentadol = 30 MMEs x 4 = 120mg    - continue fentanyl patch 50mcg  - methcarbamol 500mg tid  - acetaminophen 975mg tid standing  - duloxetine 60mg daily  - pregabalin 200mg tid    - start morphine ER 30mg q12h  - continue hydromorphone 4/6 po prns  - discontinue hydromorphone iv prns       48F with chronic low back pain s/p spinal surgery, multiple revisions, and now most recently on 11/14 T4-pelvis revision.    She is on home 75mg q6h [place]cy[place]ta [place] .  75mg tapentadol = 30 MMEs x 4 = 120mg    - continue fentanyl patch 50mcg  - methcarbamol 500mg tid  - acetaminophen 975mg tid standing  - duloxetine 60mg daily  - pregabalin 200mg tid    - start morphine 15mg IR q4h  - increase hydromorphone 4mg po prns to q6h  - discontinue hydromorphine 6mg po prn  - discontinue hydromorphone iv prns       48F with chronic low back pain s/p spinal surgery, multiple revisions, and now most recently on 11/14 T4-pelvis revision.    She is on home 75mg q6h [place]cy[place]ta [place] .  75mg tapentadol = 30 MMEs x 4 = 120mg    - continue fentanyl patch 50mcg  - methcarbamol 500mg tid  - acetaminophen 975mg tid standing  - duloxetine 60mg daily  - pregabalin 200mg tid    - start morphine 15mg IR q4h  - discontinue hydromorphine 6mg po prn  - discontinue hydromorphone iv prns

## 2023-11-20 NOTE — DIETITIAN INITIAL EVALUATION ADULT - OTHER CALCULATIONS
Based on Standards of Care pt >% ideal body weight, thus ideal body weight used for all calculations. Needs adjusted for age, clinical condition/status, postoperative demands.

## 2023-11-21 LAB
ANION GAP SERPL CALC-SCNC: 9 MMOL/L — SIGNIFICANT CHANGE UP (ref 5–17)
ANION GAP SERPL CALC-SCNC: 9 MMOL/L — SIGNIFICANT CHANGE UP (ref 5–17)
BUN SERPL-MCNC: 12 MG/DL — SIGNIFICANT CHANGE UP (ref 7–23)
BUN SERPL-MCNC: 12 MG/DL — SIGNIFICANT CHANGE UP (ref 7–23)
CALCIUM SERPL-MCNC: 8.3 MG/DL — LOW (ref 8.4–10.5)
CALCIUM SERPL-MCNC: 8.3 MG/DL — LOW (ref 8.4–10.5)
CHLORIDE SERPL-SCNC: 104 MMOL/L — SIGNIFICANT CHANGE UP (ref 96–108)
CHLORIDE SERPL-SCNC: 104 MMOL/L — SIGNIFICANT CHANGE UP (ref 96–108)
CO2 SERPL-SCNC: 26 MMOL/L — SIGNIFICANT CHANGE UP (ref 22–31)
CO2 SERPL-SCNC: 26 MMOL/L — SIGNIFICANT CHANGE UP (ref 22–31)
CREAT SERPL-MCNC: 0.49 MG/DL — LOW (ref 0.5–1.3)
CREAT SERPL-MCNC: 0.49 MG/DL — LOW (ref 0.5–1.3)
EGFR: 115 ML/MIN/1.73M2 — SIGNIFICANT CHANGE UP
EGFR: 115 ML/MIN/1.73M2 — SIGNIFICANT CHANGE UP
GLUCOSE SERPL-MCNC: 99 MG/DL — SIGNIFICANT CHANGE UP (ref 70–99)
GLUCOSE SERPL-MCNC: 99 MG/DL — SIGNIFICANT CHANGE UP (ref 70–99)
HCT VFR BLD CALC: 27.2 % — LOW (ref 34.5–45)
HCT VFR BLD CALC: 27.2 % — LOW (ref 34.5–45)
HGB BLD-MCNC: 8.7 G/DL — LOW (ref 11.5–15.5)
HGB BLD-MCNC: 8.7 G/DL — LOW (ref 11.5–15.5)
MCHC RBC-ENTMCNC: 28.5 PG — SIGNIFICANT CHANGE UP (ref 27–34)
MCHC RBC-ENTMCNC: 28.5 PG — SIGNIFICANT CHANGE UP (ref 27–34)
MCHC RBC-ENTMCNC: 32 GM/DL — SIGNIFICANT CHANGE UP (ref 32–36)
MCHC RBC-ENTMCNC: 32 GM/DL — SIGNIFICANT CHANGE UP (ref 32–36)
MCV RBC AUTO: 89.2 FL — SIGNIFICANT CHANGE UP (ref 80–100)
MCV RBC AUTO: 89.2 FL — SIGNIFICANT CHANGE UP (ref 80–100)
NRBC # BLD: 0 /100 WBCS — SIGNIFICANT CHANGE UP (ref 0–0)
NRBC # BLD: 0 /100 WBCS — SIGNIFICANT CHANGE UP (ref 0–0)
PLATELET # BLD AUTO: 376 K/UL — SIGNIFICANT CHANGE UP (ref 150–400)
PLATELET # BLD AUTO: 376 K/UL — SIGNIFICANT CHANGE UP (ref 150–400)
POTASSIUM SERPL-MCNC: 4.1 MMOL/L — SIGNIFICANT CHANGE UP (ref 3.5–5.3)
POTASSIUM SERPL-MCNC: 4.1 MMOL/L — SIGNIFICANT CHANGE UP (ref 3.5–5.3)
POTASSIUM SERPL-SCNC: 4.1 MMOL/L — SIGNIFICANT CHANGE UP (ref 3.5–5.3)
POTASSIUM SERPL-SCNC: 4.1 MMOL/L — SIGNIFICANT CHANGE UP (ref 3.5–5.3)
RBC # BLD: 3.05 M/UL — LOW (ref 3.8–5.2)
RBC # BLD: 3.05 M/UL — LOW (ref 3.8–5.2)
RBC # FLD: 14.5 % — SIGNIFICANT CHANGE UP (ref 10.3–14.5)
RBC # FLD: 14.5 % — SIGNIFICANT CHANGE UP (ref 10.3–14.5)
SODIUM SERPL-SCNC: 139 MMOL/L — SIGNIFICANT CHANGE UP (ref 135–145)
SODIUM SERPL-SCNC: 139 MMOL/L — SIGNIFICANT CHANGE UP (ref 135–145)
WBC # BLD: 6.09 K/UL — SIGNIFICANT CHANGE UP (ref 3.8–10.5)
WBC # BLD: 6.09 K/UL — SIGNIFICANT CHANGE UP (ref 3.8–10.5)
WBC # FLD AUTO: 6.09 K/UL — SIGNIFICANT CHANGE UP (ref 3.8–10.5)
WBC # FLD AUTO: 6.09 K/UL — SIGNIFICANT CHANGE UP (ref 3.8–10.5)

## 2023-11-21 PROCEDURE — 72082 X-RAY EXAM ENTIRE SPI 2/3 VW: CPT | Mod: 26

## 2023-11-21 PROCEDURE — 99233 SBSQ HOSP IP/OBS HIGH 50: CPT

## 2023-11-21 RX ADMIN — Medication 975 MILLIGRAM(S): at 21:50

## 2023-11-21 RX ADMIN — MORPHINE SULFATE 30 MILLIGRAM(S): 50 CAPSULE, EXTENDED RELEASE ORAL at 11:00

## 2023-11-21 RX ADMIN — Medication 975 MILLIGRAM(S): at 13:54

## 2023-11-21 RX ADMIN — Medication 975 MILLIGRAM(S): at 22:30

## 2023-11-21 RX ADMIN — POLYETHYLENE GLYCOL 3350 17 GRAM(S): 17 POWDER, FOR SOLUTION ORAL at 21:50

## 2023-11-21 RX ADMIN — METHOCARBAMOL 500 MILLIGRAM(S): 500 TABLET, FILM COATED ORAL at 06:56

## 2023-11-21 RX ADMIN — Medication 200 MILLIGRAM(S): at 07:37

## 2023-11-21 RX ADMIN — Medication 975 MILLIGRAM(S): at 06:56

## 2023-11-21 RX ADMIN — Medication 15 MILLIGRAM(S): at 00:24

## 2023-11-21 RX ADMIN — MORPHINE SULFATE 30 MILLIGRAM(S): 50 CAPSULE, EXTENDED RELEASE ORAL at 07:29

## 2023-11-21 RX ADMIN — Medication 15 MILLIGRAM(S): at 06:57

## 2023-11-21 RX ADMIN — Medication 15 MILLIGRAM(S): at 12:00

## 2023-11-21 RX ADMIN — Medication 15 MILLIGRAM(S): at 12:55

## 2023-11-21 RX ADMIN — Medication 975 MILLIGRAM(S): at 13:22

## 2023-11-21 RX ADMIN — Medication 200 MILLIGRAM(S): at 13:23

## 2023-11-21 RX ADMIN — DULOXETINE HYDROCHLORIDE 60 MILLIGRAM(S): 30 CAPSULE, DELAYED RELEASE ORAL at 12:00

## 2023-11-21 RX ADMIN — MORPHINE SULFATE 30 MILLIGRAM(S): 50 CAPSULE, EXTENDED RELEASE ORAL at 11:41

## 2023-11-21 RX ADMIN — Medication 200 MILLIGRAM(S): at 21:50

## 2023-11-21 RX ADMIN — ONDANSETRON 4 MILLIGRAM(S): 8 TABLET, FILM COATED ORAL at 19:37

## 2023-11-21 RX ADMIN — ENOXAPARIN SODIUM 40 MILLIGRAM(S): 100 INJECTION SUBCUTANEOUS at 21:50

## 2023-11-21 RX ADMIN — ONDANSETRON 4 MILLIGRAM(S): 8 TABLET, FILM COATED ORAL at 00:24

## 2023-11-21 RX ADMIN — POLYETHYLENE GLYCOL 3350 17 GRAM(S): 17 POWDER, FOR SOLUTION ORAL at 11:59

## 2023-11-21 RX ADMIN — METHOCARBAMOL 500 MILLIGRAM(S): 500 TABLET, FILM COATED ORAL at 21:50

## 2023-11-21 RX ADMIN — PANTOPRAZOLE SODIUM 40 MILLIGRAM(S): 20 TABLET, DELAYED RELEASE ORAL at 06:56

## 2023-11-21 RX ADMIN — MORPHINE SULFATE 30 MILLIGRAM(S): 50 CAPSULE, EXTENDED RELEASE ORAL at 19:08

## 2023-11-21 RX ADMIN — ONDANSETRON 4 MILLIGRAM(S): 8 TABLET, FILM COATED ORAL at 12:00

## 2023-11-21 RX ADMIN — SENNA PLUS 2 TABLET(S): 8.6 TABLET ORAL at 21:50

## 2023-11-21 RX ADMIN — MORPHINE SULFATE 30 MILLIGRAM(S): 50 CAPSULE, EXTENDED RELEASE ORAL at 20:00

## 2023-11-21 RX ADMIN — NALOXEGOL OXALATE 12.5 MILLIGRAM(S): 12.5 TABLET, FILM COATED ORAL at 12:00

## 2023-11-21 RX ADMIN — METHOCARBAMOL 500 MILLIGRAM(S): 500 TABLET, FILM COATED ORAL at 13:22

## 2023-11-21 RX ADMIN — MORPHINE SULFATE 30 MILLIGRAM(S): 50 CAPSULE, EXTENDED RELEASE ORAL at 06:57

## 2023-11-21 RX ADMIN — ONDANSETRON 4 MILLIGRAM(S): 8 TABLET, FILM COATED ORAL at 06:56

## 2023-11-21 NOTE — PROGRESS NOTE ADULT - SUBJECTIVE AND OBJECTIVE BOX
INTERVAL HPI/OVERNIGHT EVENTS: david o/n    SUBJECTIVE: Patient seen and examined at bedside.   Pt had BM yesterday - feeling pain is improving. Eating and drinking better. Sat up in chair   OBJECTIVE:    VITAL SIGNS:  ICU Vital Signs Last 24 Hrs  T(C): 36.8 (21 Nov 2023 08:40), Max: 36.8 (20 Nov 2023 21:10)  T(F): 98.3 (21 Nov 2023 08:40), Max: 98.3 (20 Nov 2023 21:10)  HR: 75 (21 Nov 2023 14:34) (74 - 86)  BP: 100/55 (21 Nov 2023 14:34) (98/57 - 121/67)  BP(mean): 85 (20 Nov 2023 16:43) (85 - 85)  ABP: --  ABP(mean): --  RR: 18 (21 Nov 2023 08:40) (17 - 18)  SpO2: 97% (21 Nov 2023 08:40) (97% - 99%)    O2 Parameters below as of 21 Nov 2023 08:40  Patient On (Oxygen Delivery Method): room air              11-20 @ 07:01  -  11-21 @ 07:00  --------------------------------------------------------  IN: 1540 mL / OUT: 680 mL / NET: 860 mL      CAPILLARY BLOOD GLUCOSE          PHYSICAL EXAM:    General: NAD  HEENT: NC/AT; PERRL, clear conjunctiva  Neck: supple  Respiratory: CTA b/l  Cardiovascular: +S1/S2; RRR  Abdomen: soft, NT/ND; +BS x4  Extremities: WWP, 2+ peripheral pulses b/l; no LE edema  Skin: normal color and turgor; no rash  Neurological:     MEDICATIONS:  MEDICATIONS  (STANDING):  acetaminophen     Tablet .. 975 milliGRAM(s) Oral every 8 hours  chlorhexidine 2% Cloths 1 Application(s) Topical once  DULoxetine 60 milliGRAM(s) Oral daily  enoxaparin Injectable 40 milliGRAM(s) SubCutaneous every 24 hours  fentaNYL   Patch  50 MICROgram(s)/Hr 1 Patch Transdermal every 72 hours  methocarbamol 500 milliGRAM(s) Oral three times a day  naloxegol 12.5 milliGRAM(s) Oral daily  ondansetron   Disintegrating Tablet 4 milliGRAM(s) Oral every 6 hours  pantoprazole    Tablet 40 milliGRAM(s) Oral before breakfast  polyethylene glycol 3350 17 Gram(s) Oral <User Schedule>  povidone iodine 5% Nasal Swab 1 Application(s) Both Nostrils once  pregabalin 200 milliGRAM(s) Oral three times a day  senna 2 Tablet(s) Oral at bedtime  triamterene 37.5 mG/hydrochlorothiazide 25 mG Tablet 1 Tablet(s) Oral daily    MEDICATIONS  (PRN):  albuterol    90 MICROgram(s) HFA Inhaler 2 Puff(s) Inhalation every 6 hours PRN Shortness of Breath and/or Wheezing  aluminum hydroxide/magnesium hydroxide/simethicone Suspension 30 milliLiter(s) Oral every 4 hours PRN Dyspepsia  benzocaine/menthol Lozenge 1 Lozenge Oral four times a day PRN Sore Throat  bisacodyl 5 milliGRAM(s) Oral every 12 hours PRN Constipation  bisacodyl Suppository 10 milliGRAM(s) Rectal daily PRN Constipation  diphenhydrAMINE 25 milliGRAM(s) Oral every 4 hours PRN Rash and/or Itching  magnesium hydroxide Suspension 30 milliLiter(s) Oral every 12 hours PRN Constipation  morphine  IR 30 milliGRAM(s) Oral every 4 hours PRN Severe Pain (7 - 10)  morphine  IR 15 milliGRAM(s) Oral every 4 hours PRN Moderate Pain (4 - 6)  naloxone Injectable 0.1 milliGRAM(s) IV Push every 3 minutes PRN For ANY of the following changes in patient status:  A. RR LESS THAN 10 breaths per minute, B. Oxygen saturation LESS THAN 90%, C. Sedation score of 6  ondansetron Injectable 4 milliGRAM(s) IV Push once PRN Nausea  ondansetron Injectable 4 milliGRAM(s) IV Push every 6 hours PRN Nausea and/or Vomiting      ALLERGIES:  Allergies    shellfish (Hives; Short breath)  diclofenac (Other)    Intolerances        LABS:                        8.7    6.09  )-----------( 376      ( 21 Nov 2023 05:30 )             27.2     11-21    139  |  104  |  12  ----------------------------<  99  4.1   |  26  |  0.49<L>    Ca    8.3<L>      21 Nov 2023 05:30        Urinalysis Basic - ( 21 Nov 2023 05:30 )    Color: x / Appearance: x / SG: x / pH: x  Gluc: 99 mg/dL / Ketone: x  / Bili: x / Urobili: x   Blood: x / Protein: x / Nitrite: x   Leuk Esterase: x / RBC: x / WBC x   Sq Epi: x / Non Sq Epi: x / Bacteria: x        RADIOLOGY & ADDITIONAL TESTS: Reviewed. INTERVAL HPI/OVERNIGHT EVENTS: david o/n    SUBJECTIVE: Patient seen and examined at bedside.   Pt had BM yesterday - feeling pain is improving. Eating and drinking better. Sat up in chair since early this AM. No chest pain, dyspnea. No LH/dizziness. Cobb remains in place    OBJECTIVE:    VITAL SIGNS:  ICU Vital Signs Last 24 Hrs  T(C): 36.8 (21 Nov 2023 08:40), Max: 36.8 (20 Nov 2023 21:10)  T(F): 98.3 (21 Nov 2023 08:40), Max: 98.3 (20 Nov 2023 21:10)  HR: 75 (21 Nov 2023 14:34) (74 - 86)  BP: 100/55 (21 Nov 2023 14:34) (98/57 - 121/67)  BP(mean): 85 (20 Nov 2023 16:43) (85 - 85)  ABP: --  ABP(mean): --  RR: 18 (21 Nov 2023 08:40) (17 - 18)  SpO2: 97% (21 Nov 2023 08:40) (97% - 99%)    O2 Parameters below as of 21 Nov 2023 08:40  Patient On (Oxygen Delivery Method): room air              11-20 @ 07:01  -  11-21 @ 07:00  --------------------------------------------------------  IN: 1540 mL / OUT: 680 mL / NET: 860 mL      CAPILLARY BLOOD GLUCOSE          PHYSICAL EXAM:  GEN: female in NAD on RA  HEENT: NC/AT, MMM  CV: RRR, nml S1S2, no murmurs  PULM: nml effort, CTAB  ABD: Soft, non-distended, NABS, non-tender  : Cobb catheter in place  NEURO  A/O x3, moving all extremities, 5/5 in BLE. 3 drains in place. Sensation intact  PSYCH: Appropriate      MEDICATIONS:  MEDICATIONS  (STANDING):  acetaminophen     Tablet .. 975 milliGRAM(s) Oral every 8 hours  chlorhexidine 2% Cloths 1 Application(s) Topical once  DULoxetine 60 milliGRAM(s) Oral daily  enoxaparin Injectable 40 milliGRAM(s) SubCutaneous every 24 hours  fentaNYL   Patch  50 MICROgram(s)/Hr 1 Patch Transdermal every 72 hours  methocarbamol 500 milliGRAM(s) Oral three times a day  naloxegol 12.5 milliGRAM(s) Oral daily  ondansetron   Disintegrating Tablet 4 milliGRAM(s) Oral every 6 hours  pantoprazole    Tablet 40 milliGRAM(s) Oral before breakfast  polyethylene glycol 3350 17 Gram(s) Oral <User Schedule>  povidone iodine 5% Nasal Swab 1 Application(s) Both Nostrils once  pregabalin 200 milliGRAM(s) Oral three times a day  senna 2 Tablet(s) Oral at bedtime  triamterene 37.5 mG/hydrochlorothiazide 25 mG Tablet 1 Tablet(s) Oral daily    MEDICATIONS  (PRN):  albuterol    90 MICROgram(s) HFA Inhaler 2 Puff(s) Inhalation every 6 hours PRN Shortness of Breath and/or Wheezing  aluminum hydroxide/magnesium hydroxide/simethicone Suspension 30 milliLiter(s) Oral every 4 hours PRN Dyspepsia  benzocaine/menthol Lozenge 1 Lozenge Oral four times a day PRN Sore Throat  bisacodyl 5 milliGRAM(s) Oral every 12 hours PRN Constipation  bisacodyl Suppository 10 milliGRAM(s) Rectal daily PRN Constipation  diphenhydrAMINE 25 milliGRAM(s) Oral every 4 hours PRN Rash and/or Itching  magnesium hydroxide Suspension 30 milliLiter(s) Oral every 12 hours PRN Constipation  morphine  IR 30 milliGRAM(s) Oral every 4 hours PRN Severe Pain (7 - 10)  morphine  IR 15 milliGRAM(s) Oral every 4 hours PRN Moderate Pain (4 - 6)  naloxone Injectable 0.1 milliGRAM(s) IV Push every 3 minutes PRN For ANY of the following changes in patient status:  A. RR LESS THAN 10 breaths per minute, B. Oxygen saturation LESS THAN 90%, C. Sedation score of 6  ondansetron Injectable 4 milliGRAM(s) IV Push once PRN Nausea  ondansetron Injectable 4 milliGRAM(s) IV Push every 6 hours PRN Nausea and/or Vomiting      ALLERGIES:  Allergies    shellfish (Hives; Short breath)  diclofenac (Other)    Intolerances        LABS:                        8.7    6.09  )-----------( 376      ( 21 Nov 2023 05:30 )             27.2     11-21    139  |  104  |  12  ----------------------------<  99  4.1   |  26  |  0.49<L>    Ca    8.3<L>      21 Nov 2023 05:30        Urinalysis Basic - ( 21 Nov 2023 05:30 )    Color: x / Appearance: x / SG: x / pH: x  Gluc: 99 mg/dL / Ketone: x  / Bili: x / Urobili: x   Blood: x / Protein: x / Nitrite: x   Leuk Esterase: x / RBC: x / WBC x   Sq Epi: x / Non Sq Epi: x / Bacteria: x        RADIOLOGY & ADDITIONAL TESTS: Reviewed.

## 2023-11-21 NOTE — PROGRESS NOTE ADULT - ASSESSMENT
48F w obesity s/p gastric bypass sx, extensive back pain w spinal surgeries, revision w Dr. Schwab, p/w persistent back pain and RLE sciatica sxs and weakness w ambulation issues, follows w pain mgmt, here for elective PSF w extension of fusion T3-Pelvis, pedicle subtraction osteotomy w Dr. Schwab 11/14, c/b urinary retention w paulino placed 11/19    #Acute urinary retention - paulino placed 11/19. Urology following. Constipation, decreased mobility likely contributing  #Constipation - resolved w movantik. Last BM 11/20 Benign abd exam    #Post-op state - pain controlled. PPx: SQL. On bowel regimen and incentive spirometer  #Spinal stenosis  --Scheduled tylenol 975 q8, robaxin 500 q8, lyrica 200 q8  --PRN: morphine 15/30 q4h for mod/severe pain    #HTN - home on triamterene/hctz 37.5/25 -- hold parameters added  #Anemia - 8.7 today. baseline 12.8.    #Asthma - on albuterol inhaler PRN  #Chronic pain - on cymbalta 60 ER, Fentanyl patch 50mcg q72, lyrica 200 q8   - also on nucynta (tapatendol 75 PRN)  #Obesity - BMi 31.8 - affects all aspects of care    Plan  Overall - improving. Passed BM today.   Consider TOV today    Pain mgmt recs  Caution w NSAIDs d/t gastric bypass hx    DISPO: Acute rehab

## 2023-11-21 NOTE — PROGRESS NOTE ADULT - SUBJECTIVE AND OBJECTIVE BOX
Ortho Note    Subjective:  Pt seen and examined today, patient oob to the chair reporting pain better controlled with current pain medication regimen   Denies CP, SOB, N/V, numbness/tingling   reports bm x1 yesterday 11-  eager to ambulate with pt, and go to [facility]       Vital Signs Last 24 Hrs  T(C): 36.8 (11-21-23 @ 08:40), Max: 36.8 (11-21-23 @ 08:40)  T(F): 98.3 (11-21-23 @ 08:40), Max: 98.3 (11-21-23 @ 08:40)  HR: 86 (11-21-23 @ 08:40) (82 - 86)  BP: 98/57 (11-21-23 @ 08:40) (98/57 - 101/57)  BP(mean): --  RR: 18 (11-21-23 @ 08:40) (18 - 18)  SpO2: 97% (11-21-23 @ 08:40) (97% - 97%)  AVSS    Objective:    Physical exam   General: Pt Alert and oriented, NAD  DSG- aquacel x2 dressing cdi,  Pulses: +DP BLE, WWP feet  Sensation: diminished in S1 distribution to RLE but improving otherwise silt intact  Motor: 5/5 EHL/FHL/TA/GS LLE; 5/5 EHL/GS/TA RLE, 5/5    fentanyl 50mcg/hour patch noted to Right scapula   lora         Plan of Care:  A/P: 48yFemale POD#6 s/p T4-Pelvis rev PSF, L5 SO and proximal tethering 11/14  - afebrile   - Pain Control- switch Dilaudid to morphine 15-30mg Q4h prn moderate to severe pain, dc dilaudid IVP, continue fentanyl 50mcg patch, methocarbamol 500mg PO Q8h, lyrica 200mg PO TID  - DVT ppx: lovenox 40mg Subq Q24h  - PT, WBS: WBAT  - appreciate medicine recs  - appreciate pain management recs  - bowel regimen, IS use, PPI - last bm 11-  - mandy king  - dispo - [facility] pending auth and acceptance,     Ortho Pager [phone] Ortho Note    Subjective:  Pt seen and examined today, patient oob to the chair reporting pain better controlled with current pain medication regimen   Denies CP, SOB, N/V, numbness/tingling   reports bm x1 yesterday 11-  eager to ambulate with pt, and go to [facility]       Vital Signs Last 24 Hrs  T(C): 36.8 (11-21-23 @ 08:40), Max: 36.8 (11-21-23 @ 08:40)  T(F): 98.3 (11-21-23 @ 08:40), Max: 98.3 (11-21-23 @ 08:40)  HR: 86 (11-21-23 @ 08:40) (82 - 86)  BP: 98/57 (11-21-23 @ 08:40) (98/57 - 101/57)  BP(mean): --  RR: 18 (11-21-23 @ 08:40) (18 - 18)  SpO2: 97% (11-21-23 @ 08:40) (97% - 97%)  AVSS    Objective:    Physical exam   General: Pt Alert and oriented, NAD  DSG- aquacel x2 dressing cdi,  Pulses: +DP BLE, WWP feet  Sensation: diminished in S1 distribution to RLE but improving otherwise silt intact  Motor: 5/5 EHL/FHL/TA/GS LLE; 5/5 EHL/GS/TA RLE, 5/5    fentanyl 50mcg/hour patch noted to Right scapula   lora         Plan of Care:  A/P: 48yFemale POD#6 s/p T4-Pelvis rev PSF, L5 SO and proximal tethering 11/14  - afebrile   - Pain Control- morphine 15-30mg Q4h prn moderate to severe pain,   , continue fentanyl 50mcg patch, methocarbamol 500mg PO Q8h, lyrica 200mg PO TID  last IVP dilaudid 11-20-23 956am-   - DVT ppx: lovenox 40mg Subq Q24h  - PT, WBS: WBAT  - appreciate medicine recs  - appreciate pain management recs  - bowel regimen, IS use, PPI - last bm 11-  - mandy king  - dispo - [facility] pending auth and acceptance,     Ortho Pager [phone] Ortho Note    Subjective:  Pt seen and examined today, patient oob to the chair reporting pain better controlled with current pain medication regimen   Denies CP, SOB, N/V, numbness/tingling   reports bm x1 yesterday 11-  eager to ambulate with pt, and go to [facility]       Vital Signs Last 24 Hrs  T(C): 36.8 (11-21-23 @ 08:40), Max: 36.8 (11-21-23 @ 08:40)  T(F): 98.3 (11-21-23 @ 08:40), Max: 98.3 (11-21-23 @ 08:40)  HR: 86 (11-21-23 @ 08:40) (82 - 86)  BP: 98/57 (11-21-23 @ 08:40) (98/57 - 101/57)  BP(mean): --  RR: 18 (11-21-23 @ 08:40) (18 - 18)  SpO2: 97% (11-21-23 @ 08:40) (97% - 97%)  AVSS    Objective:    Physical exam   General: Pt Alert and oriented, NAD  DSG- aquacel x2 dressing cdi,  Pulses: +DP BLE, WWP feet  Sensation: diminished in S1 distribution to RLE but improving otherwise silt intact  Motor: 5/5 EHL/FHL/TA/GS LLE; 5/5 EHL/GS/TA RLE, 5/5    fentanyl 50mcg/hour patch noted to Right scapula   lora         Plan of Care:  A/P: 48yFemale POD#6 s/p T4-Pelvis rev PSF, L5 SO and proximal tethering 11/14  - afebrile   - Pain Control- morphine 15-30mg Q4h prn moderate to severe pain,   , continue fentanyl 50mcg patch, methocarbamol 500mg PO Q8h, lyrica 200mg PO TID  last IVP dilaudid 11-20-23 956am- all IV medications discontinued   - DVT ppx: lovenox 40mg Subq Q24h  - PT, WBS: WBAT  - appreciate medicine recs  - appreciate pain management recs  - bowel regimen, IS use, PPI - last bm 11-  - mandy king  - dispo - [facility] pending auth and acceptance,   Patient will be medically cleared for discharge on Wednesday 11/22/2023    Ortho Pager [phone] Ortho Note    Subjective:  Pt seen and examined today, patient oob to the chair reporting pain better controlled with current pain medication regimen   Denies CP, SOB, N/V, numbness/tingling   reports bm x1 yesterday 11-  eager to ambulate with pt, and go to [facility]       Vital Signs Last 24 Hrs  T(C): 36.8 (11-21-23 @ 08:40), Max: 36.8 (11-21-23 @ 08:40)  T(F): 98.3 (11-21-23 @ 08:40), Max: 98.3 (11-21-23 @ 08:40)  HR: 86 (11-21-23 @ 08:40) (82 - 86)  BP: 98/57 (11-21-23 @ 08:40) (98/57 - 101/57)  BP(mean): --  RR: 18 (11-21-23 @ 08:40) (18 - 18)  SpO2: 97% (11-21-23 @ 08:40) (97% - 97%)  AVSS    Objective:    Physical exam   General: Pt Alert and oriented, NAD  DSG- aquacel x2 dressing cdi,  Pulses: +DP BLE, WWP feet  Sensation: diminished in S1 distribution to RLE but improving otherwise silt intact  Motor: 5/5 EHL/FHL/TA/GS LLE; 5/5 EHL/GS/TA RLE, 5/5    fentanyl 50mcg/hour patch noted to Right scapula   paulino         Plan of Care:  A/P: 48yFemale POD#6 s/p T4-Pelvis rev PSF, L5 SO and proximal tethering 11/14  - afebrile   - Pain Control- morphine 15-30mg Q4h prn moderate to severe pain,   , continue fentanyl 50mcg patch, methocarbamol 500mg PO Q8h, lyrica 200mg PO TID  last IVP dilaudid 11-20-23 956am- all IV medications discontinued   - DVT ppx: lovenox 40mg Subq Q24h  - PT, WBS: WBAT  - appreciate medicine recs  - appreciate pain management recs  - bowel regimen, IS use, PPI - last bm 11-  - mandy king  - needs standing xray scoli   - dispo - [facility] pending auth and acceptance,   Patient will be medically cleared for discharge on Wednesday 11/22/2023    Ortho Pager [phone]

## 2023-11-21 NOTE — CONSULT NOTE ADULT - SUBJECTIVE AND OBJECTIVE BOX
Patient seen at bedside today  She reports that her pain is controlled on her current regimen.  She requested 30mg IR morphine x 3 in the past 24 hours.    At home she takes the followinmg q6h nucynta, confirmed on nys   tizanidine  cymbalta   pregabalin    HPI:  48F with low back pain x  7 years, She says that she has an extensive history of back pain throughout her life and had her first spinal surgery in 2017 in [facility]. She said that after the initial surgery she had 10 screws loosen, and under went a revision with Dr. Schwab a few years later. Today she is having an extension of her fusion for progressive worsening of pain. She says that she had intermittent bilateral sciatica and right lower leg weakness and numbness that has caused her to fall due to not being able to feel the ground she is walking on. She is unable to find comfort and both sitting too long and standing too long provokes pain and numbness/tingling of her lower extremities. She ambulates with a cane at baseline. Despite conservative measure pain persists. She also has intermitted right upper extremity nerve pain, she under went a 1 level ACDF in 2023.     Denies history of blood clots or seizures. She denies chest pain, shortness of breath, nausea or vomiting today.     Patient currently sees outpatient pain management. She uses fentanyl 50 mcg/hr patch, nucynta 75mg, and pregabalin 200mg for pain relief.     Presents for elective revision PSF with extension of fusion with instrumentation T3-pelvis, pedicle subtraction osteotomy (PSO) L4, tethers, iliac fixation, smith clark osteotomies T3-T10 with Plastics closure with Dr. Schwab, Dr. Gonzalez and Dr. Simpson.  (2023 11:29)      PAST MEDICAL & SURGICAL HISTORY:  Asthma  Scoliosis (and kyphoscoliosis), idiopathic  Lumbar stenosis with neurogenic claudication  Spondylisthesis  Bilateral sciatica  History of anemia  Varicose veins of both lower extremities  HTN (hypertension)  S/P  section X3; ,,   Gastric bypass status for obesity   H/O varicose vein ligation b/l thigh  History of hip replacement left  History of bunionectomy b/l  History of cervical spinal surgery  History of lumbosacral spine surgery + rods/screw  H/O medial meniscus repair of left knee    FAMILY HISTORY:  Family history of aortic aneurysm (Father)  Father    Family history of pneumonia (Mother)  Mother    Family history of ovarian cancer (Grandparent)  Grandmother-maternal    Family history of myocardial infarction (Grandparent)  Grandfather-paternal    Family history of myocardial infarction (Grandparent)  Grandmother-paternal        SOCIAL HISTORY:  [ ] Denies Smoking, Alcohol, or Drug Use    HOME MEDICATIONS:   Please refer to initial HNP    PAIN HOME MEDICATIONS:    Allergies    shellfish (Hives; Short breath)  diclofenac (Other)    Intolerances        PAIN MEDICATIONS:  acetaminophen     Tablet .. 975 milliGRAM(s) Oral every 8 hours  diphenhydrAMINE 25 milliGRAM(s) Oral every 4 hours PRN  DULoxetine 60 milliGRAM(s) Oral daily  fentaNYL   Patch  50 MICROgram(s)/Hr 1 Patch Transdermal every 72 hours  HYDROmorphone   Tablet 4 milliGRAM(s) Oral every 4 hours PRN  HYDROmorphone   Tablet 6 milliGRAM(s) Oral every 4 hours PRN  HYDROmorphone  Injectable 0.5 milliGRAM(s) IV Push every 3 hours PRN  methocarbamol 500 milliGRAM(s) Oral three times a day  ondansetron   Disintegrating Tablet 4 milliGRAM(s) Oral every 6 hours  ondansetron Injectable 4 milliGRAM(s) IV Push every 6 hours PRN  ondansetron Injectable 4 milliGRAM(s) IV Push once PRN  pregabalin 200 milliGRAM(s) Oral three times a day    Heme:  enoxaparin Injectable 40 milliGRAM(s) SubCutaneous every 24 hours    Antibiotics:    Cardiovascular:  triamterene 37.5 mG/hydrochlorothiazide 25 mG Tablet 1 Tablet(s) Oral daily    GI:  aluminum hydroxide/magnesium hydroxide/simethicone Suspension 30 milliLiter(s) Oral every 4 hours PRN  bisacodyl 5 milliGRAM(s) Oral every 12 hours PRN  bisacodyl Suppository 10 milliGRAM(s) Rectal daily PRN  magnesium hydroxide Suspension 30 milliLiter(s) Oral every 12 hours PRN  naloxegol 12.5 milliGRAM(s) Oral daily  pantoprazole    Tablet 40 milliGRAM(s) Oral before breakfast  polyethylene glycol 3350 17 Gram(s) Oral <User Schedule>  senna 2 Tablet(s) Oral at bedtime    Endocrine:    All Other Medications:  benzocaine/menthol Lozenge 1 Lozenge Oral four times a day PRN  chlorhexidine 2% Cloths 1 Application(s) Topical once  naloxone Injectable 0.1 milliGRAM(s) IV Push every 3 minutes PRN  povidone iodine 5% Nasal Swab 1 Application(s) Both Nostrils once      Vital Signs Last 24 Hrs  T(C): 37.1 (2023 05:04), Max: 37.2 (2023 18:01)  T(F): 98.7 (2023 05:04), Max: 99 (2023 18:01)  HR: 74 (2023 05:04) (74 - 89)  BP: 99/56 (2023 05:04) (92/51 - 115/66)  BP(mean): --  RR: 14 (2023 05:04) (14 - 18)  SpO2: 100% (2023 05:04) (96% - 100%)    Parameters below as of 2023 05:04  Patient On (Oxygen Delivery Method): room air        LABS:                        8.0    6.07  )-----------( 301      ( 2023 05:30 )             25.0     11-20    137  |  105  |  9   ----------------------------<  103<H>  4.0   |  27  |  0.47<L>    Ca    7.9<L>      2023 05:30        Urinalysis Basic - ( 2023 05:30 )    Color: x / Appearance: x / SG: x / pH: x  Gluc: 103 mg/dL / Ketone: x  / Bili: x / Urobili: x   Blood: x / Protein: x / Nitrite: x   Leuk Esterase: x / RBC: x / WBC x   Sq Epi: x / Non Sq Epi: x / Bacteria: x        RADIOLOGY:    Drug Screen:   Patient seen at bedside today  She reports that her pain is controlled on her current regimen.  She requested 30mg IR morphine x 3 in the past 24 hours.    At home she takes the followinmg q6h nucynta, confirmed on nys   tizanidine  cymbalta   pregabalin    HPI:  48F with low back pain x  7 years, She says that she has an extensive history of back pain throughout her life and had her first spinal surgery in 2017 in [facility]. She said that after the initial surgery she had 10 screws loosen, and under went a revision with Dr. Schwab a few years later. Today she is having an extension of her fusion for progressive worsening of pain. She says that she had intermittent bilateral sciatica and right lower leg weakness and numbness that has caused her to fall due to not being able to feel the ground she is walking on. She is unable to find comfort and both sitting too long and standing too long provokes pain and numbness/tingling of her lower extremities. She ambulates with a cane at baseline. Despite conservative measure pain persists. She also has intermitted right upper extremity nerve pain, she under went a 1 level ACDF in 2023.     Denies history of blood clots or seizures. She denies chest pain, shortness of breath, nausea or vomiting today.     Patient currently sees outpatient pain management. She uses fentanyl 50 mcg/hr patch, nucynta 75mg, and pregabalin 200mg for pain relief.     Presents for elective revision PSF with extension of fusion with instrumentation T3-pelvis, pedicle subtraction osteotomy (PSO) L4, tethers, iliac fixation, smith clark osteotomies T3-T10 with Plastics closure with Dr. Schwab, Dr. Gonzalez and Dr. Simpson.  (2023 11:29)      PAST MEDICAL & SURGICAL HISTORY:  Asthma  Scoliosis (and kyphoscoliosis), idiopathic  Lumbar stenosis with neurogenic claudication  Spondylisthesis  Bilateral sciatica  History of anemia  Varicose veins of both lower extremities  HTN (hypertension)  S/P  section X3; ,,   Gastric bypass status for obesity   H/O varicose vein ligation b/l thigh  History of hip replacement left  History of bunionectomy b/l  History of cervical spinal surgery  History of lumbosacral spine surgery + rods/screw  H/O medial meniscus repair of left knee    FAMILY HISTORY:  Family history of aortic aneurysm (Father)  Father    Family history of pneumonia (Mother)  Mother    Family history of ovarian cancer (Grandparent)  Grandmother-maternal    Family history of myocardial infarction (Grandparent)  Grandfather-paternal    Family history of myocardial infarction (Grandparent)  Grandmother-paternal        SOCIAL HISTORY:  [ ] Denies Smoking, Alcohol, or Drug Use    HOME MEDICATIONS:   Please refer to initial HNP    PAIN HOME MEDICATIONS:    Allergies    shellfish (Hives; Short breath)  diclofenac (Other)    Intolerances        PAIN MEDICATIONS:  acetaminophen     Tablet .. 975 milliGRAM(s) Oral every 8 hours  diphenhydrAMINE 25 milliGRAM(s) Oral every 4 hours PRN  DULoxetine 60 milliGRAM(s) Oral daily  fentaNYL   Patch  50 MICROgram(s)/Hr 1 Patch Transdermal every 72 hours  HYDROmorphone   Tablet 4 milliGRAM(s) Oral every 4 hours PRN  HYDROmorphone   Tablet 6 milliGRAM(s) Oral every 4 hours PRN  HYDROmorphone  Injectable 0.5 milliGRAM(s) IV Push every 3 hours PRN  methocarbamol 500 milliGRAM(s) Oral three times a day  ondansetron   Disintegrating Tablet 4 milliGRAM(s) Oral every 6 hours  ondansetron Injectable 4 milliGRAM(s) IV Push every 6 hours PRN  ondansetron Injectable 4 milliGRAM(s) IV Push once PRN  pregabalin 200 milliGRAM(s) Oral three times a day    Heme:  enoxaparin Injectable 40 milliGRAM(s) SubCutaneous every 24 hours    Antibiotics:    Cardiovascular:  triamterene 37.5 mG/hydrochlorothiazide 25 mG Tablet 1 Tablet(s) Oral daily    GI:  aluminum hydroxide/magnesium hydroxide/simethicone Suspension 30 milliLiter(s) Oral every 4 hours PRN  bisacodyl 5 milliGRAM(s) Oral every 12 hours PRN  bisacodyl Suppository 10 milliGRAM(s) Rectal daily PRN  magnesium hydroxide Suspension 30 milliLiter(s) Oral every 12 hours PRN  naloxegol 12.5 milliGRAM(s) Oral daily  pantoprazole    Tablet 40 milliGRAM(s) Oral before breakfast  polyethylene glycol 3350 17 Gram(s) Oral <User Schedule>  senna 2 Tablet(s) Oral at bedtime    Endocrine:    All Other Medications:  benzocaine/menthol Lozenge 1 Lozenge Oral four times a day PRN  chlorhexidine 2% Cloths 1 Application(s) Topical once  naloxone Injectable 0.1 milliGRAM(s) IV Push every 3 minutes PRN  povidone iodine 5% Nasal Swab 1 Application(s) Both Nostrils once      Vital Signs Last 24 Hrs  T(C): 37.1 (2023 05:04), Max: 37.2 (2023 18:01)  T(F): 98.7 (2023 05:04), Max: 99 (2023 18:01)  HR: 74 (2023 05:04) (74 - 89)  BP: 99/56 (2023 05:04) (92/51 - 115/66)  BP(mean): --  RR: 14 (2023 05:04) (14 - 18)  SpO2: 100% (2023 05:04) (96% - 100%)    Parameters below as of 2023 05:04  Patient On (Oxygen Delivery Method): room air        LABS:                        8.0    6.07  )-----------( 301      ( 2023 05:30 )             25.0     11-20    137  |  105  |  9   ----------------------------<  103<H>  4.0   |  27  |  0.47<L>    Ca    7.9<L>      2023 05:30        Urinalysis Basic - ( 2023 05:30 )    Color: x / Appearance: x / SG: x / pH: x  Gluc: 103 mg/dL / Ketone: x  / Bili: x / Urobili: x   Blood: x / Protein: x / Nitrite: x   Leuk Esterase: x / RBC: x / WBC x   Sq Epi: x / Non Sq Epi: x / Bacteria: x        RADIOLOGY:    Drug Screen:   Patient seen at bedside today  She reports that her pain is controlled on her current regimen.  She requested 30mg IR morphine x 3 in the past 24 hours.    At home she takes the followinmg q6h nucynta, confirmed on nys   tizanidine  cymbalta   pregabalin    HPI:  48F with low back pain x  7 years, She says that she has an extensive history of back pain throughout her life and had her first spinal surgery in 2017 in [facility]. She said that after the initial surgery she had 10 screws loosen, and under went a revision with Dr. Schwab a few years later. Today she is having an extension of her fusion for progressive worsening of pain. She says that she had intermittent bilateral sciatica and right lower leg weakness and numbness that has caused her to fall due to not being able to feel the ground she is walking on. She is unable to find comfort and both sitting too long and standing too long provokes pain and numbness/tingling of her lower extremities. She ambulates with a cane at baseline. Despite conservative measure pain persists. She also has intermitted right upper extremity nerve pain, she under went a 1 level ACDF in 2023.     Denies history of blood clots or seizures. She denies chest pain, shortness of breath, nausea or vomiting today.     Patient currently sees outpatient pain management. She uses fentanyl 50 mcg/hr patch, nucynta 75mg, and pregabalin 200mg for pain relief.     Presents for elective revision PSF with extension of fusion with instrumentation T3-pelvis, pedicle subtraction osteotomy (PSO) L4, tethers, iliac fixation, smith clark osteotomies T3-T10 with Plastics closure with Dr. Schwab, Dr. Gonzalez and Dr. Simpson.  (2023 11:29)      PAST MEDICAL & SURGICAL HISTORY:  Asthma  Scoliosis (and kyphoscoliosis), idiopathic  Lumbar stenosis with neurogenic claudication  Spondylisthesis  Bilateral sciatica  History of anemia  Varicose veins of both lower extremities  HTN (hypertension)  S/P  section X3; ,,   Gastric bypass status for obesity   H/O varicose vein ligation b/l thigh  History of hip replacement left  History of bunionectomy b/l  History of cervical spinal surgery  History of lumbosacral spine surgery + rods/screw  H/O medial meniscus repair of left knee    FAMILY HISTORY:  Family history of aortic aneurysm (Father)  Father    Family history of pneumonia (Mother)  Mother    Family history of ovarian cancer (Grandparent)  Grandmother-maternal    Family history of myocardial infarction (Grandparent)  Grandfather-paternal    Family history of myocardial infarction (Grandparent)  Grandmother-paternal        SOCIAL HISTORY:  [ ] Denies Smoking, Alcohol, or Drug Use    HOME MEDICATIONS:   Please refer to initial HNP    PAIN HOME MEDICATIONS:    Allergies    shellfish (Hives; Short breath)  diclofenac (Other)    Intolerances        PAIN MEDICATIONS:  acetaminophen     Tablet .. 975 milliGRAM(s) Oral every 8 hours  diphenhydrAMINE 25 milliGRAM(s) Oral every 4 hours PRN  DULoxetine 60 milliGRAM(s) Oral daily  fentaNYL   Patch  50 MICROgram(s)/Hr 1 Patch Transdermal every 72 hours  HYDROmorphone   Tablet 4 milliGRAM(s) Oral every 4 hours PRN  HYDROmorphone   Tablet 6 milliGRAM(s) Oral every 4 hours PRN  HYDROmorphone  Injectable 0.5 milliGRAM(s) IV Push every 3 hours PRN  methocarbamol 500 milliGRAM(s) Oral three times a day  ondansetron   Disintegrating Tablet 4 milliGRAM(s) Oral every 6 hours  ondansetron Injectable 4 milliGRAM(s) IV Push every 6 hours PRN  ondansetron Injectable 4 milliGRAM(s) IV Push once PRN  pregabalin 200 milliGRAM(s) Oral three times a day    Heme:  enoxaparin Injectable 40 milliGRAM(s) SubCutaneous every 24 hours    Antibiotics:    Cardiovascular:  triamterene 37.5 mG/hydrochlorothiazide 25 mG Tablet 1 Tablet(s) Oral daily    GI:  aluminum hydroxide/magnesium hydroxide/simethicone Suspension 30 milliLiter(s) Oral every 4 hours PRN  bisacodyl 5 milliGRAM(s) Oral every 12 hours PRN  bisacodyl Suppository 10 milliGRAM(s) Rectal daily PRN  magnesium hydroxide Suspension 30 milliLiter(s) Oral every 12 hours PRN  naloxegol 12.5 milliGRAM(s) Oral daily  pantoprazole    Tablet 40 milliGRAM(s) Oral before breakfast  polyethylene glycol 3350 17 Gram(s) Oral <User Schedule>  senna 2 Tablet(s) Oral at bedtime    Endocrine:    All Other Medications:  benzocaine/menthol Lozenge 1 Lozenge Oral four times a day PRN  chlorhexidine 2% Cloths 1 Application(s) Topical once  naloxone Injectable 0.1 milliGRAM(s) IV Push every 3 minutes PRN  povidone iodine 5% Nasal Swab 1 Application(s) Both Nostrils once      Vital Signs Last 24 Hrs  T(C): 37.1 (2023 05:04), Max: 37.2 (2023 18:01)  T(F): 98.7 (2023 05:04), Max: 99 (2023 18:01)  HR: 74 (2023 05:04) (74 - 89)  BP: 99/56 (2023 05:04) (92/51 - 115/66)  BP(mean): --  RR: 14 (2023 05:04) (14 - 18)  SpO2: 100% (2023 05:04) (96% - 100%)    Parameters below as of 2023 05:04  Patient On (Oxygen Delivery Method): room air        LABS:                        8.0    6.07  )-----------( 301      ( 2023 05:30 )             25.0     11-20    137  |  105  |  9   ----------------------------<  103<H>  4.0   |  27  |  0.47<L>    Ca    7.9<L>      2023 05:30        Urinalysis Basic - ( 2023 05:30 )    Color: x / Appearance: x / SG: x / pH: x  Gluc: 103 mg/dL / Ketone: x  / Bili: x / Urobili: x   Blood: x / Protein: x / Nitrite: x   Leuk Esterase: x / RBC: x / WBC x   Sq Epi: x / Non Sq Epi: x / Bacteria: x        RADIOLOGY:    Drug Screen:

## 2023-11-21 NOTE — CONSULT NOTE ADULT - ASSESSMENT
48F with chronic low back pain s/p spinal surgery, multiple revisions, and now most recently on 11/14 T4-pelvis revision.    She is on home 75mg q6h nucynta confirmed on [place] .  75mg tapentadol = 30 MMEs x 4 = 120MME    - continue fentanyl patch 50mcg/h  - continue morphine 30mg IR q6h  - methocarbamol 500mg tid  - acetaminophen 975mg tid   - duloxetine 60mg daily  - pregabalin 200mg tid  - bowel regimen  - nausea ppx

## 2023-11-22 ENCOUNTER — TRANSCRIPTION ENCOUNTER (OUTPATIENT)
Age: 48
End: 2023-11-22

## 2023-11-22 ENCOUNTER — INPATIENT (INPATIENT)
Facility: HOSPITAL | Age: 48
LOS: 13 days | Discharge: HOME HEALTH | DRG: 560 | End: 2023-12-06
Attending: PHYSICAL MEDICINE & REHABILITATION | Admitting: PHYSICAL MEDICINE & REHABILITATION
Payer: COMMERCIAL

## 2023-11-22 VITALS
DIASTOLIC BLOOD PRESSURE: 68 MMHG | TEMPERATURE: 98 F | HEART RATE: 79 BPM | RESPIRATION RATE: 17 BRPM | SYSTOLIC BLOOD PRESSURE: 105 MMHG | OXYGEN SATURATION: 96 %

## 2023-11-22 VITALS
OXYGEN SATURATION: 99 % | TEMPERATURE: 99 F | SYSTOLIC BLOOD PRESSURE: 110 MMHG | HEIGHT: 62 IN | HEART RATE: 80 BPM | DIASTOLIC BLOOD PRESSURE: 71 MMHG | WEIGHT: 200.4 LBS | RESPIRATION RATE: 16 BRPM

## 2023-11-22 DIAGNOSIS — Z98.890 OTHER SPECIFIED POSTPROCEDURAL STATES: Chronic | ICD-10-CM

## 2023-11-22 DIAGNOSIS — Z96.649 PRESENCE OF UNSPECIFIED ARTIFICIAL HIP JOINT: Chronic | ICD-10-CM

## 2023-11-22 DIAGNOSIS — Z98.84 BARIATRIC SURGERY STATUS: Chronic | ICD-10-CM

## 2023-11-22 DIAGNOSIS — Z98.89 OTHER SPECIFIED POSTPROCEDURAL STATES: Chronic | ICD-10-CM

## 2023-11-22 DIAGNOSIS — M43.20 FUSION OF SPINE, SITE UNSPECIFIED: ICD-10-CM

## 2023-11-22 PROBLEM — I10 ESSENTIAL (PRIMARY) HYPERTENSION: Chronic | Status: ACTIVE | Noted: 2023-11-14

## 2023-11-22 LAB
ANION GAP SERPL CALC-SCNC: 5 MMOL/L — SIGNIFICANT CHANGE UP (ref 5–17)
ANION GAP SERPL CALC-SCNC: 5 MMOL/L — SIGNIFICANT CHANGE UP (ref 5–17)
BUN SERPL-MCNC: 13 MG/DL — SIGNIFICANT CHANGE UP (ref 7–23)
BUN SERPL-MCNC: 13 MG/DL — SIGNIFICANT CHANGE UP (ref 7–23)
CALCIUM SERPL-MCNC: 8.1 MG/DL — LOW (ref 8.4–10.5)
CALCIUM SERPL-MCNC: 8.1 MG/DL — LOW (ref 8.4–10.5)
CHLORIDE SERPL-SCNC: 104 MMOL/L — SIGNIFICANT CHANGE UP (ref 96–108)
CHLORIDE SERPL-SCNC: 104 MMOL/L — SIGNIFICANT CHANGE UP (ref 96–108)
CO2 SERPL-SCNC: 29 MMOL/L — SIGNIFICANT CHANGE UP (ref 22–31)
CO2 SERPL-SCNC: 29 MMOL/L — SIGNIFICANT CHANGE UP (ref 22–31)
CREAT SERPL-MCNC: 0.62 MG/DL — SIGNIFICANT CHANGE UP (ref 0.5–1.3)
CREAT SERPL-MCNC: 0.62 MG/DL — SIGNIFICANT CHANGE UP (ref 0.5–1.3)
EGFR: 109 ML/MIN/1.73M2 — SIGNIFICANT CHANGE UP
EGFR: 109 ML/MIN/1.73M2 — SIGNIFICANT CHANGE UP
GLUCOSE SERPL-MCNC: 90 MG/DL — SIGNIFICANT CHANGE UP (ref 70–99)
GLUCOSE SERPL-MCNC: 90 MG/DL — SIGNIFICANT CHANGE UP (ref 70–99)
HCT VFR BLD CALC: 24.7 % — LOW (ref 34.5–45)
HCT VFR BLD CALC: 24.7 % — LOW (ref 34.5–45)
HGB BLD-MCNC: 7.7 G/DL — LOW (ref 11.5–15.5)
HGB BLD-MCNC: 7.7 G/DL — LOW (ref 11.5–15.5)
MCHC RBC-ENTMCNC: 28.2 PG — SIGNIFICANT CHANGE UP (ref 27–34)
MCHC RBC-ENTMCNC: 28.2 PG — SIGNIFICANT CHANGE UP (ref 27–34)
MCHC RBC-ENTMCNC: 31.2 GM/DL — LOW (ref 32–36)
MCHC RBC-ENTMCNC: 31.2 GM/DL — LOW (ref 32–36)
MCV RBC AUTO: 90.5 FL — SIGNIFICANT CHANGE UP (ref 80–100)
MCV RBC AUTO: 90.5 FL — SIGNIFICANT CHANGE UP (ref 80–100)
NRBC # BLD: 0 /100 WBCS — SIGNIFICANT CHANGE UP (ref 0–0)
NRBC # BLD: 0 /100 WBCS — SIGNIFICANT CHANGE UP (ref 0–0)
PLATELET # BLD AUTO: 395 K/UL — SIGNIFICANT CHANGE UP (ref 150–400)
PLATELET # BLD AUTO: 395 K/UL — SIGNIFICANT CHANGE UP (ref 150–400)
POTASSIUM SERPL-MCNC: 4.6 MMOL/L — SIGNIFICANT CHANGE UP (ref 3.5–5.3)
POTASSIUM SERPL-MCNC: 4.6 MMOL/L — SIGNIFICANT CHANGE UP (ref 3.5–5.3)
POTASSIUM SERPL-SCNC: 4.6 MMOL/L — SIGNIFICANT CHANGE UP (ref 3.5–5.3)
POTASSIUM SERPL-SCNC: 4.6 MMOL/L — SIGNIFICANT CHANGE UP (ref 3.5–5.3)
RBC # BLD: 2.73 M/UL — LOW (ref 3.8–5.2)
RBC # BLD: 2.73 M/UL — LOW (ref 3.8–5.2)
RBC # FLD: 14.6 % — HIGH (ref 10.3–14.5)
RBC # FLD: 14.6 % — HIGH (ref 10.3–14.5)
SARS-COV-2 RNA SPEC QL NAA+PROBE: SIGNIFICANT CHANGE UP
SARS-COV-2 RNA SPEC QL NAA+PROBE: SIGNIFICANT CHANGE UP
SODIUM SERPL-SCNC: 138 MMOL/L — SIGNIFICANT CHANGE UP (ref 135–145)
SODIUM SERPL-SCNC: 138 MMOL/L — SIGNIFICANT CHANGE UP (ref 135–145)
WBC # BLD: 5.58 K/UL — SIGNIFICANT CHANGE UP (ref 3.8–10.5)
WBC # BLD: 5.58 K/UL — SIGNIFICANT CHANGE UP (ref 3.8–10.5)
WBC # FLD AUTO: 5.58 K/UL — SIGNIFICANT CHANGE UP (ref 3.8–10.5)
WBC # FLD AUTO: 5.58 K/UL — SIGNIFICANT CHANGE UP (ref 3.8–10.5)

## 2023-11-22 PROCEDURE — 86923 COMPATIBILITY TEST ELECTRIC: CPT

## 2023-11-22 PROCEDURE — C1889: CPT

## 2023-11-22 PROCEDURE — 36415 COLL VENOUS BLD VENIPUNCTURE: CPT

## 2023-11-22 PROCEDURE — P9045: CPT

## 2023-11-22 PROCEDURE — 36430 TRANSFUSION BLD/BLD COMPNT: CPT

## 2023-11-22 PROCEDURE — 85018 HEMOGLOBIN: CPT

## 2023-11-22 PROCEDURE — 93005 ELECTROCARDIOGRAM TRACING: CPT

## 2023-11-22 PROCEDURE — 82330 ASSAY OF CALCIUM: CPT

## 2023-11-22 PROCEDURE — 76000 FLUOROSCOPY <1 HR PHYS/QHP: CPT

## 2023-11-22 PROCEDURE — 87102 FUNGUS ISOLATION CULTURE: CPT

## 2023-11-22 PROCEDURE — C1713: CPT

## 2023-11-22 PROCEDURE — 82962 GLUCOSE BLOOD TEST: CPT

## 2023-11-22 PROCEDURE — 85025 COMPLETE CBC W/AUTO DIFF WBC: CPT

## 2023-11-22 PROCEDURE — 86900 BLOOD TYPING SEROLOGIC ABO: CPT

## 2023-11-22 PROCEDURE — 85027 COMPLETE CBC AUTOMATED: CPT

## 2023-11-22 PROCEDURE — 87075 CULTR BACTERIA EXCEPT BLOOD: CPT

## 2023-11-22 PROCEDURE — 97166 OT EVAL MOD COMPLEX 45 MIN: CPT

## 2023-11-22 PROCEDURE — P9016: CPT

## 2023-11-22 PROCEDURE — 72082 X-RAY EXAM ENTIRE SPI 2/3 VW: CPT

## 2023-11-22 PROCEDURE — 97116 GAIT TRAINING THERAPY: CPT

## 2023-11-22 PROCEDURE — 82947 ASSAY GLUCOSE BLOOD QUANT: CPT

## 2023-11-22 PROCEDURE — 87070 CULTURE OTHR SPECIMN AEROBIC: CPT

## 2023-11-22 PROCEDURE — 97530 THERAPEUTIC ACTIVITIES: CPT

## 2023-11-22 PROCEDURE — 84295 ASSAY OF SERUM SODIUM: CPT

## 2023-11-22 PROCEDURE — 97161 PT EVAL LOW COMPLEX 20 MIN: CPT

## 2023-11-22 PROCEDURE — 99232 SBSQ HOSP IP/OBS MODERATE 35: CPT

## 2023-11-22 PROCEDURE — 80048 BASIC METABOLIC PNL TOTAL CA: CPT

## 2023-11-22 PROCEDURE — 86901 BLOOD TYPING SEROLOGIC RH(D): CPT

## 2023-11-22 PROCEDURE — 84132 ASSAY OF SERUM POTASSIUM: CPT

## 2023-11-22 PROCEDURE — 97110 THERAPEUTIC EXERCISES: CPT

## 2023-11-22 PROCEDURE — 86850 RBC ANTIBODY SCREEN: CPT

## 2023-11-22 RX ORDER — SENNA PLUS 8.6 MG/1
2 TABLET ORAL
Qty: 0 | Refills: 0 | DISCHARGE
Start: 2023-11-22

## 2023-11-22 RX ORDER — ENOXAPARIN SODIUM 100 MG/ML
40 INJECTION SUBCUTANEOUS EVERY 24 HOURS
Refills: 0 | Status: DISCONTINUED | OUTPATIENT
Start: 2023-11-22 | End: 2023-12-06

## 2023-11-22 RX ORDER — NALOXEGOL OXALATE 12.5 MG/1
1 TABLET, FILM COATED ORAL
Qty: 0 | Refills: 0 | DISCHARGE
Start: 2023-11-22

## 2023-11-22 RX ORDER — NALOXEGOL OXALATE 12.5 MG/1
12.5 TABLET, FILM COATED ORAL DAILY
Refills: 0 | Status: DISCONTINUED | OUTPATIENT
Start: 2023-11-22 | End: 2023-12-06

## 2023-11-22 RX ORDER — METHOCARBAMOL 500 MG/1
1 TABLET, FILM COATED ORAL
Qty: 0 | Refills: 0 | DISCHARGE
Start: 2023-11-22

## 2023-11-22 RX ORDER — TRIAMTERENE/HYDROCHLOROTHIAZID 75 MG-50MG
1 TABLET ORAL DAILY
Refills: 0 | Status: DISCONTINUED | OUTPATIENT
Start: 2023-11-23 | End: 2023-12-06

## 2023-11-22 RX ORDER — SENNA PLUS 8.6 MG/1
2 TABLET ORAL AT BEDTIME
Refills: 0 | Status: DISCONTINUED | OUTPATIENT
Start: 2023-11-22 | End: 2023-12-06

## 2023-11-22 RX ORDER — ACETAMINOPHEN 500 MG
3 TABLET ORAL
Qty: 0 | Refills: 0 | DISCHARGE
Start: 2023-11-22

## 2023-11-22 RX ORDER — POLYETHYLENE GLYCOL 3350 17 G/17G
17 POWDER, FOR SOLUTION ORAL
Refills: 0 | Status: DISCONTINUED | OUTPATIENT
Start: 2023-11-22 | End: 2023-12-06

## 2023-11-22 RX ORDER — MORPHINE SULFATE 50 MG/1
30 CAPSULE, EXTENDED RELEASE ORAL EVERY 4 HOURS
Refills: 0 | Status: DISCONTINUED | OUTPATIENT
Start: 2023-11-22 | End: 2023-11-23

## 2023-11-22 RX ORDER — METHOCARBAMOL 500 MG/1
500 TABLET, FILM COATED ORAL THREE TIMES A DAY
Refills: 0 | Status: DISCONTINUED | OUTPATIENT
Start: 2023-11-22 | End: 2023-12-04

## 2023-11-22 RX ORDER — POLYETHYLENE GLYCOL 3350 17 G/17G
17 POWDER, FOR SOLUTION ORAL
Qty: 0 | Refills: 0 | DISCHARGE
Start: 2023-11-22

## 2023-11-22 RX ORDER — ENOXAPARIN SODIUM 100 MG/ML
40 INJECTION SUBCUTANEOUS
Qty: 0 | Refills: 0 | DISCHARGE
Start: 2023-11-22

## 2023-11-22 RX ORDER — DULOXETINE HYDROCHLORIDE 30 MG/1
60 CAPSULE, DELAYED RELEASE ORAL DAILY
Refills: 0 | Status: DISCONTINUED | OUTPATIENT
Start: 2023-11-23 | End: 2023-12-06

## 2023-11-22 RX ORDER — MORPHINE SULFATE 50 MG/1
30 CAPSULE, EXTENDED RELEASE ORAL EVERY 4 HOURS
Refills: 0 | Status: DISCONTINUED | OUTPATIENT
Start: 2023-11-22 | End: 2023-11-22

## 2023-11-22 RX ORDER — LANOLIN ALCOHOL/MO/W.PET/CERES
3 CREAM (GRAM) TOPICAL AT BEDTIME
Refills: 0 | Status: DISCONTINUED | OUTPATIENT
Start: 2023-11-22 | End: 2023-12-06

## 2023-11-22 RX ORDER — ACETAMINOPHEN 500 MG
975 TABLET ORAL EVERY 8 HOURS
Refills: 0 | Status: DISCONTINUED | OUTPATIENT
Start: 2023-11-22 | End: 2023-12-06

## 2023-11-22 RX ORDER — ALBUTEROL 90 UG/1
2 AEROSOL, METERED ORAL EVERY 6 HOURS
Refills: 0 | Status: DISCONTINUED | OUTPATIENT
Start: 2023-11-22 | End: 2023-12-06

## 2023-11-22 RX ORDER — MORPHINE SULFATE 50 MG/1
15 CAPSULE, EXTENDED RELEASE ORAL EVERY 4 HOURS
Refills: 0 | Status: DISCONTINUED | OUTPATIENT
Start: 2023-11-22 | End: 2023-11-23

## 2023-11-22 RX ORDER — PANTOPRAZOLE SODIUM 20 MG/1
40 TABLET, DELAYED RELEASE ORAL
Refills: 0 | Status: DISCONTINUED | OUTPATIENT
Start: 2023-11-23 | End: 2023-12-06

## 2023-11-22 RX ORDER — MORPHINE SULFATE 50 MG/1
15 CAPSULE, EXTENDED RELEASE ORAL EVERY 4 HOURS
Refills: 0 | Status: DISCONTINUED | OUTPATIENT
Start: 2023-11-22 | End: 2023-11-22

## 2023-11-22 RX ADMIN — Medication 975 MILLIGRAM(S): at 23:02

## 2023-11-22 RX ADMIN — Medication 975 MILLIGRAM(S): at 13:14

## 2023-11-22 RX ADMIN — METHOCARBAMOL 500 MILLIGRAM(S): 500 TABLET, FILM COATED ORAL at 06:10

## 2023-11-22 RX ADMIN — MORPHINE SULFATE 30 MILLIGRAM(S): 50 CAPSULE, EXTENDED RELEASE ORAL at 07:10

## 2023-11-22 RX ADMIN — Medication 200 MILLIGRAM(S): at 23:03

## 2023-11-22 RX ADMIN — DULOXETINE HYDROCHLORIDE 60 MILLIGRAM(S): 30 CAPSULE, DELAYED RELEASE ORAL at 12:34

## 2023-11-22 RX ADMIN — MORPHINE SULFATE 30 MILLIGRAM(S): 50 CAPSULE, EXTENDED RELEASE ORAL at 11:30

## 2023-11-22 RX ADMIN — Medication 975 MILLIGRAM(S): at 07:10

## 2023-11-22 RX ADMIN — ONDANSETRON 4 MILLIGRAM(S): 8 TABLET, FILM COATED ORAL at 06:10

## 2023-11-22 RX ADMIN — Medication 975 MILLIGRAM(S): at 06:10

## 2023-11-22 RX ADMIN — Medication 200 MILLIGRAM(S): at 13:13

## 2023-11-22 RX ADMIN — MORPHINE SULFATE 30 MILLIGRAM(S): 50 CAPSULE, EXTENDED RELEASE ORAL at 22:15

## 2023-11-22 RX ADMIN — PANTOPRAZOLE SODIUM 40 MILLIGRAM(S): 20 TABLET, DELAYED RELEASE ORAL at 06:10

## 2023-11-22 RX ADMIN — METHOCARBAMOL 500 MILLIGRAM(S): 500 TABLET, FILM COATED ORAL at 13:13

## 2023-11-22 RX ADMIN — MORPHINE SULFATE 30 MILLIGRAM(S): 50 CAPSULE, EXTENDED RELEASE ORAL at 10:29

## 2023-11-22 RX ADMIN — MORPHINE SULFATE 30 MILLIGRAM(S): 50 CAPSULE, EXTENDED RELEASE ORAL at 06:10

## 2023-11-22 RX ADMIN — MORPHINE SULFATE 30 MILLIGRAM(S): 50 CAPSULE, EXTENDED RELEASE ORAL at 16:20

## 2023-11-22 RX ADMIN — Medication 200 MILLIGRAM(S): at 06:10

## 2023-11-22 RX ADMIN — MORPHINE SULFATE 30 MILLIGRAM(S): 50 CAPSULE, EXTENDED RELEASE ORAL at 15:22

## 2023-11-22 RX ADMIN — METHOCARBAMOL 500 MILLIGRAM(S): 500 TABLET, FILM COATED ORAL at 23:02

## 2023-11-22 NOTE — H&P ADULT - ASSESSMENT
Assessment/Plan:  KYLE CARPIO is a 48y with PMH of chronic back pain (s/p multiple spinal surgeries), asthma, obesity (s/p gastric bypass), HTN presented to [facility] on 11/14 for elective revision PSF with extension of fusion with instrumentation T3-pelvis, pedicle subtraction osteotomy (PSO) L4, tethers, iliac fixation, smith clark osteotomies T3-T10 with Plastics closure with Dr. Schwab, Dr. Gonzalez and Dr. Simpson. Intraoperative course with acute blood loss requiring 3 unit PRBC transfusion, one additional PRBC post-op. Post-op hospital course significant for urinary retention, s/p paulino removal 11/21 and TOV passed, Patient now admitted for a multidisciplinary rehab program. 11-22-23 @ 13:29    s/p T4-Pelvis rev PSF, L5 SO and proximal tethering   - s/p revision surgery on 11/14  - HV x 2 and SHAWNA x 4 drains removed on 11/20   - Impaired ADLs and mobility  - Need for assistance with personal care   - Start comprehensive rehab program of PT/OT - 3 hours a day, 5 days a week. P&O as needed   - Pain control as below  - Fall/Spinal precautions  - WBAT    Pain Control   - Tylenol 975 mg Q8H  - Cymbalta 60 mg QD  - Lyrica 200 mg TID  - Robaxin 500 mg TID  - Fentanyl Patch 50 mcg Q72H  - PRN: Morphine 15 mg Q4H mod pain, 30 mg Q4H severe pain  - follow with pain management outpatient  - evaluated by pain management at [facility] who recommended discharge on home dose of nucynta 75mg 4x/day, fentanyl patch, as well as robaxin 500mg PO 3x/day and lyrica 200mg PO 3x/day    Acute Blood Loss Anemia  - s/p 3u PRBCs intra-op and one additional PRBC post-op  - monitor H/H    Asthma  - albuterol inhaler Q6H PRN    HTN  - continue triamterene 37.5mg- HCTZ 25mg QD  - Monitor BP    Mood / Cognition  - Neuropsychology consult PRN    Sleep  - Maintain quiet hours and a low stim environment.   - Melatonin 3 mg QHS     GI / Bowel  - at risk for constipation due to neurologic diagnosis, immobility, and/or medication use  - Senna qHS  - Miralax BID  - Movantik 12.5 mg QD  - Dulcolax 5 mg BID PRN constipation  - GI ppx: pantoprazole 40 mg QD     / Bladder  - Currently patient voids independently  - Paulino removed 11/21 and passed TOV  - check PVR on admission and SC >400cc    Skin / Pressure injury  - Skin assessment on admission performed [  ] :   - Monitor Incisions:    - Pressure Injury/Skin: OOB to chair, PT/OT  - nursing to monitor skin qShift    Diet/Dysphagia:  - Diet Consistency: regular  - Aspiration Precautions  - Nutrition consult    DVT prophylaxis:   - Lovenox 40 mg QD      Outpatient Follow-up:  Schwab, Frank Johann  Orthopaedic Surgery  [address]  [place] [address]  Phone: [phone]  Fax: [phone]      Code Status/Emergency Contact:      ---------------    Goals: Safe discharge to home  Estimated Length of Stay: 10-14 days  Rehab Potential: Good  Medical Prognosis: Good  Estimated Disposition: Home with home care      PRESCREEN COMPARISON:  I have reviewed the prescreen information and I have found no relevant changes between the preadmission screening and my post admission evaluation.    RATIONALE FOR INPATIENT ADMISSION: Patient demonstrates the following:  [X] Medically appropriate for rehabilitation admission  [X] Has attainable rehab goals with an appropriate initial discharge plan  [X]Has rehabilitation potential (expected to make a significant improvement within a reasonable period of time)  [X] Requires close medical management by a rehab physician, rehab nursing care, Hospitalist and comprehensive interdisciplinary team (including PT, OT and/or SLP, Prosthetics and Orthotics)     Assessment/Plan:  KYLE CARPIO is a 48y with PMH of chronic back pain (s/p multiple spinal surgeries), asthma, obesity (s/p gastric bypass), HTN presented to [facility] on 11/14 for elective revision PSF with extension of fusion with instrumentation T3-pelvis, pedicle subtraction osteotomy (PSO) L4, tethers, iliac fixation, smith clark osteotomies T3-T10 with Plastics closure with Dr. Schwab, Dr. Gonzalez and Dr. Simpson. Intraoperative course with acute blood loss requiring 3 unit PRBC transfusion, one additional PRBC post-op. Post-op hospital course significant for urinary retention, s/p paulino removal 11/21 and TOV passed, Patient now admitted for a multidisciplinary rehab program. 11-22-23 @ 13:29    cChronic back pain, s/p T4-Pelvis rev PSF, L5 SO and proximal tethering   - s/p revision surgery on 11/14  - HV x 2 and SHAWNA x 4 drains removed on 11/20   - Impaired ADLs and mobility  - Need for assistance with personal care   - Start comprehensive rehab program of PT/OT - 3 hours a day, 5 days a week. P&O as needed   - Pain control as below  - Fall/Spinal precautions  - WBAT    Pain Control   - Tylenol 975 mg Q8H  - Cymbalta 60 mg QD  - Lyrica 200 mg TID  - Robaxin 500 mg TID  - Fentanyl Patch 50 mcg Q72H  - PRN: Morphine 15 mg Q4H mod pain, 30 mg Q4H severe pain  - follow with pain management outpatient  - evaluated by pain management at [facility] who recommended discharge on home dose of nucynta 75mg 4x/day, fentanyl patch, as well as robaxin 500mg PO 3x/day and lyrica 200mg PO 3x/day    Acute Blood Loss Anemia  - s/p 3u PRBCs intra-op and one additional PRBC post-op  - monitor H/H    Asthma  - albuterol inhaler Q6H PRN    HTN  - continue triamterene 37.5mg- HCTZ 25mg QD  - Monitor BP    Mood / Cognition  - Neuropsychology consult PRN    Sleep  - Maintain quiet hours and a low stim environment.   - Melatonin 3 mg QHS     GI / Bowel  - at risk for constipation due to neurologic diagnosis, immobility, and/or medication use  - Senna qHS  - Miralax BID  - Movantik 12.5 mg QD  - Dulcolax 5 mg BID PRN constipation  - GI ppx: pantoprazole 40 mg QD     / Bladder  - Currently patient voids independently  - Paulino removed 11/21 and passed TOV  - check PVR on admission and SC >400cc    Skin / Pressure injury  - Skin assessment on admission performed [  ] :   - Monitor Incisions:    - Pressure Injury/Skin: OOB to chair, PT/OT  - nursing to monitor skin qShift    Diet/Dysphagia:  - Diet Consistency: regular  - Aspiration Precautions  - Nutrition consult    DVT prophylaxis:   - Lovenox 40 mg QD      Outpatient Follow-up:  Schwab, Frank Jimmy  Orthopaedic Surgery  [address]  [place] [address]  Phone: [phone]  Fax: [phone]      Code Status/Emergency Contact:      ---------------    Goals: Safe discharge to home  Estimated Length of Stay: 10-14 days  Rehab Potential: Good  Medical Prognosis: Good  Estimated Disposition: Home with home care      PRESCREEN COMPARISON:  I have reviewed the prescreen information and I have found no relevant changes between the preadmission screening and my post admission evaluation.    RATIONALE FOR INPATIENT ADMISSION: Patient demonstrates the following:  [X] Medically appropriate for rehabilitation admission  [X] Has attainable rehab goals with an appropriate initial discharge plan  [X]Has rehabilitation potential (expected to make a significant improvement within a reasonable period of time)  [X] Requires close medical management by a rehab physician, rehab nursing care, Hospitalist and comprehensive interdisciplinary team (including PT, OT and/or SLP, Prosthetics and Orthotics)     Assessment/Plan:  KYLE CARPIO is a 48y with PMH of chronic back pain (s/p multiple spinal surgeries), asthma, obesity (s/p gastric bypass), HTN presented to [facility] on 11/14 for elective revision PSF with extension of fusion with instrumentation T3-pelvis, pedicle subtraction osteotomy (PSO) L4, tethers, iliac fixation, smith clark osteotomies T3-T10 with Plastics closure with Dr. Schwab, Dr. Gonzalez and Dr. Simpson. Intraoperative course with acute blood loss requiring 3 unit PRBC transfusion, one additional PRBC post-op. Post-op hospital course significant for urinary retention, s/p paulino removal 11/21 and TOV passed, Patient now admitted for a multidisciplinary rehab program. 11-22-23 @ 13:29    cChronic back pain, s/p T4-Pelvis rev PSF, L5 SO and proximal tethering   - s/p revision surgery on 11/14  - HV x 2 and SHAWNA x 4 drains removed on 11/20   - Impaired ADLs and mobility  - Need for assistance with personal care   - Start comprehensive rehab program of PT/OT - 3 hours a day, 5 days a week. P&O as needed   - Pain control as below  - Fall/Spinal precautions  - WBAT    Pain Control   - Tylenol 975 mg Q8H  - Cymbalta 60 mg QD  - Lyrica 200 mg TID  - Robaxin 500 mg TID  - Fentanyl Patch 50 mcg Q72H  - PRN: Morphine 15 mg Q4H mod pain, 30 mg Q4H severe pain  - follow with pain management outpatient  - evaluated by pain management at [facility] who recommended discharge on home dose of nucynta 75mg 4x/day, fentanyl patch, as well as robaxin 500mg PO 3x/day and lyrica 200mg PO 3x/day    Acute Blood Loss Anemia  - s/p 3u PRBCs intra-op and one additional PRBC post-op  - monitor H/H    Asthma  - albuterol inhaler Q6H PRN    HTN  - continue triamterene 37.5mg- HCTZ 25mg QD  - Monitor BP    Mood / Cognition  - Neuropsychology consult PRN    Sleep  - Maintain quiet hours and a low stim environment.   - Melatonin 3 mg QHS     GI / Bowel  - at risk for constipation due to neurologic diagnosis, immobility, and/or medication use  - Senna qHS  - Miralax BID  - Movantik 12.5 mg QD  - Dulcolax 5 mg BID PRN constipation  - GI ppx: pantoprazole 40 mg QD     / Bladder  - Currently patient voids independently  - Paulino removed 11/21 and passed TOV  - check PVR on admission and SC >400cc    Skin / Pressure injury  - Skin assessment on admission performed : Left buttock healing wound with scab, spinal surgical incision covered with aquacel dressing  - Pressure Injury/Skin: OOB to chair, PT/OT  - nursing to monitor skin q Shift    Diet/Dysphagia:  - Diet Consistency: regular  - Aspiration Precautions  - Nutrition consult    DVT prophylaxis:   - Lovenox 40 mg QD    Outpatient Follow-up:  Schwab, Frank Johann  Orthopaedic Surgery  [address]  [place] [address]  Phone: [phone]  Fax: [phone]    Code Status/Emergency Contact:    ---------------    Goals: Safe discharge to home  Estimated Length of Stay: 10-14 days  Rehab Potential: Good  Medical Prognosis: Good  Estimated Disposition: Home with home care      PRESCREEN COMPARISON:  I have reviewed the prescreen information and I have found no relevant changes between the preadmission screening and my post admission evaluation.    RATIONALE FOR INPATIENT ADMISSION: Patient demonstrates the following:  [X] Medically appropriate for rehabilitation admission  [X] Has attainable rehab goals with an appropriate initial discharge plan  [X]Has rehabilitation potential (expected to make a significant improvement within a reasonable period of time)  [X] Requires close medical management by a rehab physician, rehab nursing care, Hospitalist and comprehensive interdisciplinary team (including PT, OT and/or SLP, Prosthetics and Orthotics)     Mrs. Savi Ivy is a 48 year old female patient with past medical history of chronic back pain (s/p multiple spinal surgeries), asthma, obesity (s/p gastric bypass), and HTN who is admitted for Acute Inpatient Rehabilitation with a multidisciplinary rehab program at [facility] with functional impairments in ADLs and mobility secondary to chronic back pain s/p multiple spinal surgeries treated further surgically with an elective revision of a PSF with extension of fusion with instrumentation from T3 through pelvis, L4 pedicle subtraction osteotomy (PSO), tethers, iliac fixation, T3-T10 Duvall-Meneses osteotomies with Plastic Surgery closure whose intraoperative course was remarkable for acute blood loss requiring 3 unit PRBC transfusion with one additional PRBC post-op. Post-op hospital course additionally significant for urinary retention.      Chronic back pain s/p T4-Pelvis revision surgery    - S/P elective revision of PSF with extension of fusion with instrumentation from T3 through pelvis (11/14)  - S/P L4 pedicle subtraction osteotomy (PSO)  - S/P proximal tethers and iliac fixation  - S/P T3-T10 Smith-Meneses osteotomies with Plastic Surgery closure   - HV x 2 and SHAWNA x 4 drains removed on 11/20   - Impaired ADLs and mobility  - Need for assistance with personal care   - Start comprehensive rehab program of PT/OT - 3 hours a day, 5 days a week. P&O as needed   - Pain control as below  - Fall/Spinal precautions  - WBAT    Pain Control   - Tylenol 975 mg Q8H  - Cymbalta 60 mg QD  - Lyrica 200 mg TID  - Robaxin 500 mg TID  - Fentanyl Patch 50 mcg Q72H  - PRN: Morphine 15 mg Q4H mod pain, 30 mg Q4H severe pain  - follow with pain management outpatient  - evaluated by pain management at [facility] who recommended discharge on home dose of nucynta 75mg 4x/day, fentanyl patch, as well as robaxin 500mg PO 3x/day and lyrica 200mg PO 3x/day    Acute Blood Loss Anemia  - s/p 3u PRBCs intra-op and one additional PRBC post-op  - monitor H/H    Asthma  - albuterol inhaler Q6H PRN    HTN  - continue triamterene 37.5mg- HCTZ 25mg QD  - Monitor BP    Mood / Cognition  - Neuropsychology consult PRN    Sleep  - Maintain quiet hours and a low stim environment.   - Melatonin 3 mg QHS     GI / Bowel  - at risk for constipation due to neurologic diagnosis, immobility, and/or medication use  - Senna qHS  - Miralax BID  - Movantik 12.5 mg QD  - Dulcolax 5 mg BID PRN constipation  - GI ppx: pantoprazole 40 mg QD     / Bladder  - Currently patient voids independently  - Cobb removed 11/21 and passed TOV  - check PVR on admission and SC >400cc    Skin / Pressure injury  - Skin assessment on admission performed : Left buttock healing wound with scab, spinal surgical incision covered with aquacel dressing  - Pressure Injury/Skin: OOB to chair, PT/OT  - nursing to monitor skin q Shift    Diet/Dysphagia:  - Diet Consistency: regular  - Aspiration Precautions  - Nutrition consult    DVT prophylaxis:   - Lovenox 40 mg QD    Outpatient Follow-up:  Schwab, Frank Johann  Orthopaedic Surgery  [address]  [place] [address]  Phone: [phone]  Fax: [phone]    Code Status/Emergency Contact:    ---------------    Goals: Safe discharge to home  Estimated Length of Stay: 10-14 days  Rehab Potential: Good  Medical Prognosis: Good  Estimated Disposition: Home with home care      PRESCREEN COMPARISON:  I have reviewed the prescreen information and I have found no relevant changes between the preadmission screening and my post admission evaluation.    RATIONALE FOR INPATIENT ADMISSION: Patient demonstrates the following:  [X] Medically appropriate for rehabilitation admission  [X] Has attainable rehab goals with an appropriate initial discharge plan  [X]Has rehabilitation potential (expected to make a significant improvement within a reasonable period of time)  [X] Requires close medical management by a rehab physician, rehab nursing care, Hospitalist and comprehensive interdisciplinary team (including PT, OT and/or SLP, Prosthetics and Orthotics)     Mrs. Savi Ivy is a 48 year old female patient with past medical history of chronic back pain (s/p multiple spinal surgeries), asthma, obesity (s/p gastric bypass), and HTN who is admitted for Acute Inpatient Rehabilitation with a multidisciplinary rehab program at [facility] with functional impairments in ADLs and mobility secondary to chronic back pain s/p multiple spinal surgeries treated further surgically with an elective revision of a PSF with extension of fusion with instrumentation from T3 through pelvis, L4 pedicle subtraction osteotomy (PSO), tethers, iliac fixation, T3-T10 Duvall-Meneses osteotomies with Plastic Surgery closure whose intraoperative course was remarkable for acute blood loss requiring 3 unit PRBC transfusion with one additional PRBC post-op. Post-op hospital course additionally significant for urinary retention.      Chronic back pain s/p T4-Pelvis revision surgery    - S/P elective revision of PSF with extension of fusion with instrumentation from T3 through pelvis (11/14)  - S/P L4 pedicle subtraction osteotomy (PSO)  - S/P proximal tethers and iliac fixation  - S/P T3-T10 Smith-Meneses osteotomies with Plastic Surgery closure   - HV x 2 and SHAWNA x 4 drains removed on 11/20   - Impaired ADLs and mobility  - Need for assistance with personal care   - Start comprehensive rehab program of PT/OT - 3 hours a day, 5 days a week. P&O as needed   - Pain control as below  - Fall/Spinal precautions  - WBAT    Pain Control   - Tylenol 975 mg Q8H  - Cymbalta 60 mg QD  - Lyrica 200 mg TID  - Robaxin 500 mg TID  - Fentanyl Patch 50 mcg Q72H  - PRN: Morphine 15 mg Q4H mod pain, 30 mg Q4H severe pain  - follow with pain management outpatient  - evaluated by pain management at [facility] who recommended discharge on home dose of nucynta 75mg 4x/day, fentanyl patch, as well as robaxin 500mg PO 3x/day and lyrica 200mg PO 3x/day    Acute Blood Loss Anemia  - s/p 3u PRBCs intra-op and one additional PRBC post-op  - monitor H/H    Asthma  - albuterol inhaler Q6H PRN    HTN  - continue triamterene 37.5mg- HCTZ 25mg QD  - Monitor BP    Mood / Cognition  - Neuropsychology consult PRN    Sleep  - Maintain quiet hours and a low stim environment.   - Melatonin 3 mg QHS     GI / Bowel  - at risk for constipation due to neurologic diagnosis, immobility, and/or medication use  - Senna qHS  - Miralax BID  - Movantik 12.5 mg QD  - Dulcolax 5 mg BID PRN constipation  - GI ppx: pantoprazole 40 mg QD     / Bladder  - Currently patient voids independently  - Cobb removed 11/21 and passed TOV  - check PVR on admission and SC >400cc    Skin / Pressure injury  - Skin assessment on admission performed : Left buttock healing wound with scab, spinal surgical incision clean an dry and covered with aquacel dressing  - Pressure Injury/Skin: OOB to chair, PT/OT  - nursing to monitor skin q Shift    Diet/Dysphagia:  - Diet Consistency: regular  - Aspiration Precautions  - Nutrition consult    DVT prophylaxis:   - Lovenox 40 mg QD    Outpatient Follow-up:  Schwab, Frank Johann  Orthopaedic Surgery  [address]  [place] [address]  Phone: [phone]  Fax: [phone]    Code Status/Emergency Contact:    ---------------    Goals: Safe discharge to home  Estimated Length of Stay: 10-14 days  Rehab Potential: Good  Medical Prognosis: Good  Estimated Disposition: Home with home care      PRESCREEN COMPARISON:  I have reviewed the prescreen information and I have found no relevant changes between the preadmission screening and my post admission evaluation.    RATIONALE FOR INPATIENT ADMISSION: Patient demonstrates the following:  [X] Medically appropriate for rehabilitation admission  [X] Has attainable rehab goals with an appropriate initial discharge plan  [X]Has rehabilitation potential (expected to make a significant improvement within a reasonable period of time)  [X] Requires close medical management by a rehab physician, rehab nursing care, Hospitalist and comprehensive interdisciplinary team (including PT, OT and/or SLP, Prosthetics and Orthotics)     Mrs. Savi Ivy is a 48 year old female patient with past medical history of chronic back pain (s/p multiple spinal surgeries), asthma, obesity (s/p gastric bypass), and HTN who is admitted for Acute Inpatient Rehabilitation with a multidisciplinary rehab program at [facility] with functional impairments in ADLs and mobility secondary to chronic back pain s/p multiple spinal surgeries treated further surgically with an elective revision of a PSF with extension of fusion with instrumentation from T3 through pelvis, L4 pedicle subtraction osteotomy (PSO), tethers, iliac fixation, T3-T10 Duvall-Meneses osteotomies with Plastic Surgery closure whose intraoperative course was remarkable for acute blood loss requiring 3 unit PRBC transfusion with one additional PRBC post-op. Post-op hospital course additionally significant for urinary retention.      Chronic back pain s/p T4-Pelvis revision surgery  - S/P elective revision of PSF with extension of fusion with instrumentation from T3 through pelvis (11/14)  - S/P L4 pedicle subtraction osteotomy (PSO)  - S/P proximal tethers and iliac fixation  - S/P T3-T10 Smith-Meneses osteotomies with Plastic Surgery closure   - HV x 2 and SHAWNA x 4 drains removed on 11/20   - Impaired ADLs and mobility  - Need for assistance with personal care   - Start comprehensive rehab program of PT/OT - 3 hours a day, 5 days a week. P&O as needed   - Pain control as below  - Fall/Spinal precautions  - WBAT    Obesity    Pain Control   - Tylenol 975 mg Q8H  - Cymbalta 60 mg QD  - Lyrica 200 mg TID  - Robaxin 500 mg TID  - Fentanyl Patch 50 mcg Q72H  - PRN: Morphine 15 mg Q4H mod pain, 30 mg Q4H severe pain  - follow with pain management outpatient  - evaluated by pain management at [facility] who recommended discharge on home dose of nucynta 75mg 4x/day, fentanyl patch, as well as robaxin 500mg PO 3x/day and lyrica 200mg PO 3x/day    Acute Blood Loss Anemia  - s/p 3u PRBCs intra-op and one additional PRBC post-op  - monitor H/H    Asthma  - albuterol inhaler Q6H PRN    HTN  - continue triamterene 37.5mg- HCTZ 25mg QD  - Monitor BP    Mood / Cognition  - Neuropsychology consult PRN    Sleep  - Maintain quiet hours and a low stim environment.   - Melatonin 3 mg QHS     GI / Bowel  - at risk for constipation due to neurologic diagnosis, immobility, and/or medication use  - Senna qHS  - Miralax BID  - Movantik 12.5 mg QD  - Dulcolax 5 mg BID PRN constipation  - GI ppx: pantoprazole 40 mg QD     / Bladder  - Currently patient voids independently  - Cobb removed 11/21 and passed TOV  - check PVR on admission and SC >400cc    Skin / Pressure injury  - Skin assessment on admission performed : Left buttock healing wound with scab, spinal surgical incision clean an dry and covered with aquacel dressing  - Pressure Injury/Skin: OOB to chair, PT/OT  - nursing to monitor skin q Shift    Diet/Dysphagia:  - Diet Consistency: regular  - Aspiration Precautions  - Nutrition consult    DVT prophylaxis:   - Lovenox 40 mg QD    Outpatient Follow-up:  Schwab, Frank Johann  Orthopaedic Surgery  [address]  [place] [address]  Phone: [phone]  Fax: [phone]    Code Status/Emergency Contact:    ---------------    Goals: Safe discharge to home  Estimated Length of Stay: 10-14 days  Rehab Potential: Good  Medical Prognosis: Good  Estimated Disposition: Home with home care      PRESCREEN COMPARISON:  I have reviewed the prescreen information and I have found no relevant changes between the preadmission screening and my post admission evaluation.    RATIONALE FOR INPATIENT ADMISSION: Patient demonstrates the following:  [X] Medically appropriate for rehabilitation admission  [X] Has attainable rehab goals with an appropriate initial discharge plan  [X]Has rehabilitation potential (expected to make a significant improvement within a reasonable period of time)  [X] Requires close medical management by a rehab physician, rehab nursing care, Hospitalist and comprehensive interdisciplinary team (including PT, OT and/or SLP, Prosthetics and Orthotics)     Mrs. Savi Ivy is a 48 year old female patient with past medical history of chronic back pain (s/p multiple spinal surgeries), asthma, obesity (s/p gastric bypass), and HTN who is admitted for Acute Inpatient Rehabilitation with a multidisciplinary rehab program at [facility] with functional impairments in ADLs and mobility secondary to chronic back pain s/p multiple spinal surgeries treated further surgically with an elective revision of a PSF with extension of fusion with instrumentation from T3 through pelvis, L4 pedicle subtraction osteotomy (PSO), tethers, iliac fixation, T3-T10 Duvall-Meneses osteotomies with Plastic Surgery closure whose intraoperative course was remarkable for acute blood loss requiring 3 unit PRBC transfusion with one additional PRBC post-op. Post-op hospital course additionally significant for urinary retention.      Spinal stenosis with neurogenic claudication s/p T4-Pelvis revision surgery  - S/P elective revision of PSF with extension of fusion with instrumentation from T3 through pelvis (11/14)  - S/P L4 pedicle subtraction osteotomy (PSO)  - S/P proximal tethers and iliac fixation  - S/P T3-T10 Smith-Meneses osteotomies with Plastic Surgery closure   - HV x 2 and SHAWNA x 4 drains removed on 11/20   - Impaired ADLs and mobility  - Need for assistance with personal care   - Start comprehensive rehab program of PT/OT - 3 hours a day, 5 days a week. P&O as needed   - Pain control as below  - Fall/Spinal precautions  - WBAT    Obesity    Pain Control   - Tylenol 975 mg Q8H  - Cymbalta 60 mg QD  - Lyrica 200 mg TID  - Robaxin 500 mg TID  - Fentanyl Patch 50 mcg Q72H  - PRN: Morphine 15 mg Q4H mod pain, 30 mg Q4H severe pain  - follow with pain management outpatient  - evaluated by pain management at [facility] who recommended discharge on home dose of nucynta 75mg 4x/day, fentanyl patch, as well as robaxin 500mg PO 3x/day and lyrica 200mg PO 3x/day    Acute Blood Loss Anemia  - s/p 3u PRBCs intra-op and one additional PRBC post-op  - monitor H/H    Asthma  - albuterol inhaler Q6H PRN    HTN  - continue triamterene 37.5mg- HCTZ 25mg QD  - Monitor BP    Mood / Cognition  - Neuropsychology consult PRN    Sleep  - Maintain quiet hours and a low stim environment.   - Melatonin 3 mg QHS     GI / Bowel  - at risk for constipation due to neurologic diagnosis, immobility, and/or medication use  - Senna qHS  - Miralax BID  - Movantik 12.5 mg QD  - Dulcolax 5 mg BID PRN constipation  - GI ppx: pantoprazole 40 mg QD     / Bladder  - Currently patient voids independently  - Cobb removed 11/21 and passed TOV  - check PVR on admission and SC >400cc    Skin / Pressure injury  - Skin assessment on admission performed : Left buttock healing wound with scab, spinal surgical incision clean an dry and covered with aquacel dressing  - Pressure Injury/Skin: OOB to chair, PT/OT  - nursing to monitor skin q Shift    Diet/Dysphagia:  - Diet Consistency: regular  - Aspiration Precautions  - Nutrition consult    DVT prophylaxis:   - Lovenox 40 mg QD    Outpatient Follow-up:  Schwab, Frank Jimmy  Orthopaedic Surgery  [address]  [place] [address]  Phone: [phone]  Fax: [phone]    Code Status/Emergency Contact:    ---------------    Goals: Safe discharge to home  Estimated Length of Stay: 10-14 days  Rehab Potential: Good  Medical Prognosis: Good  Estimated Disposition: Home with home care      PRESCREEN COMPARISON:  I have reviewed the prescreen information and I have found no relevant changes between the preadmission screening and my post admission evaluation.    RATIONALE FOR INPATIENT ADMISSION: Patient demonstrates the following:  [X] Medically appropriate for rehabilitation admission  [X] Has attainable rehab goals with an appropriate initial discharge plan  [X]Has rehabilitation potential (expected to make a significant improvement within a reasonable period of time)  [X] Requires close medical management by a rehab physician, rehab nursing care, Hospitalist and comprehensive interdisciplinary team (including PT, OT and/or SLP, Prosthetics and Orthotics)     Mrs. Savi Ivy is a 48 year old female patient with past medical history of chronic back pain (s/p multiple spinal surgeries), asthma, obesity (s/p gastric bypass), and HTN who is admitted for Acute Inpatient Rehabilitation with a multidisciplinary rehab program at [facility] with functional impairments in ADLs and mobility secondary to chronic back pain s/p multiple spinal surgeries treated further surgically with an elective revision of a PSF with extension of fusion with instrumentation from T3 through pelvis, L4 pedicle subtraction osteotomy (PSO), tethers, iliac fixation, T3-T10 Duvall-Meneses osteotomies with Plastic Surgery closure whose intraoperative course was remarkable for acute blood loss requiring 3 unit PRBC transfusion with one additional PRBC post-op. Post-op hospital course additionally significant for urinary retention.      Spinal stenosis with neurogenic claudication s/p T4-Pelvis revision surgery  - S/P elective revision of PSF with extension of fusion with instrumentation from T3 through pelvis (11/14)  - S/P L4 pedicle subtraction osteotomy (PSO)  - S/P proximal tethers and iliac fixation  - S/P T3-T10 Smith-Meneses osteotomies with Plastic Surgery closure   - HV x 2 and SHAWNA x 4 drains removed on 11/20   - Impaired ADLs and mobility  - Need for assistance with personal care   - Start comprehensive rehab program of PT/OT - 3 hours a day, 5 days a week. P&O as needed   - Pain control as below  - Fall/Spinal precautions  - WBAT    Obesity    Pain Control   - Tylenol 975 mg Q8H  - Cymbalta 60 mg QD  - Lyrica 200 mg TID  - Robaxin 500 mg TID  - Fentanyl Patch 50 mcg Q72H  - PRN: Morphine 15 mg Q4H mod pain, 30 mg Q4H severe pain  - follow with pain management outpatient  - evaluated by pain management at [facility] who recommended discharge on home dose of nucynta 75mg 4x/day, fentanyl patch, as well as robaxin 500mg PO 3x/day and lyrica 200mg PO 3x/day    Acute Blood Loss Anemia  - s/p 3u PRBCs intra-op and one additional PRBC post-op  - monitor H/H    Asthma  - albuterol inhaler Q6H PRN    HTN  - continue triamterene 37.5mg- HCTZ 25mg QD  - Monitor BP    Mood / Cognition  - Neuropsychology consult PRN    Sleep  - Maintain quiet hours and a low stim environment.   - Melatonin 3 mg QHS     GI / Bowel  - at risk for constipation due to neurologic diagnosis, immobility, and/or medication use  - Senna qHS  - Miralax BID  - Movantik 12.5 mg QD  - Dulcolax 5 mg BID PRN constipation  - GI ppx: pantoprazole 40 mg QD     / Bladder  - Currently patient voids independently  - Cobb removed 11/21 and passed TOV  - check PVR on admission and SC >400cc    Skin:  - Skin assessment on admission performed : Left buttock healing wound with scab, spinal surgical incision clean an dry and covered with aquacel dressing  - Pressure Injury/Skin: OOB to chair, PT/OT  - nursing to monitor skin q Shift    Diet:  - Diet Consistency: regular  - Aspiration Precautions  - Nutrition consult    DVT prophylaxis:   - Lovenox 40 mg QD    Outpatient Follow-up:  Schwab, Frank Johann  Orthopaedic Surgery  [address]  [place] [address]  Phone: [phone]  Fax: [phone]    Code Status/Emergency Contact:    ---------------    Goals: Safe discharge to home  Estimated Length of Stay: 10-14 days  Rehab Potential: Good  Medical Prognosis: Good  Estimated Disposition: Home with home care      PRESCREEN COMPARISON:  I have reviewed the prescreen information and I have found no relevant changes between the preadmission screening and my post admission evaluation.    RATIONALE FOR INPATIENT ADMISSION: Patient demonstrates the following:  [X] Medically appropriate for rehabilitation admission  [X] Has attainable rehab goals with an appropriate initial discharge plan  [X]Has rehabilitation potential (expected to make a significant improvement within a reasonable period of time)  [X] Requires close medical management by a rehab physician, rehab nursing care, Hospitalist and comprehensive interdisciplinary team (including PT, OT and/or SLP, Prosthetics and Orthotics)

## 2023-11-22 NOTE — PATIENT PROFILE ADULT - WHEN WAS YOUR LAST VACCINATION? YEAR
[facility]? 2023

## 2023-11-22 NOTE — PROGRESS NOTE ADULT - ASSESSMENT
48F w obesity s/p gastric bypass sx, extensive back pain w spinal surgeries, revision w Dr. Schwab, p/w persistent back pain and RLE sciatica sxs and weakness w ambulation issues, follows w pain mgmt, here for elective PSF w extension of fusion T3-Pelvis, pedicle subtraction osteotomy w Dr. Schwab 11/14, c/b urinary retention w paulino placed 11/19, passed TOV - now for acute rehab    #Acute urinary retention - Resolved. Passed TOV 11/21  #Constipation - resolved w movantik. Last BM 11/20 Benign abd exam    #Post-op state - pain controlled. PPx: SQL. On bowel regimen and incentive spirometer  #Spinal stenosis  --Scheduled tylenol 975 q8, robaxin 500 q8, lyrica 200 q8  --PRN: morphine 15/30 q4h for mod/severe pain    #HTN - home on triamterene/hctz 37.5/25 -- hold parameters added  #Anemia - 7.7 - has been ranging 7-8. Appears asymptomatic baseline 12.8.    #Asthma - on albuterol inhaler PRN  #Chronic pain - on cymbalta 60 ER, Fentanyl patch 50mcg q72, lyrica 200 q8   - also on nucynta (tapatendol 75 PRN)  #Obesity - BMi 31.8 - affects all aspects of care    Plan  Optimized for acute rehab today  Can check CBC w diff in 1wk at acute rehab.    Caution w NSAIDs d/t gastric bypass hx    DISPO: Acute rehab

## 2023-11-22 NOTE — H&P ADULT - NSVTERISKREFERASSESS_GEN_ALL_CORE
Patient's wife calling because Neil Refer to the Assessment tab to view/cancel completed assessment.

## 2023-11-22 NOTE — H&P ADULT - REASON FOR ADMISSION
chronic back pain, s/p T4-Pelvis revision PSF, L5 subtraction osteotomy and proximal tethering Chronic back pain, s/p T4-Pelvis revision PSF, L5 subtraction osteotomy and proximal tethering Chronic back pain s/p T4-Pelvis revision surgery Spinal stenosis with neurogenic claudication s/p T4-Pelvis revision surgery

## 2023-11-22 NOTE — PROGRESS NOTE ADULT - SUBJECTIVE AND OBJECTIVE BOX
INTERVAL HPI/OVERNIGHT EVENTS: david o/n    SUBJECTIVE: Patient seen and examined at bedside.   Feeling well. Mobilized and walked wo LH/dizziness. Denies fatigue. Passing urine and BM. States pain is gradually improving. No chest pain, dyspnea. Eager to go to acute rehab.     OBJECTIVE:    VITAL SIGNS:  ICU Vital Signs Last 24 Hrs  T(C): 36.6 (22 Nov 2023 12:37), Max: 37.1 (22 Nov 2023 05:47)  T(F): 97.9 (22 Nov 2023 12:37), Max: 98.7 (22 Nov 2023 05:47)  HR: 78 (22 Nov 2023 12:37) (70 - 88)  BP: 105/59 (22 Nov 2023 12:37) (91/53 - 118/59)  BP(mean): --  ABP: --  ABP(mean): --  RR: 17 (22 Nov 2023 12:37) (11 - 20)  SpO2: 98% (22 Nov 2023 12:37) (93% - 98%)    O2 Parameters below as of 22 Nov 2023 12:37  Patient On (Oxygen Delivery Method): room air              11-21 @ 07:01 - 11-22 @ 07:00  --------------------------------------------------------  IN: 0 mL / OUT: 2000 mL / NET: -2000 mL    11-22 @ 07:01  -  11-22 @ 12:46  --------------------------------------------------------  IN: 480 mL / OUT: 0 mL / NET: 480 mL      CAPILLARY BLOOD GLUCOSE          PHYSICAL EXAM:  GEN: female in NAD on RA  HEENT: NC/AT, MMM  CV: RRR, nml S1S2, no murmurs  PULM: nml effort, CTAB  ABD: Soft, non-distended, NABS, non-tender  : Cobb catheter in place  NEURO  A/O x3, moving all extremities, 5/5 in BLE. Sensation intact  PSYCH: Appropriate      MEDICATIONS:  MEDICATIONS  (STANDING):  acetaminophen     Tablet .. 975 milliGRAM(s) Oral every 8 hours  chlorhexidine 2% Cloths 1 Application(s) Topical once  DULoxetine 60 milliGRAM(s) Oral daily  enoxaparin Injectable 40 milliGRAM(s) SubCutaneous every 24 hours  fentaNYL   Patch  50 MICROgram(s)/Hr 1 Patch Transdermal every 72 hours  methocarbamol 500 milliGRAM(s) Oral three times a day  naloxegol 12.5 milliGRAM(s) Oral daily  ondansetron   Disintegrating Tablet 4 milliGRAM(s) Oral every 6 hours  pantoprazole    Tablet 40 milliGRAM(s) Oral before breakfast  polyethylene glycol 3350 17 Gram(s) Oral <User Schedule>  povidone iodine 5% Nasal Swab 1 Application(s) Both Nostrils once  pregabalin 200 milliGRAM(s) Oral three times a day  senna 2 Tablet(s) Oral at bedtime  triamterene 37.5 mG/hydrochlorothiazide 25 mG Tablet 1 Tablet(s) Oral daily    MEDICATIONS  (PRN):  albuterol    90 MICROgram(s) HFA Inhaler 2 Puff(s) Inhalation every 6 hours PRN Shortness of Breath and/or Wheezing  aluminum hydroxide/magnesium hydroxide/simethicone Suspension 30 milliLiter(s) Oral every 4 hours PRN Dyspepsia  benzocaine/menthol Lozenge 1 Lozenge Oral four times a day PRN Sore Throat  bisacodyl 5 milliGRAM(s) Oral every 12 hours PRN Constipation  bisacodyl Suppository 10 milliGRAM(s) Rectal daily PRN Constipation  diphenhydrAMINE 25 milliGRAM(s) Oral every 4 hours PRN Rash and/or Itching  magnesium hydroxide Suspension 30 milliLiter(s) Oral every 12 hours PRN Constipation  morphine  IR 30 milliGRAM(s) Oral every 4 hours PRN Severe Pain (7 - 10)  morphine  IR 15 milliGRAM(s) Oral every 4 hours PRN Moderate Pain (4 - 6)  naloxone Injectable 0.1 milliGRAM(s) IV Push every 3 minutes PRN For ANY of the following changes in patient status:  A. RR LESS THAN 10 breaths per minute, B. Oxygen saturation LESS THAN 90%, C. Sedation score of 6  ondansetron Injectable 4 milliGRAM(s) IV Push every 6 hours PRN Nausea and/or Vomiting  ondansetron Injectable 4 milliGRAM(s) IV Push once PRN Nausea      ALLERGIES:  Allergies    shellfish (Hives; Short breath)  diclofenac (Other)    Intolerances        LABS:                        7.7    5.58  )-----------( 395      ( 22 Nov 2023 09:12 )             24.7     11-22    138  |  104  |  13  ----------------------------<  90  4.6   |  29  |  0.62    Ca    8.1<L>      22 Nov 2023 09:12        Urinalysis Basic - ( 22 Nov 2023 09:12 )    Color: x / Appearance: x / SG: x / pH: x  Gluc: 90 mg/dL / Ketone: x  / Bili: x / Urobili: x   Blood: x / Protein: x / Nitrite: x   Leuk Esterase: x / RBC: x / WBC x   Sq Epi: x / Non Sq Epi: x / Bacteria: x        RADIOLOGY & ADDITIONAL TESTS: Reviewed.

## 2023-11-22 NOTE — PATIENT PROFILE ADULT - NSTRANSFERBELONGINGSDISPO_GEN_A_NUR
[address]   [address] [address]  Phone: [phone]  [facility] [place], APRN - CNP        February 22, 2022     Patient: Lavern Kehr   YOB: 1965   Date of Visit: 2/22/2022       To Whom It May Concern: It is my medical opinion that Lavern Kehr should remain out of work until evaluated on 03/09/2022 by Dr. Luciano Hendrickson. .    If you have any questions or concerns, please don't hesitate to call.     Sincerely,      [employer], CMA,EMT  On Behalf of  KYLE Noland CNP
with patient

## 2023-11-22 NOTE — CONSULT NOTE ADULT - CONSULT REASON
Evaluation of Rehab needs
chronic opioid use/ pain now with acute surgery
Urinary Retention
Co-management
chronic pain

## 2023-11-22 NOTE — DISCHARGE NOTE NURSING/CASE MANAGEMENT/SOCIAL WORK - PATIENT PORTAL LINK FT
You can access the FollowMyHealth Patient Portal offered by [facility] by registering at the following website: [email or web address]. By joining [employer]’s FollowMyHealth portal, you will also be able to view your health information using other applications (apps) compatible with our system.

## 2023-11-22 NOTE — PROGRESS NOTE ADULT - SUBJECTIVE AND OBJECTIVE BOX
Ortho AM Progress Note    Subjective:  Pt seen and examined today. states she was able to sleep overnight. No issues. Pain well controlled. ready for HOWARD today.   eager to ambulate with pt, and go to Ar       Vital Signs Last 24 Hrs  T(C): 36.8 (22 Nov 2023 01:47), Max: 37 (21 Nov 2023 17:10)  T(F): 98.3 (22 Nov 2023 01:47), Max: 98.6 (21 Nov 2023 17:10)  HR: 78 (22 Nov 2023 01:47) (75 - 88)  BP: 91/53 (22 Nov 2023 01:47) (91/53 - 108/61)  BP(mean): --  RR: 11 (22 Nov 2023 01:47) (11 - 18)  SpO2: 93% (22 Nov 2023 01:47) (93% - 97%)    Parameters below as of 22 Nov 2023 01:47  Patient On (Oxygen Delivery Method): room air    Objective:    Physical exam   General: Pt Alert and oriented, NAD  DSG- aquacel x2 dressing cdi,  Pulses: +DP BLE, WWP feet  Sensation: diminished in S1 distribution to RLE but improving otherwise silt intact  Motor: 5/5 EHL/FHL/TA/GS LLE; 5/5 EHL/GS/TA RLE, 5/5      Plan of Care:  A/P: 48yFemale POD#7 s/p T4-Pelvis rev PSF, L5 SO and proximal tethering 11/14  - afebrile   - Pain Control- morphine 15-30mg Q4h prn moderate to severe pain,   , continue fentanyl 50mcg patch, methocarbamol 500mg PO Q8h, lyrica 200mg PO TID  last IVP dilaudid 11-20-23 956am- all IV medications discontinued   - DVT ppx: lovenox 40mg Subq Q24h  - PT, WBS: WBAT  - appreciate medicine recs  - appreciate pain management recs  - bowel regimen, IS use, PPI - last bm 11-  - for HOWARD likely today   - needs standing xray scoli   - dispo - Ar pending auth and acceptance,   Patient will be medically cleared for discharge on Wednesday 11/22/2023    Ortho Pager [phone]

## 2023-11-22 NOTE — PATIENT PROFILE ADULT - FALL HARM RISK - HARM RISK INTERVENTIONS
Assistance with ambulation/Assistance OOB with selected safe patient handling equipment/Communicate Risk of Fall with Harm to all staff/Discuss with provider need for PT consult/Monitor gait and stability/Provide patient with walking aids - walker, cane, crutches/Reinforce activity limits and safety measures with patient and family/Tailored Fall Risk Interventions/Visual Cue: Yellow wristband and red socks/Bed in lowest position, wheels locked, appropriate side rails in place/Call bell, personal items and telephone in reach/Instruct patient to call for assistance before getting out of bed or chair/Non-slip footwear when patient is out of bed/[place] to call system/Physically safe environment - no spills, clutter or unnecessary equipment/Purposeful Proactive Rounding/Room/bathroom lighting operational, light cord in reach Assistance with ambulation/Assistance OOB with selected safe patient handling equipment/Communicate Risk of Fall with Harm to all staff/Discuss with provider need for PT consult/Monitor gait and stability/Provide patient with walking aids - walker, cane, crutches/Reinforce activity limits and safety measures with patient and family/Sit up slowly, dangle for a short time, stand at bedside before walking/Tailored Fall Risk Interventions/Use of alarms - bed, chair and/or voice tab/Visual Cue: Yellow wristband and red socks/Bed in lowest position, wheels locked, appropriate side rails in place/Call bell, personal items and telephone in reach/Instruct patient to call for assistance before getting out of bed or chair/Non-slip footwear when patient is out of bed/[place] to call system/Physically safe environment - no spills, clutter or unnecessary equipment/Purposeful Proactive Rounding/Room/bathroom lighting operational, light cord in reach

## 2023-11-22 NOTE — H&P ADULT - NSHPPHYSICALEXAM_GEN_ALL_CORE
PHYSICAL EXAM  VITALS  T(C): 36.7 (11-22-23 @ 17:10), Max: 37.1 (11-22-23 @ 05:47)  HR: 79 (11-22-23 @ 17:10) (70 - 84)  BP: 105/68 (11-22-23 @ 17:10) (91/53 - 118/59)  RR: 17 (11-22-23 @ 17:10) (11 - 20)  SpO2: 96% (11-22-23 @ 17:10) (93% - 98%)    Gen - NAD, Comfortable  HEENT - NCAT, EOMI, MMM  Neck - Supple, No limited ROM  Pulm - CTAB, No wheeze, No rhonchi, No crackles  Cardiovascular - RRR, S1S2  Chest - good chest expansion, good respiratory effort  Abdomen - Soft, NT/ND, +BS  Extremities - No Cyanosis, no clubbing, no edema, no calf tenderness  Neuro-     Cognitive - awake, alert, oriented to person, place, date, year, and situation.  Able to follow command     Communication - Fluent, Comprehensible     Attention: Intact     Judgement: Good evidence of judgement     Memory:  memory intact     Cranial Nerves - CN 2-12 intact     Motor -                     LEFT    UE - 4/5                    RIGHT UE - 4/5                    LEFT    LE - 4/5                    RIGHT LE - 4/5        Sensory - Intact to LT      Reflexes - DTR Intact, No primitive reflexive     Coordination - FTN intact     Tone - normal  Psychiatric - Mood stable, Affect WNL  Skin:  left buttock healing wound with scab, spinal surgical incision covered with aquacel dressing PHYSICAL EXAM  VITALS  T(C): 36.7 (11-22-23 @ 17:10), Max: 37.1 (11-22-23 @ 05:47)  HR: 79 (11-22-23 @ 17:10) (70 - 84)  BP: 105/68 (11-22-23 @ 17:10) (91/53 - 118/59)  RR: 17 (11-22-23 @ 17:10) (11 - 20)  SpO2: 96% (11-22-23 @ 17:10) (93% - 98%)    Gen - NAD, Comfortable  HEENT - NCAT, EOMI, MMM  Neck - Supple, No limited ROM  Pulm - CTAB, No wheeze, No rhonchi, No crackles  Cardiovascular - RRR, S1S2  Chest - good chest expansion, good respiratory effort  Abdomen - Soft, NT/ND, +BS  Extremities - No Cyanosis, no clubbing, no edema, no calf tenderness  Neuro-     Cognitive - awake, alert, oriented to person, place, date, year, and situation.  Able to follow command     Communication - Fluent, Comprehensible     Attention: Intact     Judgement: Good evidence of judgement     Memory:  memory intact     Cranial Nerves - CN 2-12 intact     Motor -                     LEFT    UE - 4/5                    RIGHT UE - 4/5                    LEFT    LE - 4/5                    RIGHT LE - 4/5        Sensory - Intact to LT      Reflexes - DTR Intact, No primitive reflexive     Coordination - FTN intact     Tone - normal  Psychiatric - Mood stable, Affect WNL  Skin:  left buttock healing wound with scab, spinal surgical incision covered with Aquacel dressing

## 2023-11-22 NOTE — H&P ADULT - NSHPSOCIALHISTORY_GEN_ALL_CORE
Smoking - Denied  EtOH - Denied   Drugs - Denied     Lives   PTA: Independent in ADLs and ambulation     CURRENT FUNCTIONAL STATUS  Bed Mobility:   Transfers:   Gait: Smoking - Denied  EtOH - Denied   Drugs - Denied     Limited community ambulator who lives with her  and 2 teenage children in private house +3 JAIR +18 JAIR. Ambulates with occasional use of SC and states she is mainly independent with all ADLs prior, however,  assist with a few household chores.     CURRENT FUNCTIONAL STATUS (11/19 note)  Bed Mobility: Jaden  Transfers: Jaden  Gait: Jaden

## 2023-11-22 NOTE — H&P ADULT - NS ATTEND AMEND GEN_ALL_CORE FT
I independently performed the documented the history, exam, and medical decision making. I have made amendments to the documentation where necessary. I have personally seen and examined the patient. Medical records were reviewed and I have made amendments to the documentation where necessary and adjusted the history, physical examination, and plan as documented by the Nurse Practitioner and Resident Physician. Patient was seen and evaluated at bedside today. Reported no overnight events and is in no acute distress. Eager to participate on the recommended rehabilitation program. Denies any CP, SOB, CALLAWAY, palpitations, fever, chills, body aches, cough, congestion, or any other symptoms at this time. Admission vitals, labs, and physical exam are outlined below.    LAB                        7.7    5.58  )-----------( 395      ( 22 Nov 2023 09:12 )             24.7     11-22    138  |  104  |  13  ----------------------------<  90  4.6   |  29  |  0.62    Ca    8.1<L>      22 Nov 2023 09:12      PHYSICAL EXAM  Vital Signs Last 24 Hrs  T(C): 37.4 (22 Nov 2023 20:20), Max: 37.4 (22 Nov 2023 20:20)  T(F): 99.3 (22 Nov 2023 20:20), Max: 99.3 (22 Nov 2023 20:20)  HR: 80 (22 Nov 2023 20:20) (70 - 80)  BP: 110/71 (22 Nov 2023 20:20) (93/51 - 118/59)  RR: 16 (22 Nov 2023 20:20) (14 - 20)  SpO2: 99% (22 Nov 2023 20:20) (95% - 99%)    Gen - NAD, Comfortable  HEENT - NCAT, EOMI, MMM  Neck - Supple, No limited ROM  Pulm - CTAB, No wheeze, No rhonchi, No crackles  Cardiovascular - RRR, S1S2  Chest - good chest expansion, good respiratory effort  Abdomen - Soft, NT/ND, +BS  Extremities - No Cyanosis, no clubbing, no edema, no calf tenderness  Neuro-     Cognitive - awake, alert, oriented to person, place, date, year, and situation.  Able to follow command     Communication - Fluent, Comprehensible     Attention: Intact     Judgement: Good evidence of judgement     Memory:  memory intact     Cranial Nerves - CN 2-12 intact     Motor -                     LEFT    UE - 4/5                    RIGHT UE - 4/5                    LEFT    LE - 4/5                    RIGHT LE - 4/5        Sensory - Intact to LT      Reflexes - DTR Intact, No primitive reflexive     Coordination - FTN intact     Tone - normal  Psychiatric - Mood stable, Affect WNL  Skin:  left buttock healing wound with scab, spinal surgical incision covered with aquacel dressing I independently performed the documented the history, exam, and medical decision making. I have made amendments to the documentation where necessary. I have personally seen and examined the patient. Medical records were reviewed and I have made amendments to the documentation where necessary and adjusted the history, physical examination, and plan as documented by the Nurse Practitioner and Resident Physician. Patient was seen and evaluated at bedside today. Reported no overnight events and is in no acute distress. Removed and replaced Aquacel dressing with clean and dry wound with no erythema. Eager to participate on the recommended rehabilitation program. Denies any CP, SOB, CALLAWAY, palpitations, fever, chills, body aches, cough, congestion, or any other symptoms at this time. Admission vitals, labs, and physical exam are outlined below.    LAB                        8.0    6.58  )-----------( 440      ( 23 Nov 2023 06:10 )             25.2                           7.7    5.58  )-----------( 395      ( 22 Nov 2023 09:12 )             24.7     11-23    142  |  106  |  9   ----------------------------<  96  4.4   |  30  |  0.59    Ca    8.3<L>      23 Nov 2023 06:10    TPro  5.7<L>  /  Alb  1.6<L>  /  TBili  0.2  /  DBili  x   /  AST  24  /  ALT  17  /  AlkPhos  85  11-23    LIVER FUNCTIONS - ( 23 Nov 2023 06:10 )  Alb: 1.6 g/dL / Pro: 5.7 g/dL / ALK PHOS: 85 U/L / ALT: 17 U/L / AST: 24 U/L / GGT: x           11-22    138  |  104  |  13  ----------------------------<  90  4.6   |  29  |  0.62    Ca    8.1<L>      22 Nov 2023 09:12    PHYSICAL EXAM  Vital Signs Last 24 Hrs  T(C): 37.4 (22 Nov 2023 20:20), Max: 37.4 (22 Nov 2023 20:20)  T(F): 99.3 (22 Nov 2023 20:20), Max: 99.3 (22 Nov 2023 20:20)  HR: 72 (23 Nov 2023 05:29) (72 - 80)  BP: 103/68 (23 Nov 2023 05:29) (103/61 - 110/71)  RR: 16 (22 Nov 2023 20:20) (16 - 17)  SpO2: 99% (22 Nov 2023 20:20) (95% - 99%)    Gen - NAD, Comfortable  HEENT - NCAT, EOMI, MMM  Neck - Supple, No limited ROM  Pulm - CTAB, No wheeze, No rhonchi, No crackles  Cardiovascular - RRR, S1S2  Chest - good chest expansion, good respiratory effort  Abdomen - Soft, NT/ND, +BS  Extremities - No Cyanosis, no clubbing, no edema, no calf tenderness  Neuro-     Cognitive - awake, alert, and oriented     Motor -                     LEFT    UE - 4/5                    RIGHT UE - 4/5                    LEFT    LE - 4/5                    RIGHT LE - 4/5        Sensory - Intact to LT      Reflexes - DTR Intact, No primitive reflexive     Coordination - FTN intact     Tone - normal  Psychiatric - Mood stable, Affect WNL  Skin:  left buttock healing wound with scab, spinal surgical incision inspected and clean and dry with no tiffanie-incisional warmth, erythema nor discharge; covered with Aquacel dressing after examination

## 2023-11-22 NOTE — CONSULT NOTE ADULT - SUBJECTIVE AND OBJECTIVE BOX
Patient seen at bedside today  She reports that her pain is controlled on her current regimen.  She requested 30mg IR morphine x 4 in the past 24 hours.    At home she takes the followinmg q6h nucynta, confirmed on nys   fentanyl patch 50mcg/h  tizanidine  cymbalta   pregabalin    HPI:  48F with low back pain x  7 years, She says that she has an extensive history of back pain throughout her life and had her first spinal surgery in 2017 in [facility]. She said that after the initial surgery she had 10 screws loosen, and under went a revision with Dr. Schwab a few years later. Today she is having an extension of her fusion for progressive worsening of pain. She says that she had intermittent bilateral sciatica and right lower leg weakness and numbness that has caused her to fall due to not being able to feel the ground she is walking on. She is unable to find comfort and both sitting too long and standing too long provokes pain and numbness/tingling of her lower extremities. She ambulates with a cane at baseline. Despite conservative measure pain persists. She also has intermitted right upper extremity nerve pain, she under went a 1 level ACDF in 2023.     Denies history of blood clots or seizures. She denies chest pain, shortness of breath, nausea or vomiting today.     Patient currently sees outpatient pain management. She uses fentanyl 50 mcg/hr patch, nucynta 75mg, and pregabalin 200mg for pain relief.     Presents for elective revision PSF with extension of fusion with instrumentation T3-pelvis, pedicle subtraction osteotomy (PSO) L4, tethers, iliac fixation, smith clark osteotomies T3-T10 with Plastics closure with Dr. Schwab, Dr. Gonzalez and Dr. Simpson.  (2023 11:29)      PAST MEDICAL & SURGICAL HISTORY:  Asthma  Scoliosis (and kyphoscoliosis), idiopathic  Lumbar stenosis with neurogenic claudication  Spondylisthesis  Bilateral sciatica  History of anemia  Varicose veins of both lower extremities  HTN (hypertension)  S/P  section X3; ,,   Gastric bypass status for obesity   H/O varicose vein ligation b/l thigh  History of hip replacement left  History of bunionectomy b/l  History of cervical spinal surgery  History of lumbosacral spine surgery + rods/screw  H/O medial meniscus repair of left knee    FAMILY HISTORY:  Family history of aortic aneurysm (Father)  Father    Family history of pneumonia (Mother)  Mother    Family history of ovarian cancer (Grandparent)  Grandmother-maternal    Family history of myocardial infarction (Grandparent)  Grandfather-paternal    Family history of myocardial infarction (Grandparent)  Grandmother-paternal        SOCIAL HISTORY:  [ ] Denies Smoking, Alcohol, or Drug Use    HOME MEDICATIONS:   Please refer to initial HNP    PAIN HOME MEDICATIONS:    Allergies    shellfish (Hives; Short breath)  diclofenac (Other)    Intolerances        PAIN MEDICATIONS:  acetaminophen     Tablet .. 975 milliGRAM(s) Oral every 8 hours  diphenhydrAMINE 25 milliGRAM(s) Oral every 4 hours PRN  DULoxetine 60 milliGRAM(s) Oral daily  fentaNYL   Patch  50 MICROgram(s)/Hr 1 Patch Transdermal every 72 hours  HYDROmorphone   Tablet 4 milliGRAM(s) Oral every 4 hours PRN  HYDROmorphone   Tablet 6 milliGRAM(s) Oral every 4 hours PRN  HYDROmorphone  Injectable 0.5 milliGRAM(s) IV Push every 3 hours PRN  methocarbamol 500 milliGRAM(s) Oral three times a day  ondansetron   Disintegrating Tablet 4 milliGRAM(s) Oral every 6 hours  ondansetron Injectable 4 milliGRAM(s) IV Push every 6 hours PRN  ondansetron Injectable 4 milliGRAM(s) IV Push once PRN  pregabalin 200 milliGRAM(s) Oral three times a day    Heme:  enoxaparin Injectable 40 milliGRAM(s) SubCutaneous every 24 hours    Antibiotics:    Cardiovascular:  triamterene 37.5 mG/hydrochlorothiazide 25 mG Tablet 1 Tablet(s) Oral daily    GI:  aluminum hydroxide/magnesium hydroxide/simethicone Suspension 30 milliLiter(s) Oral every 4 hours PRN  bisacodyl 5 milliGRAM(s) Oral every 12 hours PRN  bisacodyl Suppository 10 milliGRAM(s) Rectal daily PRN  magnesium hydroxide Suspension 30 milliLiter(s) Oral every 12 hours PRN  naloxegol 12.5 milliGRAM(s) Oral daily  pantoprazole    Tablet 40 milliGRAM(s) Oral before breakfast  polyethylene glycol 3350 17 Gram(s) Oral <User Schedule>  senna 2 Tablet(s) Oral at bedtime    Endocrine:    All Other Medications:  benzocaine/menthol Lozenge 1 Lozenge Oral four times a day PRN  chlorhexidine 2% Cloths 1 Application(s) Topical once  naloxone Injectable 0.1 milliGRAM(s) IV Push every 3 minutes PRN  povidone iodine 5% Nasal Swab 1 Application(s) Both Nostrils once      Vital Signs Last 24 Hrs  T(C): 37.1 (2023 05:04), Max: 37.2 (2023 18:01)  T(F): 98.7 (2023 05:04), Max: 99 (2023 18:01)  HR: 74 (2023 05:04) (74 - 89)  BP: 99/56 (2023 05:04) (92/51 - 115/66)  BP(mean): --  RR: 14 (2023 05:04) (14 - 18)  SpO2: 100% (2023 05:04) (96% - 100%)    Parameters below as of 2023 05:04  Patient On (Oxygen Delivery Method): room air        LABS:                        8.0    6.07  )-----------( 301      ( 2023 05:30 )             25.0     11-20    137  |  105  |  9   ----------------------------<  103<H>  4.0   |  27  |  0.47<L>    Ca    7.9<L>      2023 05:30        Urinalysis Basic - ( 2023 05:30 )    Color: x / Appearance: x / SG: x / pH: x  Gluc: 103 mg/dL / Ketone: x  / Bili: x / Urobili: x   Blood: x / Protein: x / Nitrite: x   Leuk Esterase: x / RBC: x / WBC x   Sq Epi: x / Non Sq Epi: x / Bacteria: x        RADIOLOGY:    Drug Screen:   Patient seen at bedside today  She reports that her pain is controlled on her current regimen.  She requested 30mg IR morphine x 4 in the past 24 hours.    At home she takes the followinmg q6h nucynta, confirmed on nys   fentanyl patch 50mcg/h  tizanidine  cymbalta   pregabalin    HPI:  48F with low back pain x  7 years, She says that she has an extensive history of back pain throughout her life and had her first spinal surgery in 2017 in [facility]. She said that after the initial surgery she had 10 screws loosen, and under went a revision with Dr. Schwab a few years later. Today she is having an extension of her fusion for progressive worsening of pain. She says that she had intermittent bilateral sciatica and right lower leg weakness and numbness that has caused her to fall due to not being able to feel the ground she is walking on. She is unable to find comfort and both sitting too long and standing too long provokes pain and numbness/tingling of her lower extremities. She ambulates with a cane at baseline. Despite conservative measure pain persists. She also has intermitted right upper extremity nerve pain, she under went a 1 level ACDF in 2023.     Denies history of blood clots or seizures. She denies chest pain, shortness of breath, nausea or vomiting today.     Patient currently sees outpatient pain management. She uses fentanyl 50 mcg/hr patch, nucynta 75mg, and pregabalin 200mg for pain relief.     Presents for elective revision PSF with extension of fusion with instrumentation T3-pelvis, pedicle subtraction osteotomy (PSO) L4, tethers, iliac fixation, smith clark osteotomies T3-T10 with Plastics closure with Dr. Schwab, Dr. Gonzalez and Dr. Simpson.  (2023 11:29)      PAST MEDICAL & SURGICAL HISTORY:  Asthma  Scoliosis (and kyphoscoliosis), idiopathic  Lumbar stenosis with neurogenic claudication  Spondylisthesis  Bilateral sciatica  History of anemia  Varicose veins of both lower extremities  HTN (hypertension)  S/P  section X3; ,,   Gastric bypass status for obesity   H/O varicose vein ligation b/l thigh  History of hip replacement left  History of bunionectomy b/l  History of cervical spinal surgery  History of lumbosacral spine surgery + rods/screw  H/O medial meniscus repair of left knee    FAMILY HISTORY:  Family history of aortic aneurysm (Father)  Father    Family history of pneumonia (Mother)  Mother    Family history of ovarian cancer (Grandparent)  Grandmother-maternal    Family history of myocardial infarction (Grandparent)  Grandfather-paternal    Family history of myocardial infarction (Grandparent)  Grandmother-paternal        SOCIAL HISTORY:  [ ] Denies Smoking, Alcohol, or Drug Use    HOME MEDICATIONS:   Please refer to initial HNP    PAIN HOME MEDICATIONS:    Allergies    shellfish (Hives; Short breath)  diclofenac (Other)    Intolerances        PAIN MEDICATIONS:  acetaminophen     Tablet .. 975 milliGRAM(s) Oral every 8 hours  diphenhydrAMINE 25 milliGRAM(s) Oral every 4 hours PRN  DULoxetine 60 milliGRAM(s) Oral daily  fentaNYL   Patch  50 MICROgram(s)/Hr 1 Patch Transdermal every 72 hours  HYDROmorphone   Tablet 4 milliGRAM(s) Oral every 4 hours PRN  HYDROmorphone   Tablet 6 milliGRAM(s) Oral every 4 hours PRN  HYDROmorphone  Injectable 0.5 milliGRAM(s) IV Push every 3 hours PRN  methocarbamol 500 milliGRAM(s) Oral three times a day  ondansetron   Disintegrating Tablet 4 milliGRAM(s) Oral every 6 hours  ondansetron Injectable 4 milliGRAM(s) IV Push every 6 hours PRN  ondansetron Injectable 4 milliGRAM(s) IV Push once PRN  pregabalin 200 milliGRAM(s) Oral three times a day    Heme:  enoxaparin Injectable 40 milliGRAM(s) SubCutaneous every 24 hours    Antibiotics:    Cardiovascular:  triamterene 37.5 mG/hydrochlorothiazide 25 mG Tablet 1 Tablet(s) Oral daily    GI:  aluminum hydroxide/magnesium hydroxide/simethicone Suspension 30 milliLiter(s) Oral every 4 hours PRN  bisacodyl 5 milliGRAM(s) Oral every 12 hours PRN  bisacodyl Suppository 10 milliGRAM(s) Rectal daily PRN  magnesium hydroxide Suspension 30 milliLiter(s) Oral every 12 hours PRN  naloxegol 12.5 milliGRAM(s) Oral daily  pantoprazole    Tablet 40 milliGRAM(s) Oral before breakfast  polyethylene glycol 3350 17 Gram(s) Oral <User Schedule>  senna 2 Tablet(s) Oral at bedtime    Endocrine:    All Other Medications:  benzocaine/menthol Lozenge 1 Lozenge Oral four times a day PRN  chlorhexidine 2% Cloths 1 Application(s) Topical once  naloxone Injectable 0.1 milliGRAM(s) IV Push every 3 minutes PRN  povidone iodine 5% Nasal Swab 1 Application(s) Both Nostrils once      Vital Signs Last 24 Hrs  T(C): 37.1 (2023 05:04), Max: 37.2 (2023 18:01)  T(F): 98.7 (2023 05:04), Max: 99 (2023 18:01)  HR: 74 (2023 05:04) (74 - 89)  BP: 99/56 (2023 05:04) (92/51 - 115/66)  BP(mean): --  RR: 14 (2023 05:04) (14 - 18)  SpO2: 100% (2023 05:04) (96% - 100%)    Parameters below as of 2023 05:04  Patient On (Oxygen Delivery Method): room air        LABS:                        8.0    6.07  )-----------( 301      ( 2023 05:30 )             25.0     11-20    137  |  105  |  9   ----------------------------<  103<H>  4.0   |  27  |  0.47<L>    Ca    7.9<L>      2023 05:30        Urinalysis Basic - ( 2023 05:30 )    Color: x / Appearance: x / SG: x / pH: x  Gluc: 103 mg/dL / Ketone: x  / Bili: x / Urobili: x   Blood: x / Protein: x / Nitrite: x   Leuk Esterase: x / RBC: x / WBC x   Sq Epi: x / Non Sq Epi: x / Bacteria: x        RADIOLOGY:    Drug Screen:   Patient seen at bedside today  She reports that her pain is controlled on her current regimen.  She requested 30mg IR morphine x 4 in the past 24 hours.    At home she takes the followinmg q6h nucynta, confirmed on nys   fentanyl patch 50mcg/h  tizanidine  cymbalta   pregabalin    HPI:  48F with low back pain x  7 years, She says that she has an extensive history of back pain throughout her life and had her first spinal surgery in 2017 in [facility]. She said that after the initial surgery she had 10 screws loosen, and under went a revision with Dr. Schwab a few years later. Today she is having an extension of her fusion for progressive worsening of pain. She says that she had intermittent bilateral sciatica and right lower leg weakness and numbness that has caused her to fall due to not being able to feel the ground she is walking on. She is unable to find comfort and both sitting too long and standing too long provokes pain and numbness/tingling of her lower extremities. She ambulates with a cane at baseline. Despite conservative measure pain persists. She also has intermitted right upper extremity nerve pain, she under went a 1 level ACDF in 2023.     Denies history of blood clots or seizures. She denies chest pain, shortness of breath, nausea or vomiting today.     Patient currently sees outpatient pain management. She uses fentanyl 50 mcg/hr patch, nucynta 75mg, and pregabalin 200mg for pain relief.     Presents for elective revision PSF with extension of fusion with instrumentation T3-pelvis, pedicle subtraction osteotomy (PSO) L4, tethers, iliac fixation, smith clark osteotomies T3-T10 with Plastics closure with Dr. Schwab, Dr. Gonzalez and Dr. Simpson.  (2023 11:29)      PAST MEDICAL & SURGICAL HISTORY:  Asthma  Scoliosis (and kyphoscoliosis), idiopathic  Lumbar stenosis with neurogenic claudication  Spondylisthesis  Bilateral sciatica  History of anemia  Varicose veins of both lower extremities  HTN (hypertension)  S/P  section X3; ,,   Gastric bypass status for obesity   H/O varicose vein ligation b/l thigh  History of hip replacement left  History of bunionectomy b/l  History of cervical spinal surgery  History of lumbosacral spine surgery + rods/screw  H/O medial meniscus repair of left knee    FAMILY HISTORY:  Family history of aortic aneurysm (Father)  Father    Family history of pneumonia (Mother)  Mother    Family history of ovarian cancer (Grandparent)  Grandmother-maternal    Family history of myocardial infarction (Grandparent)  Grandfather-paternal    Family history of myocardial infarction (Grandparent)  Grandmother-paternal        SOCIAL HISTORY:  [ ] Denies Smoking, Alcohol, or Drug Use    HOME MEDICATIONS:   Please refer to initial HNP    PAIN HOME MEDICATIONS:    Allergies    shellfish (Hives; Short breath)  diclofenac (Other)    Intolerances        PAIN MEDICATIONS:  acetaminophen     Tablet .. 975 milliGRAM(s) Oral every 8 hours  diphenhydrAMINE 25 milliGRAM(s) Oral every 4 hours PRN  DULoxetine 60 milliGRAM(s) Oral daily  fentaNYL   Patch  50 MICROgram(s)/Hr 1 Patch Transdermal every 72 hours  HYDROmorphone   Tablet 4 milliGRAM(s) Oral every 4 hours PRN  HYDROmorphone   Tablet 6 milliGRAM(s) Oral every 4 hours PRN  HYDROmorphone  Injectable 0.5 milliGRAM(s) IV Push every 3 hours PRN  methocarbamol 500 milliGRAM(s) Oral three times a day  ondansetron   Disintegrating Tablet 4 milliGRAM(s) Oral every 6 hours  ondansetron Injectable 4 milliGRAM(s) IV Push every 6 hours PRN  ondansetron Injectable 4 milliGRAM(s) IV Push once PRN  pregabalin 200 milliGRAM(s) Oral three times a day    Heme:  enoxaparin Injectable 40 milliGRAM(s) SubCutaneous every 24 hours    Antibiotics:    Cardiovascular:  triamterene 37.5 mG/hydrochlorothiazide 25 mG Tablet 1 Tablet(s) Oral daily    GI:  aluminum hydroxide/magnesium hydroxide/simethicone Suspension 30 milliLiter(s) Oral every 4 hours PRN  bisacodyl 5 milliGRAM(s) Oral every 12 hours PRN  bisacodyl Suppository 10 milliGRAM(s) Rectal daily PRN  magnesium hydroxide Suspension 30 milliLiter(s) Oral every 12 hours PRN  naloxegol 12.5 milliGRAM(s) Oral daily  pantoprazole    Tablet 40 milliGRAM(s) Oral before breakfast  polyethylene glycol 3350 17 Gram(s) Oral <User Schedule>  senna 2 Tablet(s) Oral at bedtime    Endocrine:    All Other Medications:  benzocaine/menthol Lozenge 1 Lozenge Oral four times a day PRN  chlorhexidine 2% Cloths 1 Application(s) Topical once  naloxone Injectable 0.1 milliGRAM(s) IV Push every 3 minutes PRN  povidone iodine 5% Nasal Swab 1 Application(s) Both Nostrils once      Vital Signs Last 24 Hrs  T(C): 37.1 (2023 05:04), Max: 37.2 (2023 18:01)  T(F): 98.7 (2023 05:04), Max: 99 (2023 18:01)  HR: 74 (2023 05:04) (74 - 89)  BP: 99/56 (2023 05:04) (92/51 - 115/66)  BP(mean): --  RR: 14 (2023 05:04) (14 - 18)  SpO2: 100% (2023 05:04) (96% - 100%)    Parameters below as of 2023 05:04  Patient On (Oxygen Delivery Method): room air        LABS:                        8.0    6.07  )-----------( 301      ( 2023 05:30 )             25.0     11-20    137  |  105  |  9   ----------------------------<  103<H>  4.0   |  27  |  0.47<L>    Ca    7.9<L>      2023 05:30        Urinalysis Basic - ( 2023 05:30 )    Color: x / Appearance: x / SG: x / pH: x  Gluc: 103 mg/dL / Ketone: x  / Bili: x / Urobili: x   Blood: x / Protein: x / Nitrite: x   Leuk Esterase: x / RBC: x / WBC x   Sq Epi: x / Non Sq Epi: x / Bacteria: x        RADIOLOGY:    Drug Screen:

## 2023-11-22 NOTE — CONSULT NOTE ADULT - REASON FOR ADMISSION
Low back pain
Nutrition Education Provided on Mechanical Soft (Dysphagia 2-Minced & Moist) Diet/verbal instruction

## 2023-11-22 NOTE — DISCHARGE NOTE NURSING/CASE MANAGEMENT/SOCIAL WORK - NSDCPEFALRISK_GEN_ALL_CORE
For information on Fall & Injury Prevention, visit: [email or web address] OR  [email or web address] OR  https://www.cdc.gov/steadi/patient.html

## 2023-11-22 NOTE — PATIENT PROFILE ADULT - HEALTH LITERACY
Gave nurse to nurse to [facility] [place].  He will call their doctor and call us back to let us know if we need to do a doc to doc.   no

## 2023-11-22 NOTE — H&P ADULT - HISTORY OF PRESENT ILLNESS
49 y/o F with PMH of chronic back pain (s/p multiple spinal surgeries), asthma, obesity (s/p gastric bypass), HTN presented to [facility] on 11/14 for elective revision PSF with extension of fusion with instrumentation T3-pelvis, pedicle subtraction osteotomy (PSO) L4, tethers, iliac fixation, smith clark osteotomies T3-T10 with Plastics closure with Dr. Schwab, Dr. Gonzalez and Dr. Simpson. Intraoperative course with acute blood loss requiring 3 unit PRBC transfusion, one additional PRBC post-op. Post-op hospital course significant for urinary retention requiring Cobb, TOV passed on POD 7. Patient evaluated by PT/OT and was recommended for acute inpatient rehab. Patient is medically stable for discharge to [facility] on 11/22.  49 y/o F with PMH of chronic back pain (s/p multiple spinal surgeries), asthma, obesity (s/p gastric bypass), HTN presented to [facility] on 11/14 for elective revision PSF with extension of fusion with instrumentation T3-pelvis, pedicle subtraction osteotomy (PSO) L4, tethers, iliac fixation, smith clark osteotomies T3-T10 with Plastics closure with Dr. Schwab, Dr. Gonzalez and Dr. Simpson. Intraoperative course with acute blood loss requiring 3 unit PRBC transfusion, one additional PRBC post-op. Post-op hospital course significant for urinary retention, s/p paulino removal 11/21 and TOV passed. Patient evaluated by PT/OT and was recommended for acute inpatient rehab. Patient is medically stable for discharge to [facility] on 11/22.  This is a 47 y/o F with PMH of chronic back pain (s/p multiple spinal surgeries), asthma, obesity (s/p gastric bypass), HTN presented to [facility] on 11/14 for elective revision PSF with extension of fusion with instrumentation T3-pelvis, pedicle subtraction osteotomy (PSO) L4, tethers, iliac fixation, smith clark osteotomies T3-T10 with Plastics closure with Dr. Schwab, Dr. Gonzalez and Dr. Simpson. Intraoperative course with acute blood loss requiring 3 unit PRBC transfusion, one additional PRBC post-op. Post-op hospital course significant for urinary retention, s/p pualino removal 11/21 and TOV passed. Patient evaluated by PT/OT and was recommended for acute inpatient rehab. Patient is medically stable for discharge to [facility] on 11/22.  Mrs. Savi Ivy is a 48 year old female patient with past medical history of chronic back pain (s/p multiple spinal surgeries), asthma, obesity (s/p gastric bypass), and HTN who presented to [facility] on 11/14 for an elective revision of a PSF with extension of fusion with instrumentation from T3 throgh pelvis, pedicle subtraction osteotomy (PSO) L4, tethers, iliac fixation, smith clark osteotomies T3-T10 with Plastic Surgery closure with Dr. Schwab, Dr. Gonzalez and Dr. Simpson. Intraoperative course with acute blood loss requiring 3 unit PRBC transfusion with one additional PRBC post-op. Post-op hospital course significant for urinary retention, s/p paulino removal 11/21 and TOV passed. Patient evaluated by PT/OT and was recommended for acute inpatient rehab. Patient is medically stable for discharge to [facility] on 11/22.

## 2023-11-22 NOTE — H&P ADULT - NSHPREVIEWOFSYSTEMS_GEN_ALL_CORE
REVIEW OF SYSTEMS  Constitutional: No fever, No Chills, No fatigue  HEENT: No eye pain, No visual disturbances, No difficulty hearing  Pulm: No cough,  No shortness of breath  Cardio: No chest pain, No palpitations  GI:  No abdominal pain, No nausea, No vomiting, No diarrhea, No constipation  : No dysuria, No frequency, No hematuria  Neuro: No headaches, No memory loss, + loss of strength, no numbness, No tremors  Skin: No itching, No rashes, No lesions   Endo: No temperature intolerance  MSK: No joint pain, No joint swelling, No muscle pain, No Neck pain,  + back pain  Psych:  No depression, No anxiety Constitutional: No fever, No Chills, No fatigue  HEENT: No eye pain, No visual disturbances, No difficulty hearing  Pulm: No cough,  No shortness of breath  Cardio: No chest pain, No palpitations  GI:  No abdominal pain, No nausea, No vomiting, No diarrhea, No constipation  : No dysuria, No frequency, No hematuria  Neuro: No headaches, No memory loss, + loss of strength, no numbness, No tremors  Skin: No itching, No rashes, No lesions   Endo: No temperature intolerance  MSK: No joint pain, No joint swelling, No muscle pain, No Neck pain,  + back pain  Psych:  No depression, No anxiety

## 2023-11-22 NOTE — PROGRESS NOTE ADULT - SUBJECTIVE AND OBJECTIVE BOX
Ortho Note    Pt seen and examined seated in chair with breakfast.. Comfortable without complaints. Pain is controlled on current regimen.  Patient educated that she will likely go back on home dose of nucynta unless unavailable- then may still be using morphine 30mg q6h prn.  Voiding without difficulty. Has had 2x BM.  Denies CP, SOB, N/V, numbness/tingling. Anticipates dc to rehab today.     Vital Signs Last 24 Hrs  T(C): 36.8 (11-22-23 @ 09:52), Max: 36.8 (11-22-23 @ 09:52)  T(F): 98.3 (11-22-23 @ 09:52), Max: 98.3 (11-22-23 @ 09:52)  HR: 74 (11-22-23 @ 09:52) (70 - 74)  BP: 103/61 (11-22-23 @ 09:52) (93/51 - 103/61)  BP(mean): --  RR: 17 (11-22-23 @ 09:52) (17 - 20)  SpO2: 95% (11-22-23 @ 09:52) (95% - 95%)  AVSS    General: Pt Alert and oriented, NAD  DSG- aquacel dressing changed,  C/D/I; no erythema or drainage to surgical incision  Pulses: +2DP, WWP feet  Sensation: SILT  BLE; has had some numbness in S1 distribution to right leg but has improved since surgery and from pre-op  Motor: 5/5 EHL/FHL/TA/GS BLE, 5/5 quad/ hamstring BLE- much improved from last week                          7.7    5.58  )-----------( 395      ( 22 Nov 2023 09:12 )             24.7     11-22    138  |  104  |  13  ----------------------------<  90  4.6   |  29  |  0.62    Ca    8.1<L>      22 Nov 2023 09:12        A/P: 48yFemale POD#8 s/p T4-Pelvis rev PSF, L5 SO and proximal tethering 11/14  - acute blood loss anemia- s/p 3u PRBCs intra-op, 1u on 11/16 with Hgb 7/8 today. Is asymptomatic. Plan to follow CBC At acute rehab  - Pain Control- appreciate pain management recommendations  - DVT ppx: SCDS, LVX 40mg subq daily  - PT, WBS: WBAT, OT consut  - OOB for meal as tolerated, I/S  - bowel regimen  - remove paulino for TOV yesterday- passed TOV and is voiding adequately  - post-op xrays WNL  - appreciate internal medicine recs  - dispo: acute rehab today    Ortho Pager [phone]

## 2023-11-22 NOTE — H&P ADULT - NSHPLABSRESULTS_GEN_ALL_CORE
LABS:                        7.7    5.58  )-----------( 395      ( 22 Nov 2023 09:12 )             24.7     11-22    138  |  104  |  13  ----------------------------<  90  4.6   |  29  |  0.62    Ca    8.1<L>      22 Nov 2023 09:12      Xray Spine Entire Thoracolumbar 2 or 3 Views (11.21.23 @ 16:20)     Intact unremarkable extensive T4-sacroiliac posterior spinal fusion hardware and separate 2 level lower cervical anterior fusion hardware.    No gross scoliotic curving deformity. Vertebral body heights and alignment grossly maintained.    Intact aligned visualized upper end of a left total hip prosthesis.  Chronic ossicleadjacent to superomedial left greater trochanter margin.

## 2023-11-22 NOTE — CONSULT NOTE ADULT - ASSESSMENT
48F with chronic low back pain s/p spinal surgery, multiple revisions, and now most recently on 11/14 T4-pelvis revision.    She is on home 75mg q6h [place] .  75mg tapentadol = 30 MMEs x 4 = 120MME  40mg morphine x 3-4 = 120-150 MMEs    - continue fentanyl patch 50mcg/h  - continue morphine 30mg IR q6h  - methocarbamol 500mg tid  - acetaminophen 975mg tid   - duloxetine 60mg daily  - pregabalin 200mg tid  - bowel regimen  - nausea ppx   48F with chronic low back pain s/p spinal surgery, multiple revisions, and now most recently on 11/14 T4-pelvis revision.    She is on home 75mg q6h [place] .  75mg tapentadol = 30 MMEs x 4 = 120MME  40mg morphine x 3-4 = 120-150 MMEs    - continue fentanyl patch 50mcg/h  - continue morphine 30mg IR q4-6h prn  - methocarbamol 500mg tid  - acetaminophen 975mg tid   - duloxetine 60mg daily  - pregabalin 200mg tid  - bowel regimen  - nausea ppx   48F with chronic low back pain s/p spinal surgery, multiple revisions, and now most recently on 11/14 T4-pelvis revision.    She is on home 75mg q6h [place] .  75mg tapentadol = 30 MMEs x 4 = 120MME  40mg morphine x 3-4 = 120-150 MMEs    - continue fentanyl patch 50mcg/h  - continue morphine 30mg IR q4-6h prn  - methocarbamol 500mg tid  - acetaminophen 975mg tid   - duloxetine 60mg daily  - pregabalin 200mg tid  - bowel regimen  - nausea ppx        For discharge, patient can continue home dose of nucynta 75mg q6h (can use morphine 30mg IR q6h if nucynta unavailable at rehab), home dose of fentanyl 50mcg patch q72h, lyrica 200mg PO tid, and robaxin 500mg PO tid.  Follow-up with pain management upon discharge from rehab.  Plan discussed with and approved by Dr. Stanley.

## 2023-11-22 NOTE — CONSULT NOTE ADULT - CONSULT REQUESTED DATE/TIME
20-Nov-2023 11:01
14-Nov-2023 13:22
19-Nov-2023
15-Nov-2023 09:06
16-Nov-2023 14:24
20-Nov-2023 11:01
20-Nov-2023 11:01

## 2023-11-22 NOTE — PROGRESS NOTE ADULT - REASON FOR ADMISSION
Low back pain

## 2023-11-23 LAB
ALBUMIN SERPL ELPH-MCNC: 1.6 G/DL — LOW (ref 3.3–5)
ALBUMIN SERPL ELPH-MCNC: 1.6 G/DL — LOW (ref 3.3–5)
ALP SERPL-CCNC: 85 U/L — SIGNIFICANT CHANGE UP (ref 40–120)
ALP SERPL-CCNC: 85 U/L — SIGNIFICANT CHANGE UP (ref 40–120)
ALT FLD-CCNC: 17 U/L — SIGNIFICANT CHANGE UP (ref 10–45)
ALT FLD-CCNC: 17 U/L — SIGNIFICANT CHANGE UP (ref 10–45)
ANION GAP SERPL CALC-SCNC: 6 MMOL/L — SIGNIFICANT CHANGE UP (ref 5–17)
ANION GAP SERPL CALC-SCNC: 6 MMOL/L — SIGNIFICANT CHANGE UP (ref 5–17)
AST SERPL-CCNC: 24 U/L — SIGNIFICANT CHANGE UP (ref 10–40)
AST SERPL-CCNC: 24 U/L — SIGNIFICANT CHANGE UP (ref 10–40)
BASOPHILS # BLD AUTO: 0.03 K/UL — SIGNIFICANT CHANGE UP (ref 0–0.2)
BASOPHILS # BLD AUTO: 0.03 K/UL — SIGNIFICANT CHANGE UP (ref 0–0.2)
BASOPHILS NFR BLD AUTO: 0.5 % — SIGNIFICANT CHANGE UP (ref 0–2)
BASOPHILS NFR BLD AUTO: 0.5 % — SIGNIFICANT CHANGE UP (ref 0–2)
BILIRUB SERPL-MCNC: 0.2 MG/DL — SIGNIFICANT CHANGE UP (ref 0.2–1.2)
BILIRUB SERPL-MCNC: 0.2 MG/DL — SIGNIFICANT CHANGE UP (ref 0.2–1.2)
BUN SERPL-MCNC: 9 MG/DL — SIGNIFICANT CHANGE UP (ref 7–23)
BUN SERPL-MCNC: 9 MG/DL — SIGNIFICANT CHANGE UP (ref 7–23)
CALCIUM SERPL-MCNC: 8.3 MG/DL — LOW (ref 8.4–10.5)
CALCIUM SERPL-MCNC: 8.3 MG/DL — LOW (ref 8.4–10.5)
CHLORIDE SERPL-SCNC: 106 MMOL/L — SIGNIFICANT CHANGE UP (ref 96–108)
CHLORIDE SERPL-SCNC: 106 MMOL/L — SIGNIFICANT CHANGE UP (ref 96–108)
CO2 SERPL-SCNC: 30 MMOL/L — SIGNIFICANT CHANGE UP (ref 22–31)
CO2 SERPL-SCNC: 30 MMOL/L — SIGNIFICANT CHANGE UP (ref 22–31)
CREAT SERPL-MCNC: 0.59 MG/DL — SIGNIFICANT CHANGE UP (ref 0.5–1.3)
CREAT SERPL-MCNC: 0.59 MG/DL — SIGNIFICANT CHANGE UP (ref 0.5–1.3)
EGFR: 110 ML/MIN/1.73M2 — SIGNIFICANT CHANGE UP
EGFR: 110 ML/MIN/1.73M2 — SIGNIFICANT CHANGE UP
EOSINOPHIL # BLD AUTO: 0.15 K/UL — SIGNIFICANT CHANGE UP (ref 0–0.5)
EOSINOPHIL # BLD AUTO: 0.15 K/UL — SIGNIFICANT CHANGE UP (ref 0–0.5)
EOSINOPHIL NFR BLD AUTO: 2.3 % — SIGNIFICANT CHANGE UP (ref 0–6)
EOSINOPHIL NFR BLD AUTO: 2.3 % — SIGNIFICANT CHANGE UP (ref 0–6)
GLUCOSE SERPL-MCNC: 96 MG/DL — SIGNIFICANT CHANGE UP (ref 70–99)
GLUCOSE SERPL-MCNC: 96 MG/DL — SIGNIFICANT CHANGE UP (ref 70–99)
HCT VFR BLD CALC: 25.2 % — LOW (ref 34.5–45)
HCT VFR BLD CALC: 25.2 % — LOW (ref 34.5–45)
HGB BLD-MCNC: 8 G/DL — LOW (ref 11.5–15.5)
HGB BLD-MCNC: 8 G/DL — LOW (ref 11.5–15.5)
IMM GRANULOCYTES NFR BLD AUTO: 1.2 % — HIGH (ref 0–0.9)
IMM GRANULOCYTES NFR BLD AUTO: 1.2 % — HIGH (ref 0–0.9)
LYMPHOCYTES # BLD AUTO: 1.52 K/UL — SIGNIFICANT CHANGE UP (ref 1–3.3)
LYMPHOCYTES # BLD AUTO: 1.52 K/UL — SIGNIFICANT CHANGE UP (ref 1–3.3)
LYMPHOCYTES # BLD AUTO: 23.1 % — SIGNIFICANT CHANGE UP (ref 13–44)
LYMPHOCYTES # BLD AUTO: 23.1 % — SIGNIFICANT CHANGE UP (ref 13–44)
MCHC RBC-ENTMCNC: 28.2 PG — SIGNIFICANT CHANGE UP (ref 27–34)
MCHC RBC-ENTMCNC: 28.2 PG — SIGNIFICANT CHANGE UP (ref 27–34)
MCHC RBC-ENTMCNC: 31.7 GM/DL — LOW (ref 32–36)
MCHC RBC-ENTMCNC: 31.7 GM/DL — LOW (ref 32–36)
MCV RBC AUTO: 88.7 FL — SIGNIFICANT CHANGE UP (ref 80–100)
MCV RBC AUTO: 88.7 FL — SIGNIFICANT CHANGE UP (ref 80–100)
MONOCYTES # BLD AUTO: 0.61 K/UL — SIGNIFICANT CHANGE UP (ref 0–0.9)
MONOCYTES # BLD AUTO: 0.61 K/UL — SIGNIFICANT CHANGE UP (ref 0–0.9)
MONOCYTES NFR BLD AUTO: 9.3 % — SIGNIFICANT CHANGE UP (ref 2–14)
MONOCYTES NFR BLD AUTO: 9.3 % — SIGNIFICANT CHANGE UP (ref 2–14)
NEUTROPHILS # BLD AUTO: 4.19 K/UL — SIGNIFICANT CHANGE UP (ref 1.8–7.4)
NEUTROPHILS # BLD AUTO: 4.19 K/UL — SIGNIFICANT CHANGE UP (ref 1.8–7.4)
NEUTROPHILS NFR BLD AUTO: 63.6 % — SIGNIFICANT CHANGE UP (ref 43–77)
NEUTROPHILS NFR BLD AUTO: 63.6 % — SIGNIFICANT CHANGE UP (ref 43–77)
NRBC # BLD: 0 /100 WBCS — SIGNIFICANT CHANGE UP (ref 0–0)
NRBC # BLD: 0 /100 WBCS — SIGNIFICANT CHANGE UP (ref 0–0)
PLATELET # BLD AUTO: 440 K/UL — HIGH (ref 150–400)
PLATELET # BLD AUTO: 440 K/UL — HIGH (ref 150–400)
POTASSIUM SERPL-MCNC: 4.4 MMOL/L — SIGNIFICANT CHANGE UP (ref 3.5–5.3)
POTASSIUM SERPL-MCNC: 4.4 MMOL/L — SIGNIFICANT CHANGE UP (ref 3.5–5.3)
POTASSIUM SERPL-SCNC: 4.4 MMOL/L — SIGNIFICANT CHANGE UP (ref 3.5–5.3)
POTASSIUM SERPL-SCNC: 4.4 MMOL/L — SIGNIFICANT CHANGE UP (ref 3.5–5.3)
PROT SERPL-MCNC: 5.7 G/DL — LOW (ref 6–8.3)
PROT SERPL-MCNC: 5.7 G/DL — LOW (ref 6–8.3)
RBC # BLD: 2.84 M/UL — LOW (ref 3.8–5.2)
RBC # BLD: 2.84 M/UL — LOW (ref 3.8–5.2)
RBC # FLD: 14.4 % — SIGNIFICANT CHANGE UP (ref 10.3–14.5)
RBC # FLD: 14.4 % — SIGNIFICANT CHANGE UP (ref 10.3–14.5)
SODIUM SERPL-SCNC: 142 MMOL/L — SIGNIFICANT CHANGE UP (ref 135–145)
SODIUM SERPL-SCNC: 142 MMOL/L — SIGNIFICANT CHANGE UP (ref 135–145)
WBC # BLD: 6.58 K/UL — SIGNIFICANT CHANGE UP (ref 3.8–10.5)
WBC # BLD: 6.58 K/UL — SIGNIFICANT CHANGE UP (ref 3.8–10.5)
WBC # FLD AUTO: 6.58 K/UL — SIGNIFICANT CHANGE UP (ref 3.8–10.5)
WBC # FLD AUTO: 6.58 K/UL — SIGNIFICANT CHANGE UP (ref 3.8–10.5)

## 2023-11-23 PROCEDURE — 99223 1ST HOSP IP/OBS HIGH 75: CPT

## 2023-11-23 PROCEDURE — 99253 IP/OBS CNSLTJ NEW/EST LOW 45: CPT

## 2023-11-23 RX ORDER — MORPHINE SULFATE 50 MG/1
15 CAPSULE, EXTENDED RELEASE ORAL EVERY 4 HOURS
Refills: 0 | Status: DISCONTINUED | OUTPATIENT
Start: 2023-11-23 | End: 2023-11-29

## 2023-11-23 RX ORDER — MORPHINE SULFATE 50 MG/1
30 CAPSULE, EXTENDED RELEASE ORAL EVERY 4 HOURS
Refills: 0 | Status: DISCONTINUED | OUTPATIENT
Start: 2023-11-23 | End: 2023-11-23

## 2023-11-23 RX ORDER — MORPHINE SULFATE 50 MG/1
30 CAPSULE, EXTENDED RELEASE ORAL EVERY 4 HOURS
Refills: 0 | Status: DISCONTINUED | OUTPATIENT
Start: 2023-11-23 | End: 2023-11-29

## 2023-11-23 RX ORDER — MORPHINE SULFATE 50 MG/1
15 CAPSULE, EXTENDED RELEASE ORAL EVERY 4 HOURS
Refills: 0 | Status: DISCONTINUED | OUTPATIENT
Start: 2023-11-23 | End: 2023-11-23

## 2023-11-23 RX ADMIN — PANTOPRAZOLE SODIUM 40 MILLIGRAM(S): 20 TABLET, DELAYED RELEASE ORAL at 05:28

## 2023-11-23 RX ADMIN — Medication 200 MILLIGRAM(S): at 21:52

## 2023-11-23 RX ADMIN — MORPHINE SULFATE 30 MILLIGRAM(S): 50 CAPSULE, EXTENDED RELEASE ORAL at 21:53

## 2023-11-23 RX ADMIN — MORPHINE SULFATE 30 MILLIGRAM(S): 50 CAPSULE, EXTENDED RELEASE ORAL at 18:30

## 2023-11-23 RX ADMIN — Medication 975 MILLIGRAM(S): at 22:40

## 2023-11-23 RX ADMIN — DULOXETINE HYDROCHLORIDE 60 MILLIGRAM(S): 30 CAPSULE, DELAYED RELEASE ORAL at 12:08

## 2023-11-23 RX ADMIN — METHOCARBAMOL 500 MILLIGRAM(S): 500 TABLET, FILM COATED ORAL at 05:28

## 2023-11-23 RX ADMIN — Medication 975 MILLIGRAM(S): at 21:53

## 2023-11-23 RX ADMIN — ENOXAPARIN SODIUM 40 MILLIGRAM(S): 100 INJECTION SUBCUTANEOUS at 06:24

## 2023-11-23 RX ADMIN — Medication 3 MILLIGRAM(S): at 21:53

## 2023-11-23 RX ADMIN — MORPHINE SULFATE 30 MILLIGRAM(S): 50 CAPSULE, EXTENDED RELEASE ORAL at 22:40

## 2023-11-23 RX ADMIN — Medication 975 MILLIGRAM(S): at 14:54

## 2023-11-23 RX ADMIN — Medication 3 MILLIGRAM(S): at 00:05

## 2023-11-23 RX ADMIN — Medication 975 MILLIGRAM(S): at 05:27

## 2023-11-23 RX ADMIN — Medication 200 MILLIGRAM(S): at 14:54

## 2023-11-23 RX ADMIN — MORPHINE SULFATE 15 MILLIGRAM(S): 50 CAPSULE, EXTENDED RELEASE ORAL at 10:22

## 2023-11-23 RX ADMIN — MORPHINE SULFATE 15 MILLIGRAM(S): 50 CAPSULE, EXTENDED RELEASE ORAL at 11:00

## 2023-11-23 RX ADMIN — METHOCARBAMOL 500 MILLIGRAM(S): 500 TABLET, FILM COATED ORAL at 21:58

## 2023-11-23 RX ADMIN — Medication 975 MILLIGRAM(S): at 15:54

## 2023-11-23 RX ADMIN — POLYETHYLENE GLYCOL 3350 17 GRAM(S): 17 POWDER, FOR SOLUTION ORAL at 18:13

## 2023-11-23 RX ADMIN — MORPHINE SULFATE 30 MILLIGRAM(S): 50 CAPSULE, EXTENDED RELEASE ORAL at 17:44

## 2023-11-23 RX ADMIN — Medication 200 MILLIGRAM(S): at 05:29

## 2023-11-23 RX ADMIN — METHOCARBAMOL 500 MILLIGRAM(S): 500 TABLET, FILM COATED ORAL at 14:54

## 2023-11-23 NOTE — CONSULT NOTE ADULT - ASSESSMENT
48F from home chronic back pain (s/p multiple spinal surgeries), asthma, obesity (s/p gastric bypass), HTN, opoid dependence  Admitted Nov14 for Elective revision of PSF  Intraoperative course complicated by acute blood loss requiring 4PRBC   Post-op hospital course significant for urinary retention, s/p paulino removal 11/21   Tx to Acute rehab    Offers no complaints to me today except that dilaudid worked better    Chronic back pain SP Revision sugery   -In acute rehab today    Pain Control   - Tylenol 975 mg Q8H  - Cymbalta 60 mg QD  - Lyrica 200 mg TID  - Robaxin 500 mg TID  - Fentanyl Patch 50 mcg Q72H  - PRN: Morphine 15 mg Q4H mod pain, 30 mg Q4H severe pain  - Consider addint Nucynta 75 or morphine equivelent if pain not controlled    Acute Blood Loss Anemia  -SP 4 PRBC  -H/H normal    Asthma  - albuterol inhaler Q6H PRN    HTN  - continue triamterene 37.5mg- HCTZ 25mg QD  -controlled today    Mood / Cognition  - Neuropsychology consult PRN    GI / Bowel  - at risk for constipation due to neurologic diagnosis, immobility, and/or medication use  - Senna qHS  - Miralax BID  - Movantik 12.5 mg QD  - Dulcolax 5 mg BID PRN constipation  - GI ppx: pantoprazole 40 mg QD     / Bladder  - Currently patient voids independently  - Paulino removed 11/21 and passed TOV  - check PVR on admission and SC >400cc    Skin / Pressure injury  - Skin assessment on admission performed : Left buttock healing wound with scab, spinal surgical incision covered with aquacel dressing  - Pressure Injury/Skin: OOB to chair, PT/OT  - nursing to monitor skin q Shift    Diet/Dysphagia:  - Diet Consistency: regular  - Aspiration Precautions  - Nutrition consult    DVT prophylaxis:   - Lovenox 40 mg QD

## 2023-11-23 NOTE — CONSULT NOTE ADULT - SUBJECTIVE AND OBJECTIVE BOX
Patient is a 48y old  Female who presents with a chief complaint of Chronic back pain s/p T4-Pelvis revision surgery (2023 13:26)      HPI:  48F from home chronic back pain (s/p multiple spinal surgeries), asthma, obesity (s/p gastric bypass), HTN, opoid dependence  Admitted  for Elective revision of PSF  Intraoperative course complicated by acute blood loss requiring 4PRBC   Post-op hospital course significant for urinary retention, s/p paulino removal    Tx to Acute rehab      PAST MEDICAL & SURGICAL HISTORY:  Asthma    Scoliosis (and kyphoscoliosis), idiopathic    Opoid dependence on Nucynta 75mg    Lumbar stenosis with neurogenic claudication    Spondylisthesis    Bilateral sciatica    History of anemia    Varicose veins of both lower extremities    HTN (hypertension)    S/P  section  X3; ,,     Gastric bypass status for obesity      H/O varicose vein ligation  b/l thigh    History of hip replacement  left    History of bunionectomy  b/l    History of cervical spinal surgery    History of lumbosacral spine surgery  + rods/screw    H/O medial meniscus repair of left knee    Father: - at age - with history of   Mother: - at age - with history of       Allergies    shellfish (Hives; Short breath)  diclofenac (Other)    Intolerances    morphine   Solution 15 milliGRAM(s) Oral every 4 hours PRN  morphine   Solution 30 milliGRAM(s) Oral every 4 hours PRN      REVIEW OF SYSTEMS:  CONSTITUTIONAL: No fever, weight loss, or fatigue  NECK: No pain or stiffness  RESPIRATORY: No cough, wheezing, chills or hemoptysis; No shortness of breath  CARDIOVASCULAR: No chest pain, palpitations, dizziness, or leg swelling  GASTROINTESTINAL: No abdominal or epigastric pain. No nausea, vomiting, or hematemesis; No diarrhea or constipation. No melena or hematochezia.  NEUROLOGICAL: No headaches, memory loss, loss of strength, numbness, or tremors  SKIN: No itching, burning, rashes, or lesions   LYMPH NODES: No enlarged glands  ENDOCRINE: No heat or cold intolerance; No hair loss  MUSCULOSKELETAL: No joint pain or swelling; No muscle, back, or extremity pain  PSYCHIATRIC: No depression, anxiety, mood swings, or difficulty sleeping  HEME/LYMPH: No easy bruising, or bleeding gums  ALLERY AND IMMUNOLOGIC: No hives or eczema    ALL ROS REVIEWED AND NORMAL EXCEPT AS STATED ABOVE    T(C): 37.4 (23 @ 20:20), Max: 37.4 (23 @ 20:20)  HR: 72 (23 @ 05:29) (72 - 80)  BP: 103/68 (23 @ 05:29) (103/61 - 110/71)  RR: 16 (23 @ 20:20) (16 - 17)  SpO2: 99% (23 @ 20:20) (95% - 99%)  Wt(kg): --Vital Signs Last 24 Hrs  T(C): 37.4 (2023 20:20), Max: 37.4 (2023 20:20)  T(F): 99.3 (2023 20:20), Max: 99.3 (2023 20:20)  HR: 72 (2023 05:29) (72 - 80)  BP: 103/68 (2023 05:29) (103/61 - 110/71)  BP(mean): --  RR: 16 (2023 20:20) (16 - 17)  SpO2: 99% (2023 20:20) (95% - 99%)      PHYSICAL EXAM:  GENERAL: Nad resting quietly  HEAD:  Atraumatic, Normocephalic  ENMT: No tonsillar erythema, exudates, or enlargement; Moist mucous membranes, Good dentition, multiple petechia along arms  NERVOUS SYSTEM:  Alert & Oriented X3, Good concentration; Motor Strength 5/5 B/L upper and lower extremities;   CHEST/LUNG:  No rales, rhonchi, wheezing, or rubs  HEART: Regular rate and rhythm;   ABDOMEN: Soft, Nontender, Nondistended;   EXTREMITIES:  2+ Peripheral Pulses, No clubbing, cyanosis, or edema      LABS:                        8.0    6.58  )-----------( 440      ( 2023 06:10 )             25.2         142  |  106  |  9   ----------------------------<  96  4.4   |  30  |  0.59    Ca    8.3<L>      2023 06:10    TPro  5.7<L>  /  Alb  1.6<L>  /  TBili  0.2  /  DBili  x   /  AST  24  /  ALT  17  /  AlkPhos  85  11-23      Urinalysis Basic - ( 2023 06:10 )    Color: x / Appearance: x / SG: x / pH: x  Gluc: 96 mg/dL / Ketone: x  / Bili: x / Urobili: x   Blood: x / Protein: x / Nitrite: x   Leuk Esterase: x / RBC: x / WBC x   Sq Epi: x / Non Sq Epi: x / Bacteria: x      Urinalysis Basic - ( 2023 06:10 )    Color: x / Appearance: x / SG: x / pH: x  Gluc: 96 mg/dL / Ketone: x  / Bili: x / Urobili: x   Blood: x / Protein: x / Nitrite: x   Leuk Esterase: x / RBC: x / WBC x   Sq Epi: x / Non Sq Epi: x / Bacteria: x        RADIOLOGY & ADDITIONAL TESTS:    Consultant(s) Notes Reviewed:  [x ] YES  [ ] NO  Care Discussed with Consultants/Other Providers [ x] YES  [ ] NO  Imaging Personally Reviewed:  [ ] YES  [ ] NO

## 2023-11-23 NOTE — CHART NOTE - NSCHARTNOTEFT_GEN_A_CORE
REHABILITATION DIAGNOSIS/IMPAIRMENT GROUP CODE:  Chronic back pain s/p T4-Pelvis revision surgery    COMORBIDITIES/COMPLICATING CONDITIONS IMPACTING REHABILITATION:  HEALTH ISSUES - PROBLEM Dx:  - S/P elective revision of PSF with extension of fusion with instrumentation from T3 through pelvis ()  - S/P L4 pedicle subtraction osteotomy (PSO)  - S/P proximal tethers and iliac fixation  - S/P T3-T10 Smith-Meneses osteotomies with Plastic Surgery closure   - Impaired ADLs and mobility  - Need for assistance with personal care         PAST MEDICAL & SURGICAL HISTORY:  Asthma      Scoliosis (and kyphoscoliosis), idiopathic      Lumbar stenosis with neurogenic claudication      Spondylisthesis      Bilateral sciatica      History of anemia      Varicose veins of both lower extremities      HTN (hypertension)      S/P  section  X3; ,,       Gastric bypass status for obesity        H/O varicose vein ligation  b/l thigh      History of hip replacement  left      History of bunionectomy  b/l      History of cervical spinal surgery      History of lumbosacral spine surgery  + rods/screw      H/O medial meniscus repair of left knee          Based upon consideration of the patient's impairments, functional status, complicating conditions and any other contributing factors and after information garnered from the assessments of all therapy disciplines involved in treating the patient and other pertinent clinicians:    INTERDISCIPLINARY REHABILITATION INTERVENTIONS:    [ X  ] Transfer Training  [X   ] Bed Mobility  [ X  ] Therapeutic Exercise  [ X  ] Balance/Coordination Exercises  [ X  ] Locomotion retraining  [ X  ] Stairs  [ X  ] Functional Transfer Training  [ X  ] Bowel/Bladder program  [  X ] Pain Management  [ X  ] Skin/Wound Care  [X   ] Visual/Perceptual Training  [X   ] Therapeutic Recreation Activities  [  X ] Neuromuscular Re-education  [  X ] Activities of Daily Living  [   ] Speech Exercise  [   ] Swallowing Exercises  [   ] Vital Stim  [   ] Dietary Supplements  [   ] Calorie Count  [   ] Cognitive Exercises  [   ] Congnitive/Linguistic Treatment  [   ] Behavior Program  [ X ] Neuropsych Therapy  [ X  ] Patient/Family Counseling  [ X  ] Family Training  [ X  ] Community Re-entry  [ X  ] Orthotic Evaluation  [   ] Prosthetic Eval/Training    MEDICAL PROGNOSIS:  good     REHAB POTENTIAL:  good     EXPECTED DAILY THERAPY:         PT: 2hr/day       OT: 1hr/day       P&O: 0    EXPECTED INTENSITY OF PROGRAM:  3 hrs/day    EXPECTED FREQUENCY OF PROGRAM:  5 days/week    ESTIMATED LOS:  10-14 days    ESTIMATED DISPOSITION:  home    INTERDISCIPLINARY FUNCTIONAL OUTCOMES?GOALS:         Gait/Mobility: mod-independent       Transfers: mod-independent       ADLs: mod-independent       Functional Transfers: mod-independent       Medication Management: mod-independent       Communication: independent       Cognitive:               Bladder: continent       Bowel: continent REHABILITATION DIAGNOSIS/IMPAIRMENT GROUP CODE:  Spinal stenosis with neurogenic claudication s/p T4-Pelvis revision surgery    COMORBIDITIES/COMPLICATING CONDITIONS IMPACTING REHABILITATION:  HEALTH ISSUES - PROBLEM Dx:  - S/P elective revision of PSF with extension of fusion with instrumentation from T3 through pelvis ()  - S/P L4 pedicle subtraction osteotomy (PSO)  - S/P proximal tethers and iliac fixation  - S/P T3-T10 Smith-Meneses osteotomies with Plastic Surgery closure   - Impaired ADLs and mobility  - Need for assistance with personal care         PAST MEDICAL & SURGICAL HISTORY:  Asthma      Scoliosis (and kyphoscoliosis), idiopathic      Lumbar stenosis with neurogenic claudication      Spondylisthesis      Bilateral sciatica      History of anemia      Varicose veins of both lower extremities      HTN (hypertension)      S/P  section  X3; ,,       Gastric bypass status for obesity        H/O varicose vein ligation  b/l thigh      History of hip replacement  left      History of bunionectomy  b/l      History of cervical spinal surgery      History of lumbosacral spine surgery  + rods/screw      H/O medial meniscus repair of left knee          Based upon consideration of the patient's impairments, functional status, complicating conditions and any other contributing factors and after information garnered from the assessments of all therapy disciplines involved in treating the patient and other pertinent clinicians:    INTERDISCIPLINARY REHABILITATION INTERVENTIONS:    [ X  ] Transfer Training  [X   ] Bed Mobility  [ X  ] Therapeutic Exercise  [ X  ] Balance/Coordination Exercises  [ X  ] Locomotion retraining  [ X  ] Stairs  [ X  ] Functional Transfer Training  [ X  ] Bowel/Bladder program  [  X ] Pain Management  [ X  ] Skin/Wound Care  [X   ] Visual/Perceptual Training  [X   ] Therapeutic Recreation Activities  [  X ] Neuromuscular Re-education  [  X ] Activities of Daily Living  [   ] Speech Exercise  [   ] Swallowing Exercises  [   ] Vital Stim  [   ] Dietary Supplements  [   ] Calorie Count  [   ] Cognitive Exercises  [   ] Congnitive/Linguistic Treatment  [   ] Behavior Program  [ X ] Neuropsych Therapy  [ X  ] Patient/Family Counseling  [ X  ] Family Training  [ X  ] Community Re-entry  [ X  ] Orthotic Evaluation  [   ] Prosthetic Eval/Training    MEDICAL PROGNOSIS:  good     REHAB POTENTIAL:  good     EXPECTED DAILY THERAPY:         PT: 2hr/day       OT: 1hr/day       P&O: 0    EXPECTED INTENSITY OF PROGRAM:  3 hrs/day    EXPECTED FREQUENCY OF PROGRAM:  5 days/week    ESTIMATED LOS:  10-14 days    ESTIMATED DISPOSITION:  home    INTERDISCIPLINARY FUNCTIONAL OUTCOMES?GOALS:         Gait/Mobility: mod-independent       Transfers: mod-independent       ADLs: mod-independent       Functional Transfers: mod-independent       Medication Management: mod-independent       Communication: independent       Cognitive:               Bladder: continent       Bowel: continent

## 2023-11-24 PROCEDURE — 99232 SBSQ HOSP IP/OBS MODERATE 35: CPT

## 2023-11-24 PROCEDURE — 99232 SBSQ HOSP IP/OBS MODERATE 35: CPT | Mod: GC

## 2023-11-24 RX ORDER — FLUTICASONE PROPIONATE 50 MCG
1 SPRAY, SUSPENSION NASAL
Refills: 0 | Status: DISCONTINUED | OUTPATIENT
Start: 2023-11-24 | End: 2023-12-06

## 2023-11-24 RX ADMIN — Medication 1 SPRAY(S): at 17:10

## 2023-11-24 RX ADMIN — Medication 975 MILLIGRAM(S): at 21:07

## 2023-11-24 RX ADMIN — METHOCARBAMOL 500 MILLIGRAM(S): 500 TABLET, FILM COATED ORAL at 05:43

## 2023-11-24 RX ADMIN — Medication 975 MILLIGRAM(S): at 15:46

## 2023-11-24 RX ADMIN — Medication 975 MILLIGRAM(S): at 06:00

## 2023-11-24 RX ADMIN — Medication 975 MILLIGRAM(S): at 22:01

## 2023-11-24 RX ADMIN — Medication 200 MILLIGRAM(S): at 21:07

## 2023-11-24 RX ADMIN — POLYETHYLENE GLYCOL 3350 17 GRAM(S): 17 POWDER, FOR SOLUTION ORAL at 17:10

## 2023-11-24 RX ADMIN — Medication 200 MILLIGRAM(S): at 05:43

## 2023-11-24 RX ADMIN — MORPHINE SULFATE 30 MILLIGRAM(S): 50 CAPSULE, EXTENDED RELEASE ORAL at 02:30

## 2023-11-24 RX ADMIN — METHOCARBAMOL 500 MILLIGRAM(S): 500 TABLET, FILM COATED ORAL at 15:45

## 2023-11-24 RX ADMIN — MORPHINE SULFATE 30 MILLIGRAM(S): 50 CAPSULE, EXTENDED RELEASE ORAL at 06:00

## 2023-11-24 RX ADMIN — NALOXEGOL OXALATE 12.5 MILLIGRAM(S): 12.5 TABLET, FILM COATED ORAL at 12:04

## 2023-11-24 RX ADMIN — Medication 3 MILLIGRAM(S): at 22:06

## 2023-11-24 RX ADMIN — Medication 200 MILLIGRAM(S): at 15:45

## 2023-11-24 RX ADMIN — Medication 975 MILLIGRAM(S): at 16:33

## 2023-11-24 RX ADMIN — ENOXAPARIN SODIUM 40 MILLIGRAM(S): 100 INJECTION SUBCUTANEOUS at 05:44

## 2023-11-24 RX ADMIN — MORPHINE SULFATE 30 MILLIGRAM(S): 50 CAPSULE, EXTENDED RELEASE ORAL at 05:43

## 2023-11-24 RX ADMIN — MORPHINE SULFATE 30 MILLIGRAM(S): 50 CAPSULE, EXTENDED RELEASE ORAL at 14:10

## 2023-11-24 RX ADMIN — PANTOPRAZOLE SODIUM 40 MILLIGRAM(S): 20 TABLET, DELAYED RELEASE ORAL at 05:44

## 2023-11-24 RX ADMIN — MORPHINE SULFATE 30 MILLIGRAM(S): 50 CAPSULE, EXTENDED RELEASE ORAL at 10:40

## 2023-11-24 RX ADMIN — METHOCARBAMOL 500 MILLIGRAM(S): 500 TABLET, FILM COATED ORAL at 21:07

## 2023-11-24 RX ADMIN — Medication 1 TABLET(S): at 05:41

## 2023-11-24 RX ADMIN — DULOXETINE HYDROCHLORIDE 60 MILLIGRAM(S): 30 CAPSULE, DELAYED RELEASE ORAL at 12:04

## 2023-11-24 RX ADMIN — MORPHINE SULFATE 30 MILLIGRAM(S): 50 CAPSULE, EXTENDED RELEASE ORAL at 20:43

## 2023-11-24 RX ADMIN — MORPHINE SULFATE 30 MILLIGRAM(S): 50 CAPSULE, EXTENDED RELEASE ORAL at 19:59

## 2023-11-24 RX ADMIN — MORPHINE SULFATE 30 MILLIGRAM(S): 50 CAPSULE, EXTENDED RELEASE ORAL at 10:03

## 2023-11-24 RX ADMIN — MORPHINE SULFATE 30 MILLIGRAM(S): 50 CAPSULE, EXTENDED RELEASE ORAL at 01:48

## 2023-11-24 RX ADMIN — Medication 975 MILLIGRAM(S): at 05:43

## 2023-11-24 NOTE — DIETITIAN INITIAL EVALUATION ADULT - NSFNSGIIOFT_GEN_A_CORE
Pt denied N/V/C, however endorsed diarrhea - mentioned last BM 11/23, consistent with RN flowsheets.

## 2023-11-24 NOTE — CHART NOTE - NSCHARTNOTEFT_GEN_A_CORE
PT reports muffled hearing in right ear  relieved by drinking water  no fevers, pain or rhinitis. comfortable  History of seasonal allergies and allergic rhinitis  No otoscope available however when patient  after autoinsufflation, discomfort is shortly relieved  likely OME from allergies. resume home med flonase

## 2023-11-24 NOTE — DIETITIAN INITIAL EVALUATION ADULT - PERTINENT MEDS FT
MEDICATIONS  (STANDING):  acetaminophen     Tablet .. 975 milliGRAM(s) Oral every 8 hours  DULoxetine 60 milliGRAM(s) Oral daily  enoxaparin Injectable 40 milliGRAM(s) SubCutaneous every 24 hours  fentaNYL   Patch  50 MICROgram(s)/Hr 1 Patch Transdermal every 72 hours  fluticasone propionate 50 MICROgram(s)/spray Nasal Spray 1 Spray(s) Both Nostrils two times a day  melatonin. 3 milliGRAM(s) Oral at bedtime  methocarbamol 500 milliGRAM(s) Oral three times a day  naloxegol 12.5 milliGRAM(s) Oral daily  pantoprazole    Tablet 40 milliGRAM(s) Oral before breakfast  polyethylene glycol 3350 17 Gram(s) Oral two times a day  pregabalin 200 milliGRAM(s) Oral three times a day  senna 2 Tablet(s) Oral at bedtime  triamterene 37.5 mG/hydrochlorothiazide 25 mG Tablet 1 Tablet(s) Oral daily    MEDICATIONS  (PRN):  albuterol    90 MICROgram(s) HFA Inhaler 2 Puff(s) Inhalation every 6 hours PRN Shortness of Breath and/or Wheezing  bisacodyl 5 milliGRAM(s) Oral every 12 hours PRN Constipation  morphine   Solution 15 milliGRAM(s) Oral every 4 hours PRN Moderate Pain (4 - 6)  morphine   Solution 30 milliGRAM(s) Oral every 4 hours PRN Severe Pain (7 - 10)

## 2023-11-24 NOTE — PROGRESS NOTE ADULT - ASSESSMENT
48F from home chronic back pain (s/p multiple spinal surgeries), asthma, obesity (s/p gastric bypass), HTN, opoid dependence  Admitted Nov14 for Elective revision of PSF  Intraoperative course complicated by acute blood loss requiring 4PRBC   Post-op hospital course significant for urinary retention, s/p paulino removal 11/21   Tx to Acute rehab    Offers no complaints to me today except that dilaudid worked better    Chronic back pain SP Revision sugery   -In acute rehab today    Pain Control   - Tylenol 975 mg Q8H  - Cymbalta 60 mg QD  - Lyrica 200 mg TID  - Robaxin 500 mg TID  - Fentanyl Patch 50 mcg Q72H  - PRN: Morphine 15 mg Q4H mod pain, 30 mg Q4H severe pain  - Consider addint Nucynta 75 or morphine equivelent if pain not controlled    Acute Blood Loss Anemia  -SP 4 PRBC  -H/H normal    Asthma  - albuterol inhaler Q6H PRN    HTN  - continue triamterene 37.5mg- HCTZ 25mg QD  -controlled today    Mood / Cognition  - Neuropsychology consult PRN    GI / Bowel  - at risk for constipation due to neurologic diagnosis, immobility, and/or medication use  - Senna qHS  - Miralax BID  - Movantik 12.5 mg QD  - Dulcolax 5 mg BID PRN constipation  - GI ppx: pantoprazole 40 mg QD     / Bladder  - Currently patient voids independently  - Paulino removed 11/21 and passed TOV  - check PVR on admission and SC >400cc    Skin / Pressure injury  - Skin assessment on admission performed : Left buttock healing wound with scab, spinal surgical incision covered with aquacel dressing  - Pressure Injury/Skin: OOB to chair, PT/OT  - nursing to monitor skin q Shift    Diet/Dysphagia:  - Diet Consistency: regular  - Aspiration Precautions  - Nutrition consult    DVT prophylaxis:   - Lovenox 40 mg QD   48F from home chronic back pain (s/p multiple spinal surgeries), asthma, obesity (s/p gastric bypass), HTN, opoid dependence  Admitted Nov14 for Elective revision of PSF  Intraoperative course complicated by acute blood loss requiring 4PRBC   Post-op hospital course significant for urinary retention, s/p paulino removal 11/21   Tx to Acute rehab    Offers no complaints to me today except that dilaudid worked better    Chronic back pain SP Revision sugery   -In acute rehab today    Pain Control   - Tylenol 975 mg Q8H  - Cymbalta 60 mg QD  - Lyrica 200 mg TID  - Robaxin 500 mg TID  - Fentanyl Patch 50 mcg Q72H  - PRN: Morphine 15 mg Q4H mod pain, 30 mg Q4H severe pain  - doing well off Nucynta 75 or morphine equivalent if pain not controlled    Acute Blood Loss Anemia  -SP 4 PRBC  -H/H normal    Asthma  - albuterol inhaler Q6H PRN    HTN  - continue triamterene 37.5mg- HCTZ 25mg QD  -controlled today    Mood / Cognition  - Neuropsychology consult PRN    GI / Bowel  - at risk for constipation due to neurologic diagnosis, immobility, and/or medication use  - Senna qHS  - Miralax BID  - Movantik 12.5 mg QD  - Dulcolax 5 mg BID PRN constipation  - GI ppx: pantoprazole 40 mg QD     / Bladder  - Currently patient voids independently  - Paulino removed 11/21 and passed TOV  - check PVR on admission and SC >400cc    Skin / Pressure injury  - Skin assessment on admission performed : Left buttock healing wound with scab, spinal surgical incision covered with aquacel dressing  - Pressure Injury/Skin: OOB to chair, PT/OT  - nursing to monitor skin q Shift    Diet/Dysphagia:  - Diet Consistency: regular  - Aspiration Precautions  - Nutrition consult    DVT prophylaxis:   - Lovenox 40 mg QD

## 2023-11-24 NOTE — PROGRESS NOTE ADULT - ASSESSMENT
Mrs. Savi Ivy is a 48 year old female patient with past medical history of chronic back pain (s/p multiple spinal surgeries), asthma, obesity (s/p gastric bypass), and HTN who is admitted for Acute Inpatient Rehabilitation with a multidisciplinary rehab program at [facility] with functional impairments in ADLs and mobility secondary to chronic back pain s/p multiple spinal surgeries treated further surgically with an elective revision of a PSF with extension of fusion with instrumentation from T3 through pelvis, L4 pedicle subtraction osteotomy (PSO), tethers, iliac fixation, T3-T10 Duvall-Meneses osteotomies with Plastic Surgery closure whose intraoperative course was remarkable for acute blood loss requiring 3 unit PRBC transfusion with one additional PRBC post-op. Post-op hospital course additionally significant for urinary retention.      Chronic back pain s/p T4-Pelvis revision surgery  - S/P elective revision of PSF with extension of fusion with instrumentation from T3 through pelvis (11/14)  - S/P L4 pedicle subtraction osteotomy (PSO)  - S/P proximal tethers and iliac fixation  - S/P T3-T10 Smith-Meneses osteotomies with Plastic Surgery closure   - HV x 2 and SHAWNA x 4 drains removed on 11/20   - Impaired ADLs and mobility  - Need for assistance with personal care   - Start comprehensive rehab program of PT/OT - 3 hours a day, 5 days a week. P&O as needed   - Pain control as below  - Fall/Spinal precautions  - WBAT    Pain Control   - Tylenol 975 mg Q8H  - Cymbalta 60 mg QD  - Lyrica 200 mg TID  - Robaxin 500 mg TID  - Fentanyl Patch 50 mcg Q72H  - PRN: Morphine 15 mg Q4H mod pain, 30 mg Q4H severe pain  - follow with pain management outpatient  - evaluated by pain management at [facility] who recommended discharge on home dose of nucynta 75mg 4x/day, fentanyl patch, as well as robaxin 500mg PO 3x/day and lyrica 200mg PO 3x/day    Acute Blood Loss Anemia  - s/p 3u PRBCs intra-op and one additional PRBC post-op  - monitor H/H    Asthma  - albuterol inhaler Q6H PRN    HTN  - continue triamterene 37.5mg- HCTZ 25mg QD  - Monitor BP    Mood / Cognition  - Neuropsychology consult PRN    Sleep  - Maintain quiet hours and a low stim environment.   - Melatonin 3 mg QHS     GI / Bowel  - at risk for constipation due to neurologic diagnosis, immobility, and/or medication use  - Senna qHS  - Miralax BID  - Movantik 12.5 mg QD  - Dulcolax 5 mg BID PRN constipation  - GI ppx: pantoprazole 40 mg QD     / Bladder  - Currently patient voids independently  - Cobb removed 11/21 and passed TOV  - check PVR on admission and SC >400cc    Skin / Pressure injury  - Skin assessment on admission performed : Left buttock healing wound with scab, spinal surgical incision clean an dry and covered with aquacel dressing  - Pressure Injury/Skin: OOB to chair, PT/OT  - nursing to monitor skin q Shift    Diet/Dysphagia:  - Diet Consistency: regular  - Aspiration Precautions  - Nutrition consult    DVT prophylaxis:   - Lovenox 40 mg QD    Outpatient Follow-up:  Schwab, Frank Johann  Orthopaedic Surgery  [address]  [place] [address]  Phone: [phone]  Fax: [phone]    Code Status/Emergency Contact:    ---------------    Goals: Safe discharge to home  Estimated Length of Stay: 10-14 days  Rehab Potential: Good  Medical Prognosis: Good  Estimated Disposition: Home with home care      PRESCREEN COMPARISON:  I have reviewed the prescreen information and I have found no relevant changes between the preadmission screening and my post admission evaluation.    RATIONALE FOR INPATIENT ADMISSION: Patient demonstrates the following:  [X] Medically appropriate for rehabilitation admission  [X] Has attainable rehab goals with an appropriate initial discharge plan  [X]Has rehabilitation potential (expected to make a significant improvement within a reasonable period of time)  [X] Requires close medical management by a rehab physician, rehab nursing care, Hospitalist and comprehensive interdisciplinary team (including PT, OT and/or SLP, Prosthetics and Orthotics)     Mrs. Savi Ivy is a 48 year old female patient with past medical history of chronic back pain (s/p multiple spinal surgeries), asthma, obesity (s/p gastric bypass), and HTN who is admitted for Acute Inpatient Rehabilitation with a multidisciplinary rehab program at [facility] with functional impairments in ADLs and mobility secondary to chronic back pain s/p multiple spinal surgeries treated further surgically with an elective revision of a PSF with extension of fusion with instrumentation from T3 through pelvis, L4 pedicle subtraction osteotomy (PSO), tethers, iliac fixation, T3-T10 Duvall-Meneses osteotomies with Plastic Surgery closure whose intraoperative course was remarkable for acute blood loss requiring 3 unit PRBC transfusion with one additional PRBC post-op. Post-op hospital course additionally significant for urinary retention.      Chronic back pain s/p T4-Pelvis revision surgery  - S/P elective revision of PSF with extension of fusion with instrumentation from T3 through pelvis (11/14)  - S/P L4 pedicle subtraction osteotomy (PSO)  - S/P proximal tethers and iliac fixation  - S/P T3-T10 Smith-Meneses osteotomies with Plastic Surgery closure   - HV x 2 and SHAWNA x 4 drains removed on 11/20   - Impaired ADLs and mobility  - Need for assistance with personal care   - Start comprehensive rehab program of PT/OT - 3 hours a day, 5 days a week. P&O as needed   - Pain control as below  - Fall/Spinal precautions  - WBAT    Pain Control   - Tylenol 975 mg Q8H  - Cymbalta 60 mg QD  - Lyrica 200 mg TID  - Robaxin 500 mg TID  - Fentanyl Patch 50 mcg Q72H  - PRN: Morphine 15 mg Q4H mod pain, 30 mg Q4H severe pain  - follow with pain management outpatient  - evaluated by pain management at [facility] who recommended discharge on home dose of nucynta 75mg 4x/day, fentanyl patch, as well as robaxin 500mg PO 3x/day and lyrica 200mg PO 3x/day    Acute Blood Loss Anemia  - s/p 3u PRBCs intra-op and one additional PRBC post-op  - monitor H/H    Asthma  - albuterol inhaler Q6H PRN    HTN  - continue triamterene 37.5mg- HCTZ 25mg QD  - Monitor BP    Mood / Cognition  - Neuropsychology consult PRN    Sleep  - Maintain quiet hours and a low stim environment.   - Melatonin 3 mg QHS     GI / Bowel  - at risk for constipation due to neurologic diagnosis, immobility, and/or medication use  - Senna qHS  - Miralax BID  - Movantik 12.5 mg QD  - Dulcolax 5 mg BID PRN constipation  - GI ppx: pantoprazole 40 mg QD     / Bladder  - Currently patient voids independently  - Cobb removed 11/21 and passed TOV  - check PVR on admission and SC >400cc    Skin:  - Skin assessment on admission performed : Left buttock healing wound with scab, spinal surgical incision clean an dry and covered with aquacel dressing  - Pressure Injury/Skin: OOB to chair, PT/OT  - nursing to monitor skin q Shift    Diet:  - Diet Consistency: regular  - Aspiration Precautions  - Nutrition consult    DVT prophylaxis:   - Lovenox 40 mg QD    Outpatient Follow-up:  Schwab, Frank Johann  Orthopaedic Surgery  [address]  [place] [address]  Phone: [phone]  Fax: [phone]    Code Status/Emergency Contact:    ---------------    Goals: Safe discharge to home  Estimated Length of Stay: 10-14 days  Rehab Potential: Good  Medical Prognosis: Good  Estimated Disposition: Home with home care      PRESCREEN COMPARISON:  I have reviewed the prescreen information and I have found no relevant changes between the preadmission screening and my post admission evaluation.    RATIONALE FOR INPATIENT ADMISSION: Patient demonstrates the following:  [X] Medically appropriate for rehabilitation admission  [X] Has attainable rehab goals with an appropriate initial discharge plan  [X]Has rehabilitation potential (expected to make a significant improvement within a reasonable period of time)  [X] Requires close medical management by a rehab physician, rehab nursing care, Hospitalist and comprehensive interdisciplinary team (including PT, OT and/or SLP, Prosthetics and Orthotics)

## 2023-11-24 NOTE — PROGRESS NOTE ADULT - SUBJECTIVE AND OBJECTIVE BOX
HPI:  Mrs. Savi Ivy is a 48 year old female patient with past medical history of chronic back pain (s/p multiple spinal surgeries), asthma, obesity (s/p gastric bypass), and HTN who presented to [facility] on 11/14 for an elective revision of a PSF with extension of fusion with instrumentation from T3 throgh pelvis, pedicle subtraction osteotomy (PSO) L4, tethers, iliac fixation, smith clark osteotomies T3-T10 with Plastic Surgery closure with Dr. Schwab, Dr. Gonzalez and Dr. Simpson. Intraoperative course with acute blood loss requiring 3 unit PRBC transfusion with one additional PRBC post-op. Post-op hospital course significant for urinary retention, s/p paulino removal 11/21 and TOV passed. Patient evaluated by PT/OT and was recommended for acute inpatient rehab. Patient is medically stable for discharge to [facility] on 11/22.    (22 Nov 2023 13:26)  ___________________________________________________________________________    SUBJECTIVE/ROS  Patient was seen and evaluated at bedside today.  Reported no overnight events and is in no acute distress.  Pain is well controlled. Slept well. Last BM yesterday.   Patient is motivated to continue participation on the recommended rehabilitation program.   Denies any CP, SOB, CALLAWAY, palpitations, fever, chills, body aches, cough, congestion, or any other symptoms at this time.   ___________________________________________________________________________    VITALS  T(C): 36.4 (11-24-23 @ 08:27), Max: 37.2 (11-23-23 @ 21:54)  HR: 61 (11-24-23 @ 08:27) (61 - 76)  BP: 94/53 (11-24-23 @ 08:27) (94/53 - 100/62)  RR: 16 (11-24-23 @ 08:27) (15 - 16)  SpO2: 98% (11-24-23 @ 08:27) (95% - 98%)  ___________________________________________________________________________    LABS                          8.0    6.58  )-----------( 440      ( 23 Nov 2023 06:10 )             25.2       11-23    142  |  106  |  9   ----------------------------<  96  4.4   |  30  |  0.59    Ca    8.3<L>      23 Nov 2023 06:10    TPro  5.7<L>  /  Alb  1.6<L>  /  TBili  0.2  /  DBili  x   /  AST  24  /  ALT  17  /  AlkPhos  85  11-23    ___________________________________________________________________________    MEDICATIONS  (STANDING):  acetaminophen     Tablet .. 975 milliGRAM(s) Oral every 8 hours  DULoxetine 60 milliGRAM(s) Oral daily  enoxaparin Injectable 40 milliGRAM(s) SubCutaneous every 24 hours  fentaNYL   Patch  50 MICROgram(s)/Hr 1 Patch Transdermal every 72 hours  melatonin. 3 milliGRAM(s) Oral at bedtime  methocarbamol 500 milliGRAM(s) Oral three times a day  naloxegol 12.5 milliGRAM(s) Oral daily  pantoprazole    Tablet 40 milliGRAM(s) Oral before breakfast  polyethylene glycol 3350 17 Gram(s) Oral two times a day  pregabalin 200 milliGRAM(s) Oral three times a day  senna 2 Tablet(s) Oral at bedtime  triamterene 37.5 mG/hydrochlorothiazide 25 mG Tablet 1 Tablet(s) Oral daily    MEDICATIONS  (PRN):  albuterol    90 MICROgram(s) HFA Inhaler 2 Puff(s) Inhalation every 6 hours PRN Shortness of Breath and/or Wheezing  bisacodyl 5 milliGRAM(s) Oral every 12 hours PRN Constipation  morphine   Solution 30 milliGRAM(s) Oral every 4 hours PRN Severe Pain (7 - 10)  morphine   Solution 15 milliGRAM(s) Oral every 4 hours PRN Moderate Pain (4 - 6)    ___________________________________________________________________________    PHYSICAL EXAM:  Gen - NAD, Comfortable  HEENT - NCAT, EOMI, MMM  Neck - Supple, No limited ROM  Pulm - CTAB, No wheeze, No rhonchi, No crackles  Cardiovascular - RRR, S1S2  Chest - good chest expansion, good respiratory effort  Abdomen - Soft, NT/ND, +BS  Extremities - No Cyanosis, no clubbing, no edema, no calf tenderness  Neuro-     Cognitive - awake, alert, oriented to person, place, date, year, and situation.  Able to follow command     Motor -                     LEFT    UE - 4/5                    RIGHT UE - 4/5                    LEFT    LE - 4/5                    RIGHT LE - 4/5        Sensory - Intact to LT   Psychiatric - Mood stable, Affect WNL  Skin:  left buttock healing wound with scab, spinal surgical incision covered with Aquacel dressing c/d/i    ___________________________________________________________________________ HPI:  Mrs. Savi Ivy is a 48 year old female patient with past medical history of chronic back pain (s/p multiple spinal surgeries), asthma, obesity (s/p gastric bypass), and HTN who presented to [facility] on 11/14 for an elective revision of a PSF with extension of fusion with instrumentation from T3 throgh pelvis, pedicle subtraction osteotomy (PSO) L4, tethers, iliac fixation, smith clark osteotomies T3-T10 with Plastic Surgery closure with Dr. Schwab, Dr. Gonzalez and Dr. Simpson. Intraoperative course with acute blood loss requiring 3 unit PRBC transfusion with one additional PRBC post-op. Post-op hospital course significant for urinary retention, s/p paulino removal 11/21 and TOV passed. Patient evaluated by PT/OT and was recommended for acute inpatient rehab. Patient is medically stable for discharge to [facility] on 11/22.   ___________________________________________________________________________    SUBJECTIVE/ROS  Patient was seen and evaluated at bedside today.  Reported no overnight events and is in no acute distress.  Pain is well controlled. Slept well. Last BM yesterday.   Patient is motivated to continue participation on the recommended rehabilitation program.   Denies any CP, SOB, CALLAWAY, palpitations, fever, chills, body aches, cough, congestion, or any other symptoms at this time.   ___________________________________________________________________________    VITALS  T(C): 36.4 (11-24-23 @ 08:27), Max: 37.2 (11-23-23 @ 21:54)  HR: 61 (11-24-23 @ 08:27) (61 - 76)  BP: 94/53 (11-24-23 @ 08:27) (94/53 - 100/62)  RR: 16 (11-24-23 @ 08:27) (15 - 16)  SpO2: 98% (11-24-23 @ 08:27) (95% - 98%)  ___________________________________________________________________________    LABS                          8.0    6.58  )-----------( 440      ( 23 Nov 2023 06:10 )             25.2       11-23    142  |  106  |  9   ----------------------------<  96  4.4   |  30  |  0.59    Ca    8.3<L>      23 Nov 2023 06:10    TPro  5.7<L>  /  Alb  1.6<L>  /  TBili  0.2  /  DBili  x   /  AST  24  /  ALT  17  /  AlkPhos  85  11-23    ___________________________________________________________________________    MEDICATIONS  (STANDING):  acetaminophen     Tablet .. 975 milliGRAM(s) Oral every 8 hours  DULoxetine 60 milliGRAM(s) Oral daily  enoxaparin Injectable 40 milliGRAM(s) SubCutaneous every 24 hours  fentaNYL   Patch  50 MICROgram(s)/Hr 1 Patch Transdermal every 72 hours  melatonin. 3 milliGRAM(s) Oral at bedtime  methocarbamol 500 milliGRAM(s) Oral three times a day  naloxegol 12.5 milliGRAM(s) Oral daily  pantoprazole    Tablet 40 milliGRAM(s) Oral before breakfast  polyethylene glycol 3350 17 Gram(s) Oral two times a day  pregabalin 200 milliGRAM(s) Oral three times a day  senna 2 Tablet(s) Oral at bedtime  triamterene 37.5 mG/hydrochlorothiazide 25 mG Tablet 1 Tablet(s) Oral daily    MEDICATIONS  (PRN):  albuterol    90 MICROgram(s) HFA Inhaler 2 Puff(s) Inhalation every 6 hours PRN Shortness of Breath and/or Wheezing  bisacodyl 5 milliGRAM(s) Oral every 12 hours PRN Constipation  morphine   Solution 30 milliGRAM(s) Oral every 4 hours PRN Severe Pain (7 - 10)  morphine   Solution 15 milliGRAM(s) Oral every 4 hours PRN Moderate Pain (4 - 6)    ___________________________________________________________________________    PHYSICAL EXAM:  Gen - NAD, Comfortable  HEENT - NCAT, EOMI, MMM  Neck - Supple, No limited ROM  Pulm - CTAB, No crackles  Cardiovascular - RRR, S1S2  Chest - good chest expansion, good respiratory effort  Abdomen - Soft, NT, ND, +BS  Extremities - No Cyanosis, no clubbing, no edema, no calf tenderness  Neuro-     Cognitive - awake, alert, oriented to person, place, date, year, and situation.  Able to follow command     Motor -                     LEFT    UE - 4/5                    RIGHT UE - 4/5                    LEFT    LE - 4/5                    RIGHT LE - 4/5        Sensory - Intact to LT   Psychiatric - Mood stable, Affect WNL  Skin:  left buttock healing wound with scab, spinal surgical incision covered with Aquacel dressing c/d/i    ___________________________________________________________________________

## 2023-11-24 NOTE — DIETITIAN INITIAL EVALUATION ADULT - ADD RECOMMEND
1. Continue with current diet, as tolerated  - Encourage protein-rich foods to aid in skin tear healing  2. Recommend checking B12 level due to pt's hx of gastric bypass and reported monthly B12 infusions  3. Add MVI for general nutrient coverage  4. Appreciate weekly weight trends  5. Hold off on bowel regimen if pt continue to have diarrhea  - Encourage adequate fluid intake to prevent dehydration

## 2023-11-24 NOTE — DIETITIAN INITIAL EVALUATION ADULT - OTHER INFO
Mrs. Savi Ivy is a 48 year old female patient with past medical history of chronic back pain (s/p multiple spinal surgeries), asthma, obesity (s/p gastric bypass in 2007 per previous RD note), and HTN who presented to [facility] on 11/14 for an elective revision of a PSF with extension of fusion with instrumentation from T3 throgh pelvis, pedicle subtraction osteotomy (PSO) L4, tethers, iliac fixation, smith clark osteotomies T3-T10 with Plastic Surgery closure with Dr. Schwab, Dr. Gonzalez and Dr. Simpson. Intraoperative course with acute blood loss requiring 3 unit PRBC transfusion with one additional PRBC post-op. Post-op hospital course significant for urinary retention, s/p paulino removal 11/21 and TOV passed. Patient evaluated by PT/OT and was recommended for acute inpatient rehab. Patient is medically stable for discharge to [facility] on 11/22.     Met with pt this afternoon at bedside. Pt was seated upright in bed and able to articulate her nutrition hx well. Pt denied N/V/C, however endorsed diarrhea - mentioned last BM 11/23, consistent with RN flowsheets. Encouraged pt to drink adequate fluids to prevent dehydration. Pt denied chewing/swallowing difficulty. Pt reported UBW ~186 lbs over the last 6 months & 141 lbs over the last year. Pt appeared well nourished and without overt muscle or subcutaneous fat wasting.     Note: Pt with hx of gastric bypass 2007 - pt mentioned receiving month B12 infusions  Corrected calcium (11/24): ([4 - 1.6] x 0.8) + 8.3 = 10.22 (WNL)

## 2023-11-24 NOTE — DIETITIAN INITIAL EVALUATION ADULT - ORAL INTAKE PTA/DIET HISTORY
Pt reported good appetite and POs PTA. Allergic to shellfish/shrimp. 24 hour recall below.    B: oatmeal or cottage cheese + peaches  L: 1-2 hard boiled eggs  D: salad + grilled chicken or chicken cutlet + baked potatoes + steamed vegetables  Colin: "sliver" of [employer] marble loaf cake

## 2023-11-24 NOTE — PROGRESS NOTE ADULT - SUBJECTIVE AND OBJECTIVE BOX
No events overnight  No new complaints to offer me    MEDICATIONS  (STANDING):  acetaminophen     Tablet .. 975 milliGRAM(s) Oral every 8 hours  DULoxetine 60 milliGRAM(s) Oral daily  enoxaparin Injectable 40 milliGRAM(s) SubCutaneous every 24 hours  fentaNYL   Patch  50 MICROgram(s)/Hr 1 Patch Transdermal every 72 hours  melatonin. 3 milliGRAM(s) Oral at bedtime  methocarbamol 500 milliGRAM(s) Oral three times a day  naloxegol 12.5 milliGRAM(s) Oral daily  pantoprazole    Tablet 40 milliGRAM(s) Oral before breakfast  polyethylene glycol 3350 17 Gram(s) Oral two times a day  pregabalin 200 milliGRAM(s) Oral three times a day  senna 2 Tablet(s) Oral at bedtime  triamterene 37.5 mG/hydrochlorothiazide 25 mG Tablet 1 Tablet(s) Oral daily    MEDICATIONS  (PRN):  albuterol    90 MICROgram(s) HFA Inhaler 2 Puff(s) Inhalation every 6 hours PRN Shortness of Breath and/or Wheezing  bisacodyl 5 milliGRAM(s) Oral every 12 hours PRN Constipation  morphine   Solution 30 milliGRAM(s) Oral every 4 hours PRN Severe Pain (7 - 10)  morphine   Solution 15 milliGRAM(s) Oral every 4 hours PRN Moderate Pain (4 - 6)      Vital Signs Last 24 Hrs  T(F): 99 (23 Nov 2023 21:54), Max: 99 (23 Nov 2023 21:54)  HR: 76 (23 Nov 2023 21:54) (71 - 76)  BP: 97/61 (23 Nov 2023 21:54) (97/61 - 102/71)  RR: 15 (23 Nov 2023 21:54) (15 - 16)  SpO2: 95% (23 Nov 2023 21:54) (95% - 96%)    I&O's Summary      Examination:  General: NAD, Comfortable  Pulm: CTA BL No Rhonchi Rales or wheezes  Neck: Supple, No JVD  CVS: RRR No rubs murmurs or gallops  Abdomen: Soft, Nontender, Nondistended; No masses or organomegally  Extremities: No calf tenderness, No pitting edema    Labs:                        8.0    6.58  )-----------( 440      ( 23 Nov 2023 06:10 )             25.2       11-23    142  |  106  |  9   ----------------------------<  96  4.4   |  30  |  0.59    Ca    8.3      23 Nov 2023 06:10    TPro  5.7  /  Alb  1.6  /  TBili  0.2  /  DBili  x   /  AST  24  /  ALT  17  /  AlkPhos  85  11-23                                     MEDICATIONS  (STANDING):  acetaminophen     Tablet .. 975 milliGRAM(s) Oral every 8 hours  DULoxetine 60 milliGRAM(s) Oral daily  enoxaparin Injectable 40 milliGRAM(s) SubCutaneous every 24 hours  fentaNYL   Patch  50 MICROgram(s)/Hr 1 Patch Transdermal every 72 hours  melatonin. 3 milliGRAM(s) Oral at bedtime  methocarbamol 500 milliGRAM(s) Oral three times a day  naloxegol 12.5 milliGRAM(s) Oral daily  pantoprazole    Tablet 40 milliGRAM(s) Oral before breakfast  polyethylene glycol 3350 17 Gram(s) Oral two times a day  pregabalin 200 milliGRAM(s) Oral three times a day  senna 2 Tablet(s) Oral at bedtime  triamterene 37.5 mG/hydrochlorothiazide 25 mG Tablet 1 Tablet(s) Oral daily    MEDICATIONS  (PRN):  albuterol    90 MICROgram(s) HFA Inhaler 2 Puff(s) Inhalation every 6 hours PRN Shortness of Breath and/or Wheezing  bisacodyl 5 milliGRAM(s) Oral every 12 hours PRN Constipation  morphine   Solution 30 milliGRAM(s) Oral every 4 hours PRN Severe Pain (7 - 10)  morphine   Solution 15 milliGRAM(s) Oral every 4 hours PRN Moderate Pain (4 - 6)      Vital Signs Last 24 Hrs  T(F): 99 (23 Nov 2023 21:54), Max: 99 (23 Nov 2023 21:54)  HR: 76 (23 Nov 2023 21:54) (71 - 76)  BP: 97/61 (23 Nov 2023 21:54) (97/61 - 102/71)  RR: 15 (23 Nov 2023 21:54) (15 - 16)  SpO2: 95% (23 Nov 2023 21:54) (95% - 96%)    I&O's Summary      Examination:  General: NAD, Comfortable  Pulm: CTA BL No Rhonchi Rales or wheezes  Neck: Supple, No JVD  CVS: RRR No rubs murmurs or gallops  Abdomen: Soft, Nontender, Nondistended; No masses or organomegally  Extremities: No calf tenderness, No pitting edema    Labs:                        8.0    6.58  )-----------( 440      ( 23 Nov 2023 06:10 )             25.2       11-23    142  |  106  |  9   ----------------------------<  96  4.4   |  30  |  0.59    Ca    8.3      23 Nov 2023 06:10    TPro  5.7  /  Alb  1.6  /  TBili  0.2  /  DBili  x   /  AST  24  /  ALT  17  /  AlkPhos  85  11-23                                   No complaints offered to me today  Feeling well. pain is controled        MEDICATIONS  (STANDING):  acetaminophen     Tablet .. 975 milliGRAM(s) Oral every 8 hours  DULoxetine 60 milliGRAM(s) Oral daily  enoxaparin Injectable 40 milliGRAM(s) SubCutaneous every 24 hours  fentaNYL   Patch  50 MICROgram(s)/Hr 1 Patch Transdermal every 72 hours  melatonin. 3 milliGRAM(s) Oral at bedtime  methocarbamol 500 milliGRAM(s) Oral three times a day  naloxegol 12.5 milliGRAM(s) Oral daily  pantoprazole    Tablet 40 milliGRAM(s) Oral before breakfast  polyethylene glycol 3350 17 Gram(s) Oral two times a day  pregabalin 200 milliGRAM(s) Oral three times a day  senna 2 Tablet(s) Oral at bedtime  triamterene 37.5 mG/hydrochlorothiazide 25 mG Tablet 1 Tablet(s) Oral daily    MEDICATIONS  (PRN):  albuterol    90 MICROgram(s) HFA Inhaler 2 Puff(s) Inhalation every 6 hours PRN Shortness of Breath and/or Wheezing  bisacodyl 5 milliGRAM(s) Oral every 12 hours PRN Constipation  morphine   Solution 30 milliGRAM(s) Oral every 4 hours PRN Severe Pain (7 - 10)  morphine   Solution 15 milliGRAM(s) Oral every 4 hours PRN Moderate Pain (4 - 6)      Vital Signs Last 24 Hrs  T(F): 99 (23 Nov 2023 21:54), Max: 99 (23 Nov 2023 21:54)  HR: 76 (23 Nov 2023 21:54) (71 - 76)  BP: 97/61 (23 Nov 2023 21:54) (97/61 - 102/71)  RR: 15 (23 Nov 2023 21:54) (15 - 16)  SpO2: 95% (23 Nov 2023 21:54) (95% - 96%)    I&O's Summary      Examination:  General: NAD, Comfortable  Pulm: CTA BL No Rhonchi Rales or wheezes  Neck: Supple, No JVD  CVS: RRR No rubs murmurs or gallops  Abdomen: Soft, Nontender, Nondistended; No masses or organomegally  Extremities: No calf tenderness, No pitting edema    Labs:                        8.0    6.58  )-----------( 440      ( 23 Nov 2023 06:10 )             25.2       11-23    142  |  106  |  9   ----------------------------<  96  4.4   |  30  |  0.59    Ca    8.3      23 Nov 2023 06:10    TPro  5.7  /  Alb  1.6  /  TBili  0.2  /  DBili  x   /  AST  24  /  ALT  17  /  AlkPhos  85  11-23

## 2023-11-25 PROCEDURE — 99232 SBSQ HOSP IP/OBS MODERATE 35: CPT

## 2023-11-25 RX ORDER — PHENYLEPHRINE-SHARK LIVER OIL-MINERAL OIL-PETROLATUM RECTAL OINTMENT
1 OINTMENT (GRAM) RECTAL
Refills: 0 | Status: DISCONTINUED | OUTPATIENT
Start: 2023-11-25 | End: 2023-12-06

## 2023-11-25 RX ADMIN — Medication 975 MILLIGRAM(S): at 21:02

## 2023-11-25 RX ADMIN — SENNA PLUS 2 TABLET(S): 8.6 TABLET ORAL at 21:02

## 2023-11-25 RX ADMIN — MORPHINE SULFATE 30 MILLIGRAM(S): 50 CAPSULE, EXTENDED RELEASE ORAL at 18:22

## 2023-11-25 RX ADMIN — MORPHINE SULFATE 30 MILLIGRAM(S): 50 CAPSULE, EXTENDED RELEASE ORAL at 00:08

## 2023-11-25 RX ADMIN — MORPHINE SULFATE 30 MILLIGRAM(S): 50 CAPSULE, EXTENDED RELEASE ORAL at 00:42

## 2023-11-25 RX ADMIN — Medication 975 MILLIGRAM(S): at 06:20

## 2023-11-25 RX ADMIN — MORPHINE SULFATE 30 MILLIGRAM(S): 50 CAPSULE, EXTENDED RELEASE ORAL at 13:30

## 2023-11-25 RX ADMIN — Medication 975 MILLIGRAM(S): at 15:52

## 2023-11-25 RX ADMIN — METHOCARBAMOL 500 MILLIGRAM(S): 500 TABLET, FILM COATED ORAL at 05:36

## 2023-11-25 RX ADMIN — DULOXETINE HYDROCHLORIDE 60 MILLIGRAM(S): 30 CAPSULE, DELAYED RELEASE ORAL at 11:31

## 2023-11-25 RX ADMIN — MORPHINE SULFATE 30 MILLIGRAM(S): 50 CAPSULE, EXTENDED RELEASE ORAL at 17:49

## 2023-11-25 RX ADMIN — Medication 1 SPRAY(S): at 17:45

## 2023-11-25 RX ADMIN — NALOXEGOL OXALATE 12.5 MILLIGRAM(S): 12.5 TABLET, FILM COATED ORAL at 11:31

## 2023-11-25 RX ADMIN — PHENYLEPHRINE-SHARK LIVER OIL-MINERAL OIL-PETROLATUM RECTAL OINTMENT 1 APPLICATION(S): at 17:45

## 2023-11-25 RX ADMIN — Medication 200 MILLIGRAM(S): at 15:52

## 2023-11-25 RX ADMIN — Medication 1 SPRAY(S): at 05:37

## 2023-11-25 RX ADMIN — POLYETHYLENE GLYCOL 3350 17 GRAM(S): 17 POWDER, FOR SOLUTION ORAL at 17:45

## 2023-11-25 RX ADMIN — MORPHINE SULFATE 30 MILLIGRAM(S): 50 CAPSULE, EXTENDED RELEASE ORAL at 09:30

## 2023-11-25 RX ADMIN — MORPHINE SULFATE 30 MILLIGRAM(S): 50 CAPSULE, EXTENDED RELEASE ORAL at 12:53

## 2023-11-25 RX ADMIN — MORPHINE SULFATE 30 MILLIGRAM(S): 50 CAPSULE, EXTENDED RELEASE ORAL at 08:42

## 2023-11-25 RX ADMIN — Medication 200 MILLIGRAM(S): at 05:37

## 2023-11-25 RX ADMIN — Medication 200 MILLIGRAM(S): at 21:02

## 2023-11-25 RX ADMIN — Medication 975 MILLIGRAM(S): at 22:01

## 2023-11-25 RX ADMIN — Medication 1 TABLET(S): at 11:31

## 2023-11-25 RX ADMIN — Medication 975 MILLIGRAM(S): at 16:30

## 2023-11-25 RX ADMIN — METHOCARBAMOL 500 MILLIGRAM(S): 500 TABLET, FILM COATED ORAL at 21:02

## 2023-11-25 RX ADMIN — Medication 975 MILLIGRAM(S): at 05:36

## 2023-11-25 RX ADMIN — PANTOPRAZOLE SODIUM 40 MILLIGRAM(S): 20 TABLET, DELAYED RELEASE ORAL at 05:36

## 2023-11-25 RX ADMIN — METHOCARBAMOL 500 MILLIGRAM(S): 500 TABLET, FILM COATED ORAL at 15:52

## 2023-11-25 RX ADMIN — ENOXAPARIN SODIUM 40 MILLIGRAM(S): 100 INJECTION SUBCUTANEOUS at 05:36

## 2023-11-25 NOTE — PROGRESS NOTE ADULT - ASSESSMENT
Mrs. Savi Ivy is a 48 year old female patient with past medical history of chronic back pain (s/p multiple spinal surgeries), asthma, obesity (s/p gastric bypass), and HTN who is admitted for Acute Inpatient Rehabilitation with a multidisciplinary rehab program at [facility] with functional impairments in ADLs and mobility secondary to chronic back pain s/p multiple spinal surgeries treated further surgically with an elective revision of a PSF with extension of fusion with instrumentation from T3 through pelvis, L4 pedicle subtraction osteotomy (PSO), tethers, iliac fixation, T3-T10 Duvall-Meneses osteotomies with Plastic Surgery closure whose intraoperative course was remarkable for acute blood loss requiring 3 unit PRBC transfusion with one additional PRBC post-op. Post-op hospital course additionally significant for urinary retention.      Chronic back pain s/p T4-Pelvis revision surgery  - S/P elective revision of PSF with extension of fusion with instrumentation from T3 through pelvis (11/14)  - S/P L4 pedicle subtraction osteotomy (PSO)  - S/P proximal tethers and iliac fixation  - S/P T3-T10 Smith-Meneses osteotomies with Plastic Surgery closure   - HV x 2 and SHAWNA x 4 drains removed on 11/20   - Impaired ADLs and mobility  - Need for assistance with personal care   - Continue comprehensive rehab program of PT/OT - 3 hours a day, 5 days a week. P&O as needed   - Pain control as below  - Fall/Spinal precautions  - WBAT    Pain Control   - Tylenol 975 mg Q8H  - Cymbalta 60 mg QD  - Lyrica 200 mg TID  - Robaxin 500 mg TID  - Fentanyl Patch 50 mcg Q72H  - PRN: Morphine 15 mg Q4H mod pain, 30 mg Q4H severe pain  - follow with pain management outpatient  - evaluated by pain management at [facility] who recommended discharge on home dose of nucynta 75mg 4x/day, fentanyl patch, as well as robaxin 500mg PO 3x/day and lyrica 200mg PO 3x/day    Acute Blood Loss Anemia  - s/p 3u PRBCs intra-op and one additional PRBC post-op  - monitor H/H    Asthma  - albuterol inhaler Q6H PRN    HTN  - continue triamterene 37.5mg- HCTZ 25mg QD  - Monitor BP    Mood / Cognition  - Neuropsychology consult PRN    Sleep  - Maintain quiet hours and a low stim environment.   - Melatonin 3 mg QHS     GI / Bowel  - at risk for constipation due to neurologic diagnosis, immobility, and/or medication use  - Senna qHS  - Miralax BID  - Movantik 12.5 mg QD  - Dulcolax 5 mg BID PRN constipation  - GI ppx: pantoprazole 40 mg QD     / Bladder  - Currently patient voids independently  - Cobb removed 11/21 and passed TOV  - check PVR on admission and SC >400cc    Skin / Pressure injury  - Skin assessment on admission performed : Left buttock healing wound with scab, spinal surgical incision clean an dry and left LIVE (11/25)  - Pressure Injury/Skin: OOB to chair, PT/OT  - nursing to monitor skin q Shift    Diet/Dysphagia:  - Diet Consistency: regular  - Aspiration Precautions  - Nutrition consult    DVT prophylaxis:   - Lovenox 40 mg QD    Outpatient Follow-up:  Schwab, Frank Johann  Orthopaedic Surgery  [address]  [place] [address]  Phone: [phone]  Fax: [phone]    Code Status/Emergency Contact:    ---------------    Goals: Safe discharge to home  Estimated Length of Stay: 10-14 days  Rehab Potential: Good  Medical Prognosis: Good  Estimated Disposition: Home with home care      PRESCREEN COMPARISON:  I have reviewed the prescreen information and I have found no relevant changes between the preadmission screening and my post admission evaluation.    RATIONALE FOR INPATIENT ADMISSION: Patient demonstrates the following:  [X] Medically appropriate for rehabilitation admission  [X] Has attainable rehab goals with an appropriate initial discharge plan  [X]Has rehabilitation potential (expected to make a significant improvement within a reasonable period of time)  [X] Requires close medical management by a rehab physician, rehab nursing care, Hospitalist and comprehensive interdisciplinary team (including PT, OT and/or SLP, Prosthetics and Orthotics)     Mrs. Savi Ivy is a 48 year old female patient with past medical history of chronic back pain (s/p multiple spinal surgeries), asthma, obesity (s/p gastric bypass), and HTN who is admitted for Acute Inpatient Rehabilitation with a multidisciplinary rehab program at [facility] with functional impairments in ADLs and mobility secondary to chronic back pain s/p multiple spinal surgeries treated further surgically with an elective revision of a PSF with extension of fusion with instrumentation from T3 through pelvis, L4 pedicle subtraction osteotomy (PSO), tethers, iliac fixation, T3-T10 Duvall-Meneses osteotomies with Plastic Surgery closure whose intraoperative course was remarkable for acute blood loss requiring 3 unit PRBC transfusion with one additional PRBC post-op. Post-op hospital course additionally significant for urinary retention.      Spinal stenosis with neurogenic claudication s/p T4-Pelvis revision surgery  - S/P elective revision of PSF with extension of fusion with instrumentation from T3 through pelvis (11/14)  - S/P L4 pedicle subtraction osteotomy (PSO)  - S/P proximal tethers and iliac fixation  - S/P T3-T10 Smith-Meneses osteotomies with Plastic Surgery closure   - HV x 2 and SHAWNA x 4 drains removed on 11/20   - Impaired ADLs and mobility  - Need for assistance with personal care   - Continue comprehensive rehab program of PT/OT - 3 hours a day, 5 days a week. P&O as needed   - Pain control as below  - Fall/Spinal precautions  - WBAT    Pain Control   - Tylenol 975 mg Q8H  - Cymbalta 60 mg QD  - Lyrica 200 mg TID  - Robaxin 500 mg TID  - Fentanyl Patch 50 mcg Q72H  - PRN: Morphine 15 mg Q4H mod pain, 30 mg Q4H severe pain  - follow with pain management outpatient  - evaluated by pain management at [facility] who recommended discharge on home dose of nucynta 75mg 4x/day, fentanyl patch, as well as robaxin 500mg PO 3x/day and lyrica 200mg PO 3x/day    Acute Blood Loss Anemia  - s/p 3u PRBCs intra-op and one additional PRBC post-op  - monitor H/H    Asthma  - albuterol inhaler Q6H PRN    HTN  - continue triamterene 37.5mg- HCTZ 25mg QD  - Monitor BP    Mood / Cognition  - Neuropsychology consult PRN    Sleep  - Maintain quiet hours and a low stim environment.   - Melatonin 3 mg QHS     GI / Bowel  - at risk for constipation due to neurologic diagnosis, immobility, and/or medication use  - Senna qHS  - Miralax BID  - Movantik 12.5 mg QD  - Dulcolax 5 mg BID PRN constipation  - GI ppx: pantoprazole 40 mg QD     / Bladder  - Currently patient voids independently  - Cobb removed 11/21 and passed TOV  - check PVR on admission and SC >400cc    Skin / Pressure injury  - Skin assessment on admission performed : Left buttock healing wound with scab, spinal surgical incision clean an dry and left LIVE (11/25)  - Pressure Injury/Skin: OOB to chair, PT/OT  - nursing to monitor skin q Shift    Diet/Dysphagia:  - Diet Consistency: regular  - Aspiration Precautions  - Nutrition consult    DVT prophylaxis:   - Lovenox 40 mg QD    Outpatient Follow-up:  Schwab, Frank Johann  Orthopaedic Surgery  [address]  [place] [address]  Phone: [phone]  Fax: [phone]    Code Status/Emergency Contact:    ---------------    Goals: Safe discharge to home  Estimated Length of Stay: 10-14 days  Rehab Potential: Good  Medical Prognosis: Good  Estimated Disposition: Home with home care      PRESCREEN COMPARISON:  I have reviewed the prescreen information and I have found no relevant changes between the preadmission screening and my post admission evaluation.    RATIONALE FOR INPATIENT ADMISSION: Patient demonstrates the following:  [X] Medically appropriate for rehabilitation admission  [X] Has attainable rehab goals with an appropriate initial discharge plan  [X]Has rehabilitation potential (expected to make a significant improvement within a reasonable period of time)  [X] Requires close medical management by a rehab physician, rehab nursing care, Hospitalist and comprehensive interdisciplinary team (including PT, OT and/or SLP, Prosthetics and Orthotics)     Mrs. Savi Ivy is a 48 year old female patient with past medical history of chronic back pain (s/p multiple spinal surgeries), asthma, obesity (s/p gastric bypass), and HTN who is admitted for Acute Inpatient Rehabilitation with a multidisciplinary rehab program at [facility] with functional impairments in ADLs and mobility secondary to chronic back pain s/p multiple spinal surgeries treated further surgically with an elective revision of a PSF with extension of fusion with instrumentation from T3 through pelvis, L4 pedicle subtraction osteotomy (PSO), tethers, iliac fixation, T3-T10 Duvall-Meneses osteotomies with Plastic Surgery closure whose intraoperative course was remarkable for acute blood loss requiring 3 unit PRBC transfusion with one additional PRBC post-op. Post-op hospital course additionally significant for urinary retention.      Spinal stenosis with neurogenic claudication s/p T4-Pelvis revision surgery  - S/P elective revision of PSF with extension of fusion with instrumentation from T3 through pelvis (11/14)  - S/P L4 pedicle subtraction osteotomy (PSO)  - S/P proximal tethers and iliac fixation  - S/P T3-T10 Smith-Meneses osteotomies with Plastic Surgery closure   - HV x 2 and SHAWNA x 4 drains removed on 11/20   - Impaired ADLs and mobility  - Need for assistance with personal care   - Continue comprehensive rehab program of PT/OT - 3 hours a day, 5 days a week. P&O as needed   - Pain control as below  - Fall/Spinal precautions  - WBAT    Pain Control   - Tylenol 975 mg Q8H  - Cymbalta 60 mg QD  - Lyrica 200 mg TID  - Robaxin 500 mg TID  - Fentanyl Patch 50 mcg Q72H  - PRN: Morphine 15 mg Q4H mod pain, 30 mg Q4H severe pain  - follow with pain management outpatient  - evaluated by pain management at [facility] who recommended discharge on home dose of nucynta 75mg 4x/day, fentanyl patch, as well as robaxin 500mg PO 3x/day and lyrica 200mg PO 3x/day    Acute Blood Loss Anemia  - s/p 3u PRBCs intra-op and one additional PRBC post-op  - monitor H/H    Asthma  - albuterol inhaler Q6H PRN    HTN  - continue triamterene 37.5mg- HCTZ 25mg QD  - Monitor BP    Mood / Cognition  - Neuropsychology consult PRN    Sleep  - Maintain quiet hours and a low stim environment.   - Melatonin 3 mg QHS     GI / Bowel  - at risk for constipation due to neurologic diagnosis, immobility, and/or medication use  - Senna qHS  - Miralax BID  - Movantik 12.5 mg QD  - Dulcolax 5 mg BID PRN constipation  - GI ppx: pantoprazole 40 mg QD     / Bladder  - Currently patient voids independently  - Cobb removed 11/21 and passed TOV  - check PVR on admission and SC >400cc    Skin:  - Skin assessment on admission performed : Left buttock healing wound with scab, spinal surgical incision clean an dry and left LIVE (11/25)  - Pressure Injury/Skin: OOB to chair, PT/OT  - nursing to monitor skin q Shift    Diet:  - Diet Consistency: regular  - Aspiration Precautions  - Nutrition consult    DVT prophylaxis:   - Lovenox 40 mg QD    Outpatient Follow-up:  Schwab, Frank Johann  Orthopaedic Surgery  [address]  [place] [address]  Phone: [phone]  Fax: [phone]    Code Status/Emergency Contact:    ---------------    Goals: Safe discharge to home  Estimated Length of Stay: 10-14 days  Rehab Potential: Good  Medical Prognosis: Good  Estimated Disposition: Home with home care      PRESCREEN COMPARISON:  I have reviewed the prescreen information and I have found no relevant changes between the preadmission screening and my post admission evaluation.    RATIONALE FOR INPATIENT ADMISSION: Patient demonstrates the following:  [X] Medically appropriate for rehabilitation admission  [X] Has attainable rehab goals with an appropriate initial discharge plan  [X]Has rehabilitation potential (expected to make a significant improvement within a reasonable period of time)  [X] Requires close medical management by a rehab physician, rehab nursing care, Hospitalist and comprehensive interdisciplinary team (including PT, OT and/or SLP, Prosthetics and Orthotics)

## 2023-11-25 NOTE — PROGRESS NOTE ADULT - ASSESSMENT
48F from home chronic back pain (s/p multiple spinal surgeries), asthma, obesity (s/p gastric bypass), HTN, opoid dependence  Admitted Nov14 for Elective revision of PSF. Intraoperative course complicated by acute blood loss requiring 4PRBC. Post-op hospital course significant for urinary retention, s/p paulino removal 11/21. Now transferred to Acute rehab      Chronic back pain s/p Revision [facility]   Pain Control   - Tylenol 975 mg Q8H  - Cymbalta 60 mg QD  - Lyrica 200 mg TID  - Robaxin 500 mg TID  - Fentanyl Patch 50 mcg Q72H  - PRN: Morphine 15 mg Q4H mod pain, 30 mg Q4H severe pain    Acute Blood Loss Anemia  -SP 4 PRBC  -H/H normal    Asthma  -albuterol inhaler Q6H PRN    HTN  -continue triamterene 37.5mg- HCTZ 25mg QD  -controlled today    GI / Bowel  - Senna qHS  - Miralax BID  - Movantik 12.5 mg QD  - Dulcolax 5 mg BID PRN constipation  - GI ppx: pantoprazole 40 mg QD    DVT prophylaxis:   - Lovenox 40 mg QD

## 2023-11-25 NOTE — PROGRESS NOTE ADULT - SUBJECTIVE AND OBJECTIVE BOX
HPI:  Mrs. Savi Ivy is a 48 year old female patient with past medical history of chronic back pain (s/p multiple spinal surgeries), asthma, obesity (s/p gastric bypass), and HTN who presented to [facility] on 11/14 for an elective revision of a PSF with extension of fusion with instrumentation from T3 throgh pelvis, pedicle subtraction osteotomy (PSO) L4, tethers, iliac fixation, smith clark osteotomies T3-T10 with Plastic Surgery closure with Dr. Schwab, Dr. Gonzalez and Dr. Simpson. Intraoperative course with acute blood loss requiring 3 unit PRBC transfusion with one additional PRBC post-op. Post-op hospital course significant for urinary retention, s/p paulino removal 11/21 and TOV passed. Patient evaluated by PT/OT and was recommended for acute inpatient rehab. Patient is medically stable for discharge to [facility] on 11/22.   ___________________________________________________________________________    SUBJECTIVE/ROS  Patient was seen and evaluated at bedside today.  Reported no overnight events and is in no acute distress.  Pain is well controlled. Slept well. Last BM yesterday.   Wound reviewed today and left LIVE.  Patient is motivated to continue participation on the recommended rehabilitation program.   Denies any CP, SOB, CALLAWAY, palpitations, fever, chills, body aches, cough, congestion, or any other symptoms at this time.   ___________________________________________________________________________    Vital Signs Last 24 Hrs  T(C): 36.9 (25 Nov 2023 08:04), Max: 36.9 (25 Nov 2023 08:04)  T(F): 98.4 (25 Nov 2023 08:04), Max: 98.4 (25 Nov 2023 08:04)  HR: 68 (25 Nov 2023 08:04) (68 - 78)  BP: 108/69 (25 Nov 2023 08:04) (105/67 - 109/77)  RR: 16 (25 Nov 2023 08:04) (16 - 16)  SpO2: 97% (25 Nov 2023 08:04) (96% - 99%)    ___________________________________________________________________________    LABS                          8.0    6.58  )-----------( 440      ( 23 Nov 2023 06:10 )             25.2       11-23    142  |  106  |  9   ----------------------------<  96  4.4   |  30  |  0.59    Ca    8.3<L>      23 Nov 2023 06:10    TPro  5.7<L>  /  Alb  1.6<L>  /  TBili  0.2  /  DBili  x   /  AST  24  /  ALT  17  /  AlkPhos  85  11-23    ___________________________________________________________________________    MEDICATIONS  (STANDING):  acetaminophen     Tablet .. 975 milliGRAM(s) Oral every 8 hours  DULoxetine 60 milliGRAM(s) Oral daily  enoxaparin Injectable 40 milliGRAM(s) SubCutaneous every 24 hours  fentaNYL   Patch  50 MICROgram(s)/Hr 1 Patch Transdermal every 72 hours  fluticasone propionate 50 MICROgram(s)/spray Nasal Spray 1 Spray(s) Both Nostrils two times a day  melatonin. 3 milliGRAM(s) Oral at bedtime  methocarbamol 500 milliGRAM(s) Oral three times a day  multivitamin 1 Tablet(s) Oral daily  naloxegol 12.5 milliGRAM(s) Oral daily  pantoprazole    Tablet 40 milliGRAM(s) Oral before breakfast  polyethylene glycol 3350 17 Gram(s) Oral two times a day  pregabalin 200 milliGRAM(s) Oral three times a day  senna 2 Tablet(s) Oral at bedtime  triamterene 37.5 mG/hydrochlorothiazide 25 mG Tablet 1 Tablet(s) Oral daily    MEDICATIONS  (PRN):  albuterol    90 MICROgram(s) HFA Inhaler 2 Puff(s) Inhalation every 6 hours PRN Shortness of Breath and/or Wheezing  bisacodyl 5 milliGRAM(s) Oral every 12 hours PRN Constipation  morphine   Solution 30 milliGRAM(s) Oral every 4 hours PRN Severe Pain (7 - 10)  morphine   Solution 15 milliGRAM(s) Oral every 4 hours PRN Moderate Pain (4 - 6)    ___________________________________________________________________________    PHYSICAL EXAM:  Gen - NAD, Comfortable  HEENT - NCAT, EOMI, MMM  Neck - Supple, No limited ROM  Pulm - CTAB, No crackles  Cardiovascular - RRR, S1S2  Chest - good chest expansion, good respiratory effort  Abdomen - Soft, NT, ND, +BS  Extremities - No Cyanosis, no clubbing, no edema, no calf tenderness  Neuro-     Cognitive - awake, alert, oriented to person, place, date, year, and situation.  Able to follow command     Motor -                     LEFT    UE - 4/5                    RIGHT UE - 4/5                    LEFT    LE - 4/5                    RIGHT LE - 4/5        Sensory - Intact to LT   Psychiatric - Mood stable, Affect WNL  Skin:  left buttock healing wound with scab, spinal surgical incision clean and dry and left LIVE    ___________________________________________________________________________

## 2023-11-25 NOTE — PROGRESS NOTE ADULT - SUBJECTIVE AND OBJECTIVE BOX
Patient is a 48y old  Female who presents with a chief complaint of Chronic back pain s/p T4-Pelvis revision surgery (25 Nov 2023 09:46)      Subjective and overnight events:  Patient seen and examined at bedside. reports persistent severe back pain but stable. no fever, chills, sob, cp, abd pain.    ALLERGIES:  shellfish (Hives; Short breath)  diclofenac (Other)    MEDICATIONS  (STANDING):  acetaminophen     Tablet .. 975 milliGRAM(s) Oral every 8 hours  DULoxetine 60 milliGRAM(s) Oral daily  enoxaparin Injectable 40 milliGRAM(s) SubCutaneous every 24 hours  fentaNYL   Patch  50 MICROgram(s)/Hr 1 Patch Transdermal every 72 hours  fluticasone propionate 50 MICROgram(s)/spray Nasal Spray 1 Spray(s) Both Nostrils two times a day  melatonin. 3 milliGRAM(s) Oral at bedtime  methocarbamol 500 milliGRAM(s) Oral three times a day  multivitamin 1 Tablet(s) Oral daily  naloxegol 12.5 milliGRAM(s) Oral daily  pantoprazole    Tablet 40 milliGRAM(s) Oral before breakfast  polyethylene glycol 3350 17 Gram(s) Oral two times a day  pregabalin 200 milliGRAM(s) Oral three times a day  senna 2 Tablet(s) Oral at bedtime  triamterene 37.5 mG/hydrochlorothiazide 25 mG Tablet 1 Tablet(s) Oral daily    MEDICATIONS  (PRN):  albuterol    90 MICROgram(s) HFA Inhaler 2 Puff(s) Inhalation every 6 hours PRN Shortness of Breath and/or Wheezing  bisacodyl 5 milliGRAM(s) Oral every 12 hours PRN Constipation  morphine   Solution 30 milliGRAM(s) Oral every 4 hours PRN Severe Pain (7 - 10)  morphine   Solution 15 milliGRAM(s) Oral every 4 hours PRN Moderate Pain (4 - 6)    Vital Signs Last 24 Hrs  T(F): 98.4 (25 Nov 2023 08:04), Max: 98.4 (25 Nov 2023 08:04)  HR: 68 (25 Nov 2023 08:04) (68 - 78)  BP: 108/69 (25 Nov 2023 08:04) (105/67 - 108/69)  RR: 16 (25 Nov 2023 08:04) (16 - 16)  SpO2: 97% (25 Nov 2023 08:04) (96% - 97%)  I&O's Summary    PHYSICAL EXAM:  General: NAD, A/O x 3  ENT: MMM  Neck: Supple, No JVD  Lungs: Clear to auscultation bilaterally  Cardio: RRR, S1/S2, No murmurs  Abdomen: Soft, Nontender, Nondistended; Bowel sounds present  Extremities: No calf tenderness, +bilateral edema    LABS:                        8.0    6.58  )-----------( 440      ( 23 Nov 2023 06:10 )             25.2     11-23    142  |  106  |  9   ----------------------------<  96  4.4   |  30  |  0.59    Ca    8.3      23 Nov 2023 06:10    TPro  5.7  /  Alb  1.6  /  TBili  0.2  /  DBili  x   /  AST  24  /  ALT  17  /  AlkPhos  85  11-23              Urinalysis Basic - ( 23 Nov 2023 06:10 )    Color: x / Appearance: x / SG: x / pH: x  Gluc: 96 mg/dL / Ketone: x  / Bili: x / Urobili: x   Blood: x / Protein: x / Nitrite: x   Leuk Esterase: x / RBC: x / WBC x   Sq Epi: x / Non Sq Epi: x / Bacteria: x        COVID-19 PCR: NotDetec (11-22-23 @ 21:39)      RADIOLOGY & ADDITIONAL TESTS:    Care Discussed with Consultants/Other Providers:

## 2023-11-25 NOTE — PROGRESS NOTE ADULT - REASON FOR ADMISSION
Chronic back pain s/p T4-Pelvis revision surgery Spinal stenosis with neurogenic claudication s/p T4-Pelvis revision surgery

## 2023-11-26 PROCEDURE — 99232 SBSQ HOSP IP/OBS MODERATE 35: CPT

## 2023-11-26 RX ADMIN — METHOCARBAMOL 500 MILLIGRAM(S): 500 TABLET, FILM COATED ORAL at 05:16

## 2023-11-26 RX ADMIN — MORPHINE SULFATE 30 MILLIGRAM(S): 50 CAPSULE, EXTENDED RELEASE ORAL at 08:55

## 2023-11-26 RX ADMIN — MORPHINE SULFATE 30 MILLIGRAM(S): 50 CAPSULE, EXTENDED RELEASE ORAL at 01:52

## 2023-11-26 RX ADMIN — MORPHINE SULFATE 30 MILLIGRAM(S): 50 CAPSULE, EXTENDED RELEASE ORAL at 15:22

## 2023-11-26 RX ADMIN — DULOXETINE HYDROCHLORIDE 60 MILLIGRAM(S): 30 CAPSULE, DELAYED RELEASE ORAL at 11:36

## 2023-11-26 RX ADMIN — Medication 975 MILLIGRAM(S): at 21:24

## 2023-11-26 RX ADMIN — Medication 975 MILLIGRAM(S): at 14:29

## 2023-11-26 RX ADMIN — Medication 1 SPRAY(S): at 05:21

## 2023-11-26 RX ADMIN — Medication 3 MILLIGRAM(S): at 21:24

## 2023-11-26 RX ADMIN — Medication 975 MILLIGRAM(S): at 13:25

## 2023-11-26 RX ADMIN — MORPHINE SULFATE 30 MILLIGRAM(S): 50 CAPSULE, EXTENDED RELEASE ORAL at 19:58

## 2023-11-26 RX ADMIN — Medication 200 MILLIGRAM(S): at 05:17

## 2023-11-26 RX ADMIN — Medication 975 MILLIGRAM(S): at 22:00

## 2023-11-26 RX ADMIN — Medication 1 TABLET(S): at 11:37

## 2023-11-26 RX ADMIN — Medication 200 MILLIGRAM(S): at 21:24

## 2023-11-26 RX ADMIN — MORPHINE SULFATE 30 MILLIGRAM(S): 50 CAPSULE, EXTENDED RELEASE ORAL at 02:22

## 2023-11-26 RX ADMIN — MORPHINE SULFATE 30 MILLIGRAM(S): 50 CAPSULE, EXTENDED RELEASE ORAL at 10:07

## 2023-11-26 RX ADMIN — PANTOPRAZOLE SODIUM 40 MILLIGRAM(S): 20 TABLET, DELAYED RELEASE ORAL at 05:16

## 2023-11-26 RX ADMIN — Medication 975 MILLIGRAM(S): at 05:16

## 2023-11-26 RX ADMIN — Medication 975 MILLIGRAM(S): at 06:00

## 2023-11-26 RX ADMIN — MORPHINE SULFATE 30 MILLIGRAM(S): 50 CAPSULE, EXTENDED RELEASE ORAL at 16:29

## 2023-11-26 RX ADMIN — METHOCARBAMOL 500 MILLIGRAM(S): 500 TABLET, FILM COATED ORAL at 21:24

## 2023-11-26 RX ADMIN — ENOXAPARIN SODIUM 40 MILLIGRAM(S): 100 INJECTION SUBCUTANEOUS at 06:38

## 2023-11-26 RX ADMIN — NALOXEGOL OXALATE 12.5 MILLIGRAM(S): 12.5 TABLET, FILM COATED ORAL at 11:37

## 2023-11-26 RX ADMIN — METHOCARBAMOL 500 MILLIGRAM(S): 500 TABLET, FILM COATED ORAL at 13:25

## 2023-11-26 RX ADMIN — POLYETHYLENE GLYCOL 3350 17 GRAM(S): 17 POWDER, FOR SOLUTION ORAL at 17:12

## 2023-11-26 RX ADMIN — MORPHINE SULFATE 30 MILLIGRAM(S): 50 CAPSULE, EXTENDED RELEASE ORAL at 19:28

## 2023-11-26 RX ADMIN — Medication 1 SPRAY(S): at 17:11

## 2023-11-26 RX ADMIN — Medication 200 MILLIGRAM(S): at 13:25

## 2023-11-26 NOTE — PROGRESS NOTE ADULT - ASSESSMENT
48F from home chronic back pain (s/p multiple spinal surgeries), asthma, obesity (s/p gastric bypass), HTN, opoid dependence  Admitted Nov14 for Elective revision of PSF. Intraoperative course complicated by acute blood loss requiring 4PRBC. Post-op hospital course significant for urinary retention, s/p paulino removal 11/21. Now transferred to Acute rehab      Chronic back pain s/p Revision [facility]   Pain Control   - Tylenol 975 mg Q8H  - Cymbalta 60 mg QD  - Lyrica 200 mg TID  - Robaxin 500 mg TID  - Fentanyl Patch 50 mcg Q72H  - PRN: Morphine 15 mg Q4H mod pain, 30 mg Q4H severe pain  - warm compresses prn    Acute Blood Loss Anemia  -SP 4 PRBC  -H/H normal    Asthma  -albuterol inhaler Q6H PRN    HTN  -continue triamterene 37.5mg- HCTZ 25mg QD  -controlled today    Constipation   - Senna qHS  - Miralax BID  - Movantik 12.5 mg QD  - Dulcolax 5 mg BID PRN constipation  - GI ppx: pantoprazole 40 mg QD    DVT prophylaxis:   - Lovenox 40 mg QD

## 2023-11-26 NOTE — PROGRESS NOTE ADULT - ASSESSMENT
Mrs. Savi Ivy is a 48 year old female patient with past medical history of chronic back pain (s/p multiple spinal surgeries), asthma, obesity (s/p gastric bypass), and HTN who is admitted for Acute Inpatient Rehabilitation with a multidisciplinary rehab program at [facility] with functional impairments in ADLs and mobility secondary to chronic back pain s/p multiple spinal surgeries treated further surgically with an elective revision of a PSF with extension of fusion with instrumentation from T3 through pelvis, L4 pedicle subtraction osteotomy (PSO), tethers, iliac fixation, T3-T10 Duvall-Meneses osteotomies with Plastic Surgery closure whose intraoperative course was remarkable for acute blood loss requiring 3 unit PRBC transfusion with one additional PRBC post-op. Post-op hospital course additionally significant for urinary retention.      Chronic back pain s/p T4-Pelvis revision surgery  - S/P elective revision of PSF with extension of fusion with instrumentation from T3 through pelvis (11/14)  - S/P L4 pedicle subtraction osteotomy (PSO)  - S/P proximal tethers and iliac fixation  - S/P T3-T10 Smith-Meneses osteotomies with Plastic Surgery closure   - HV x 2 and SHAWNA x 4 drains removed on 11/20   - Impaired ADLs and mobility  - Need for assistance with personal care   - Continue comprehensive rehab program of PT/OT - 3 hours a day, 5 days a week. P&O as needed   - Pain control as below  - Fall/Spinal precautions  - WBAT    Pain Control   - Tylenol 975 mg Q8H  - Cymbalta 60 mg QD  - Lyrica 200 mg TID  - Robaxin 500 mg TID  - Fentanyl Patch 50 mcg Q72H  - PRN: Morphine 15 mg Q4H mod pain, 30 mg Q4H severe pain  - follow with pain management outpatient  - evaluated by pain management at [facility] who recommended discharge on home dose of nucynta 75mg 4x/day, fentanyl patch, as well as robaxin 500mg PO 3x/day and lyrica 200mg PO 3x/day    Acute Blood Loss Anemia  - s/p 3u PRBCs intra-op and one additional PRBC post-op  - monitor H/H    Asthma  - albuterol inhaler Q6H PRN    HTN  - continue triamterene 37.5mg- HCTZ 25mg QD  - Monitor BP    Mood / Cognition  - Neuropsychology consult PRN    Sleep  - Maintain quiet hours and a low stim environment.   - Melatonin 3 mg QHS     GI / Bowel  - at risk for constipation due to neurologic diagnosis, immobility, and/or medication use  - Senna qHS  - Miralax BID  - Movantik 12.5 mg QD  - Dulcolax 5 mg BID PRN constipation  - GI ppx: pantoprazole 40 mg QD     / Bladder  - Currently patient voids independently  - Cobb removed 11/21 and passed TOV  - check PVR on admission and SC >400cc    Skin / Pressure injury  - Skin assessment on admission performed : Left buttock healing wound with scab, spinal surgical incision clean an dry and left LIVE (11/25)  - Pressure Injury/Skin: OOB to chair, PT/OT  - nursing to monitor skin q Shift    Diet/Dysphagia:  - Diet Consistency: regular  - Aspiration Precautions  - Nutrition consult    DVT prophylaxis:   - Lovenox 40 mg QD    Outpatient Follow-up:  Schwab, Frank Johann  Orthopaedic Surgery  [address]  [place] [address]  Phone: [phone]  Fax: [phone]    Code Status/Emergency Contact:    ---------------    Goals: Safe discharge to home  Estimated Length of Stay: 10-14 days  Rehab Potential: Good  Medical Prognosis: Good  Estimated Disposition: Home with home care      PRESCREEN COMPARISON:  I have reviewed the prescreen information and I have found no relevant changes between the preadmission screening and my post admission evaluation.    RATIONALE FOR INPATIENT ADMISSION: Patient demonstrates the following:  [X] Medically appropriate for rehabilitation admission  [X] Has attainable rehab goals with an appropriate initial discharge plan  [X]Has rehabilitation potential (expected to make a significant improvement within a reasonable period of time)  [X] Requires close medical management by a rehab physician, rehab nursing care, Hospitalist and comprehensive interdisciplinary team (including PT, OT and/or SLP, Prosthetics and Orthotics)     Mrs. Savi Ivy is a 48 year old female patient with past medical history of chronic back pain (s/p multiple spinal surgeries), asthma, obesity (s/p gastric bypass), and HTN who is admitted for Acute Inpatient Rehabilitation with a multidisciplinary rehab program at [facility] with functional impairments in ADLs and mobility secondary to chronic back pain s/p multiple spinal surgeries treated further surgically with an elective revision of a PSF with extension of fusion with instrumentation from T3 through pelvis, L4 pedicle subtraction osteotomy (PSO), tethers, iliac fixation, T3-T10 Duvall-Meneses osteotomies with Plastic Surgery closure whose intraoperative course was remarkable for acute blood loss requiring 3 unit PRBC transfusion with one additional PRBC post-op. Post-op hospital course additionally significant for urinary retention.      Spinal stenosis with neurogenic claudication s/p T4-Pelvis revision surgery  - S/P elective revision of PSF with extension of fusion with instrumentation from T3 through pelvis (11/14)  - S/P L4 pedicle subtraction osteotomy (PSO)  - S/P proximal tethers and iliac fixation  - S/P T3-T10 Smith-Meneses osteotomies with Plastic Surgery closure   - HV x 2 and SHAWNA x 4 drains removed on 11/20   - Impaired ADLs and mobility  - Need for assistance with personal care   - Continue comprehensive rehab program of PT/OT - 3 hours a day, 5 days a week. P&O as needed   - Pain control as below  - Fall/Spinal precautions  - WBAT    Pain Control   - Tylenol 975 mg Q8H  - Cymbalta 60 mg QD  - Lyrica 200 mg TID  - Robaxin 500 mg TID  - Fentanyl Patch 50 mcg Q72H  - PRN: Morphine 15 mg Q4H mod pain, 30 mg Q4H severe pain  - follow with pain management outpatient  - evaluated by pain management at [facility] who recommended discharge on home dose of nucynta 75mg 4x/day, fentanyl patch, as well as robaxin 500mg PO 3x/day and lyrica 200mg PO 3x/day    Acute Blood Loss Anemia  - s/p 3u PRBCs intra-op and one additional PRBC post-op  - monitor H/H    Asthma  - albuterol inhaler Q6H PRN    HTN  - continue triamterene 37.5mg- HCTZ 25mg QD  - Monitor BP    Mood / Cognition  - Neuropsychology consult PRN    Sleep  - Maintain quiet hours and a low stim environment.   - Melatonin 3 mg QHS     GI / Bowel  - at risk for constipation due to neurologic diagnosis, immobility, and/or medication use  - Senna qHS  - Miralax BID  - Movantik 12.5 mg QD  - Dulcolax 5 mg BID PRN constipation  - GI ppx: pantoprazole 40 mg QD     / Bladder  - Currently patient voids independently  - Cobb removed 11/21 and passed TOV  - check PVR on admission and SC >400cc    Skin / Pressure injury  - Skin assessment on admission performed : Left buttock healing wound with scab, spinal surgical incision clean an dry and left LIVE (11/25)  - Pressure Injury/Skin: OOB to chair, PT/OT  - nursing to monitor skin q Shift    Diet/Dysphagia:  - Diet Consistency: regular  - Aspiration Precautions  - Nutrition consult    DVT prophylaxis:   - Lovenox 40 mg QD    Outpatient Follow-up:  Schwab, Frank Johann  Orthopaedic Surgery  [address]  [place] [address]  Phone: [phone]  Fax: [phone]    Code Status/Emergency Contact:    ---------------    Goals: Safe discharge to home  Estimated Length of Stay: 10-14 days  Rehab Potential: Good  Medical Prognosis: Good  Estimated Disposition: Home with home care      PRESCREEN COMPARISON:  I have reviewed the prescreen information and I have found no relevant changes between the preadmission screening and my post admission evaluation.    RATIONALE FOR INPATIENT ADMISSION: Patient demonstrates the following:  [X] Medically appropriate for rehabilitation admission  [X] Has attainable rehab goals with an appropriate initial discharge plan  [X]Has rehabilitation potential (expected to make a significant improvement within a reasonable period of time)  [X] Requires close medical management by a rehab physician, rehab nursing care, Hospitalist and comprehensive interdisciplinary team (including PT, OT and/or SLP, Prosthetics and Orthotics)     Mrs. Savi Ivy is a 48 year old female patient with past medical history of chronic back pain (s/p multiple spinal surgeries), asthma, obesity (s/p gastric bypass), and HTN who is admitted for Acute Inpatient Rehabilitation with a multidisciplinary rehab program at [facility] with functional impairments in ADLs and mobility secondary to chronic back pain s/p multiple spinal surgeries treated further surgically with an elective revision of a PSF with extension of fusion with instrumentation from T3 through pelvis, L4 pedicle subtraction osteotomy (PSO), tethers, iliac fixation, T3-T10 Duvall-Meneses osteotomies with Plastic Surgery closure whose intraoperative course was remarkable for acute blood loss requiring 3 unit PRBC transfusion with one additional PRBC post-op. Post-op hospital course additionally significant for urinary retention.      Spinal stenosis with neurogenic claudication s/p T4-Pelvis revision surgery  - S/P elective revision of PSF with extension of fusion with instrumentation from T3 through pelvis (11/14)  - S/P L4 pedicle subtraction osteotomy (PSO)  - S/P proximal tethers and iliac fixation  - S/P T3-T10 Smith-Meneses osteotomies with Plastic Surgery closure   - HV x 2 and SHAWNA x 4 drains removed on 11/20   - Impaired ADLs and mobility  - Need for assistance with personal care   - Continue comprehensive rehab program of PT/OT - 3 hours a day, 5 days a week. P&O as needed   - Pain control as below  - Fall/Spinal precautions  - WBAT    Pain Control   - Tylenol 975 mg Q8H  - Cymbalta 60 mg QD  - Lyrica 200 mg TID  - Robaxin 500 mg TID  - Fentanyl Patch 50 mcg Q72H  - PRN: Morphine 15 mg Q4H mod pain, 30 mg Q4H severe pain  - follow with pain management outpatient  - evaluated by pain management at [facility] who recommended discharge on home dose of nucynta 75mg 4x/day, fentanyl patch, as well as robaxin 500mg PO 3x/day and lyrica 200mg PO 3x/day    Acute Blood Loss Anemia  - s/p 3u PRBCs intra-op and one additional PRBC post-op  - monitor H/H    Asthma  - albuterol inhaler Q6H PRN    HTN  - continue triamterene 37.5mg- HCTZ 25mg QD  - Monitor BP    Mood / Cognition  - Neuropsychology consult PRN    Sleep  - Maintain quiet hours and a low stim environment.   - Melatonin 3 mg QHS     GI / Bowel  - at risk for constipation due to neurologic diagnosis, immobility, and/or medication use  - Senna qHS  - Miralax BID  - Movantik 12.5 mg QD  - Dulcolax 5 mg BID PRN constipation  - GI ppx: pantoprazole 40 mg QD     / Bladder  - Currently patient voids independently  - Cobb removed 11/21 and passed TOV  - check PVR on admission and SC >400cc    Skin:  - Skin assessment on admission performed : Left buttock healing wound with scab, spinal surgical incision clean an dry and left LIVE (11/25)  - Pressure Injury/Skin: OOB to chair, PT/OT  - nursing to monitor skin q Shift    Diet:  - Diet Consistency: regular  - Aspiration Precautions  - Nutrition consult    DVT prophylaxis:   - Lovenox 40 mg QD    Outpatient Follow-up:  Schwab, Frank Johann  Orthopaedic Surgery  [address]  [place] [address]  Phone: [phone]  Fax: [phone]    Code Status/Emergency Contact:    ---------------    Goals: Safe discharge to home  Estimated Length of Stay: 10-14 days  Rehab Potential: Good  Medical Prognosis: Good  Estimated Disposition: Home with home care      PRESCREEN COMPARISON:  I have reviewed the prescreen information and I have found no relevant changes between the preadmission screening and my post admission evaluation.    RATIONALE FOR INPATIENT ADMISSION: Patient demonstrates the following:  [X] Medically appropriate for rehabilitation admission  [X] Has attainable rehab goals with an appropriate initial discharge plan  [X]Has rehabilitation potential (expected to make a significant improvement within a reasonable period of time)  [X] Requires close medical management by a rehab physician, rehab nursing care, Hospitalist and comprehensive interdisciplinary team (including PT, OT and/or SLP, Prosthetics and Orthotics)

## 2023-11-26 NOTE — PROGRESS NOTE ADULT - SUBJECTIVE AND OBJECTIVE BOX
Patient is a 48y old  Female who presents with a chief complaint of Chronic back pain s/p T4-Pelvis revision surgery (26 Nov 2023 10:03)      Subjective and overnight events:  Patient seen and examined at bedside. reports having persistent severe pain and muscle spasms, pain med maximized and symptoms mild relieves with ice blocks and warm compresses. no fever, chills, sob, cp, abd pain. + two epi of loose stool.      ALLERGIES:  shellfish (Hives; Short breath)  diclofenac (Other)    MEDICATIONS  (STANDING):  acetaminophen     Tablet .. 975 milliGRAM(s) Oral every 8 hours  DULoxetine 60 milliGRAM(s) Oral daily  enoxaparin Injectable 40 milliGRAM(s) SubCutaneous every 24 hours  fentaNYL   Patch  50 MICROgram(s)/Hr 1 Patch Transdermal every 72 hours  fluticasone propionate 50 MICROgram(s)/spray Nasal Spray 1 Spray(s) Both Nostrils two times a day  melatonin. 3 milliGRAM(s) Oral at bedtime  methocarbamol 500 milliGRAM(s) Oral three times a day  multivitamin 1 Tablet(s) Oral daily  naloxegol 12.5 milliGRAM(s) Oral daily  pantoprazole    Tablet 40 milliGRAM(s) Oral before breakfast  polyethylene glycol 3350 17 Gram(s) Oral two times a day  pregabalin 200 milliGRAM(s) Oral three times a day  senna 2 Tablet(s) Oral at bedtime  triamterene 37.5 mG/hydrochlorothiazide 25 mG Tablet 1 Tablet(s) Oral daily    MEDICATIONS  (PRN):  albuterol    90 MICROgram(s) HFA Inhaler 2 Puff(s) Inhalation every 6 hours PRN Shortness of Breath and/or Wheezing  bisacodyl 5 milliGRAM(s) Oral every 12 hours PRN Constipation  hemorrhoidal Ointment 1 Application(s) Rectal two times a day PRN hemorrhoidal pain  morphine   Solution 30 milliGRAM(s) Oral every 4 hours PRN Severe Pain (7 - 10)  morphine   Solution 15 milliGRAM(s) Oral every 4 hours PRN Moderate Pain (4 - 6)    Vital Signs Last 24 Hrs  T(F): 97.9 (26 Nov 2023 07:41), Max: 98 (25 Nov 2023 19:28)  HR: 67 (26 Nov 2023 07:41) (67 - 75)  BP: 94/62 (26 Nov 2023 07:41) (94/62 - 121/70)  RR: 16 (26 Nov 2023 07:41) (16 - 16)  SpO2: 97% (26 Nov 2023 07:41) (97% - 97%)  I&O's Summary    PHYSICAL EXAM:  General: NAD, A/O x 3  ENT: MMM  Neck: Supple, No JVD  Lungs: Clear to auscultation bilaterally  Cardio: RRR, S1/S2, No murmurs  Abdomen: Soft, Nontender, Nondistended; Bowel sounds present  Extremities: No calf tenderness, No pitting edema    LABS:                      COVID-19 PCR: NotDetec (11-22-23 @ 21:39)      RADIOLOGY & ADDITIONAL TESTS:    Care Discussed with Consultants/Other Providers:    Patient is a 48y old  Female who presents with a chief complaint of Chronic back pain s/p T4-Pelvis revision surgery (26 Nov 2023 10:03)      Subjective and overnight events:  Patient seen and examined at bedside. reports having persistent severe pain and muscle spasms, pain med maximized and symptoms mild relieved with ice blocks and warm compresses. no fever, chills, sob, cp, abd pain.     ALLERGIES:  shellfish (Hives; Short breath)  diclofenac (Other)    MEDICATIONS  (STANDING):  acetaminophen     Tablet .. 975 milliGRAM(s) Oral every 8 hours  DULoxetine 60 milliGRAM(s) Oral daily  enoxaparin Injectable 40 milliGRAM(s) SubCutaneous every 24 hours  fentaNYL   Patch  50 MICROgram(s)/Hr 1 Patch Transdermal every 72 hours  fluticasone propionate 50 MICROgram(s)/spray Nasal Spray 1 Spray(s) Both Nostrils two times a day  melatonin. 3 milliGRAM(s) Oral at bedtime  methocarbamol 500 milliGRAM(s) Oral three times a day  multivitamin 1 Tablet(s) Oral daily  naloxegol 12.5 milliGRAM(s) Oral daily  pantoprazole    Tablet 40 milliGRAM(s) Oral before breakfast  polyethylene glycol 3350 17 Gram(s) Oral two times a day  pregabalin 200 milliGRAM(s) Oral three times a day  senna 2 Tablet(s) Oral at bedtime  triamterene 37.5 mG/hydrochlorothiazide 25 mG Tablet 1 Tablet(s) Oral daily    MEDICATIONS  (PRN):  albuterol    90 MICROgram(s) HFA Inhaler 2 Puff(s) Inhalation every 6 hours PRN Shortness of Breath and/or Wheezing  bisacodyl 5 milliGRAM(s) Oral every 12 hours PRN Constipation  hemorrhoidal Ointment 1 Application(s) Rectal two times a day PRN hemorrhoidal pain  morphine   Solution 30 milliGRAM(s) Oral every 4 hours PRN Severe Pain (7 - 10)  morphine   Solution 15 milliGRAM(s) Oral every 4 hours PRN Moderate Pain (4 - 6)    Vital Signs Last 24 Hrs  T(F): 97.9 (26 Nov 2023 07:41), Max: 98 (25 Nov 2023 19:28)  HR: 67 (26 Nov 2023 07:41) (67 - 75)  BP: 94/62 (26 Nov 2023 07:41) (94/62 - 121/70)  RR: 16 (26 Nov 2023 07:41) (16 - 16)  SpO2: 97% (26 Nov 2023 07:41) (97% - 97%)  I&O's Summary    PHYSICAL EXAM:  General: NAD, A/O x 3  ENT: MMM  Neck: Supple, No JVD  Lungs: Clear to auscultation bilaterally  Cardio: RRR, S1/S2, No murmurs  Abdomen: Soft, Nontender, Nondistended; Bowel sounds present  Extremities: No calf tenderness, No pitting edema    LABS:                      COVID-19 PCR: NotDetec (11-22-23 @ 21:39)      RADIOLOGY & ADDITIONAL TESTS:    Care Discussed with Consultants/Other Providers:

## 2023-11-26 NOTE — PROGRESS NOTE ADULT - SUBJECTIVE AND OBJECTIVE BOX
HPI:  Mrs. Savi Ivy is a 48 year old female patient with past medical history of chronic back pain (s/p multiple spinal surgeries), asthma, obesity (s/p gastric bypass), and HTN who presented to [facility] on 11/14 for an elective revision of a PSF with extension of fusion with instrumentation from T3 throgh pelvis, pedicle subtraction osteotomy (PSO) L4, tethers, iliac fixation, smith clark osteotomies T3-T10 with Plastic Surgery closure with Dr. Schwab, Dr. Gonzalez and Dr. Simpson. Intraoperative course with acute blood loss requiring 3 unit PRBC transfusion with one additional PRBC post-op. Post-op hospital course significant for urinary retention, s/p paulino removal 11/21 and TOV passed. Patient evaluated by PT/OT and was recommended for acute inpatient rehab. Patient is medically stable for discharge to [facility] on 11/22.   ___________________________________________________________________________    SUBJECTIVE/ROS  Patient was seen and evaluated at bedside today.  Reported no overnight events and is in no acute distress.  Wound reviewed today and left LIVE.  Patient is motivated to continue participation on the recommended rehabilitation program.   Denies any CP, SOB, CALLAWAY, palpitations, fever, chills, body aches, cough, congestion, or any other symptoms at this time.   ___________________________________________________________________________    Vital Signs Last 24 Hrs  T(C): 36.6 (26 Nov 2023 07:41), Max: 36.7 (25 Nov 2023 19:28)  T(F): 97.9 (26 Nov 2023 07:41), Max: 98 (25 Nov 2023 19:28)  HR: 67 (26 Nov 2023 07:41) (67 - 75)  BP: 94/62 (26 Nov 2023 07:41) (94/62 - 121/70)  RR: 16 (26 Nov 2023 07:41) (16 - 16)  SpO2: 97% (26 Nov 2023 07:41) (97% - 97%)    ___________________________________________________________________________    LABS                          8.0    6.58  )-----------( 440      ( 23 Nov 2023 06:10 )             25.2       11-23    142  |  106  |  9   ----------------------------<  96  4.4   |  30  |  0.59    Ca    8.3<L>      23 Nov 2023 06:10    TPro  5.7<L>  /  Alb  1.6<L>  /  TBili  0.2  /  DBili  x   /  AST  24  /  ALT  17  /  AlkPhos  85  11-23    ___________________________________________________________________________    MEDICATIONS  (STANDING):  acetaminophen     Tablet .. 975 milliGRAM(s) Oral every 8 hours  DULoxetine 60 milliGRAM(s) Oral daily  enoxaparin Injectable 40 milliGRAM(s) SubCutaneous every 24 hours  fentaNYL   Patch  50 MICROgram(s)/Hr 1 Patch Transdermal every 72 hours  fluticasone propionate 50 MICROgram(s)/spray Nasal Spray 1 Spray(s) Both Nostrils two times a day  melatonin. 3 milliGRAM(s) Oral at bedtime  methocarbamol 500 milliGRAM(s) Oral three times a day  multivitamin 1 Tablet(s) Oral daily  naloxegol 12.5 milliGRAM(s) Oral daily  pantoprazole    Tablet 40 milliGRAM(s) Oral before breakfast  polyethylene glycol 3350 17 Gram(s) Oral two times a day  pregabalin 200 milliGRAM(s) Oral three times a day  senna 2 Tablet(s) Oral at bedtime  triamterene 37.5 mG/hydrochlorothiazide 25 mG Tablet 1 Tablet(s) Oral daily    MEDICATIONS  (PRN):  albuterol    90 MICROgram(s) HFA Inhaler 2 Puff(s) Inhalation every 6 hours PRN Shortness of Breath and/or Wheezing  bisacodyl 5 milliGRAM(s) Oral every 12 hours PRN Constipation  morphine   Solution 30 milliGRAM(s) Oral every 4 hours PRN Severe Pain (7 - 10)  morphine   Solution 15 milliGRAM(s) Oral every 4 hours PRN Moderate Pain (4 - 6)    ___________________________________________________________________________    PHYSICAL EXAM:  Gen - NAD, Comfortable  HEENT - NCAT, EOMI, MMM  Neck - Supple, No limited ROM  Pulm - CTAB, No crackles  Cardiovascular - RRR, S1S2  Chest - good chest expansion, good respiratory effort  Abdomen - Soft, NT, ND, +BS  Extremities - No Cyanosis, no clubbing, no edema, no calf tenderness  Neuro-     Cognitive - awake, alert, oriented to person, place, date, year, and situation.  Able to follow command     Motor -                     LEFT    UE - 4/5                    RIGHT UE - 4/5                    LEFT    LE - 4/5                    RIGHT LE - 4/5        Sensory - Intact to LT   Psychiatric - Mood stable, Affect WNL  Skin:  left buttock healing wound with scab, spinal surgical incision clean and dry and left LIVE    ___________________________________________________________________________

## 2023-11-27 LAB
ALBUMIN SERPL ELPH-MCNC: 1.8 G/DL — LOW (ref 3.3–5)
ALBUMIN SERPL ELPH-MCNC: 1.8 G/DL — LOW (ref 3.3–5)
ALP SERPL-CCNC: 107 U/L — SIGNIFICANT CHANGE UP (ref 40–120)
ALP SERPL-CCNC: 107 U/L — SIGNIFICANT CHANGE UP (ref 40–120)
ALT FLD-CCNC: 18 U/L — SIGNIFICANT CHANGE UP (ref 10–45)
ALT FLD-CCNC: 18 U/L — SIGNIFICANT CHANGE UP (ref 10–45)
ANION GAP SERPL CALC-SCNC: 5 MMOL/L — SIGNIFICANT CHANGE UP (ref 5–17)
ANION GAP SERPL CALC-SCNC: 5 MMOL/L — SIGNIFICANT CHANGE UP (ref 5–17)
AST SERPL-CCNC: 23 U/L — SIGNIFICANT CHANGE UP (ref 10–40)
AST SERPL-CCNC: 23 U/L — SIGNIFICANT CHANGE UP (ref 10–40)
BILIRUB SERPL-MCNC: 0.2 MG/DL — SIGNIFICANT CHANGE UP (ref 0.2–1.2)
BILIRUB SERPL-MCNC: 0.2 MG/DL — SIGNIFICANT CHANGE UP (ref 0.2–1.2)
BUN SERPL-MCNC: 13 MG/DL — SIGNIFICANT CHANGE UP (ref 7–23)
BUN SERPL-MCNC: 13 MG/DL — SIGNIFICANT CHANGE UP (ref 7–23)
CALCIUM SERPL-MCNC: 8.3 MG/DL — LOW (ref 8.4–10.5)
CALCIUM SERPL-MCNC: 8.3 MG/DL — LOW (ref 8.4–10.5)
CHLORIDE SERPL-SCNC: 106 MMOL/L — SIGNIFICANT CHANGE UP (ref 96–108)
CHLORIDE SERPL-SCNC: 106 MMOL/L — SIGNIFICANT CHANGE UP (ref 96–108)
CO2 SERPL-SCNC: 31 MMOL/L — SIGNIFICANT CHANGE UP (ref 22–31)
CO2 SERPL-SCNC: 31 MMOL/L — SIGNIFICANT CHANGE UP (ref 22–31)
CREAT SERPL-MCNC: 0.58 MG/DL — SIGNIFICANT CHANGE UP (ref 0.5–1.3)
CREAT SERPL-MCNC: 0.58 MG/DL — SIGNIFICANT CHANGE UP (ref 0.5–1.3)
EGFR: 111 ML/MIN/1.73M2 — SIGNIFICANT CHANGE UP
EGFR: 111 ML/MIN/1.73M2 — SIGNIFICANT CHANGE UP
GLUCOSE SERPL-MCNC: 83 MG/DL — SIGNIFICANT CHANGE UP (ref 70–99)
GLUCOSE SERPL-MCNC: 83 MG/DL — SIGNIFICANT CHANGE UP (ref 70–99)
HCT VFR BLD CALC: 24.1 % — LOW (ref 34.5–45)
HCT VFR BLD CALC: 24.1 % — LOW (ref 34.5–45)
HGB BLD-MCNC: 7.5 G/DL — LOW (ref 11.5–15.5)
HGB BLD-MCNC: 7.5 G/DL — LOW (ref 11.5–15.5)
MCHC RBC-ENTMCNC: 27.9 PG — SIGNIFICANT CHANGE UP (ref 27–34)
MCHC RBC-ENTMCNC: 27.9 PG — SIGNIFICANT CHANGE UP (ref 27–34)
MCHC RBC-ENTMCNC: 31.1 GM/DL — LOW (ref 32–36)
MCHC RBC-ENTMCNC: 31.1 GM/DL — LOW (ref 32–36)
MCV RBC AUTO: 89.6 FL — SIGNIFICANT CHANGE UP (ref 80–100)
MCV RBC AUTO: 89.6 FL — SIGNIFICANT CHANGE UP (ref 80–100)
NRBC # BLD: 0 /100 WBCS — SIGNIFICANT CHANGE UP (ref 0–0)
NRBC # BLD: 0 /100 WBCS — SIGNIFICANT CHANGE UP (ref 0–0)
PLATELET # BLD AUTO: 549 K/UL — HIGH (ref 150–400)
PLATELET # BLD AUTO: 549 K/UL — HIGH (ref 150–400)
POTASSIUM SERPL-MCNC: 4.3 MMOL/L — SIGNIFICANT CHANGE UP (ref 3.5–5.3)
POTASSIUM SERPL-MCNC: 4.3 MMOL/L — SIGNIFICANT CHANGE UP (ref 3.5–5.3)
POTASSIUM SERPL-SCNC: 4.3 MMOL/L — SIGNIFICANT CHANGE UP (ref 3.5–5.3)
POTASSIUM SERPL-SCNC: 4.3 MMOL/L — SIGNIFICANT CHANGE UP (ref 3.5–5.3)
PROT SERPL-MCNC: 5.7 G/DL — LOW (ref 6–8.3)
PROT SERPL-MCNC: 5.7 G/DL — LOW (ref 6–8.3)
RBC # BLD: 2.69 M/UL — LOW (ref 3.8–5.2)
RBC # BLD: 2.69 M/UL — LOW (ref 3.8–5.2)
RBC # FLD: 14.3 % — SIGNIFICANT CHANGE UP (ref 10.3–14.5)
RBC # FLD: 14.3 % — SIGNIFICANT CHANGE UP (ref 10.3–14.5)
SODIUM SERPL-SCNC: 142 MMOL/L — SIGNIFICANT CHANGE UP (ref 135–145)
SODIUM SERPL-SCNC: 142 MMOL/L — SIGNIFICANT CHANGE UP (ref 135–145)
VIT B12 SERPL-MCNC: 885 PG/ML — SIGNIFICANT CHANGE UP (ref 232–1245)
VIT B12 SERPL-MCNC: 885 PG/ML — SIGNIFICANT CHANGE UP (ref 232–1245)
WBC # BLD: 6.67 K/UL — SIGNIFICANT CHANGE UP (ref 3.8–10.5)
WBC # BLD: 6.67 K/UL — SIGNIFICANT CHANGE UP (ref 3.8–10.5)
WBC # FLD AUTO: 6.67 K/UL — SIGNIFICANT CHANGE UP (ref 3.8–10.5)
WBC # FLD AUTO: 6.67 K/UL — SIGNIFICANT CHANGE UP (ref 3.8–10.5)

## 2023-11-27 PROCEDURE — 72128 CT CHEST SPINE W/O DYE: CPT | Mod: 26

## 2023-11-27 PROCEDURE — 72131 CT LUMBAR SPINE W/O DYE: CPT | Mod: 26

## 2023-11-27 PROCEDURE — 99232 SBSQ HOSP IP/OBS MODERATE 35: CPT

## 2023-11-27 RX ADMIN — Medication 975 MILLIGRAM(S): at 05:20

## 2023-11-27 RX ADMIN — Medication 200 MILLIGRAM(S): at 13:59

## 2023-11-27 RX ADMIN — Medication 975 MILLIGRAM(S): at 13:59

## 2023-11-27 RX ADMIN — MORPHINE SULFATE 30 MILLIGRAM(S): 50 CAPSULE, EXTENDED RELEASE ORAL at 02:39

## 2023-11-27 RX ADMIN — Medication 3 MILLIGRAM(S): at 21:51

## 2023-11-27 RX ADMIN — MORPHINE SULFATE 30 MILLIGRAM(S): 50 CAPSULE, EXTENDED RELEASE ORAL at 18:44

## 2023-11-27 RX ADMIN — Medication 200 MILLIGRAM(S): at 21:51

## 2023-11-27 RX ADMIN — Medication 1 SPRAY(S): at 05:20

## 2023-11-27 RX ADMIN — ENOXAPARIN SODIUM 40 MILLIGRAM(S): 100 INJECTION SUBCUTANEOUS at 06:13

## 2023-11-27 RX ADMIN — METHOCARBAMOL 500 MILLIGRAM(S): 500 TABLET, FILM COATED ORAL at 05:20

## 2023-11-27 RX ADMIN — Medication 200 MILLIGRAM(S): at 05:21

## 2023-11-27 RX ADMIN — Medication 975 MILLIGRAM(S): at 21:51

## 2023-11-27 RX ADMIN — Medication 975 MILLIGRAM(S): at 15:02

## 2023-11-27 RX ADMIN — METHOCARBAMOL 500 MILLIGRAM(S): 500 TABLET, FILM COATED ORAL at 13:59

## 2023-11-27 RX ADMIN — PANTOPRAZOLE SODIUM 40 MILLIGRAM(S): 20 TABLET, DELAYED RELEASE ORAL at 05:20

## 2023-11-27 RX ADMIN — MORPHINE SULFATE 30 MILLIGRAM(S): 50 CAPSULE, EXTENDED RELEASE ORAL at 08:59

## 2023-11-27 RX ADMIN — DULOXETINE HYDROCHLORIDE 60 MILLIGRAM(S): 30 CAPSULE, DELAYED RELEASE ORAL at 11:45

## 2023-11-27 RX ADMIN — MORPHINE SULFATE 30 MILLIGRAM(S): 50 CAPSULE, EXTENDED RELEASE ORAL at 01:42

## 2023-11-27 RX ADMIN — NALOXEGOL OXALATE 12.5 MILLIGRAM(S): 12.5 TABLET, FILM COATED ORAL at 11:45

## 2023-11-27 RX ADMIN — Medication 1 TABLET(S): at 11:45

## 2023-11-27 RX ADMIN — POLYETHYLENE GLYCOL 3350 17 GRAM(S): 17 POWDER, FOR SOLUTION ORAL at 05:23

## 2023-11-27 RX ADMIN — METHOCARBAMOL 500 MILLIGRAM(S): 500 TABLET, FILM COATED ORAL at 21:51

## 2023-11-27 RX ADMIN — MORPHINE SULFATE 30 MILLIGRAM(S): 50 CAPSULE, EXTENDED RELEASE ORAL at 19:19

## 2023-11-27 RX ADMIN — Medication 975 MILLIGRAM(S): at 22:30

## 2023-11-27 RX ADMIN — MORPHINE SULFATE 30 MILLIGRAM(S): 50 CAPSULE, EXTENDED RELEASE ORAL at 10:03

## 2023-11-27 RX ADMIN — Medication 1 SPRAY(S): at 17:13

## 2023-11-27 RX ADMIN — Medication 975 MILLIGRAM(S): at 06:13

## 2023-11-27 NOTE — PROGRESS NOTE ADULT - ASSESSMENT
48 year old female with past medical history of chronic back pain (s/p multiple spinal surgeries), asthma, obesity (s/p gastric bypass), HTN, opoid dependence  Admitted Nov14 for Elective revision of PSF with extension of fusion with instrumentation from T3 throgh pelvis, pedicle subtraction osteotomy (PSO) L4, tethers, iliac fixation, smith clark osteotomies T3-T10 with Plastic Surgery closure with Dr. Schwab, Dr. Gonzalez and Dr. Simpson. Intraoperative course complicated by acute blood loss requiring 4PRBC. Post-op hospital course significant for urinary retention, s/p paulino removal 11/21. Now transferred to Acute rehab    Chronic back pain s/p T4-Pelvis Revision Surgery  - S/P elective revision of PSF with extension of fusion with instrumentation from T3 through pelvis (11/14)  - S/P L4 pedicle subtraction osteotomy (PSO)  - S/P proximal tethers and iliac fixation  - S/P T3-T10 Smith-Clark osteotomies with Plastic Surgery closure   - HV x 2 and SHAWNA x 4 drains removed on 11/20   - Fall/spinal precaution  - Pain control and bowel regimen per primary team  - Comprehensive rehab program with PT/OT  - Follow up CT T/L Spine to eval worsening pain     Acute Blood Loss Anemia  -SP 4 PRBC  -H/H otherwise stable  -Continue to monitor, transfuse for Hb <7    Asthma  -No evidence of exacerbation   -albuterol inhaler Q6H PRN    HTN  -Continue triamterene 37.5mg- HCTZ 25mg QD - has not met criteria, last dose 11/24  -Monitor BP, stable, can consider discontinuing if BP remains normotensive    GI PPx  -PPI     DVT prophylaxis:   - Lovenox 40 mg SC QD

## 2023-11-27 NOTE — PROGRESS NOTE ADULT - SUBJECTIVE AND OBJECTIVE BOX
Interval History  Patient seen and examined at bedside. No acute events noted.  Patient reports severe pain/spasm of LUE/LLE overnight improved with pain regimen/warm compress. No other complaints.    ALLERGIES:  shellfish (Hives; Short breath)  diclofenac (Other)    MEDICATIONS  (STANDING):  acetaminophen     Tablet .. 975 milliGRAM(s) Oral every 8 hours  DULoxetine 60 milliGRAM(s) Oral daily  enoxaparin Injectable 40 milliGRAM(s) SubCutaneous every 24 hours  fentaNYL   Patch  50 MICROgram(s)/Hr 1 Patch Transdermal every 72 hours  fluticasone propionate 50 MICROgram(s)/spray Nasal Spray 1 Spray(s) Both Nostrils two times a day  melatonin. 3 milliGRAM(s) Oral at bedtime  methocarbamol 500 milliGRAM(s) Oral three times a day  multivitamin 1 Tablet(s) Oral daily  naloxegol 12.5 milliGRAM(s) Oral daily  pantoprazole    Tablet 40 milliGRAM(s) Oral before breakfast  polyethylene glycol 3350 17 Gram(s) Oral two times a day  pregabalin 200 milliGRAM(s) Oral three times a day  senna 2 Tablet(s) Oral at bedtime  triamterene 37.5 mG/hydrochlorothiazide 25 mG Tablet 1 Tablet(s) Oral daily    MEDICATIONS  (PRN):  albuterol    90 MICROgram(s) HFA Inhaler 2 Puff(s) Inhalation every 6 hours PRN Shortness of Breath and/or Wheezing  bisacodyl 5 milliGRAM(s) Oral every 12 hours PRN Constipation  hemorrhoidal Ointment 1 Application(s) Rectal two times a day PRN hemorrhoidal pain  morphine   Solution 30 milliGRAM(s) Oral every 4 hours PRN Severe Pain (7 - 10)  morphine   Solution 15 milliGRAM(s) Oral every 4 hours PRN Moderate Pain (4 - 6)    Vital Signs Last 24 Hrs  T(F): 98.2 (27 Nov 2023 07:18), Max: 98.2 (27 Nov 2023 07:18)  HR: 65 (27 Nov 2023 07:18) (65 - 69)  BP: 108/69 (27 Nov 2023 07:18) (107/68 - 108/69)  RR: 16 (27 Nov 2023 07:18) (16 - 16)  SpO2: 95% (27 Nov 2023 07:18) (95% - 95%)  I&O's Summary    BMI (kg/m2): 36.6 (11-27-23 @ 09:03)    PHYSICAL EXAM:  GENERAL: NAD  HEENT: NCAT  CHEST/LUNG: Clear to percussion bilaterally; No wheezing  HEART: Regular rate and rhythm; No murmurs  ABDOMEN: Soft, Nontender, Nondistended; Bowel sounds present  MUSCULOSKELETAL/EXTREMITIES:  2+ Peripheral Pulses, No LE edema, thoracic/lumbar incision healing well, no redness/discharge   PSYCH: Appropriate affect  NEURO: Alert & Oriented x 3    LABS:                        7.5    6.67  )-----------( 549      ( 27 Nov 2023 05:29 )             24.1       11-27    142  |  106  |  13  ----------------------------<  83  4.3   |  31  |  0.58    Ca    8.3      27 Nov 2023 05:29    TPro  5.7  /  Alb  1.8  /  TBili  0.2  /  DBili  x   /  AST  23  /  ALT  18  /  AlkPhos  107  11-27    Urinalysis Basic - ( 27 Nov 2023 05:29 )    Color: x / Appearance: x / SG: x / pH: x  Gluc: 83 mg/dL / Ketone: x  / Bili: x / Urobili: x   Blood: x / Protein: x / Nitrite: x   Leuk Esterase: x / RBC: x / WBC x   Sq Epi: x / Non Sq Epi: x / Bacteria: x    COVID-19 PCR: Rafateosiris (11-22-23 @ 21:39)

## 2023-11-27 NOTE — PROGRESS NOTE ADULT - ASSESSMENT
Mrs. Savi Ivy is a 48 year old female patient with past medical history of chronic back pain (s/p multiple spinal surgeries), asthma, obesity (s/p gastric bypass), and HTN who is admitted for Acute Inpatient Rehabilitation with a multidisciplinary rehab program at [facility] with functional impairments in ADLs and mobility secondary to chronic back pain s/p multiple spinal surgeries treated further surgically with an elective revision of a PSF with extension of fusion with instrumentation from T3 through pelvis, L4 pedicle subtraction osteotomy (PSO), tethers, iliac fixation, T3-T10 Duvall-Meneses osteotomies with Plastic Surgery closure whose intraoperative course was remarkable for acute blood loss requiring 3 unit PRBC transfusion with one additional PRBC post-op. Post-op hospital course additionally significant for urinary retention.      Chronic back pain s/p T4-Pelvis revision surgery  - S/P elective revision of PSF with extension of fusion with instrumentation from T3 through pelvis (11/14)  - S/P L4 pedicle subtraction osteotomy (PSO)  - S/P proximal tethers and iliac fixation  - S/P T3-T10 Smith-Meneses osteotomies with Plastic Surgery closure   - HV x 2 and SHAWNA x 4 drains removed on 11/20   - Impaired ADLs and mobility  - Need for assistance with personal care   - Continue comprehensive rehab program of PT/OT - 3 hours a day, 5 days a week. P&O as needed   - Pain control as below  - Fall/Spinal precautions  - WBAT    Pain Control   - Tylenol 975 mg Q8H  - Cymbalta 60 mg QD  - Lyrica 200 mg TID  - Robaxin 500 mg TID  - Fentanyl Patch 50 mcg Q72H  - PRN: Morphine 15 mg Q4H mod pain, 30 mg Q4H severe pain  - follow with pain management outpatient  - evaluated by pain management at [facility] who recommended discharge on home dose of nucynta 75mg 4x/day, fentanyl patch, as well as robaxin 500mg PO 3x/day and lyrica 200mg PO 3x/day    Acute Blood Loss Anemia  - s/p 3u PRBCs intra-op and one additional PRBC post-op  - monitor H/H    Asthma  - albuterol inhaler Q6H PRN    HTN  - continue triamterene 37.5mg- HCTZ 25mg QD  - Monitor BP    Mood / Cognition  - Neuropsychology consult PRN    Sleep  - Maintain quiet hours and a low stim environment.   - Melatonin 3 mg QHS     GI / Bowel  - at risk for constipation due to neurologic diagnosis, immobility, and/or medication use  - Senna qHS  - Miralax BID  - Movantik 12.5 mg QD  - Dulcolax 5 mg BID PRN constipation  - GI ppx: pantoprazole 40 mg QD     / Bladder  - Currently patient voids independently  - Cobb removed 11/21 and passed TOV  - check PVR on admission and SC >400cc    Skin / Pressure injury  - Skin assessment on admission performed : Left buttock healing wound with scab, spinal surgical incision clean an dry and left LIVE (11/25)  - Pressure Injury/Skin: OOB to chair, PT/OT  - nursing to monitor skin q Shift    Diet/Dysphagia:  - Diet Consistency: regular  - Aspiration Precautions  - Nutrition consult    DVT prophylaxis:   - Lovenox 40 mg QD    Outpatient Follow-up:  Schwab, Frank Johann  Orthopaedic Surgery  [address]  [place] [address]  Phone: [phone]  Fax: [phone]    Code Status/Emergency Contact:    ---------------    Goals: Safe discharge to home  Estimated Length of Stay: 10-14 days  Rehab Potential: Good  Medical Prognosis: Good  Estimated Disposition: Home with home care      PRESCREEN COMPARISON:  I have reviewed the prescreen information and I have found no relevant changes between the preadmission screening and my post admission evaluation.    RATIONALE FOR INPATIENT ADMISSION: Patient demonstrates the following:  [X] Medically appropriate for rehabilitation admission  [X] Has attainable rehab goals with an appropriate initial discharge plan  [X]Has rehabilitation potential (expected to make a significant improvement within a reasonable period of time)  [X] Requires close medical management by a rehab physician, rehab nursing care, Hospitalist and comprehensive interdisciplinary team (including PT, OT and/or SLP, Prosthetics and Orthotics)     Mrs. Savi Ivy is a 48 year old female patient with past medical history of chronic back pain (s/p multiple spinal surgeries), asthma, obesity (s/p gastric bypass), and HTN who is admitted for Acute Inpatient Rehabilitation with a multidisciplinary rehab program at [facility] with functional impairments in ADLs and mobility secondary to chronic back pain s/p multiple spinal surgeries treated further surgically with an elective revision of a PSF with extension of fusion with instrumentation from T3 through pelvis, L4 pedicle subtraction osteotomy (PSO), tethers, iliac fixation, T3-T10 Duvall-Meneses osteotomies with Plastic Surgery closure whose intraoperative course was remarkable for acute blood loss requiring 3 unit PRBC transfusion with one additional PRBC post-op. Post-op hospital course additionally significant for urinary retention.      Spinal stenosis with neurogenic claudication s/p T4-Pelvis revision surgery  - S/P elective revision of PSF with extension of fusion with instrumentation from T3 through pelvis (11/14)  - S/P L4 pedicle subtraction osteotomy (PSO)  - S/P proximal tethers and iliac fixation  - S/P T3-T10 Smith-Meneses osteotomies with Plastic Surgery closure   - HV x 2 and SHAWNA x 4 drains removed on 11/20   - Impaired ADLs and mobility  - Need for assistance with personal care   - Continue comprehensive rehab program of PT/OT - 3 hours a day, 5 days a week. P&O as needed   - Pain control as below  - Fall/Spinal precautions  - WBAT    Pain Control   - Tylenol 975 mg Q8H  - Cymbalta 60 mg QD  - Lyrica 200 mg TID  - Robaxin 500 mg TID  - Fentanyl Patch 50 mcg Q72H  - PRN: Morphine 15 mg Q4H mod pain, 30 mg Q4H severe pain  - follow with pain management outpatient  - evaluated by pain management at [facility] who recommended discharge on home dose of nucynta 75mg 4x/day, fentanyl patch, as well as robaxin 500mg PO 3x/day and lyrica 200mg PO 3x/day    Acute Blood Loss Anemia  - s/p 3u PRBCs intra-op and one additional PRBC post-op  - monitor H/H    Asthma  - albuterol inhaler Q6H PRN    HTN  - continue triamterene 37.5mg- HCTZ 25mg QD  - Monitor BP    Mood / Cognition  - Neuropsychology consult PRN    Sleep  - Maintain quiet hours and a low stim environment.   - Melatonin 3 mg QHS     GI / Bowel  - at risk for constipation due to neurologic diagnosis, immobility, and/or medication use  - Senna qHS  - Miralax BID  - Movantik 12.5 mg QD  - Dulcolax 5 mg BID PRN constipation  - GI ppx: pantoprazole 40 mg QD     / Bladder  - Currently patient voids independently  - Cobb removed 11/21 and passed TOV  - check PVR on admission and SC >400cc    Skin / Pressure injury  - Skin assessment on admission performed : Left buttock healing wound with scab, spinal surgical incision clean an dry and left LIVE (11/25)  - Pressure Injury/Skin: OOB to chair, PT/OT  - nursing to monitor skin q Shift    Diet/Dysphagia:  - Diet Consistency: regular  - Aspiration Precautions  - Nutrition consult    DVT prophylaxis:   - Lovenox 40 mg QD    Outpatient Follow-up:  Schwab, Frank Johann  Orthopaedic Surgery  [address]  [place] [address]  Phone: [phone]  Fax: [phone]    Code Status/Emergency Contact:    ---------------    Goals: Safe discharge to home  Estimated Length of Stay: 10-14 days  Rehab Potential: Good  Medical Prognosis: Good  Estimated Disposition: Home with home care      PRESCREEN COMPARISON:  I have reviewed the prescreen information and I have found no relevant changes between the preadmission screening and my post admission evaluation.    RATIONALE FOR INPATIENT ADMISSION: Patient demonstrates the following:  [X] Medically appropriate for rehabilitation admission  [X] Has attainable rehab goals with an appropriate initial discharge plan  [X]Has rehabilitation potential (expected to make a significant improvement within a reasonable period of time)  [X] Requires close medical management by a rehab physician, rehab nursing care, Hospitalist and comprehensive interdisciplinary team (including PT, OT and/or SLP, Prosthetics and Orthotics)     Mrs. Savi Ivy is a 48 year old female patient with past medical history of chronic back pain (s/p multiple spinal surgeries), asthma, obesity (s/p gastric bypass), and HTN who is admitted for Acute Inpatient Rehabilitation with a multidisciplinary rehab program at [facility] with functional impairments in ADLs and mobility secondary to chronic back pain s/p multiple spinal surgeries treated further surgically with an elective revision of a PSF with extension of fusion with instrumentation from T3 through pelvis, L4 pedicle subtraction osteotomy (PSO), tethers, iliac fixation, T3-T10 Duvall-Meneses osteotomies with Plastic Surgery closure whose intraoperative course was remarkable for acute blood loss requiring 3 unit PRBC transfusion with one additional PRBC post-op. Post-op hospital course additionally significant for urinary retention.      Spinal stenosis with neurogenic claudication s/p T4-Pelvis revision surgery  - S/P elective revision of PSF with extension of fusion with instrumentation from T3 through pelvis (11/14)  - S/P L4 pedicle subtraction osteotomy (PSO)  - S/P proximal tethers and iliac fixation  - S/P T3-T10 Smith-Meneses osteotomies with Plastic Surgery closure   - HV x 2 and SHAWNA x 4 drains removed on 11/20   - Impaired ADLs and mobility  - Need for assistance with personal care   - Continue comprehensive rehab program of PT/OT - 3 hours a day, 5 days a week. P&O as needed   - Pain control as below  - Fall/Spinal precautions  - WBAT    Pain Control   - Tylenol 975 mg Q8H  - Cymbalta 60 mg QD  - Lyrica 200 mg TID  - Robaxin 500 mg TID  - Fentanyl Patch 50 mcg Q72H  - PRN: Morphine 15 mg Q4H mod pain, 30 mg Q4H severe pain  - follow with pain management outpatient  - evaluated by pain management at [facility] who recommended discharge on home dose of nucynta 75mg 4x/day, fentanyl patch, as well as robaxin 500mg PO 3x/day and lyrica 200mg PO 3x/day    Acute Blood Loss Anemia  - s/p 3u PRBCs intra-op and one additional PRBC post-op  - monitor H/H    Asthma  - albuterol inhaler Q6H PRN    HTN  - continue triamterene 37.5mg- HCTZ 25mg QD  - Monitor BP    Mood / Cognition  - Neuropsychology consult PRN    Sleep  - Maintain quiet hours and a low stim environment.   - Melatonin 3 mg QHS     GI / Bowel  - at risk for constipation due to neurologic diagnosis, immobility, and/or medication use  - Senna qHS  - Miralax BID  - Movantik 12.5 mg QD  - Dulcolax 5 mg BID PRN constipation  - GI ppx: pantoprazole 40 mg QD     / Bladder  - Currently patient voids independently  - Cobb removed 11/21 and passed TOV  - check PVR on admission and SC >400cc    Skin:  - Skin assessment on admission performed : Left buttock healing wound with scab, spinal surgical incision clean an dry and left LIVE (11/25)  - Pressure Injury/Skin: OOB to chair, PT/OT  - nursing to monitor skin q Shift    Diet:  - Diet Consistency: regular  - Aspiration Precautions  - Nutrition consult    DVT prophylaxis:   - Lovenox 40 mg QD    Outpatient Follow-up:  Schwab, Frank Johann  Orthopaedic Surgery  [address]  [place] [address]  Phone: [phone]  Fax: [phone]    Code Status/Emergency Contact:    ---------------    Goals: Safe discharge to home  Estimated Length of Stay: 10-14 days  Rehab Potential: Good  Medical Prognosis: Good  Estimated Disposition: Home with home care      PRESCREEN COMPARISON:  I have reviewed the prescreen information and I have found no relevant changes between the preadmission screening and my post admission evaluation.    RATIONALE FOR INPATIENT ADMISSION: Patient demonstrates the following:  [X] Medically appropriate for rehabilitation admission  [X] Has attainable rehab goals with an appropriate initial discharge plan  [X]Has rehabilitation potential (expected to make a significant improvement within a reasonable period of time)  [X] Requires close medical management by a rehab physician, rehab nursing care, Hospitalist and comprehensive interdisciplinary team (including PT, OT and/or SLP, Prosthetics and Orthotics)

## 2023-11-27 NOTE — PROGRESS NOTE ADULT - SUBJECTIVE AND OBJECTIVE BOX
HPI:  Mrs. Savi Ivy is a 48 year old female patient with past medical history of chronic back pain (s/p multiple spinal surgeries), asthma, obesity (s/p gastric bypass), and HTN who presented to [facility] on 11/14 for an elective revision of a PSF with extension of fusion with instrumentation from T3 throgh pelvis, pedicle subtraction osteotomy (PSO) L4, tethers, iliac fixation, smith clark osteotomies T3-T10 with Plastic Surgery closure with Dr. Schwab, Dr. Gonzalez and Dr. Simpson. Intraoperative course with acute blood loss requiring 3 unit PRBC transfusion with one additional PRBC post-op. Post-op hospital course significant for urinary retention, s/p paulino removal 11/21 and TOV passed. Patient evaluated by PT/OT and was recommended for acute inpatient rehab. Patient is medically stable for discharge to [facility] on 11/22.   ___________________________________________________________________________    SUBJECTIVE/ROS  Patient was seen and evaluated at bedside today.  Reported overnight bilateral radicular pain worsening today and CT of T/s and L/S ordered  Wound reviewed today and left LIVE.  Will follow results and contact surgeon if interval change present.  Patient is motivated to continue participation on the recommended rehabilitation program.   Denies any CP, SOB, CALLAWAY, palpitations, fever, chills, body aches, cough, congestion, or any other symptoms at this time.   ___________________________________________________________________________    Vital Signs Last 24 Hrs  T(C): 36.8 (27 Nov 2023 07:18), Max: 36.8 (27 Nov 2023 07:18)  T(F): 98.2 (27 Nov 2023 07:18), Max: 98.2 (27 Nov 2023 07:18)  HR: 65 (27 Nov 2023 07:18) (65 - 74)  BP: 108/69 (27 Nov 2023 07:18) (103/67 - 108/69)  RR: 16 (27 Nov 2023 07:18) (16 - 16)  SpO2: 95% (27 Nov 2023 07:18) (95% - 97%)    ___________________________________________________________________________    LAB                        7.5    6.67  )-----------( 549      ( 27 Nov 2023 05:29 )             24.1     11-27    142  |  106  |  13  ----------------------------<  83  4.3   |  31  |  0.58    Ca    8.3<L>      27 Nov 2023 05:29    TPro  5.7<L>  /  Alb  1.8<L>  /  TBili  0.2  /  DBili  x   /  AST  23  /  ALT  18  /  AlkPhos  107  11-27    LIVER FUNCTIONS - ( 27 Nov 2023 05:29 )  Alb: 1.8 g/dL / Pro: 5.7 g/dL / ALK PHOS: 107 U/L / ALT: 18 U/L / AST: 23 U/L / GGT: x           ___________________________________________________________________________    MEDICATIONS  (STANDING):  acetaminophen     Tablet .. 975 milliGRAM(s) Oral every 8 hours  DULoxetine 60 milliGRAM(s) Oral daily  enoxaparin Injectable 40 milliGRAM(s) SubCutaneous every 24 hours  fentaNYL   Patch  50 MICROgram(s)/Hr 1 Patch Transdermal every 72 hours  fluticasone propionate 50 MICROgram(s)/spray Nasal Spray 1 Spray(s) Both Nostrils two times a day  melatonin. 3 milliGRAM(s) Oral at bedtime  methocarbamol 500 milliGRAM(s) Oral three times a day  multivitamin 1 Tablet(s) Oral daily  naloxegol 12.5 milliGRAM(s) Oral daily  pantoprazole    Tablet 40 milliGRAM(s) Oral before breakfast  polyethylene glycol 3350 17 Gram(s) Oral two times a day  pregabalin 200 milliGRAM(s) Oral three times a day  senna 2 Tablet(s) Oral at bedtime  triamterene 37.5 mG/hydrochlorothiazide 25 mG Tablet 1 Tablet(s) Oral daily    MEDICATIONS  (PRN):  albuterol    90 MICROgram(s) HFA Inhaler 2 Puff(s) Inhalation every 6 hours PRN Shortness of Breath and/or Wheezing  bisacodyl 5 milliGRAM(s) Oral every 12 hours PRN Constipation  hemorrhoidal Ointment 1 Application(s) Rectal two times a day PRN hemorrhoidal pain  morphine   Solution 30 milliGRAM(s) Oral every 4 hours PRN Severe Pain (7 - 10)  morphine   Solution 15 milliGRAM(s) Oral every 4 hours PRN Moderate Pain (4 - 6)    ___________________________________________________________________________    PHYSICAL EXAM:  Gen - NAD, Comfortable  HEENT - NCAT, EOMI, MMM  Neck - Supple, No limited ROM  Pulm - CTAB, No crackles  Cardiovascular - RRR, S1S2  Chest - good chest expansion, good respiratory effort  Abdomen - Soft, NT, ND, +BS  Extremities - No Cyanosis, no clubbing, no edema, no calf tenderness  Neuro-     Cognitive - awake, alert, oriented to person, place, date, year, and situation.  Able to follow command     Motor -                     LEFT    UE - 4/5                    RIGHT UE - 4/5                    LEFT    LE - 4/5                    RIGHT LE - 4/5        Sensory - Intact to LT   Psychiatric - Mood stable, Affect WNL  Skin:  left buttock healing wound with scab, spinal surgical incision clean and dry and left LIVE    ___________________________________________________________________________ HPI:  Mrs. Savi Ivy is a 48 year old female patient with past medical history of chronic back pain (s/p multiple spinal surgeries), asthma, obesity (s/p gastric bypass), and HTN who presented to [facility] on 11/14 for an elective revision of a PSF with extension of fusion with instrumentation from T3 throgh pelvis, pedicle subtraction osteotomy (PSO) L4, tethers, iliac fixation, smith clark osteotomies T3-T10 with Plastic Surgery closure with Dr. Schwab, Dr. Gonzalez and Dr. Sipmson. Intraoperative course with acute blood loss requiring 3 unit PRBC transfusion with one additional PRBC post-op. Post-op hospital course significant for urinary retention, s/p paulino removal 11/21 and TOV passed. Patient evaluated by PT/OT and was recommended for acute inpatient rehab. Patient is medically stable for discharge to [facility] on 11/22.   ___________________________________________________________________________    SUBJECTIVE/ROS  Patient was seen and evaluated at bedside today.  Reported overnight bilateral radicular pain worsening today and CT of T/s and L/S ordered.  Results outlined below and unremarkable for new pathology.  Wound reviewed today and left LIVE.  Will follow results and contact surgeon if interval change present.  Patient is motivated to continue participation on the recommended rehabilitation program.   Denies any CP, SOB, CALLAWAY, palpitations, fever, chills, body aches, cough, congestion, or any other symptoms at this time.   ___________________________________________________________________________    CT THORACIC SPINE AND LUMBAR SPINE (11/27/2023)    IMPRESSION: Recent thoracic fusion T4-T9 with screws and connecting rods and immature bony fusion mass. More remote fusion T10 through the sacrum with transpedicular screws and mature bony fusion mass.    ___________________________________________________________________________    Vital Signs Last 24 Hrs  T(C): 36.8 (27 Nov 2023 07:18), Max: 36.8 (27 Nov 2023 07:18)  T(F): 98.2 (27 Nov 2023 07:18), Max: 98.2 (27 Nov 2023 07:18)  HR: 65 (27 Nov 2023 07:18) (65 - 74)  BP: 108/69 (27 Nov 2023 07:18) (103/67 - 108/69)  RR: 16 (27 Nov 2023 07:18) (16 - 16)  SpO2: 95% (27 Nov 2023 07:18) (95% - 97%)    ___________________________________________________________________________    LAB                        7.5    6.67  )-----------( 549      ( 27 Nov 2023 05:29 )             24.1     11-27    142  |  106  |  13  ----------------------------<  83  4.3   |  31  |  0.58    Ca    8.3<L>      27 Nov 2023 05:29    TPro  5.7<L>  /  Alb  1.8<L>  /  TBili  0.2  /  DBili  x   /  AST  23  /  ALT  18  /  AlkPhos  107  11-27    LIVER FUNCTIONS - ( 27 Nov 2023 05:29 )  Alb: 1.8 g/dL / Pro: 5.7 g/dL / ALK PHOS: 107 U/L / ALT: 18 U/L / AST: 23 U/L / GGT: x           ___________________________________________________________________________    MEDICATIONS  (STANDING):  acetaminophen     Tablet .. 975 milliGRAM(s) Oral every 8 hours  DULoxetine 60 milliGRAM(s) Oral daily  enoxaparin Injectable 40 milliGRAM(s) SubCutaneous every 24 hours  fentaNYL   Patch  50 MICROgram(s)/Hr 1 Patch Transdermal every 72 hours  fluticasone propionate 50 MICROgram(s)/spray Nasal Spray 1 Spray(s) Both Nostrils two times a day  melatonin. 3 milliGRAM(s) Oral at bedtime  methocarbamol 500 milliGRAM(s) Oral three times a day  multivitamin 1 Tablet(s) Oral daily  naloxegol 12.5 milliGRAM(s) Oral daily  pantoprazole    Tablet 40 milliGRAM(s) Oral before breakfast  polyethylene glycol 3350 17 Gram(s) Oral two times a day  pregabalin 200 milliGRAM(s) Oral three times a day  senna 2 Tablet(s) Oral at bedtime  triamterene 37.5 mG/hydrochlorothiazide 25 mG Tablet 1 Tablet(s) Oral daily    MEDICATIONS  (PRN):  albuterol    90 MICROgram(s) HFA Inhaler 2 Puff(s) Inhalation every 6 hours PRN Shortness of Breath and/or Wheezing  bisacodyl 5 milliGRAM(s) Oral every 12 hours PRN Constipation  hemorrhoidal Ointment 1 Application(s) Rectal two times a day PRN hemorrhoidal pain  morphine   Solution 30 milliGRAM(s) Oral every 4 hours PRN Severe Pain (7 - 10)  morphine   Solution 15 milliGRAM(s) Oral every 4 hours PRN Moderate Pain (4 - 6)    ___________________________________________________________________________    PHYSICAL EXAM:  Gen - NAD, Comfortable  HEENT - NCAT, EOMI, MMM  Neck - Supple, No limited ROM  Pulm - CTAB, No crackles  Cardiovascular - RRR, S1S2  Chest - good chest expansion, good respiratory effort  Abdomen - Soft, NT, ND, +BS  Extremities - No Cyanosis, no clubbing, no edema, no calf tenderness  Neuro-     Cognitive - awake, alert, oriented to person, place, date, year, and situation.  Able to follow command     Motor -                     LEFT    UE - 4/5                    RIGHT UE - 4/5                    LEFT    LE - 4/5                    RIGHT LE - 4/5        Sensory - Intact to LT   Psychiatric - Mood stable, Affect WNL  Skin:  left buttock healing wound with scab, spinal surgical incision clean and dry and left LIVE    ___________________________________________________________________________

## 2023-11-28 DIAGNOSIS — M48.062 SPINAL STENOSIS, LUMBAR REGION WITH NEUROGENIC CLAUDICATION: ICD-10-CM

## 2023-11-28 LAB
CULTURE RESULTS: NO GROWTH — SIGNIFICANT CHANGE UP
SPECIMEN SOURCE: SIGNIFICANT CHANGE UP

## 2023-11-28 PROCEDURE — 99232 SBSQ HOSP IP/OBS MODERATE 35: CPT

## 2023-11-28 RX ADMIN — Medication 1 SPRAY(S): at 17:17

## 2023-11-28 RX ADMIN — ENOXAPARIN SODIUM 40 MILLIGRAM(S): 100 INJECTION SUBCUTANEOUS at 05:55

## 2023-11-28 RX ADMIN — Medication 200 MILLIGRAM(S): at 14:00

## 2023-11-28 RX ADMIN — DULOXETINE HYDROCHLORIDE 60 MILLIGRAM(S): 30 CAPSULE, DELAYED RELEASE ORAL at 11:05

## 2023-11-28 RX ADMIN — MORPHINE SULFATE 30 MILLIGRAM(S): 50 CAPSULE, EXTENDED RELEASE ORAL at 18:05

## 2023-11-28 RX ADMIN — MORPHINE SULFATE 30 MILLIGRAM(S): 50 CAPSULE, EXTENDED RELEASE ORAL at 21:32

## 2023-11-28 RX ADMIN — Medication 200 MILLIGRAM(S): at 21:32

## 2023-11-28 RX ADMIN — MORPHINE SULFATE 30 MILLIGRAM(S): 50 CAPSULE, EXTENDED RELEASE ORAL at 22:30

## 2023-11-28 RX ADMIN — Medication 1 TABLET(S): at 11:05

## 2023-11-28 RX ADMIN — Medication 975 MILLIGRAM(S): at 05:56

## 2023-11-28 RX ADMIN — POLYETHYLENE GLYCOL 3350 17 GRAM(S): 17 POWDER, FOR SOLUTION ORAL at 17:17

## 2023-11-28 RX ADMIN — SENNA PLUS 2 TABLET(S): 8.6 TABLET ORAL at 21:33

## 2023-11-28 RX ADMIN — Medication 975 MILLIGRAM(S): at 14:55

## 2023-11-28 RX ADMIN — MORPHINE SULFATE 30 MILLIGRAM(S): 50 CAPSULE, EXTENDED RELEASE ORAL at 11:50

## 2023-11-28 RX ADMIN — MORPHINE SULFATE 30 MILLIGRAM(S): 50 CAPSULE, EXTENDED RELEASE ORAL at 17:17

## 2023-11-28 RX ADMIN — METHOCARBAMOL 500 MILLIGRAM(S): 500 TABLET, FILM COATED ORAL at 21:31

## 2023-11-28 RX ADMIN — METHOCARBAMOL 500 MILLIGRAM(S): 500 TABLET, FILM COATED ORAL at 05:55

## 2023-11-28 RX ADMIN — NALOXEGOL OXALATE 12.5 MILLIGRAM(S): 12.5 TABLET, FILM COATED ORAL at 11:05

## 2023-11-28 RX ADMIN — PANTOPRAZOLE SODIUM 40 MILLIGRAM(S): 20 TABLET, DELAYED RELEASE ORAL at 05:56

## 2023-11-28 RX ADMIN — Medication 975 MILLIGRAM(S): at 14:00

## 2023-11-28 RX ADMIN — MORPHINE SULFATE 30 MILLIGRAM(S): 50 CAPSULE, EXTENDED RELEASE ORAL at 11:04

## 2023-11-28 RX ADMIN — Medication 1 SPRAY(S): at 05:59

## 2023-11-28 RX ADMIN — METHOCARBAMOL 500 MILLIGRAM(S): 500 TABLET, FILM COATED ORAL at 14:01

## 2023-11-28 RX ADMIN — Medication 3 MILLIGRAM(S): at 21:32

## 2023-11-28 RX ADMIN — Medication 200 MILLIGRAM(S): at 05:56

## 2023-11-28 NOTE — PROGRESS NOTE ADULT - ASSESSMENT
Mrs. Savi Ivy is a 48 year old female patient with past medical history of chronic back pain (s/p multiple spinal surgeries), asthma, obesity (s/p gastric bypass), and HTN who is admitted for Acute Inpatient Rehabilitation with a multidisciplinary rehab program at [facility] with functional impairments in ADLs and mobility secondary to chronic back pain s/p multiple spinal surgeries treated further surgically with an elective revision of a PSF with extension of fusion with instrumentation from T3 through pelvis, L4 pedicle subtraction osteotomy (PSO), tethers, iliac fixation, T3-T10 Duvall-Meneses osteotomies with Plastic Surgery closure whose intraoperative course was remarkable for acute blood loss requiring 3 unit PRBC transfusion with one additional PRBC post-op. Post-op hospital course additionally significant for urinary retention.    Spinal stenosis with neurogenic claudication s/p T4-Pelvis revision surgery  - S/P elective revision of PSF with extension of fusion with instrumentation from T3 through pelvis (11/14)  - S/P L4 pedicle subtraction osteotomy (PSO)  - S/P proximal tethers and iliac fixation  - S/P T3-T10 Smith-Meneses osteotomies with Plastic Surgery closure   - HV x 2 and SHAWNA x 4 drains removed on 11/20   - Impaired ADLs and mobility  - Need for assistance with personal care   - Continue comprehensive rehab program of PT/OT - 3 hours a day, 5 days a week. P&O as needed   - Pain control as below  - Fall/Spinal precautions  - WBAT    Pain Control   - Tylenol 975 mg Q8H  - Cymbalta 60 mg QD  - Lyrica 200 mg TID  - Robaxin 500 mg TID  - Fentanyl Patch 50 mcg Q72H  - PRN: Morphine 15 mg Q4H mod pain, 30 mg Q4H severe pain  - follow with pain management outpatient  - evaluated by pain management at [facility] who recommended discharge on home dose of nucynta 75mg 4x/day, fentanyl patch, as well as robaxin 500mg PO 3x/day and lyrica 200mg PO 3x/day    Acute Blood Loss Anemia  - s/p 3u PRBCs intra-op and one additional PRBC post-op  - monitor H/H    Asthma  - albuterol inhaler Q6H PRN    HTN  - continue triamterene 37.5mg- HCTZ 25mg QD  - Monitor BP    Mood / Cognition  - Neuropsychology consult PRN    Sleep  - Maintain quiet hours and a low stim environment.   - Melatonin 3 mg QHS     GI / Bowel  - at risk for constipation due to neurologic diagnosis, immobility, and/or medication use  - Senna qHS  - Miralax BID  - Movantik 12.5 mg QD  - Dulcolax 5 mg BID PRN constipation  - GI ppx: pantoprazole 40 mg QD     / Bladder  - Currently patient voids independently  - Cobb removed 11/21 and passed TOV    Skin:  - Skin assessment on admission performed : Left buttock healing wound with scab, spinal surgical incision clean an dry and left LIVE (11/25)  - Pressure Injury/Skin: OOB to chair, PT/OT  - nursing to monitor skin q Shift    Diet:  - Diet Consistency: regular  - Aspiration Precautions  - Nutrition consult    DVT prophylaxis:   - Lovenox 40 mg QD    Outpatient Follow-up:  Schwab, Frank Johann  Orthopaedic Surgery  [address]  [place] [address]  Phone: [phone]  Fax: [phone]    Code Status/Emergency Contact:    ---------------    Goals: Safe discharge to home  Estimated Length of Stay: 10-14 days  Rehab Potential: Good  Medical Prognosis: Good  Estimated Disposition: Home with home care      PRESCREEN COMPARISON:  I have reviewed the prescreen information and I have found no relevant changes between the preadmission screening and my post admission evaluation.    RATIONALE FOR INPATIENT ADMISSION: Patient demonstrates the following:  [X] Medically appropriate for rehabilitation admission  [X] Has attainable rehab goals with an appropriate initial discharge plan  [X]Has rehabilitation potential (expected to make a significant improvement within a reasonable period of time)  [X] Requires close medical management by a rehab physician, rehab nursing care, Hospitalist and comprehensive interdisciplinary team (including PT, OT and/or SLP, Prosthetics and Orthotics)

## 2023-11-28 NOTE — ASU PATIENT PROFILE, ADULT - PAIN RATING AT REST
LMOM for patient to cb and reschedule 12/05/23 appointment   Jess Srinivasan does not see Dr. Stevenson Hutchinson patients  Schedule with Mane Hickman or Dr. Stevenson Hutchinson
5

## 2023-11-28 NOTE — PROGRESS NOTE ADULT - SUBJECTIVE AND OBJECTIVE BOX
HPI:  Mrs. Savi Ivy is a 48 year old female patient with past medical history of chronic back pain (s/p multiple spinal surgeries), asthma, obesity (s/p gastric bypass), and HTN who presented to [facility] on 11/14 for an elective revision of a PSF with extension of fusion with instrumentation from T3 throgh pelvis, pedicle subtraction osteotomy (PSO) L4, tethers, iliac fixation, smith clark osteotomies T3-T10 with Plastic Surgery closure with Dr. Schwab, Dr. Gonzalez and Dr. Simpson. Intraoperative course with acute blood loss requiring 3 unit PRBC transfusion with one additional PRBC post-op. Post-op hospital course significant for urinary retention, s/p paulino removal 11/21 and TOV passed. Patient evaluated by PT/OT and was recommended for acute inpatient rehab. Patient is medically stable for discharge to [facility] on 11/22.   ___________________________________________________________________________    SUBJECTIVE/ROS  Patient was seen and evaluated at bedside today.  Reported bilateral radicular pain worsening yesterday and CT of T/s and L/S ordered.   Results outlined below and unremarkable for new pathology.  Wound reviewed today and left LIVE.  Patient is motivated to continue participation on the recommended rehabilitation program.   Denies any CP, SOB, CALLAWAY, palpitations, fever, chills, body aches, cough, congestion, or any other symptoms at this time.   ___________________________________________________________________________    CT THORACIC SPINE AND LUMBAR SPINE (11/27/2023)    IMPRESSION: Recent thoracic fusion T4-T9 with screws and connecting rods and immature bony fusion mass. More remote fusion T10 through the sacrum with transpedicular screws and mature bony fusion mass.    ___________________________________________________________________________    Vital Signs Last 24 Hrs  T(C): 36.6 (28 Nov 2023 08:16), Max: 36.6 (28 Nov 2023 08:16)  T(F): 97.9 (28 Nov 2023 08:16), Max: 97.9 (28 Nov 2023 08:16)  HR: 78 (28 Nov 2023 08:16) (69 - 78)  BP: 114/64 (28 Nov 2023 08:16) (105/67 - 114/64)  BP(mean): --  RR: 16 (28 Nov 2023 08:16) (16 - 16)  SpO2: 96% (28 Nov 2023 08:16) (96% - 96%)  ___________________________________________________________________________    LAB                        7.5    6.67  )-----------( 549      ( 27 Nov 2023 05:29 )             24.1     11-27    142  |  106  |  13  ----------------------------<  83  4.3   |  31  |  0.58    Ca    8.3<L>      27 Nov 2023 05:29    TPro  5.7<L>  /  Alb  1.8<L>  /  TBili  0.2  /  DBili  x   /  AST  23  /  ALT  18  /  AlkPhos  107  11-27    LIVER FUNCTIONS - ( 27 Nov 2023 05:29 )  Alb: 1.8 g/dL / Pro: 5.7 g/dL / ALK PHOS: 107 U/L / ALT: 18 U/L / AST: 23 U/L / GGT: x           ___________________________________________________________________________    MEDICATIONS  (STANDING):  acetaminophen     Tablet .. 975 milliGRAM(s) Oral every 8 hours  DULoxetine 60 milliGRAM(s) Oral daily  enoxaparin Injectable 40 milliGRAM(s) SubCutaneous every 24 hours  fentaNYL   Patch  50 MICROgram(s)/Hr 1 Patch Transdermal every 72 hours  fluticasone propionate 50 MICROgram(s)/spray Nasal Spray 1 Spray(s) Both Nostrils two times a day  melatonin. 3 milliGRAM(s) Oral at bedtime  methocarbamol 500 milliGRAM(s) Oral three times a day  multivitamin 1 Tablet(s) Oral daily  naloxegol 12.5 milliGRAM(s) Oral daily  pantoprazole    Tablet 40 milliGRAM(s) Oral before breakfast  polyethylene glycol 3350 17 Gram(s) Oral two times a day  pregabalin 200 milliGRAM(s) Oral three times a day  senna 2 Tablet(s) Oral at bedtime  triamterene 37.5 mG/hydrochlorothiazide 25 mG Tablet 1 Tablet(s) Oral daily    MEDICATIONS  (PRN):  albuterol    90 MICROgram(s) HFA Inhaler 2 Puff(s) Inhalation every 6 hours PRN Shortness of Breath and/or Wheezing  bisacodyl 5 milliGRAM(s) Oral every 12 hours PRN Constipation  hemorrhoidal Ointment 1 Application(s) Rectal two times a day PRN hemorrhoidal pain  morphine   Solution 30 milliGRAM(s) Oral every 4 hours PRN Severe Pain (7 - 10)  morphine   Solution 15 milliGRAM(s) Oral every 4 hours PRN Moderate Pain (4 - 6)  ___________________________________________________________________________    PHYSICAL EXAM:  Gen - NAD, Comfortable  HEENT - NCAT, EOMI, MMM  Neck - Supple, No limited ROM  Pulm - breathing comfortably on room air  Cardiovascular - warm and well perfused  Abdomen - Soft, NT, ND  Extremities - No Cyanosis, no clubbing, no edema, no calf tenderness  Neuro-     Cognitive - awake, alert, oriented to person, place, date, year, and situation.  Able to follow command     Motor -                     LEFT    UE - 4/5                    RIGHT UE - 4/5                    LEFT    LE - 4/5                    RIGHT LE - 4/5        Sensory - Intact to LT   Psychiatric - Mood stable, Affect WNL  Skin:  left buttock healing wound with scab, spinal surgical incision clean and dry and left LIVE    ___________________________________________________________________________ HPI:  Mrs. Savi Ivy is a 48 year old female patient with past medical history of chronic back pain (s/p multiple spinal surgeries), asthma, obesity (s/p gastric bypass), and HTN who presented to [facility] on 11/14 for an elective revision of a PSF with extension of fusion with instrumentation from T3 throgh pelvis, pedicle subtraction osteotomy (PSO) L4, tethers, iliac fixation, smith clark osteotomies T3-T10 with Plastic Surgery closure with Dr. Schwab, Dr. Gonzalez and Dr. Simpson. Intraoperative course with acute blood loss requiring 3 unit PRBC transfusion with one additional PRBC post-op. Post-op hospital course significant for urinary retention, s/p paulino removal 11/21 and TOV passed. Patient evaluated by PT/OT and was recommended for acute inpatient rehab. Patient is medically stable for discharge to [facility] on 11/22.   ___________________________________________________________________________    SUBJECTIVE/ROS  Patient was seen and evaluated at bedside today.  Pain is improved today.  Had reported bilateral radicular pain worsening yesterday and CT of T/s and L/S ordered.   Results outlined below and unremarkable for new pathology.  Wound reviewed today and left LIVE.  Patient is motivated to continue participation on the recommended rehabilitation program.   Denies any CP, SOB, CALLAWAY, palpitations, fever, chills, body aches, cough, congestion, or any other symptoms at this time.   ___________________________________________________________________________    CT THORACIC SPINE AND LUMBAR SPINE (11/27/2023)    IMPRESSION: Recent thoracic fusion T4-T9 with screws and connecting rods and immature bony fusion mass. More remote fusion T10 through the sacrum with transpedicular screws and mature bony fusion mass.    ___________________________________________________________________________    Vital Signs Last 24 Hrs  T(C): 36.6 (28 Nov 2023 08:16), Max: 36.6 (28 Nov 2023 08:16)  T(F): 97.9 (28 Nov 2023 08:16), Max: 97.9 (28 Nov 2023 08:16)  HR: 78 (28 Nov 2023 08:16) (69 - 78)  BP: 114/64 (28 Nov 2023 08:16) (105/67 - 114/64)  BP(mean): --  RR: 16 (28 Nov 2023 08:16) (16 - 16)  SpO2: 96% (28 Nov 2023 08:16) (96% - 96%)  ___________________________________________________________________________    LAB                        7.5    6.67  )-----------( 549      ( 27 Nov 2023 05:29 )             24.1     11-27    142  |  106  |  13  ----------------------------<  83  4.3   |  31  |  0.58    Ca    8.3<L>      27 Nov 2023 05:29    TPro  5.7<L>  /  Alb  1.8<L>  /  TBili  0.2  /  DBili  x   /  AST  23  /  ALT  18  /  AlkPhos  107  11-27    LIVER FUNCTIONS - ( 27 Nov 2023 05:29 )  Alb: 1.8 g/dL / Pro: 5.7 g/dL / ALK PHOS: 107 U/L / ALT: 18 U/L / AST: 23 U/L / GGT: x           ___________________________________________________________________________    MEDICATIONS  (STANDING):  acetaminophen     Tablet .. 975 milliGRAM(s) Oral every 8 hours  DULoxetine 60 milliGRAM(s) Oral daily  enoxaparin Injectable 40 milliGRAM(s) SubCutaneous every 24 hours  fentaNYL   Patch  50 MICROgram(s)/Hr 1 Patch Transdermal every 72 hours  fluticasone propionate 50 MICROgram(s)/spray Nasal Spray 1 Spray(s) Both Nostrils two times a day  melatonin. 3 milliGRAM(s) Oral at bedtime  methocarbamol 500 milliGRAM(s) Oral three times a day  multivitamin 1 Tablet(s) Oral daily  naloxegol 12.5 milliGRAM(s) Oral daily  pantoprazole    Tablet 40 milliGRAM(s) Oral before breakfast  polyethylene glycol 3350 17 Gram(s) Oral two times a day  pregabalin 200 milliGRAM(s) Oral three times a day  senna 2 Tablet(s) Oral at bedtime  triamterene 37.5 mG/hydrochlorothiazide 25 mG Tablet 1 Tablet(s) Oral daily    MEDICATIONS  (PRN):  albuterol    90 MICROgram(s) HFA Inhaler 2 Puff(s) Inhalation every 6 hours PRN Shortness of Breath and/or Wheezing  bisacodyl 5 milliGRAM(s) Oral every 12 hours PRN Constipation  hemorrhoidal Ointment 1 Application(s) Rectal two times a day PRN hemorrhoidal pain  morphine   Solution 30 milliGRAM(s) Oral every 4 hours PRN Severe Pain (7 - 10)  morphine   Solution 15 milliGRAM(s) Oral every 4 hours PRN Moderate Pain (4 - 6)  ___________________________________________________________________________    PHYSICAL EXAM:  Gen - NAD, Comfortable  HEENT - NCAT, EOMI, MMM  Neck - Supple, No limited ROM  Pulm - breathing comfortably on room air  Cardiovascular - warm and well perfused  Abdomen - Soft, NT, ND  Extremities - No Cyanosis, no clubbing, no edema, no calf tenderness  Neuro-     Cognitive - awake, alert, oriented to person, place, date, year, and situation.  Able to follow command     Motor -                     LEFT    UE - 4/5                    RIGHT UE - 4/5                    LEFT    LE - 4/5                    RIGHT LE - 4/5        Sensory - Intact to LT   Psychiatric - Mood stable, Affect WNL  Skin:  left buttock healing wound with scab, spinal surgical incision clean and dry and left LIVE    ___________________________________________________________________________ HPI:  Mrs. Savi Ivy is a 48 year old female patient with past medical history of chronic back pain (s/p multiple spinal surgeries), asthma, obesity (s/p gastric bypass), and HTN who presented to [facility] on 11/14 for an elective revision of a PSF with extension of fusion with instrumentation from T3 throgh pelvis, pedicle subtraction osteotomy (PSO) L4, tethers, iliac fixation, smith clark osteotomies T3-T10 with Plastic Surgery closure with Dr. Schwab, Dr. Gonzalez and Dr. Simpson. Intraoperative course with acute blood loss requiring 3 unit PRBC transfusion with one additional PRBC post-op. Post-op hospital course significant for urinary retention, s/p paulino removal 11/21 and TOV passed. Patient evaluated by PT/OT and was recommended for acute inpatient rehab. Patient is medically stable for discharge to [facility] on 11/22.     TDD: 12/6  ___________________________________________________________________________    SUBJECTIVE/ROS  Patient was seen and evaluated at bedside today.  Pain is improved today.  Had reported bilateral radicular pain worsening yesterday and CT of T/s and L/S ordered.   Results outlined below and unremarkable for new pathology.  Wound reviewed today and left LIVE.  Patient is motivated to continue participation on the recommended rehabilitation program.   Case discussed at Interdisciplinary Team Meeting (11/28).  Tentative Discharge Date outlined above.   Denies any CP, SOB, CALLAWAY, palpitations, fever, chills, body aches, cough, congestion, or any other symptoms at this time.   ___________________________________________________________________________    CT THORACIC SPINE AND LUMBAR SPINE (11/27/2023)    IMPRESSION: Recent thoracic fusion T4-T9 with screws and connecting rods and immature bony fusion mass. More remote fusion T10 through the sacrum with transpedicular screws and mature bony fusion mass.  ___________________________________________________________________________    Vital Signs Last 24 Hrs  T(C): 36.6 (28 Nov 2023 08:16), Max: 36.6 (28 Nov 2023 08:16)  T(F): 97.9 (28 Nov 2023 08:16), Max: 97.9 (28 Nov 2023 08:16)  HR: 78 (28 Nov 2023 08:16) (69 - 78)  BP: 114/64 (28 Nov 2023 08:16) (105/67 - 114/64)  BP(mean): --  RR: 16 (28 Nov 2023 08:16) (16 - 16)  SpO2: 96% (28 Nov 2023 08:16) (96% - 96%)  ___________________________________________________________________________    LAB                        7.5    6.67  )-----------( 549      ( 27 Nov 2023 05:29 )             24.1     11-27    142  |  106  |  13  ----------------------------<  83  4.3   |  31  |  0.58    Ca    8.3<L>      27 Nov 2023 05:29    TPro  5.7<L>  /  Alb  1.8<L>  /  TBili  0.2  /  DBili  x   /  AST  23  /  ALT  18  /  AlkPhos  107  11-27    LIVER FUNCTIONS - ( 27 Nov 2023 05:29 )  Alb: 1.8 g/dL / Pro: 5.7 g/dL / ALK PHOS: 107 U/L / ALT: 18 U/L / AST: 23 U/L / GGT: x           ___________________________________________________________________________    MEDICATIONS  (STANDING):  acetaminophen     Tablet .. 975 milliGRAM(s) Oral every 8 hours  DULoxetine 60 milliGRAM(s) Oral daily  enoxaparin Injectable 40 milliGRAM(s) SubCutaneous every 24 hours  fentaNYL   Patch  50 MICROgram(s)/Hr 1 Patch Transdermal every 72 hours  fluticasone propionate 50 MICROgram(s)/spray Nasal Spray 1 Spray(s) Both Nostrils two times a day  melatonin. 3 milliGRAM(s) Oral at bedtime  methocarbamol 500 milliGRAM(s) Oral three times a day  multivitamin 1 Tablet(s) Oral daily  naloxegol 12.5 milliGRAM(s) Oral daily  pantoprazole    Tablet 40 milliGRAM(s) Oral before breakfast  polyethylene glycol 3350 17 Gram(s) Oral two times a day  pregabalin 200 milliGRAM(s) Oral three times a day  senna 2 Tablet(s) Oral at bedtime  triamterene 37.5 mG/hydrochlorothiazide 25 mG Tablet 1 Tablet(s) Oral daily    MEDICATIONS  (PRN):  albuterol    90 MICROgram(s) HFA Inhaler 2 Puff(s) Inhalation every 6 hours PRN Shortness of Breath and/or Wheezing  bisacodyl 5 milliGRAM(s) Oral every 12 hours PRN Constipation  hemorrhoidal Ointment 1 Application(s) Rectal two times a day PRN hemorrhoidal pain  morphine   Solution 30 milliGRAM(s) Oral every 4 hours PRN Severe Pain (7 - 10)  morphine   Solution 15 milliGRAM(s) Oral every 4 hours PRN Moderate Pain (4 - 6)  ___________________________________________________________________________    PHYSICAL EXAM:  Gen - NAD, Comfortable  HEENT - NCAT, EOMI, MMM  Neck - Supple, No limited ROM  Pulm - breathing comfortably on room air  Cardiovascular - warm and well perfused  Abdomen - Soft, NT, ND  Extremities - No Cyanosis, no clubbing, no edema, no calf tenderness  Neuro-     Cognitive - awake, alert, oriented to person, place, date, year, and situation.  Able to follow command     Motor -                     LEFT    UE - 4/5                    RIGHT UE - 4/5                    LEFT    LE - 4/5                    RIGHT LE - 4/5        Sensory - Intact to LT   Psychiatric - Mood stable, Affect WNL  Skin:  left buttock healing wound with scab, spinal surgical incision clean and dry and left LIVE    ___________________________________________________________________________ HPI:  Mrs. Savi Ivy is a 48 year old female patient with past medical history of chronic back pain (s/p multiple spinal surgeries), asthma, obesity (s/p gastric bypass), and HTN who presented to [facility] on 11/14 for an elective revision of a PSF with extension of fusion with instrumentation from T3 throgh pelvis, pedicle subtraction osteotomy (PSO) L4, tethers, iliac fixation, smith clark osteotomies T3-T10 with Plastic Surgery closure with Dr. Schwab, Dr. Gonzalez and Dr. Simpson. Intraoperative course with acute blood loss requiring 3 unit PRBC transfusion with one additional PRBC post-op. Post-op hospital course significant for urinary retention, s/p paulino removal 11/21 and TOV passed. Patient evaluated by PT/OT and was recommended for acute inpatient rehab. Patient is medically stable for discharge to [facility] on 11/22.     TDD: 12/6  ___________________________________________________________________________    SUBJECTIVE/ROS  Patient was seen and evaluated at bedside today.  Pain is improved today.  Had reported bilateral radicular pain worsening yesterday and CT of T/S and L/S ordered.   Results outlined below and unremarkable for new pathology.  Wound reviewed today and left LIVE.  Patient is motivated to continue participation on the recommended rehabilitation program.   Case discussed at Interdisciplinary Team Meeting (11/28).  Tentative Discharge Date outlined above.   Recreational therapy added to her treatment to foster resumption of patient's normalcy.  Denies any CP, SOB, CALLAWAY, palpitations, fever, chills, body aches, cough, congestion, or any other symptoms at this time.   ___________________________________________________________________________    CT THORACIC SPINE AND LUMBAR SPINE (11/27/2023)    IMPRESSION: Recent thoracic fusion T4-T9 with screws and connecting rods and immature bony fusion mass. More remote fusion T10 through the sacrum with transpedicular screws and mature bony fusion mass.  ___________________________________________________________________________    Vital Signs Last 24 Hrs  T(C): 36.6 (28 Nov 2023 08:16), Max: 36.6 (28 Nov 2023 08:16)  T(F): 97.9 (28 Nov 2023 08:16), Max: 97.9 (28 Nov 2023 08:16)  HR: 78 (28 Nov 2023 08:16) (69 - 78)  BP: 114/64 (28 Nov 2023 08:16) (105/67 - 114/64)  RR: 16 (28 Nov 2023 08:16) (16 - 16)  SpO2: 96% (28 Nov 2023 08:16) (96% - 96%)  ___________________________________________________________________________    LAB                        7.5    6.67  )-----------( 549      ( 27 Nov 2023 05:29 )             24.1     11-27    142  |  106  |  13  ----------------------------<  83  4.3   |  31  |  0.58    Ca    8.3<L>      27 Nov 2023 05:29    TPro  5.7<L>  /  Alb  1.8<L>  /  TBili  0.2  /  DBili  x   /  AST  23  /  ALT  18  /  AlkPhos  107  11-27    LIVER FUNCTIONS - ( 27 Nov 2023 05:29 )  Alb: 1.8 g/dL / Pro: 5.7 g/dL / ALK PHOS: 107 U/L / ALT: 18 U/L / AST: 23 U/L / GGT: x           ___________________________________________________________________________    MEDICATIONS  (STANDING):  acetaminophen     Tablet .. 975 milliGRAM(s) Oral every 8 hours  DULoxetine 60 milliGRAM(s) Oral daily  enoxaparin Injectable 40 milliGRAM(s) SubCutaneous every 24 hours  fentaNYL   Patch  50 MICROgram(s)/Hr 1 Patch Transdermal every 72 hours  fluticasone propionate 50 MICROgram(s)/spray Nasal Spray 1 Spray(s) Both Nostrils two times a day  melatonin. 3 milliGRAM(s) Oral at bedtime  methocarbamol 500 milliGRAM(s) Oral three times a day  multivitamin 1 Tablet(s) Oral daily  naloxegol 12.5 milliGRAM(s) Oral daily  pantoprazole    Tablet 40 milliGRAM(s) Oral before breakfast  polyethylene glycol 3350 17 Gram(s) Oral two times a day  pregabalin 200 milliGRAM(s) Oral three times a day  senna 2 Tablet(s) Oral at bedtime  triamterene 37.5 mG/hydrochlorothiazide 25 mG Tablet 1 Tablet(s) Oral daily    MEDICATIONS  (PRN):  albuterol    90 MICROgram(s) HFA Inhaler 2 Puff(s) Inhalation every 6 hours PRN Shortness of Breath and/or Wheezing  bisacodyl 5 milliGRAM(s) Oral every 12 hours PRN Constipation  hemorrhoidal Ointment 1 Application(s) Rectal two times a day PRN hemorrhoidal pain  morphine   Solution 30 milliGRAM(s) Oral every 4 hours PRN Severe Pain (7 - 10)  morphine   Solution 15 milliGRAM(s) Oral every 4 hours PRN Moderate Pain (4 - 6)  ___________________________________________________________________________    PHYSICAL EXAM:  Gen - NAD, Comfortable  HEENT - NCAT, EOMI, MMM  Neck - Supple, No limited ROM  Pulm - breathing comfortably on room air  Cardiovascular - warm and well perfused  Abdomen - Soft, NT, ND  Extremities - No Cyanosis, no clubbing, no edema, no calf tenderness  Neuro-     Cognitive - awake, alert, oriented to person, place, date, year, and situation.  Able to follow command     Motor -                     LEFT    UE - 4/5                    RIGHT UE - 4/5                    LEFT    LE - 4/5                    RIGHT LE - 4/5        Sensory - Intact to LT   Psychiatric - Mood stable, Affect WNL  Skin:  left buttock healing wound with scab, spinal surgical incision clean and dry and left LIVE    ___________________________________________________________________________

## 2023-11-29 PROCEDURE — 99232 SBSQ HOSP IP/OBS MODERATE 35: CPT

## 2023-11-29 RX ORDER — MORPHINE SULFATE 50 MG/1
25 CAPSULE, EXTENDED RELEASE ORAL EVERY 4 HOURS
Refills: 0 | Status: DISCONTINUED | OUTPATIENT
Start: 2023-11-29 | End: 2023-12-06

## 2023-11-29 RX ORDER — MORPHINE SULFATE 50 MG/1
10 CAPSULE, EXTENDED RELEASE ORAL EVERY 4 HOURS
Refills: 0 | Status: DISCONTINUED | OUTPATIENT
Start: 2023-11-29 | End: 2023-12-06

## 2023-11-29 RX ADMIN — Medication 975 MILLIGRAM(S): at 13:51

## 2023-11-29 RX ADMIN — MORPHINE SULFATE 25 MILLIGRAM(S): 50 CAPSULE, EXTENDED RELEASE ORAL at 11:05

## 2023-11-29 RX ADMIN — METHOCARBAMOL 500 MILLIGRAM(S): 500 TABLET, FILM COATED ORAL at 13:21

## 2023-11-29 RX ADMIN — Medication 1 SPRAY(S): at 06:28

## 2023-11-29 RX ADMIN — Medication 200 MILLIGRAM(S): at 21:16

## 2023-11-29 RX ADMIN — Medication 1 TABLET(S): at 12:08

## 2023-11-29 RX ADMIN — Medication 3 MILLIGRAM(S): at 21:16

## 2023-11-29 RX ADMIN — Medication 1 SPRAY(S): at 17:18

## 2023-11-29 RX ADMIN — SENNA PLUS 2 TABLET(S): 8.6 TABLET ORAL at 21:16

## 2023-11-29 RX ADMIN — Medication 975 MILLIGRAM(S): at 13:20

## 2023-11-29 RX ADMIN — MORPHINE SULFATE 30 MILLIGRAM(S): 50 CAPSULE, EXTENDED RELEASE ORAL at 02:42

## 2023-11-29 RX ADMIN — Medication 975 MILLIGRAM(S): at 21:16

## 2023-11-29 RX ADMIN — MORPHINE SULFATE 25 MILLIGRAM(S): 50 CAPSULE, EXTENDED RELEASE ORAL at 20:13

## 2023-11-29 RX ADMIN — MORPHINE SULFATE 25 MILLIGRAM(S): 50 CAPSULE, EXTENDED RELEASE ORAL at 21:00

## 2023-11-29 RX ADMIN — MORPHINE SULFATE 25 MILLIGRAM(S): 50 CAPSULE, EXTENDED RELEASE ORAL at 15:32

## 2023-11-29 RX ADMIN — MORPHINE SULFATE 25 MILLIGRAM(S): 50 CAPSULE, EXTENDED RELEASE ORAL at 14:53

## 2023-11-29 RX ADMIN — Medication 975 MILLIGRAM(S): at 07:20

## 2023-11-29 RX ADMIN — NALOXEGOL OXALATE 12.5 MILLIGRAM(S): 12.5 TABLET, FILM COATED ORAL at 12:08

## 2023-11-29 RX ADMIN — Medication 975 MILLIGRAM(S): at 22:00

## 2023-11-29 RX ADMIN — MORPHINE SULFATE 30 MILLIGRAM(S): 50 CAPSULE, EXTENDED RELEASE ORAL at 01:42

## 2023-11-29 RX ADMIN — DULOXETINE HYDROCHLORIDE 60 MILLIGRAM(S): 30 CAPSULE, DELAYED RELEASE ORAL at 12:08

## 2023-11-29 RX ADMIN — Medication 975 MILLIGRAM(S): at 06:27

## 2023-11-29 RX ADMIN — MORPHINE SULFATE 25 MILLIGRAM(S): 50 CAPSULE, EXTENDED RELEASE ORAL at 10:26

## 2023-11-29 RX ADMIN — Medication 200 MILLIGRAM(S): at 06:27

## 2023-11-29 RX ADMIN — Medication 200 MILLIGRAM(S): at 13:20

## 2023-11-29 RX ADMIN — ENOXAPARIN SODIUM 40 MILLIGRAM(S): 100 INJECTION SUBCUTANEOUS at 06:26

## 2023-11-29 RX ADMIN — METHOCARBAMOL 500 MILLIGRAM(S): 500 TABLET, FILM COATED ORAL at 21:16

## 2023-11-29 RX ADMIN — POLYETHYLENE GLYCOL 3350 17 GRAM(S): 17 POWDER, FOR SOLUTION ORAL at 17:18

## 2023-11-29 RX ADMIN — METHOCARBAMOL 500 MILLIGRAM(S): 500 TABLET, FILM COATED ORAL at 07:15

## 2023-11-29 RX ADMIN — PANTOPRAZOLE SODIUM 40 MILLIGRAM(S): 20 TABLET, DELAYED RELEASE ORAL at 06:27

## 2023-11-29 NOTE — PROGRESS NOTE ADULT - SUBJECTIVE AND OBJECTIVE BOX
HPI:  Mrs. Savi Ivy is a 48 year old female patient with past medical history of chronic back pain (s/p multiple spinal surgeries), asthma, obesity (s/p gastric bypass), and HTN who presented to [facility] on 11/14 for an elective revision of a PSF with extension of fusion with instrumentation from T3 throgh pelvis, pedicle subtraction osteotomy (PSO) L4, tethers, iliac fixation, smith clark osteotomies T3-T10 with Plastic Surgery closure with Dr. Schwab, Dr. Gonzalez and Dr. Simpson. Intraoperative course with acute blood loss requiring 3 unit PRBC transfusion with one additional PRBC post-op. Post-op hospital course significant for urinary retention, s/p paulino removal 11/21 and TOV passed. Patient evaluated by PT/OT and was recommended for acute inpatient rehab. Patient is medically stable for discharge to [facility] on 11/22.     TDD: 12/6  ___________________________________________________________________________    SUBJECTIVE/ROS  Patient was seen and evaluated at bedside today.  Reported restful sleep and is in no acute distress.  Tapering of analgesia started today after discussion with patient and will monitor response.  Wound reviewed with no acute changes.  Patient is motivated to continue participation on the recommended rehabilitation program.   Case discussed at Interdisciplinary Team Meeting and tentative discharge date outlined above discussed with patient.   Recreational therapy added to her treatment yesterday to foster resumption of patient's normalcy.  An additional discussion took place today regarding contacting spouse to brief on medical status and she agreed.  Denies any CP, SOB, CALLAWAY, palpitations, fever, chills, body aches, cough, congestion, or any other symptoms at this time.   ___________________________________________________________________________    CT THORACIC SPINE AND LUMBAR SPINE (11/27/2023)    IMPRESSION: Recent thoracic fusion T4-T9 with screws and connecting rods and immature bony fusion mass. More remote fusion T10 through the sacrum with transpedicular screws and mature bony fusion mass.  ___________________________________________________________________________    Vital Signs Last 24 Hrs  T(C): 36.8 (29 Nov 2023 07:32), Max: 36.9 (28 Nov 2023 20:09)  T(F): 98.3 (29 Nov 2023 07:32), Max: 98.4 (28 Nov 2023 20:09)  HR: 70 (29 Nov 2023 07:32) (68 - 74)  BP: 98/62 (29 Nov 2023 07:32) (94/54 - 113/68)  RR: 16 (29 Nov 2023 07:32) (16 - 16)  SpO2: 94% (29 Nov 2023 07:32) (94% - 99%)    ___________________________________________________________________________    LAB                        7.5    6.67  )-----------( 549      ( 27 Nov 2023 05:29 )             24.1     11-27    142  |  106  |  13  ----------------------------<  83  4.3   |  31  |  0.58    Ca    8.3<L>      27 Nov 2023 05:29    TPro  5.7<L>  /  Alb  1.8<L>  /  TBili  0.2  /  DBili  x   /  AST  23  /  ALT  18  /  AlkPhos  107  11-27    LIVER FUNCTIONS - ( 27 Nov 2023 05:29 )  Alb: 1.8 g/dL / Pro: 5.7 g/dL / ALK PHOS: 107 U/L / ALT: 18 U/L / AST: 23 U/L / GGT: x           ___________________________________________________________________________    MEDICATIONS  (STANDING):  acetaminophen     Tablet .. 975 milliGRAM(s) Oral every 8 hours  DULoxetine 60 milliGRAM(s) Oral daily  enoxaparin Injectable 40 milliGRAM(s) SubCutaneous every 24 hours  fentaNYL   Patch  25 MICROgram(s)/Hr 1 Patch Transdermal every 72 hours  fluticasone propionate 50 MICROgram(s)/spray Nasal Spray 1 Spray(s) Both Nostrils two times a day  melatonin. 3 milliGRAM(s) Oral at bedtime  methocarbamol 500 milliGRAM(s) Oral three times a day  multivitamin 1 Tablet(s) Oral daily  naloxegol 12.5 milliGRAM(s) Oral daily  pantoprazole    Tablet 40 milliGRAM(s) Oral before breakfast  polyethylene glycol 3350 17 Gram(s) Oral two times a day  pregabalin 200 milliGRAM(s) Oral three times a day  senna 2 Tablet(s) Oral at bedtime  triamterene 37.5 mG/hydrochlorothiazide 25 mG Tablet 1 Tablet(s) Oral daily    MEDICATIONS  (PRN):  albuterol    90 MICROgram(s) HFA Inhaler 2 Puff(s) Inhalation every 6 hours PRN Shortness of Breath and/or Wheezing  bisacodyl 5 milliGRAM(s) Oral every 12 hours PRN Constipation  hemorrhoidal Ointment 1 Application(s) Rectal two times a day PRN hemorrhoidal pain  morphine   Solution 10 milliGRAM(s) Oral every 4 hours PRN Moderate Pain (4 - 6)  morphine   Solution 25 milliGRAM(s) Oral every 4 hours PRN Severe Pain (7 - 10)    ___________________________________________________________________________    PHYSICAL EXAM:  Gen - NAD, Comfortable  HEENT - NCAT, EOMI, MMM  Neck - Supple, No limited ROM  Pulm - breathing comfortably on room air  Cardiovascular - warm and well perfused  Abdomen - Soft, NT, ND  Extremities - No Cyanosis, no clubbing, no edema, no calf tenderness  Neuro-     Cognitive - awake, alert, oriented to person, place, date, year, and situation.  Able to follow command     Motor -                     LEFT    UE - 4/5                    RIGHT UE - 4/5                    LEFT    LE - 4/5                    RIGHT LE - 4/5        Sensory - Intact to LT   Psychiatric - Mood stable, Affect WNL  Skin:  left buttock healing wound with scab, spinal surgical incision clean and dry and left LIVE    ___________________________________________________________________________

## 2023-11-29 NOTE — PROGRESS NOTE ADULT - ASSESSMENT
48 year old female with past medical history of chronic back pain (s/p multiple spinal surgeries), asthma, obesity (s/p gastric bypass), HTN, opoid dependence  Admitted Nov14 for Elective revision of PSF with extension of fusion with instrumentation from T3 throgh pelvis, pedicle subtraction osteotomy (PSO) L4, tethers, iliac fixation, smith clark osteotomies T3-T10 with Plastic Surgery closure with Dr. Schwab, Dr. Gonzalez and Dr. Simpson. Intraoperative course complicated by acute blood loss requiring 4PRBC. Post-op hospital course significant for urinary retention, s/p paulino removal 11/21. Now transferred to Acute rehab    Chronic back pain s/p T4-Pelvis Revision Surgery  - S/P elective revision of PSF with extension of fusion with instrumentation from T3 through pelvis (11/14)  - S/P L4 pedicle subtraction osteotomy (PSO)  - S/P proximal tethers and iliac fixation  - S/P T3-T10 Smith-Clark osteotomies with Plastic Surgery closure   - HV x 2 and SHAWNA x 4 drains removed on 11/20   - Follow up CT T/L Spine to eval worsening pain     Acute Blood Loss Anemia  -SP 4 PRBC  -H/H otherwise stable  -Continue to monitor, transfuse for Hb <7    Asthma  -No evidence of exacerbation   -albuterol inhaler Q6H PRN    HTN  -Continue triamterene 37.5mg- HCTZ 25mg QD - has not met criteria, last dose 11/24  -Monitor BP, stable, can consider discontinuing if BP remains normotensive    DVT prophylaxis:  Lovenox 40 mg SC QD

## 2023-11-29 NOTE — PROGRESS NOTE ADULT - SUBJECTIVE AND OBJECTIVE BOX
Patient is a 48y old  Female who presents with a chief complaint of Spinal stenosis with neurogenic claudication s/p T4-Pelvis revision surgery (29 Nov 2023 08:38)    Reports non specific pain all over  Last BM yesterday  Tolerating diet    Patient seen and examined at bedside.    ALLERGIES:  shellfish (Hives; Short breath)  diclofenac (Other)    MEDICATIONS  (STANDING):  acetaminophen     Tablet .. 975 milliGRAM(s) Oral every 8 hours  DULoxetine 60 milliGRAM(s) Oral daily  enoxaparin Injectable 40 milliGRAM(s) SubCutaneous every 24 hours  fentaNYL   Patch  25 MICROgram(s)/Hr 1 Patch Transdermal every 72 hours  fluticasone propionate 50 MICROgram(s)/spray Nasal Spray 1 Spray(s) Both Nostrils two times a day  melatonin. 3 milliGRAM(s) Oral at bedtime  methocarbamol 500 milliGRAM(s) Oral three times a day  multivitamin 1 Tablet(s) Oral daily  naloxegol 12.5 milliGRAM(s) Oral daily  pantoprazole    Tablet 40 milliGRAM(s) Oral before breakfast  polyethylene glycol 3350 17 Gram(s) Oral two times a day  pregabalin 200 milliGRAM(s) Oral three times a day  senna 2 Tablet(s) Oral at bedtime  triamterene 37.5 mG/hydrochlorothiazide 25 mG Tablet 1 Tablet(s) Oral daily    MEDICATIONS  (PRN):  albuterol    90 MICROgram(s) HFA Inhaler 2 Puff(s) Inhalation every 6 hours PRN Shortness of Breath and/or Wheezing  bisacodyl 5 milliGRAM(s) Oral every 12 hours PRN Constipation  hemorrhoidal Ointment 1 Application(s) Rectal two times a day PRN hemorrhoidal pain  morphine   Solution 10 milliGRAM(s) Oral every 4 hours PRN Moderate Pain (4 - 6)  morphine   Solution 25 milliGRAM(s) Oral every 4 hours PRN Severe Pain (7 - 10)    Vital Signs Last 24 Hrs  T(F): 98.3 (29 Nov 2023 07:32), Max: 98.4 (28 Nov 2023 20:09)  HR: 70 (29 Nov 2023 07:32) (68 - 74)  BP: 98/62 (29 Nov 2023 07:32) (94/54 - 113/68)  RR: 16 (29 Nov 2023 07:32) (16 - 16)  SpO2: 94% (29 Nov 2023 07:32) (94% - 99%)  I&O's Summary    BMI (kg/m2): 36.6 (11-29-23 @ 08:38)  PHYSICAL EXAM:  General: NAD, A/O x 3  ENT: MMM, no scleral icterus  Neck: Supple, No JVD, no thyroidomegaly  Lungs: Clear to auscultation bilaterally, no wheezes, no rales, no rhonchi, good inspiratory effort  Cardio: RRR, S1/S2, No murmurs  Abdomen: Soft, Nontender, Nondistended; Bowel sounds present  Extremities: No calf tenderness, No pitting edema, no skin changes    LABS:                        7.5    6.67  )-----------( 549      ( 27 Nov 2023 05:29 )             24.1       11-27    142  |  106  |  13  ----------------------------<  83  4.3   |  31  |  0.58    Ca    8.3      27 Nov 2023 05:29    TPro  5.7  /  Alb  1.8  /  TBili  0.2  /  DBili  x   /  AST  23  /  ALT  18  /  AlkPhos  107  11-27     Urinalysis Basic - ( 27 Nov 2023 05:29 )    Color: x / Appearance: x / SG: x / pH: x  Gluc: 83 mg/dL / Ketone: x  / Bili: x / Urobili: x   Blood: x / Protein: x / Nitrite: x   Leuk Esterase: x / RBC: x / WBC x   Sq Epi: x / Non Sq Epi: x / Bacteria: x    COVID-19 PCR: NotDetec (11-22-23 @ 21:39)  COVID-19 PCR: NotDetec (11-22-23 @ 21:39)

## 2023-11-30 DIAGNOSIS — M41.9 SCOLIOSIS, UNSPECIFIED: ICD-10-CM

## 2023-11-30 DIAGNOSIS — Z88.8 ALLERGY STATUS TO OTHER DRUGS, MEDICAMENTS AND BIOLOGICAL SUBSTANCES: ICD-10-CM

## 2023-11-30 DIAGNOSIS — J45.909 UNSPECIFIED ASTHMA, UNCOMPLICATED: ICD-10-CM

## 2023-11-30 DIAGNOSIS — I10 ESSENTIAL (PRIMARY) HYPERTENSION: ICD-10-CM

## 2023-11-30 DIAGNOSIS — Z96.642 PRESENCE OF LEFT ARTIFICIAL HIP JOINT: ICD-10-CM

## 2023-11-30 DIAGNOSIS — M48.062 SPINAL STENOSIS, LUMBAR REGION WITH NEUROGENIC CLAUDICATION: ICD-10-CM

## 2023-11-30 DIAGNOSIS — Q89.9 CONGENITAL MALFORMATION, UNSPECIFIED: ICD-10-CM

## 2023-11-30 DIAGNOSIS — K59.00 CONSTIPATION, UNSPECIFIED: ICD-10-CM

## 2023-11-30 DIAGNOSIS — Z98.84 BARIATRIC SURGERY STATUS: ICD-10-CM

## 2023-11-30 DIAGNOSIS — M96.0 PSEUDARTHROSIS AFTER FUSION OR ARTHRODESIS: ICD-10-CM
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
## 2023-11-30 DIAGNOSIS — D62 ACUTE POSTHEMORRHAGIC ANEMIA: ICD-10-CM
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
## 2023-11-30 DIAGNOSIS — Z98.1 ARTHRODESIS STATUS: ICD-10-CM

## 2023-11-30 DIAGNOSIS — T84.216A BREAKDOWN (MECHANICAL) OF INTERNAL FIXATION DEVICE OF VERTEBRAE, INITIAL ENCOUNTER: ICD-10-CM

## 2023-11-30 DIAGNOSIS — R33.9 RETENTION OF URINE, UNSPECIFIED: ICD-10-CM

## 2023-11-30 DIAGNOSIS — Z91.013 ALLERGY TO SEAFOOD: ICD-10-CM

## 2023-11-30 DIAGNOSIS — E66.9 OBESITY, UNSPECIFIED: ICD-10-CM

## 2023-11-30 LAB
ALBUMIN SERPL ELPH-MCNC: 2 G/DL — LOW (ref 3.3–5)
ALBUMIN SERPL ELPH-MCNC: 2 G/DL — LOW (ref 3.3–5)
ALP SERPL-CCNC: 120 U/L — SIGNIFICANT CHANGE UP (ref 40–120)
ALP SERPL-CCNC: 120 U/L — SIGNIFICANT CHANGE UP (ref 40–120)
ALT FLD-CCNC: 23 U/L — SIGNIFICANT CHANGE UP (ref 10–45)
ALT FLD-CCNC: 23 U/L — SIGNIFICANT CHANGE UP (ref 10–45)
ANION GAP SERPL CALC-SCNC: 6 MMOL/L — SIGNIFICANT CHANGE UP (ref 5–17)
ANION GAP SERPL CALC-SCNC: 6 MMOL/L — SIGNIFICANT CHANGE UP (ref 5–17)
APTT BLD: 33.6 SEC — SIGNIFICANT CHANGE UP (ref 24.5–35.6)
APTT BLD: 33.6 SEC — SIGNIFICANT CHANGE UP (ref 24.5–35.6)
AST SERPL-CCNC: 29 U/L — SIGNIFICANT CHANGE UP (ref 10–40)
AST SERPL-CCNC: 29 U/L — SIGNIFICANT CHANGE UP (ref 10–40)
BILIRUB SERPL-MCNC: 0.2 MG/DL — SIGNIFICANT CHANGE UP (ref 0.2–1.2)
BILIRUB SERPL-MCNC: 0.2 MG/DL — SIGNIFICANT CHANGE UP (ref 0.2–1.2)
BLD GP AB SCN SERPL QL: SIGNIFICANT CHANGE UP
BLD GP AB SCN SERPL QL: SIGNIFICANT CHANGE UP
BUN SERPL-MCNC: 11 MG/DL — SIGNIFICANT CHANGE UP (ref 7–23)
BUN SERPL-MCNC: 11 MG/DL — SIGNIFICANT CHANGE UP (ref 7–23)
CALCIUM SERPL-MCNC: 8.4 MG/DL — SIGNIFICANT CHANGE UP (ref 8.4–10.5)
CALCIUM SERPL-MCNC: 8.4 MG/DL — SIGNIFICANT CHANGE UP (ref 8.4–10.5)
CHLORIDE SERPL-SCNC: 105 MMOL/L — SIGNIFICANT CHANGE UP (ref 96–108)
CHLORIDE SERPL-SCNC: 105 MMOL/L — SIGNIFICANT CHANGE UP (ref 96–108)
CO2 SERPL-SCNC: 29 MMOL/L — SIGNIFICANT CHANGE UP (ref 22–31)
CO2 SERPL-SCNC: 29 MMOL/L — SIGNIFICANT CHANGE UP (ref 22–31)
CREAT SERPL-MCNC: 0.57 MG/DL — SIGNIFICANT CHANGE UP (ref 0.5–1.3)
CREAT SERPL-MCNC: 0.57 MG/DL — SIGNIFICANT CHANGE UP (ref 0.5–1.3)
EGFR: 111 ML/MIN/1.73M2 — SIGNIFICANT CHANGE UP
EGFR: 111 ML/MIN/1.73M2 — SIGNIFICANT CHANGE UP
GLUCOSE SERPL-MCNC: 97 MG/DL — SIGNIFICANT CHANGE UP (ref 70–99)
GLUCOSE SERPL-MCNC: 97 MG/DL — SIGNIFICANT CHANGE UP (ref 70–99)
HCT VFR BLD CALC: 23.6 % — LOW (ref 34.5–45)
HCT VFR BLD CALC: 23.6 % — LOW (ref 34.5–45)
HCT VFR BLD CALC: 25.3 % — LOW (ref 34.5–45)
HCT VFR BLD CALC: 25.3 % — LOW (ref 34.5–45)
HGB BLD-MCNC: 7.2 G/DL — LOW (ref 11.5–15.5)
HGB BLD-MCNC: 7.2 G/DL — LOW (ref 11.5–15.5)
HGB BLD-MCNC: 7.8 G/DL — LOW (ref 11.5–15.5)
HGB BLD-MCNC: 7.8 G/DL — LOW (ref 11.5–15.5)
INR BLD: 1.04 RATIO — SIGNIFICANT CHANGE UP (ref 0.85–1.18)
INR BLD: 1.04 RATIO — SIGNIFICANT CHANGE UP (ref 0.85–1.18)
MCHC RBC-ENTMCNC: 27.3 PG — SIGNIFICANT CHANGE UP (ref 27–34)
MCHC RBC-ENTMCNC: 27.3 PG — SIGNIFICANT CHANGE UP (ref 27–34)
MCHC RBC-ENTMCNC: 27.8 PG — SIGNIFICANT CHANGE UP (ref 27–34)
MCHC RBC-ENTMCNC: 27.8 PG — SIGNIFICANT CHANGE UP (ref 27–34)
MCHC RBC-ENTMCNC: 30.5 GM/DL — LOW (ref 32–36)
MCHC RBC-ENTMCNC: 30.5 GM/DL — LOW (ref 32–36)
MCHC RBC-ENTMCNC: 30.8 GM/DL — LOW (ref 32–36)
MCHC RBC-ENTMCNC: 30.8 GM/DL — LOW (ref 32–36)
MCV RBC AUTO: 89.4 FL — SIGNIFICANT CHANGE UP (ref 80–100)
MCV RBC AUTO: 89.4 FL — SIGNIFICANT CHANGE UP (ref 80–100)
MCV RBC AUTO: 90 FL — SIGNIFICANT CHANGE UP (ref 80–100)
MCV RBC AUTO: 90 FL — SIGNIFICANT CHANGE UP (ref 80–100)
NRBC # BLD: 0 /100 WBCS — SIGNIFICANT CHANGE UP (ref 0–0)
OB PNL STL: POSITIVE
OB PNL STL: POSITIVE
PLATELET # BLD AUTO: 527 K/UL — HIGH (ref 150–400)
PLATELET # BLD AUTO: 527 K/UL — HIGH (ref 150–400)
PLATELET # BLD AUTO: 547 K/UL — HIGH (ref 150–400)
PLATELET # BLD AUTO: 547 K/UL — HIGH (ref 150–400)
POTASSIUM SERPL-MCNC: 4.1 MMOL/L — SIGNIFICANT CHANGE UP (ref 3.5–5.3)
POTASSIUM SERPL-MCNC: 4.1 MMOL/L — SIGNIFICANT CHANGE UP (ref 3.5–5.3)
POTASSIUM SERPL-SCNC: 4.1 MMOL/L — SIGNIFICANT CHANGE UP (ref 3.5–5.3)
POTASSIUM SERPL-SCNC: 4.1 MMOL/L — SIGNIFICANT CHANGE UP (ref 3.5–5.3)
PROT SERPL-MCNC: 6 G/DL — SIGNIFICANT CHANGE UP (ref 6–8.3)
PROT SERPL-MCNC: 6 G/DL — SIGNIFICANT CHANGE UP (ref 6–8.3)
PROTHROM AB SERPL-ACNC: 11.8 SEC — SIGNIFICANT CHANGE UP (ref 9.5–13)
PROTHROM AB SERPL-ACNC: 11.8 SEC — SIGNIFICANT CHANGE UP (ref 9.5–13)
RBC # BLD: 2.64 M/UL — LOW (ref 3.8–5.2)
RBC # BLD: 2.64 M/UL — LOW (ref 3.8–5.2)
RBC # BLD: 2.81 M/UL — LOW (ref 3.8–5.2)
RBC # BLD: 2.81 M/UL — LOW (ref 3.8–5.2)
RBC # FLD: 14.3 % — SIGNIFICANT CHANGE UP (ref 10.3–14.5)
SODIUM SERPL-SCNC: 140 MMOL/L — SIGNIFICANT CHANGE UP (ref 135–145)
SODIUM SERPL-SCNC: 140 MMOL/L — SIGNIFICANT CHANGE UP (ref 135–145)
WBC # BLD: 6.08 K/UL — SIGNIFICANT CHANGE UP (ref 3.8–10.5)
WBC # BLD: 6.08 K/UL — SIGNIFICANT CHANGE UP (ref 3.8–10.5)
WBC # BLD: 6.73 K/UL — SIGNIFICANT CHANGE UP (ref 3.8–10.5)
WBC # BLD: 6.73 K/UL — SIGNIFICANT CHANGE UP (ref 3.8–10.5)
WBC # FLD AUTO: 6.08 K/UL — SIGNIFICANT CHANGE UP (ref 3.8–10.5)
WBC # FLD AUTO: 6.08 K/UL — SIGNIFICANT CHANGE UP (ref 3.8–10.5)
WBC # FLD AUTO: 6.73 K/UL — SIGNIFICANT CHANGE UP (ref 3.8–10.5)
WBC # FLD AUTO: 6.73 K/UL — SIGNIFICANT CHANGE UP (ref 3.8–10.5)

## 2023-11-30 PROCEDURE — 99232 SBSQ HOSP IP/OBS MODERATE 35: CPT

## 2023-11-30 RX ADMIN — Medication 975 MILLIGRAM(S): at 05:11

## 2023-11-30 RX ADMIN — METHOCARBAMOL 500 MILLIGRAM(S): 500 TABLET, FILM COATED ORAL at 13:33

## 2023-11-30 RX ADMIN — POLYETHYLENE GLYCOL 3350 17 GRAM(S): 17 POWDER, FOR SOLUTION ORAL at 05:10

## 2023-11-30 RX ADMIN — MORPHINE SULFATE 25 MILLIGRAM(S): 50 CAPSULE, EXTENDED RELEASE ORAL at 08:37

## 2023-11-30 RX ADMIN — Medication 975 MILLIGRAM(S): at 22:50

## 2023-11-30 RX ADMIN — MORPHINE SULFATE 25 MILLIGRAM(S): 50 CAPSULE, EXTENDED RELEASE ORAL at 22:50

## 2023-11-30 RX ADMIN — Medication 200 MILLIGRAM(S): at 05:10

## 2023-11-30 RX ADMIN — MORPHINE SULFATE 25 MILLIGRAM(S): 50 CAPSULE, EXTENDED RELEASE ORAL at 01:17

## 2023-11-30 RX ADMIN — METHOCARBAMOL 500 MILLIGRAM(S): 500 TABLET, FILM COATED ORAL at 20:16

## 2023-11-30 RX ADMIN — METHOCARBAMOL 500 MILLIGRAM(S): 500 TABLET, FILM COATED ORAL at 05:10

## 2023-11-30 RX ADMIN — Medication 975 MILLIGRAM(S): at 21:50

## 2023-11-30 RX ADMIN — MORPHINE SULFATE 25 MILLIGRAM(S): 50 CAPSULE, EXTENDED RELEASE ORAL at 00:17

## 2023-11-30 RX ADMIN — MORPHINE SULFATE 25 MILLIGRAM(S): 50 CAPSULE, EXTENDED RELEASE ORAL at 21:51

## 2023-11-30 RX ADMIN — Medication 1 SPRAY(S): at 05:10

## 2023-11-30 RX ADMIN — MORPHINE SULFATE 25 MILLIGRAM(S): 50 CAPSULE, EXTENDED RELEASE ORAL at 14:15

## 2023-11-30 RX ADMIN — DULOXETINE HYDROCHLORIDE 60 MILLIGRAM(S): 30 CAPSULE, DELAYED RELEASE ORAL at 12:11

## 2023-11-30 RX ADMIN — MORPHINE SULFATE 25 MILLIGRAM(S): 50 CAPSULE, EXTENDED RELEASE ORAL at 18:35

## 2023-11-30 RX ADMIN — PANTOPRAZOLE SODIUM 40 MILLIGRAM(S): 20 TABLET, DELAYED RELEASE ORAL at 05:10

## 2023-11-30 RX ADMIN — MORPHINE SULFATE 25 MILLIGRAM(S): 50 CAPSULE, EXTENDED RELEASE ORAL at 09:10

## 2023-11-30 RX ADMIN — Medication 1 SPRAY(S): at 17:21

## 2023-11-30 RX ADMIN — ENOXAPARIN SODIUM 40 MILLIGRAM(S): 100 INJECTION SUBCUTANEOUS at 05:09

## 2023-11-30 RX ADMIN — Medication 200 MILLIGRAM(S): at 21:50

## 2023-11-30 RX ADMIN — Medication 3 MILLIGRAM(S): at 21:50

## 2023-11-30 RX ADMIN — MORPHINE SULFATE 25 MILLIGRAM(S): 50 CAPSULE, EXTENDED RELEASE ORAL at 13:32

## 2023-11-30 RX ADMIN — Medication 200 MILLIGRAM(S): at 13:32

## 2023-11-30 RX ADMIN — NALOXEGOL OXALATE 12.5 MILLIGRAM(S): 12.5 TABLET, FILM COATED ORAL at 12:11

## 2023-11-30 RX ADMIN — Medication 1 TABLET(S): at 12:11

## 2023-11-30 RX ADMIN — MORPHINE SULFATE 25 MILLIGRAM(S): 50 CAPSULE, EXTENDED RELEASE ORAL at 17:49

## 2023-11-30 RX ADMIN — Medication 975 MILLIGRAM(S): at 06:11

## 2023-11-30 NOTE — PROGRESS NOTE ADULT - ASSESSMENT
48 year old female with past medical history of chronic back pain (s/p multiple spinal surgeries), asthma, obesity (s/p gastric bypass), HTN, opoid dependence, admitted for elective revision of PSF with extension of fusion with instrumentation from T3 throgh pelvis, pedicle subtraction osteotomy (PSO) L4, tethers, iliac fixation, smith clark osteotomies T3-T10 with Plastic Surgery closure with Dr. Schwab, Dr. Gonzalez and Dr. Simpson. Intraoperative course complicated by acute blood loss requiring 4PRBC. Post-op hospital course significant for urinary retention, s/p paulino removal 11/21. Now transferred to Acute rehab    Acute Blood Loss Anemia  -SP 4 units PRBC  -H/H noted to be 7.8 today  -Continue to monitor, transfuse for Hb <7  -Will check stool occult, B12/folate/iron level in AM, CBC in AM, TSH/FT4 in AM    Chronic back pain s/p T4-Pelvis Revision Surgery  - S/P elective revision of PSF with extension of fusion with instrumentation from T3 through pelvis (11/14)  - S/P L4 pedicle subtraction osteotomy (PSO)  - S/P proximal tethers and iliac fixation  - S/P T3-T10 Smith-Clark osteotomies with Plastic Surgery closure   - HV x 2 and SHAWNA x 4 drains removed on 11/20   - Follow up CT T/L Spine to eval worsening pain     Asthma  -No evidence of exacerbation   -albuterol inhaler Q6H PRN    HTN  -Continue triamterene 37.5mg- HCTZ 25mg QD - has not met criteria, last dose 11/24  -Monitor BP, stable, can consider discontinuing if BP remains normotensive    DVT prophylaxis:  Lovenox 40 mg SC QD

## 2023-11-30 NOTE — PROGRESS NOTE ADULT - SUBJECTIVE AND OBJECTIVE BOX
Patient is a 48y old  Female who presents with a chief complaint of Spinal stenosis with neurogenic claudication s/p T4-Pelvis revision surgery (29 Nov 2023 11:39)    No events overnight  Denies chest pain, SOB  Patient seen and examined at bedside.    ALLERGIES:  shellfish (Hives; Short breath)  diclofenac (Other)    MEDICATIONS  (STANDING):  acetaminophen     Tablet .. 975 milliGRAM(s) Oral every 8 hours  DULoxetine 60 milliGRAM(s) Oral daily  enoxaparin Injectable 40 milliGRAM(s) SubCutaneous every 24 hours  fluticasone propionate 50 MICROgram(s)/spray Nasal Spray 1 Spray(s) Both Nostrils two times a day  melatonin. 3 milliGRAM(s) Oral at bedtime  methocarbamol 500 milliGRAM(s) Oral three times a day  multivitamin 1 Tablet(s) Oral daily  naloxegol 12.5 milliGRAM(s) Oral daily  pantoprazole    Tablet 40 milliGRAM(s) Oral before breakfast  polyethylene glycol 3350 17 Gram(s) Oral two times a day  pregabalin 200 milliGRAM(s) Oral three times a day  senna 2 Tablet(s) Oral at bedtime  triamterene 37.5 mG/hydrochlorothiazide 25 mG Tablet 1 Tablet(s) Oral daily    MEDICATIONS  (PRN):  albuterol    90 MICROgram(s) HFA Inhaler 2 Puff(s) Inhalation every 6 hours PRN Shortness of Breath and/or Wheezing  bisacodyl 5 milliGRAM(s) Oral every 12 hours PRN Constipation  hemorrhoidal Ointment 1 Application(s) Rectal two times a day PRN hemorrhoidal pain  morphine   Solution 10 milliGRAM(s) Oral every 4 hours PRN Moderate Pain (4 - 6)  morphine   Solution 25 milliGRAM(s) Oral every 4 hours PRN Severe Pain (7 - 10)    Vital Signs Last 24 Hrs  T(F): 97.7 (30 Nov 2023 08:21), Max: 97.7 (30 Nov 2023 08:21)  HR: 89 (30 Nov 2023 08:21) (74 - 89)  BP: 97/62 (30 Nov 2023 08:21) (91/60 - 97/62)  RR: 16 (30 Nov 2023 08:21) (16 - 16)  SpO2: 94% (30 Nov 2023 08:21) (94% - 94%)  I&O's Summary    BMI (kg/m2): 36.6 (11-29-23 @ 08:38)  PHYSICAL EXAM:  General: NAD, A/O x 3  ENT: MMM, no scleral icterus  Neck: Supple, No JVD, no thyroidomegaly  Lungs: Clear to auscultation bilaterally, no wheezes, no rales, no rhonchi, good inspiratory effort  Cardio: RRR, S1/S2, No murmurs  Abdomen: Soft, Nontender, Nondistended; Bowel sounds present  Extremities: No calf tenderness, No pitting edema, no skin changes    LABS:                        7.8    6.73  )-----------( 547      ( 30 Nov 2023 11:53 )             25.3       11-30    140  |  105  |  11  ----------------------------<  97  4.1   |  29  |  0.57    Ca    8.4      30 Nov 2023 06:56    TPro  6.0  /  Alb  2.0  /  TBili  0.2  /  DBili  x   /  AST  29  /  ALT  23  /  AlkPhos  120  11-30       PT/INR - ( 30 Nov 2023 11:53 )   PT: 11.8 sec;   INR: 1.04 ratio    PTT - ( 30 Nov 2023 11:53 )  PTT:33.6 sec     Urinalysis Basic - ( 30 Nov 2023 06:56 )    Color: x / Appearance: x / SG: x / pH: x  Gluc: 97 mg/dL / Ketone: x  / Bili: x / Urobili: x   Blood: x / Protein: x / Nitrite: x   Leuk Esterase: x / RBC: x / WBC x   Sq Epi: x / Non Sq Epi: x / Bacteria: x    COVID-19 PCR: NotDetec (11-22-23 @ 21:39)  COVID-19 PCR: NotDetec (11-22-23 @ 21:39)

## 2023-11-30 NOTE — PROGRESS NOTE ADULT - SUBJECTIVE AND OBJECTIVE BOX
HPI:  Mrs. Savi Ivy is a 48 year old female patient with past medical history of chronic back pain (s/p multiple spinal surgeries), asthma, obesity (s/p gastric bypass), and HTN who presented to [facility] on 11/14 for an elective revision of a PSF with extension of fusion with instrumentation from T3 throgh pelvis, pedicle subtraction osteotomy (PSO) L4, tethers, iliac fixation, smith clark osteotomies T3-T10 with Plastic Surgery closure with Dr. Schwab, Dr. Gonzalez and Dr. Simpson. Intraoperative course with acute blood loss requiring 3 unit PRBC transfusion with one additional PRBC post-op. Post-op hospital course significant for urinary retention, s/p paulino removal 11/21 and TOV passed. Patient evaluated by PT/OT and was recommended for acute inpatient rehab. Patient is medically stable for discharge to [facility] on 11/22.     TDD: 12/6  ___________________________________________________________________________    SUBJECTIVE/ROS  Patient was seen and evaluated at bedside today alongside her spouse.  Reported restful sleep and is in no acute distress.  Hgb below 8 today and confirmed with repeat labs. Will follow in AM.  Patient with no new symptoms and tolerating tapering of analgesia.  A discussion took place with patient and her spouse with focus on expectations ahead of discharge.  All questions were answered and they expressed understanding and agreement with plan discussed.   Patient is motivated to continue participation on the recommended rehabilitation program.   Denies any CP, SOB, CALLAWAY, palpitations, fever, chills, body aches, cough, congestion, or any other symptoms at this time.   ___________________________________________________________________________    CT THORACIC SPINE AND LUMBAR SPINE (11/27/2023)    IMPRESSION: Recent thoracic fusion T4-T9 with screws and connecting rods and immature bony fusion mass. More remote fusion T10 through the sacrum with transpedicular screws and mature bony fusion mass.  ___________________________________________________________________________    Vital Signs Last 24 Hrs  T(C): 36.5 (30 Nov 2023 08:21), Max: 36.5 (30 Nov 2023 08:21)  T(F): 97.7 (30 Nov 2023 08:21), Max: 97.7 (30 Nov 2023 08:21)  HR: 89 (30 Nov 2023 08:21) (74 - 89)  BP: 97/62 (30 Nov 2023 08:21) (91/60 - 97/62)  RR: 16 (30 Nov 2023 08:21) (16 - 16)  SpO2: 94% (30 Nov 2023 08:21) (94% - 94%)    ___________________________________________________________________________    LAB                        7.8    6.73  )-----------( 547      ( 30 Nov 2023 11:53 )             25.3                             7.2    6.08  )-----------( 527      ( 30 Nov 2023 06:56 )             23.6       11-30    140  |  105  |  11  ----------------------------<  97  4.1   |  29  |  0.57    Ca    8.4      30 Nov 2023 06:56    TPro  6.0  /  Alb  2.0<L>  /  TBili  0.2  /  DBili  x   /  AST  29  /  ALT  23  /  AlkPhos  120  11-30    LIVER FUNCTIONS - ( 30 Nov 2023 06:56 )  Alb: 2.0 g/dL / Pro: 6.0 g/dL / ALK PHOS: 120 U/L / ALT: 23 U/L / AST: 29 U/L / GGT: x           PT/INR - ( 30 Nov 2023 11:53 )   PT: 11.8 sec;   INR: 1.04 ratio    PTT - ( 30 Nov 2023 11:53 )  PTT:33.6 sec    ___________________________________________________________________________    MEDICATIONS  (STANDING):  acetaminophen     Tablet .. 975 milliGRAM(s) Oral every 8 hours  DULoxetine 60 milliGRAM(s) Oral daily  enoxaparin Injectable 40 milliGRAM(s) SubCutaneous every 24 hours  fluticasone propionate 50 MICROgram(s)/spray Nasal Spray 1 Spray(s) Both Nostrils two times a day  melatonin. 3 milliGRAM(s) Oral at bedtime  methocarbamol 500 milliGRAM(s) Oral three times a day  multivitamin 1 Tablet(s) Oral daily  naloxegol 12.5 milliGRAM(s) Oral daily  pantoprazole    Tablet 40 milliGRAM(s) Oral before breakfast  polyethylene glycol 3350 17 Gram(s) Oral two times a day  pregabalin 200 milliGRAM(s) Oral three times a day  senna 2 Tablet(s) Oral at bedtime  triamterene 37.5 mG/hydrochlorothiazide 25 mG Tablet 1 Tablet(s) Oral daily    MEDICATIONS  (PRN):  albuterol    90 MICROgram(s) HFA Inhaler 2 Puff(s) Inhalation every 6 hours PRN Shortness of Breath and/or Wheezing  bisacodyl 5 milliGRAM(s) Oral every 12 hours PRN Constipation  hemorrhoidal Ointment 1 Application(s) Rectal two times a day PRN hemorrhoidal pain  morphine   Solution 10 milliGRAM(s) Oral every 4 hours PRN Moderate Pain (4 - 6)  morphine   Solution 25 milliGRAM(s) Oral every 4 hours PRN Severe Pain (7 - 10)    ___________________________________________________________________________    PHYSICAL EXAM:  Gen - NAD, Comfortable  HEENT - NCAT, EOMI, MMM  Neck - Supple, No limited ROM  Pulm - breathing comfortably on room air  Cardiovascular - warm and well perfused  Abdomen - Soft, NT, ND  Extremities - No Cyanosis, no clubbing, no edema, no calf tenderness  Neuro-     Cognitive - awake, alert, oriented to person, place, date, year, and situation.  Able to follow command     Motor -                     LEFT    UE - 4/5                    RIGHT UE - 4/5                    LEFT    LE - 4/5                    RIGHT LE - 4/5        Sensory - Intact to LT   Psychiatric - Mood stable, Affect WNL  Skin:  left buttock healing wound with scab, spinal surgical incision clean and dry and left LIVE    ___________________________________________________________________________

## 2023-11-30 NOTE — PROGRESS NOTE ADULT - ASSESSMENT
Mrs. Savi Ivy is a 48 year old female patient with past medical history of chronic back pain (s/p multiple spinal surgeries), asthma, obesity (s/p gastric bypass), and HTN who is admitted for Acute Inpatient Rehabilitation with a multidisciplinary rehab program at [facility] with functional impairments in ADLs and mobility secondary to chronic back pain s/p multiple spinal surgeries treated further surgically with an elective revision of a PSF with extension of fusion with instrumentation from T3 through pelvis, L4 pedicle subtraction osteotomy (PSO), tethers, iliac fixation, T3-T10 Duvall-Meneses osteotomies with Plastic Surgery closure whose intraoperative course was remarkable for acute blood loss requiring 3 unit PRBC transfusion with one additional PRBC post-op. Post-op hospital course additionally significant for urinary retention.    Spinal stenosis with neurogenic claudication s/p T4-Pelvis revision surgery  - S/P elective revision of PSF with extension of fusion with instrumentation from T3 through pelvis (11/14)  - S/P L4 pedicle subtraction osteotomy (PSO)  - S/P proximal tethers and iliac fixation  - S/P T3-T10 Smith-Meneses osteotomies with Plastic Surgery closure   - HV x 2 and SHAWNA x 4 drains removed on 11/20   - Impaired ADLs and mobility  - Need for assistance with personal care   - Continue comprehensive rehab program of PT/OT - 3 hours a day, 5 days a week. P&O as needed   - Pain control as below  - Fall/Spinal precautions  - WBAT    Pain Control   - Initial regimen     * Tylenol 975 mg Q8H     * Cymbalta 60 mg QD     * Lyrica 200 mg TID     * Robaxin 500 mg TID     * Fentanyl Patch 50 mcg Q72H     * PRN: Morphine 15 mg Q4H mod pain, 30 mg Q4H severe pain  - Current regimen as follows (changed on 11/29):     * acetaminophen     Tablet .. 975 milliGRAM(s) Oral every 8 hours     * DULoxetine 60 milliGRAM(s) Oral daily     * methocarbamol 500 milliGRAM(s) Oral three times a day     * fentaNYL   Patch  25 MICROgram(s)/Hr 1 Patch Transdermal every 72 hours     * pregabalin 200 milliGRAM(s) Oral three times a day     * morphine   Solution 10 milliGRAM(s) Oral every 4 hours PRN Moderate Pain (4 - 6)     * morphine   Solution 25 milliGRAM(s) Oral every 4 hours PRN Severe Pain (7 - 10)  - follow with pain management outpatient  - evaluated by pain management at [facility] who recommended discharge on home dose of nucynta 75mg 4x/day, fentanyl patch, as well as robaxin 500mg PO 3x/day and lyrica 200mg PO 3x/day      Acute Blood Loss Anemia  - s/p 3u PRBCs intra-op and one additional PRBC post-op  - monitor H/H    Asthma  - albuterol inhaler Q6H PRN    HTN  - continue triamterene 37.5mg- HCTZ 25mg QD  - Monitor BP    Mood / Cognition  - Neuropsychology consult PRN    Sleep  - Maintain quiet hours and a low stim environment.   - Melatonin 3 mg QHS     GI / Bowel  - at risk for constipation due to neurologic diagnosis, immobility, and/or medication use  - Senna qHS  - Miralax BID  - Movantik 12.5 mg QD  - Dulcolax 5 mg BID PRN constipation  - GI ppx: pantoprazole 40 mg QD     / Bladder  - Currently patient voids independently  - Cobb removed 11/21 and passed TOV    Skin:  - Skin assessment on admission performed : Left buttock healing wound with scab, spinal surgical incision clean an dry and left LIVE (11/25)  - Pressure Injury/Skin: OOB to chair, PT/OT  - nursing to monitor skin q Shift    Diet:  - Diet Consistency: regular  - Aspiration Precautions  - Nutrition consult    DVT prophylaxis:   - Lovenox 40 mg QD    Outpatient Follow-up:  Schwab, Frank Johann  Orthopaedic Surgery  [address]  [place] [address]  Phone: [phone]  Fax: [phone]    Code Status/Emergency Contact:    ---------------    Goals: Safe discharge to home  Estimated Length of Stay: 10-14 days  Rehab Potential: Good  Medical Prognosis: Good  Estimated Disposition: Home with home care      PRESCREEN COMPARISON:  I have reviewed the prescreen information and I have found no relevant changes between the preadmission screening and my post admission evaluation.    RATIONALE FOR INPATIENT ADMISSION: Patient demonstrates the following:  [X] Medically appropriate for rehabilitation admission  [X] Has attainable rehab goals with an appropriate initial discharge plan  [X]Has rehabilitation potential (expected to make a significant improvement within a reasonable period of time)  [X] Requires close medical management by a rehab physician, rehab nursing care, Hospitalist and comprehensive interdisciplinary team (including PT, OT and/or SLP, Prosthetics and Orthotics)

## 2023-12-01 DIAGNOSIS — D64.9 ANEMIA, UNSPECIFIED: ICD-10-CM

## 2023-12-01 LAB
FOLATE SERPL-MCNC: 9.4 NG/ML — SIGNIFICANT CHANGE UP
FOLATE SERPL-MCNC: 9.4 NG/ML — SIGNIFICANT CHANGE UP
HCT VFR BLD CALC: 22.8 % — LOW (ref 34.5–45)
HCT VFR BLD CALC: 22.8 % — LOW (ref 34.5–45)
HGB BLD-MCNC: 7.1 G/DL — LOW (ref 11.5–15.5)
HGB BLD-MCNC: 7.1 G/DL — LOW (ref 11.5–15.5)
IRON SATN MFR SERPL: 17 UG/DL — LOW (ref 30–160)
IRON SATN MFR SERPL: 17 UG/DL — LOW (ref 30–160)
IRON SATN MFR SERPL: 7 % — LOW (ref 14–50)
IRON SATN MFR SERPL: 7 % — LOW (ref 14–50)
MCHC RBC-ENTMCNC: 27.7 PG — SIGNIFICANT CHANGE UP (ref 27–34)
MCHC RBC-ENTMCNC: 27.7 PG — SIGNIFICANT CHANGE UP (ref 27–34)
MCHC RBC-ENTMCNC: 31.1 GM/DL — LOW (ref 32–36)
MCHC RBC-ENTMCNC: 31.1 GM/DL — LOW (ref 32–36)
MCV RBC AUTO: 89.1 FL — SIGNIFICANT CHANGE UP (ref 80–100)
MCV RBC AUTO: 89.1 FL — SIGNIFICANT CHANGE UP (ref 80–100)
NRBC # BLD: 0 /100 WBCS — SIGNIFICANT CHANGE UP (ref 0–0)
NRBC # BLD: 0 /100 WBCS — SIGNIFICANT CHANGE UP (ref 0–0)
PLATELET # BLD AUTO: 524 K/UL — HIGH (ref 150–400)
PLATELET # BLD AUTO: 524 K/UL — HIGH (ref 150–400)
RBC # BLD: 2.56 M/UL — LOW (ref 3.8–5.2)
RBC # BLD: 2.56 M/UL — LOW (ref 3.8–5.2)
RBC # FLD: 14.1 % — SIGNIFICANT CHANGE UP (ref 10.3–14.5)
RBC # FLD: 14.1 % — SIGNIFICANT CHANGE UP (ref 10.3–14.5)
T4 FREE SERPL-MCNC: 1 NG/DL — SIGNIFICANT CHANGE UP (ref 0.9–1.8)
T4 FREE SERPL-MCNC: 1 NG/DL — SIGNIFICANT CHANGE UP (ref 0.9–1.8)
TIBC SERPL-MCNC: 243 UG/DL — SIGNIFICANT CHANGE UP (ref 220–430)
TIBC SERPL-MCNC: 243 UG/DL — SIGNIFICANT CHANGE UP (ref 220–430)
TSH SERPL-MCNC: 1.11 UIU/ML — SIGNIFICANT CHANGE UP (ref 0.36–3.74)
TSH SERPL-MCNC: 1.11 UIU/ML — SIGNIFICANT CHANGE UP (ref 0.36–3.74)
UIBC SERPL-MCNC: 226 UG/DL — SIGNIFICANT CHANGE UP (ref 110–370)
UIBC SERPL-MCNC: 226 UG/DL — SIGNIFICANT CHANGE UP (ref 110–370)
WBC # BLD: 5.13 K/UL — SIGNIFICANT CHANGE UP (ref 3.8–10.5)
WBC # BLD: 5.13 K/UL — SIGNIFICANT CHANGE UP (ref 3.8–10.5)
WBC # FLD AUTO: 5.13 K/UL — SIGNIFICANT CHANGE UP (ref 3.8–10.5)
WBC # FLD AUTO: 5.13 K/UL — SIGNIFICANT CHANGE UP (ref 3.8–10.5)

## 2023-12-01 PROCEDURE — 99233 SBSQ HOSP IP/OBS HIGH 50: CPT

## 2023-12-01 PROCEDURE — 99232 SBSQ HOSP IP/OBS MODERATE 35: CPT

## 2023-12-01 PROCEDURE — 99223 1ST HOSP IP/OBS HIGH 75: CPT

## 2023-12-01 RX ORDER — IRON SUCROSE 20 MG/ML
200 INJECTION, SOLUTION INTRAVENOUS EVERY 24 HOURS
Refills: 0 | Status: COMPLETED | OUTPATIENT
Start: 2023-12-01 | End: 2023-12-02

## 2023-12-01 RX ORDER — FENTANYL CITRATE 50 UG/ML
1 INJECTION INTRAVENOUS
Refills: 0 | Status: DISCONTINUED | OUTPATIENT
Start: 2023-12-01 | End: 2023-12-01

## 2023-12-01 RX ADMIN — Medication 1 SPRAY(S): at 18:15

## 2023-12-01 RX ADMIN — Medication 1 TABLET(S): at 12:11

## 2023-12-01 RX ADMIN — Medication 975 MILLIGRAM(S): at 21:19

## 2023-12-01 RX ADMIN — Medication 975 MILLIGRAM(S): at 15:03

## 2023-12-01 RX ADMIN — SENNA PLUS 2 TABLET(S): 8.6 TABLET ORAL at 21:20

## 2023-12-01 RX ADMIN — Medication 975 MILLIGRAM(S): at 05:33

## 2023-12-01 RX ADMIN — DULOXETINE HYDROCHLORIDE 60 MILLIGRAM(S): 30 CAPSULE, DELAYED RELEASE ORAL at 12:11

## 2023-12-01 RX ADMIN — Medication 1 SPRAY(S): at 05:33

## 2023-12-01 RX ADMIN — MORPHINE SULFATE 25 MILLIGRAM(S): 50 CAPSULE, EXTENDED RELEASE ORAL at 08:52

## 2023-12-01 RX ADMIN — IRON SUCROSE 110 MILLIGRAM(S): 20 INJECTION, SOLUTION INTRAVENOUS at 12:50

## 2023-12-01 RX ADMIN — MORPHINE SULFATE 25 MILLIGRAM(S): 50 CAPSULE, EXTENDED RELEASE ORAL at 09:00

## 2023-12-01 RX ADMIN — Medication 200 MILLIGRAM(S): at 21:20

## 2023-12-01 RX ADMIN — Medication 975 MILLIGRAM(S): at 22:00

## 2023-12-01 RX ADMIN — METHOCARBAMOL 500 MILLIGRAM(S): 500 TABLET, FILM COATED ORAL at 21:19

## 2023-12-01 RX ADMIN — Medication 200 MILLIGRAM(S): at 05:32

## 2023-12-01 RX ADMIN — MORPHINE SULFATE 25 MILLIGRAM(S): 50 CAPSULE, EXTENDED RELEASE ORAL at 18:14

## 2023-12-01 RX ADMIN — MORPHINE SULFATE 10 MILLIGRAM(S): 50 CAPSULE, EXTENDED RELEASE ORAL at 23:20

## 2023-12-01 RX ADMIN — NALOXEGOL OXALATE 12.5 MILLIGRAM(S): 12.5 TABLET, FILM COATED ORAL at 12:11

## 2023-12-01 RX ADMIN — Medication 200 MILLIGRAM(S): at 15:04

## 2023-12-01 RX ADMIN — MORPHINE SULFATE 25 MILLIGRAM(S): 50 CAPSULE, EXTENDED RELEASE ORAL at 19:00

## 2023-12-01 RX ADMIN — ENOXAPARIN SODIUM 40 MILLIGRAM(S): 100 INJECTION SUBCUTANEOUS at 05:32

## 2023-12-01 RX ADMIN — METHOCARBAMOL 500 MILLIGRAM(S): 500 TABLET, FILM COATED ORAL at 05:33

## 2023-12-01 RX ADMIN — MORPHINE SULFATE 25 MILLIGRAM(S): 50 CAPSULE, EXTENDED RELEASE ORAL at 02:50

## 2023-12-01 RX ADMIN — METHOCARBAMOL 500 MILLIGRAM(S): 500 TABLET, FILM COATED ORAL at 15:04

## 2023-12-01 RX ADMIN — Medication 975 MILLIGRAM(S): at 15:11

## 2023-12-01 RX ADMIN — PANTOPRAZOLE SODIUM 40 MILLIGRAM(S): 20 TABLET, DELAYED RELEASE ORAL at 05:33

## 2023-12-01 RX ADMIN — MORPHINE SULFATE 25 MILLIGRAM(S): 50 CAPSULE, EXTENDED RELEASE ORAL at 01:58

## 2023-12-01 RX ADMIN — Medication 975 MILLIGRAM(S): at 06:33

## 2023-12-01 RX ADMIN — Medication 3 MILLIGRAM(S): at 21:20

## 2023-12-01 NOTE — PROGRESS NOTE ADULT - ASSESSMENT
Mrs. Savi Ivy is a 48 year old female patient with past medical history of chronic back pain (s/p multiple spinal surgeries), asthma, obesity (s/p gastric bypass), and HTN who is admitted for Acute Inpatient Rehabilitation with a multidisciplinary rehab program at [facility] with functional impairments in ADLs and mobility secondary to chronic back pain s/p multiple spinal surgeries treated further surgically with an elective revision of a PSF with extension of fusion with instrumentation from T3 through pelvis, L4 pedicle subtraction osteotomy (PSO), tethers, iliac fixation, T3-T10 Duvall-Meneses osteotomies with Plastic Surgery closure whose intraoperative course was remarkable for acute blood loss requiring 3 unit PRBC transfusion with one additional PRBC post-op. Post-op hospital course additionally significant for urinary retention.    Spinal stenosis with neurogenic claudication s/p T4-Pelvis revision surgery  - S/P elective revision of PSF with extension of fusion with instrumentation from T3 through pelvis (11/14)  - S/P L4 pedicle subtraction osteotomy (PSO)  - S/P proximal tethers and iliac fixation  - S/P T3-T10 Smith-Meneses osteotomies with Plastic Surgery closure   - HV x 2 and SHAWNA x 4 drains removed on 11/20   - Impaired ADLs and mobility  - Need for assistance with personal care   - Continue comprehensive rehab program of PT/OT - 3 hours a day, 5 days a week. P&O as needed   - Pain control as below  - Fall/Spinal precautions  - WBAT    Pain Control   - Initial regimen     * Tylenol 975 mg Q8H     * Cymbalta 60 mg QD     * Lyrica 200 mg TID     * Robaxin 500 mg TID     * Fentanyl Patch 50 mcg Q72H     * PRN: Morphine 15 mg Q4H mod pain, 30 mg Q4H severe pain  - Current regimen as follows (changed on 11/29):     * acetaminophen     Tablet .. 975 milliGRAM(s) Oral every 8 hours     * DULoxetine 60 milliGRAM(s) Oral daily     * methocarbamol 500 milliGRAM(s) Oral three times a day     * fentaNYL   Patch  25 MICROgram(s)/Hr 1 Patch Transdermal every 72 hours     * pregabalin 200 milliGRAM(s) Oral three times a day     * morphine   Solution 10 milliGRAM(s) Oral every 4 hours PRN Moderate Pain (4 - 6)     * morphine   Solution 25 milliGRAM(s) Oral every 4 hours PRN Severe Pain (7 - 10)  - follow with pain management outpatient  - evaluated by pain management at [facility] who recommended discharge on home dose of nucynta 75mg 4x/day, fentanyl patch, as well as robaxin 500mg PO 3x/day and lyrica 200mg PO 3x/day      Acute Blood Loss Anemia  - s/p 3u PRBCs intra-op and one additional PRBC post-op  - monitor H/H    Asthma  - albuterol inhaler Q6H PRN    HTN  - continue triamterene 37.5mg- HCTZ 25mg QD  - Monitor BP    Mood / Cognition  - Neuropsychology consult PRN    Sleep  - Maintain quiet hours and a low stim environment.   - Melatonin 3 mg QHS     GI / Bowel  - at risk for constipation due to neurologic diagnosis, immobility, and/or medication use  - Senna qHS  - Miralax BID  - Movantik 12.5 mg QD  - Dulcolax 5 mg BID PRN constipation  - GI ppx: pantoprazole 40 mg QD     / Bladder  - Currently patient voids independently  - Cobb removed 11/21 and passed TOV    Skin:  - Skin assessment on admission performed : Left buttock healing wound with scab, spinal surgical incision clean an dry and left LIVE (11/25)  - Pressure Injury/Skin: OOB to chair, PT/OT  - nursing to monitor skin q Shift    Diet:  - Diet Consistency: regular  - Aspiration Precautions  - Nutrition consult    DVT prophylaxis:   - Lovenox 40 mg QD    Outpatient Follow-up:  Schwab, Frank Johann  Orthopaedic Surgery  [address]  [place] [address]  Phone: [phone]  Fax: [phone]    Code Status/Emergency Contact:    ---------------    Goals: Safe discharge to home  Estimated Length of Stay: 10-14 days  Rehab Potential: Good  Medical Prognosis: Good  Estimated Disposition: Home with home care      PRESCREEN COMPARISON:  I have reviewed the prescreen information and I have found no relevant changes between the preadmission screening and my post admission evaluation.    RATIONALE FOR INPATIENT ADMISSION: Patient demonstrates the following:  [X] Medically appropriate for rehabilitation admission  [X] Has attainable rehab goals with an appropriate initial discharge plan  [X]Has rehabilitation potential (expected to make a significant improvement within a reasonable period of time)  [X] Requires close medical management by a rehab physician, rehab nursing care, Hospitalist and comprehensive interdisciplinary team (including PT, OT and/or SLP, Prosthetics and Orthotics)

## 2023-12-01 NOTE — CHART NOTE - NSCHARTNOTEFT_GEN_A_CORE
NUTRITION FOLLOW UP    SOURCE: Patient [X]   Family [ ]    Medical Record [X]    DIET: Regular   ALLERGIES: Shellfish    IMPRESSION:  Met with pt this AM at bedside. Pt was seated upright and was able to articulate her nutrition hx well. Pt reported good appetite and adequate POs. Pt denied N/V/D/C, stated last BM 11/30. Encouraged pt to continue to consume protein-rich foods to regain strength and remain in adequate nutrition status. Pt without overt nutrition concerns at this time.     PATIENT REPORT: [X] other: no GI distress    PO INTAKE: % per RN flowsheets    CURRENT WEIGHT: 200.4 lbs (11/22)  WEIGHT HX: N/A    PERTINENT MEDS:   Pertinent Medications: MEDICATIONS  (STANDING):  acetaminophen     Tablet .. 975 milliGRAM(s) Oral every 8 hours  DULoxetine 60 milliGRAM(s) Oral daily  enoxaparin Injectable 40 milliGRAM(s) SubCutaneous every 24 hours  fluticasone propionate 50 MICROgram(s)/spray Nasal Spray 1 Spray(s) Both Nostrils two times a day  melatonin. 3 milliGRAM(s) Oral at bedtime  methocarbamol 500 milliGRAM(s) Oral three times a day  multivitamin 1 Tablet(s) Oral daily  naloxegol 12.5 milliGRAM(s) Oral daily  pantoprazole    Tablet 40 milliGRAM(s) Oral before breakfast  polyethylene glycol 3350 17 Gram(s) Oral two times a day  pregabalin 200 milliGRAM(s) Oral three times a day  senna 2 Tablet(s) Oral at bedtime  triamterene 37.5 mG/hydrochlorothiazide 25 mG Tablet 1 Tablet(s) Oral daily    MEDICATIONS  (PRN):  albuterol    90 MICROgram(s) HFA Inhaler 2 Puff(s) Inhalation every 6 hours PRN Shortness of Breath and/or Wheezing  bisacodyl 5 milliGRAM(s) Oral every 12 hours PRN Constipation  hemorrhoidal Ointment 1 Application(s) Rectal two times a day PRN hemorrhoidal pain  morphine   Solution 10 milliGRAM(s) Oral every 4 hours PRN Moderate Pain (4 - 6)  morphine   Solution 25 milliGRAM(s) Oral every 4 hours PRN Severe Pain (7 - 10)    PERTINENT LABS:  11-30 Na140 mmol/L Glu 97 mg/dL K+ 4.1 mmol/L Cr  0.57 mg/dL BUN 11 mg/dL 11-30 Alb 2.0 g/dL<L>    SKIN: No pressure injury per RN flowsheets  EDEMA: N/A    ESTIMATED NEEDS:   [X] no change since previous assessment     PREVIOUS NUTRITION DIAGNOSIS: Increased nutrient needs    NUTRITION DIAGNOSIS is: [X] Ongoing - pt to meet >75% kcal and protein needs    NEW NUTRITION DIAGNOSIS: N/A    MONITORING AND EVALUATION:   Current diet order is appropriate and is well tolerated, will continue to monitor:  - Food and nutrient intake/POs  - Nutrition related lab values  - Weight/weight trends  - GI functions    NUTRITION RECOMMENDATIONS:   1. Continue with  regular diet, as tolerated  2. Appreciate weekly weight trends  3. Continue with MVI for general nutrient coverage    Natasha Gan RDN also available via TEAMS

## 2023-12-01 NOTE — CONSULT NOTE ADULT - NS ATTEND AMEND GEN_ALL_CORE FT
Patient seen and examined at the bedside. Agree with the assessment and plan of URSULA Howard.  Anemia with no visible GI bleeding. Recent EGD and colonoscopy.  FOBT is not an appropriate test for inpatient evaluation of anemia.  Chronic iron deficiency. Agree with IV replacement.  Would not perform EGD or colonoscopy at present. Monitor CBC.

## 2023-12-01 NOTE — CONSULT NOTE ADULT - PROBLEM SELECTOR RECOMMENDATION 9
Anemia multifactorial including post op , or gastritis   She had Recent GI procedure Anemia multifactorial including post op, or gastritis   She had EGD/ Colonoscopy 2 months before  Monitor H&H  Keep active T& S   Monitor bowel movement   Check iron studies and B12 level   Continue PPI  Avoid NSAIDs

## 2023-12-01 NOTE — CONSULT NOTE ADULT - ASSESSMENT
Mrs. Savi Ivy is a 48 year old female patient with past medical history of chronic back pain (s/p multiple spinal surgeries), asthma, obesity (s/p gastric bypass), and HTN who presented to [facility] on 11/14 for an elective revision of a PSF with extension of fusion with instrumentation from T3 throgh pelvis, pedicle subtraction osteotomy (PSO) L4, tethers, iliac fixation, smith clark osteotomies T3-T10 with Plastic Surgery closure.  Intraoperative course with acute blood loss requiring 3 unit PRBC transfusion with one additional PRBC post-op. Post-op hospital course significant for urinary retention, s/p paulino removal 11/21 and TOV passed.  GI Consultation  for anemia   As per patient had EGD / colonoscopy 2 months before was told EGD with gastritis she is on PPI  Mrs. Savi Ivy is a 48 year old female patient with past medical history of chronic back pain (s/p multiple spinal surgeries), asthma, obesity (s/p gastric bypass), and HTN who presented to [facility] on 11/14 for an elective revision of a PSF with extension of fusion with instrumentation from T3 through pelvis, pedicle subtraction osteotomy (PSO) L4, tethers, iliac fixation, smith clark  osteotomies T3-T10 with Plastic Surgery closure.  Intraoperative course with acute blood loss requiring 3 unit PRBC transfusion with one additional PRBC post-op. Post-op hospital course significant for urinary retention, s/p paulino removal 11/21 and TOV passed.  GI Consultation  for anemia As per patient had EGD / colonoscopy 2 months before.  Mrs. Savi Ivy is a 48 year old female patient with past medical history of chronic back pain (s/p multiple spinal surgeries), asthma, obesity (s/p gastric bypass), and HTN who presented to [facility] on 11/14 for an elective revision of a PSF with extension of fusion with instrumentation from T3 through pelvis, pedicle subtraction osteotomy (PSO) L4, tethers, iliac fixation, smith clark  osteotomies T3-T10 with Plastic Surgery closure.  Intraoperative course with acute blood loss requiring 3 unit PRBC transfusion with one additional PRBC post-op. Post-op hospital course significant for urinary retention, s/p paulino removal 11/21 and TOV passed.  GI Consultation  for anemia.  As per patient had EGD / colonoscopy 2 months before.

## 2023-12-01 NOTE — PROGRESS NOTE ADULT - SUBJECTIVE AND OBJECTIVE BOX
Patient is a 48y old  Female who presents with a chief complaint of Spinal stenosis with neurogenic claudication s/p T4-Pelvis revision surgery (30 Nov 2023 12:44)    Denies N/V, abd pain; had a brown BM; denies bloody BM/melena  Had an EGD/colonoscopy early November; polyp removed  Had gastric-bypass done in 2017  Ususally needs IV iron infusion    Patient seen and examined at bedside.    ALLERGIES:  shellfish (Hives; Short breath)  diclofenac (Other)    MEDICATIONS  (STANDING):  acetaminophen     Tablet .. 975 milliGRAM(s) Oral every 8 hours  DULoxetine 60 milliGRAM(s) Oral daily  enoxaparin Injectable 40 milliGRAM(s) SubCutaneous every 24 hours  fluticasone propionate 50 MICROgram(s)/spray Nasal Spray 1 Spray(s) Both Nostrils two times a day  melatonin. 3 milliGRAM(s) Oral at bedtime  methocarbamol 500 milliGRAM(s) Oral three times a day  multivitamin 1 Tablet(s) Oral daily  naloxegol 12.5 milliGRAM(s) Oral daily  pantoprazole    Tablet 40 milliGRAM(s) Oral before breakfast  polyethylene glycol 3350 17 Gram(s) Oral two times a day  pregabalin 200 milliGRAM(s) Oral three times a day  senna 2 Tablet(s) Oral at bedtime  triamterene 37.5 mG/hydrochlorothiazide 25 mG Tablet 1 Tablet(s) Oral daily    MEDICATIONS  (PRN):  albuterol    90 MICROgram(s) HFA Inhaler 2 Puff(s) Inhalation every 6 hours PRN Shortness of Breath and/or Wheezing  bisacodyl 5 milliGRAM(s) Oral every 12 hours PRN Constipation  hemorrhoidal Ointment 1 Application(s) Rectal two times a day PRN hemorrhoidal pain  morphine   Solution 10 milliGRAM(s) Oral every 4 hours PRN Moderate Pain (4 - 6)  morphine   Solution 25 milliGRAM(s) Oral every 4 hours PRN Severe Pain (7 - 10)    Vital Signs Last 24 Hrs  T(F): 97.3 (01 Dec 2023 07:34), Max: 99.4 (30 Nov 2023 20:12)  HR: 70 (01 Dec 2023 07:34) (62 - 83)  BP: 102/68 (01 Dec 2023 07:34) (102/68 - 113/64)  RR: 14 (01 Dec 2023 07:34) (14 - 16)  SpO2: 97% (01 Dec 2023 07:34) (93% - 97%)  I&O's Summary    BMI (kg/m2): 36.6 (11-30-23 @ 12:44)  PHYSICAL EXAM:  General: NAD, A/O x 3  ENT: MMM, no scleral icterus  Neck: Supple, No JVD, no thyroidomegaly  Lungs: Clear to auscultation bilaterally, no wheezes, no rales, no rhonchi, good inspiratory effort  Cardio: RRR, S1/S2, No murmurs  Abdomen: Soft, Nontender, Nondistended; Bowel sounds present  Extremities: No calf tenderness, No pitting edema, no skin changes    LABS:                        7.1    5.13  )-----------( 524      ( 01 Dec 2023 05:14 )             22.8       11-30    140  |  105  |  11  ----------------------------<  97  4.1   |  29  |  0.57    Ca    8.4      30 Nov 2023 06:56    TPro  6.0  /  Alb  2.0  /  TBili  0.2  /  DBili  x   /  AST  29  /  ALT  23  /  AlkPhos  120  11-30       PT/INR - ( 30 Nov 2023 11:53 )   PT: 11.8 sec;   INR: 1.04 ratio    PTT - ( 30 Nov 2023 11:53 )  PTT:33.6 sec     TSH 1.106   TSH with FT4 reflex --  Total T3 --    Urinalysis Basic - ( 30 Nov 2023 06:56 )    Color: x / Appearance: x / SG: x / pH: x  Gluc: 97 mg/dL / Ketone: x  / Bili: x / Urobili: x   Blood: x / Protein: x / Nitrite: x   Leuk Esterase: x / RBC: x / WBC x   Sq Epi: x / Non Sq Epi: x / Bacteria: x    COVID-19 PCR: NotDetec (11-22-23 @ 21:39)  COVID-19 PCR: NotDetec (11-22-23 @ 21:39)        Care Discussed with Consultants/Other Providers:   GI

## 2023-12-01 NOTE — PROGRESS NOTE ADULT - SUBJECTIVE AND OBJECTIVE BOX
HPI:  Mrs. Savi Ivy is a 48 year old female patient with past medical history of chronic back pain (s/p multiple spinal surgeries), asthma, obesity (s/p gastric bypass), and HTN who presented to [facility] on 11/14 for an elective revision of a PSF with extension of fusion with instrumentation from T3 through pelvis, pedicle subtraction osteotomy (PSO) L4, tethers, iliac fixation, smith clark osteotomies T3-T10 with Plastic Surgery closure with Dr. Schwab, Dr. Gonzalez and Dr. Simpson. Intraoperative course with acute blood loss requiring 3 unit PRBC transfusion with one additional PRBC post-op. Post-op hospital course significant for urinary retention, s/p paulino removal 11/21 and TOV passed. Patient evaluated by PT/OT and was recommended for acute inpatient rehab. Patient is medically stable for discharge to [facility] on 11/22.     TDD: 12/6  ___________________________________________________________________________    SUBJECTIVE/ROS  Patient was seen and evaluated at bedside today alongside her spouse.  Reported restful sleep and is in no acute distress.  Hgb 7.2 today with significant iron deficiency. Hospitalist supplementing.  Patient tolerating tapering of analgesia but with intermittent pain.  A discussion took place with patient and her spouse with focus on expectations ahead of discharge yesterday.  All questions were answered and they expressed understanding and agreement with plan discussed.   Reviewed weekend coverage with patient.  Patient expressed understanding and felt comfortable.  Patient is motivated to continue participation on the recommended rehabilitation program.   Denies any CP, SOB, CALLAWAY, palpitations, fever, chills, body aches, cough, congestion, or any other symptoms at this time.   ___________________________________________________________________________    CT THORACIC SPINE AND LUMBAR SPINE (11/27/2023)    IMPRESSION: Recent thoracic fusion T4-T9 with screws and connecting rods and immature bony fusion mass. More remote fusion T10 through the sacrum with transpedicular screws and mature bony fusion mass.  ___________________________________________________________________________    Vital Signs Last 24 Hrs  T(C): 36.3 (01 Dec 2023 07:34), Max: 37.4 (30 Nov 2023 20:12)  T(F): 97.3 (01 Dec 2023 07:34), Max: 99.4 (30 Nov 2023 20:12)  HR: 70 (01 Dec 2023 07:34) (62 - 83)  BP: 102/68 (01 Dec 2023 07:34) (102/68 - 113/64)  RR: 14 (01 Dec 2023 07:34) (14 - 16)  SpO2: 97% (01 Dec 2023 07:34) (93% - 97%)    ___________________________________________________________________________    LAB                        7.1    5.13  )-----------( 524      ( 01 Dec 2023 05:14 )             22.8                             7.8    6.73  )-----------( 547      ( 30 Nov 2023 11:53 )             25.3                             7.2    6.08  )-----------( 527      ( 30 Nov 2023 06:56 )             23.6         11-30    140  |  105  |  11  ----------------------------<  97  4.1   |  29  |  0.57    Ca    8.4      30 Nov 2023 06:56    TPro  6.0  /  Alb  2.0<L>  /  TBili  0.2  /  DBili  x   /  AST  29  /  ALT  23  /  AlkPhos  120  11-30    LIVER FUNCTIONS - ( 30 Nov 2023 06:56 )  Alb: 2.0 g/dL / Pro: 6.0 g/dL / ALK PHOS: 120 U/L / ALT: 23 U/L / AST: 29 U/L / GGT: x           PT/INR - ( 30 Nov 2023 11:53 )   PT: 11.8 sec;   INR: 1.04 ratio    PTT - ( 30 Nov 2023 11:53 )  PTT:33.6 sec    Free Thyroxine, Serum in AM (12.01.23 @ 05:14)   Free Thyroxine, Serum: 1.0 ng/dL    Thyroid Stimulating Hormone, Serum in AM (12.01.23 @ 05:14)   Thyroid Stimulating Hormone, Serum: 1.106 uIU/mL    Iron with Total Binding Capacity in AM (12.01.23 @ 05:14)   Iron - Total Binding Capacity.: 243 ug/dL  % Saturation, Iron: 7 %  Iron Total: 17 ug/dL  Unsaturated Iron Binding Capacity: 226 ug/dL    Folate, Serum in AM (12.01.23 @ 05:14)   Folate, Serum: 9.4 ng/mL    Occult Blood, Feces (11.30.23 @ 13:26)   Occult Blood, Feces: Positive    Antibody Screen Interpretation (11.30.23 @ 09:32)   Antibody Screen: NEG    Type + Screen (11.30.23 @ 09:32)   ABO RH Interpretation: O POS    Vitamin B12, Serum (11.27.23 @ 05:29)   Vitamin B12, Serum: 885 pg/mL    ___________________________________________________________________________    MEDICATIONS  (STANDING):  acetaminophen     Tablet .. 975 milliGRAM(s) Oral every 8 hours  DULoxetine 60 milliGRAM(s) Oral daily  enoxaparin Injectable 40 milliGRAM(s) SubCutaneous every 24 hours  fluticasone propionate 50 MICROgram(s)/spray Nasal Spray 1 Spray(s) Both Nostrils two times a day  iron sucrose IVPB 200 milliGRAM(s) IV Intermittent every 24 hours  melatonin. 3 milliGRAM(s) Oral at bedtime  methocarbamol 500 milliGRAM(s) Oral three times a day  multivitamin 1 Tablet(s) Oral daily  naloxegol 12.5 milliGRAM(s) Oral daily  pantoprazole    Tablet 40 milliGRAM(s) Oral before breakfast  polyethylene glycol 3350 17 Gram(s) Oral two times a day  pregabalin 200 milliGRAM(s) Oral three times a day  senna 2 Tablet(s) Oral at bedtime  triamterene 37.5 mG/hydrochlorothiazide 25 mG Tablet 1 Tablet(s) Oral daily    MEDICATIONS  (PRN):  albuterol    90 MICROgram(s) HFA Inhaler 2 Puff(s) Inhalation every 6 hours PRN Shortness of Breath and/or Wheezing  bisacodyl 5 milliGRAM(s) Oral every 12 hours PRN Constipation  hemorrhoidal Ointment 1 Application(s) Rectal two times a day PRN hemorrhoidal pain  morphine   Solution 10 milliGRAM(s) Oral every 4 hours PRN Moderate Pain (4 - 6)  morphine   Solution 25 milliGRAM(s) Oral every 4 hours PRN Severe Pain (7 - 10)    ___________________________________________________________________________    PHYSICAL EXAM:  Gen - NAD, Comfortable  HEENT - NCAT, EOMI, MMM  Neck - Supple, No limited ROM  Pulm - breathing comfortably on room air  Cardiovascular - warm and well perfused  Abdomen - Soft, NT, ND  Extremities - No Cyanosis, no clubbing, no edema, no calf tenderness  Neuro-     Cognitive - awake, alert, oriented to person, place, date, year, and situation.  Able to follow command     Motor -                     LEFT    UE - 4/5                    RIGHT UE - 4/5                    LEFT    LE - 4/5                    RIGHT LE - 4/5        Sensory - Intact to LT   Psychiatric - Mood stable, Affect WNL  Skin:  left buttock healing wound with scab, spinal surgical incision clean and dry and left LIVE    ___________________________________________________________________________

## 2023-12-01 NOTE — CONSULT NOTE ADULT - SUBJECTIVE AND OBJECTIVE BOX
INTERVAL HPI/OVERNIGHT EVENTS:  HPI:  Mrs. Savi Ivy is a 48 year old female patient with past medical history of chronic back pain (s/p multiple spinal surgeries), asthma, obesity (s/p gastric bypass), and HTN who presented to [facility] on  for an elective revision of a PSF with extension of fusion with instrumentation from T3 throgh pelvis, pedicle subtraction osteotomy (PSO) L4, tethers, iliac fixation, smith clark osteotomies T3-T10 with Plastic Surgery closure with Dr. Schwab, Dr. Gonzalez and Dr. Simpson. Intraoperative course with acute blood loss requiring 3 unit PRBC transfusion with one additional PRBC post-op. Post-op hospital course significant for urinary retention, s/p paulino removal  and TOV passed. Patient evaluated by PT/OT and was recommended for acute inpatient rehab. Patient is medically stable for discharge to [facility] on .    (2023 13:26)    GI consultation for anemia. Patient seen examined at bed side, Denies abdominal pain, nausea, vomiting diarrhea or constipation. No change in bowel patter. Denies melena  or hematochezia.   She had EGD colonoscopy 2 month before as pre op work up  and was told she has gastritis started Omeprazole and one benign polyp removed.       MEDICATIONS  (STANDING):  acetaminophen     Tablet .. 975 milliGRAM(s) Oral every 8 hours  DULoxetine 60 milliGRAM(s) Oral daily  enoxaparin Injectable 40 milliGRAM(s) SubCutaneous every 24 hours  fluticasone propionate 50 MICROgram(s)/spray Nasal Spray 1 Spray(s) Both Nostrils two times a day  melatonin. 3 milliGRAM(s) Oral at bedtime  methocarbamol 500 milliGRAM(s) Oral three times a day  multivitamin 1 Tablet(s) Oral daily  naloxegol 12.5 milliGRAM(s) Oral daily  pantoprazole    Tablet 40 milliGRAM(s) Oral before breakfast  polyethylene glycol 3350 17 Gram(s) Oral two times a day  pregabalin 200 milliGRAM(s) Oral three times a day  senna 2 Tablet(s) Oral at bedtime  triamterene 37.5 mG/hydrochlorothiazide 25 mG Tablet 1 Tablet(s) Oral daily    MEDICATIONS  (PRN):  albuterol    90 MICROgram(s) HFA Inhaler 2 Puff(s) Inhalation every 6 hours PRN Shortness of Breath and/or Wheezing  bisacodyl 5 milliGRAM(s) Oral every 12 hours PRN Constipation  hemorrhoidal Ointment 1 Application(s) Rectal two times a day PRN hemorrhoidal pain  morphine   Solution 10 milliGRAM(s) Oral every 4 hours PRN Moderate Pain (4 - 6)  morphine   Solution 25 milliGRAM(s) Oral every 4 hours PRN Severe Pain (7 - 10)      Allergies    shellfish (Hives; Short breath)  diclofenac (Other)    Intolerances        PAST MEDICAL & SURGICAL HISTORY:  Asthma      Scoliosis (and kyphoscoliosis), idiopathic      Lumbar stenosis with neurogenic claudication      Spondylisthesis      Bilateral sciatica      History of anemia      Varicose veins of both lower extremities      HTN (hypertension)      S/P  section  X3; ,,       Gastric bypass status for obesity        H/O varicose vein ligation  b/l thigh      History of hip replacement  left      History of bunionectomy  b/l      History of cervical spinal surgery      History of lumbosacral spine surgery  + rods/screw      H/O medial meniscus repair of left knee      REVIEW OF SYSTEMS	  See HPI     PHYSICAL EXAM:   Vital Signs:  Vital Signs Last 24 Hrs  T(C): 36.3 (01 Dec 2023 07:34), Max: 37.4 (2023 20:12)  T(F): 97.3 (01 Dec 2023 07:34), Max: 99.4 (2023 20:12)  HR: 70 (01 Dec 2023 07:34) (62 - 83)  BP: 102/68 (01 Dec 2023 07:34) (102/68 - 113/64)  BP(mean): --  RR: 14 (01 Dec 2023 07:34) (14 - 16)  SpO2: 97% (01 Dec 2023 07:34) (93% - 97%)    Parameters below as of 01 Dec 2023 07:34  Patient On (Oxygen Delivery Method): room air      Daily Height in cm: 157.48 (2023 12:44)    Daily I&O's Summary      GENERAL:  Appears stated age  HEENT:  NC/AT,  conjunctivae clear and pink  CHEST:  Full & symmetric excursion  HEART:  Regular rhythm, S1, S2  ABDOMEN:  Soft, non-tender, non-distended, normoactive bowel sounds  EXTEREMITIES:  no cyanosis, clubbing or edema  SKIN:  No rash/warm/dry  NEURO:  Alert, oriented    LABS:                        7.1    5.13  )-----------( 524      ( 01 Dec 2023 05:14 )             22.8         140  |  105  |  11  ----------------------------<  97  4.1   |  29  |  0.57    Ca    8.4      2023 06:56    TPro  6.0  /  Alb  2.0<L>  /  TBili  0.2  /  DBili  x   /  AST  29  /  ALT  23  /  AlkPhos  120  -    PT/INR - ( 2023 11:53 )   PT: 11.8 sec;   INR: 1.04 ratio         PTT - ( 2023 11:53 )  PTT:33.6 sec  Urinalysis Basic - ( 2023 06:56 )    Color: x / Appearance: x / SG: x / pH: x  Gluc: 97 mg/dL / Ketone: x  / Bili: x / Urobili: x   Blood: x / Protein: x / Nitrite: x   Leuk Esterase: x / RBC: x / WBC x   Sq Epi: x / Non Sq Epi: x / Bacteria: x      amylase   lipase  RADIOLOGY & ADDITIONAL TESTS:   INTERVAL HPI/OVERNIGHT EVENTS:  HPI:  Mrs. Savi Ivy is a 48 year old female patient with past medical history of chronic back pain (s/p multiple spinal surgeries), asthma, obesity (s/p gastric bypass), and HTN who presented to [facility] on  for an elective revision of a PSF with extension of fusion with instrumentation from T3 throgh pelvis, pedicle subtraction osteotomy (PSO) L4, tethers, iliac fixation, smith clark osteotomies T3-T10 with Plastic Surgery closure with Dr. Schwab, Dr. Gonzalez and Dr. Simpson. Intraoperative course with acute blood loss requiring 3 unit PRBC transfusion with one additional PRBC post-op. Post-op hospital course significant for urinary retention, s/p paulino removal  and TOV passed. Patient evaluated by PT/OT and was recommended for acute inpatient rehab. Patient is medically stable for discharge to [facility] on .    (2023 13:26)    GI consultation for anemia. Patient seen examined at bed side. She has chronic back pain had multiple spinal surgeries. She had EGD colonoscopy 2 month before as pre op work up with [facility] gastroenterologist, result not available. As per patient she has gastritis started Omeprazole and one benign polyp removed. She had multiple abdominal surgeries 3 C suction and had gastric bypass surgery.  Denies abdominal pain, nausea, vomiting diarrhea or constipation. No change in bowel patter. Denies melena  or hematochezia.       MEDICATIONS  (STANDING):  acetaminophen     Tablet .. 975 milliGRAM(s) Oral every 8 hours  DULoxetine 60 milliGRAM(s) Oral daily  enoxaparin Injectable 40 milliGRAM(s) SubCutaneous every 24 hours  fluticasone propionate 50 MICROgram(s)/spray Nasal Spray 1 Spray(s) Both Nostrils two times a day  melatonin. 3 milliGRAM(s) Oral at bedtime  methocarbamol 500 milliGRAM(s) Oral three times a day  multivitamin 1 Tablet(s) Oral daily  naloxegol 12.5 milliGRAM(s) Oral daily  pantoprazole    Tablet 40 milliGRAM(s) Oral before breakfast  polyethylene glycol 3350 17 Gram(s) Oral two times a day  pregabalin 200 milliGRAM(s) Oral three times a day  senna 2 Tablet(s) Oral at bedtime  triamterene 37.5 mG/hydrochlorothiazide 25 mG Tablet 1 Tablet(s) Oral daily    MEDICATIONS  (PRN):  albuterol    90 MICROgram(s) HFA Inhaler 2 Puff(s) Inhalation every 6 hours PRN Shortness of Breath and/or Wheezing  bisacodyl 5 milliGRAM(s) Oral every 12 hours PRN Constipation  hemorrhoidal Ointment 1 Application(s) Rectal two times a day PRN hemorrhoidal pain  morphine   Solution 10 milliGRAM(s) Oral every 4 hours PRN Moderate Pain (4 - 6)  morphine   Solution 25 milliGRAM(s) Oral every 4 hours PRN Severe Pain (7 - 10)      Allergies    shellfish (Hives; Short breath)  diclofenac (Other)    Intolerances        PAST MEDICAL & SURGICAL HISTORY:  Asthma      Scoliosis (and kyphoscoliosis), idiopathic      Lumbar stenosis with neurogenic claudication      Spondylisthesis      Bilateral sciatica      History of anemia      Varicose veins of both lower extremities      HTN (hypertension)      S/P  section  X3; ,,       Gastric bypass status for obesity        H/O varicose vein ligation  b/l thigh      History of hip replacement  left      History of bunionectomy  b/l      History of cervical spinal surgery      History of lumbosacral spine surgery  + rods/screw      H/O medial meniscus repair of left knee      REVIEW OF SYSTEMS	  See HPI     PHYSICAL EXAM:   Vital Signs:  Vital Signs Last 24 Hrs  T(C): 36.3 (01 Dec 2023 07:34), Max: 37.4 (2023 20:12)  T(F): 97.3 (01 Dec 2023 07:34), Max: 99.4 (2023 20:12)  HR: 70 (01 Dec 2023 07:34) (62 - 83)  BP: 102/68 (01 Dec 2023 07:34) (102/68 - 113/64)  BP(mean): --  RR: 14 (01 Dec 2023 07:34) (14 - 16)  SpO2: 97% (01 Dec 2023 07:34) (93% - 97%)    Parameters below as of 01 Dec 2023 07:34  Patient On (Oxygen Delivery Method): room air      Daily Height in cm: 157.48 (2023 12:44)    Daily I&O's Summary      GENERAL:  Appears stated age  HEENT:  NC/AT,  conjunctivae clear and pink  CHEST:  Full & symmetric excursion  HEART:  Regular rhythm, S1, S2  ABDOMEN:  Soft, non-tender, non-distended, normoactive bowel sounds  EXTEREMITIES:  no cyanosis, clubbing or edema  SKIN:  No rash/warm/dry  NEURO:  Alert, oriented    LABS:                        7.1    5.13  )-----------( 524      ( 01 Dec 2023 05:14 )             22.8     11-    140  |  105  |  11  ----------------------------<  97  4.1   |  29  |  0.57    Ca    8.4      2023 06:56    TPro  6.0  /  Alb  2.0<L>  /  TBili  0.2  /  DBili  x   /  AST  29  /  ALT  23  /  AlkPhos  120      PT/INR - ( 2023 11:53 )   PT: 11.8 sec;   INR: 1.04 ratio         PTT - ( 2023 11:53 )  PTT:33.6 sec  Urinalysis Basic - ( 2023 06:56 )    Color: x / Appearance: x / SG: x / pH: x  Gluc: 97 mg/dL / Ketone: x  / Bili: x / Urobili: x   Blood: x / Protein: x / Nitrite: x   Leuk Esterase: x / RBC: x / WBC x   Sq Epi: x / Non Sq Epi: x / Bacteria: x      amylase   lipase  RADIOLOGY & ADDITIONAL TESTS:   GI Consult    Mrs. Savi Ivy is a 48 year old female patient with past medical history of chronic back pain (s/p multiple spinal surgeries), asthma, obesity (s/p gastric bypass), and HTN who presented to [facility] on  for an elective revision of a PSF with extension of fusion with instrumentation from T3 throgh pelvis, pedicle subtraction osteotomy (PSO) L4, tethers, iliac fixation, smith clark osteotomies T3-T10 with Plastic Surgery closure with Dr. Schwab, Dr. Gonzalez and Dr. Simpson. Intraoperative course with acute blood loss requiring 3 unit PRBC transfusion with one additional PRBC post-op. Post-op hospital course significant for urinary retention, s/p paulino removal  and TOV passed. Patient evaluated by PT/OT and was recommended for acute inpatient rehab. Patient is medically stable for discharge to [facility] on .     GI consultation for anemia. Patient seen examined at bed side. She has chronic back pain had multiple spinal surgeries. She had EGD colonoscopy 2 month before as pre op work up with [facility] gastroenterologist, result not available. As per patient she has gastritis and started omeprazole, and one benign polyp removed. She had multiple abdominal surgeries 3 C suction and had gastric bypass surgery.  Denies abdominal pain, nausea, vomiting diarrhea or constipation. No change in bowel pattern. Denies melena  or hematochezia.       MEDICATIONS  (STANDING):  acetaminophen     Tablet .. 975 milliGRAM(s) Oral every 8 hours  DULoxetine 60 milliGRAM(s) Oral daily  enoxaparin Injectable 40 milliGRAM(s) SubCutaneous every 24 hours  fluticasone propionate 50 MICROgram(s)/spray Nasal Spray 1 Spray(s) Both Nostrils two times a day  melatonin. 3 milliGRAM(s) Oral at bedtime  methocarbamol 500 milliGRAM(s) Oral three times a day  multivitamin 1 Tablet(s) Oral daily  naloxegol 12.5 milliGRAM(s) Oral daily  pantoprazole    Tablet 40 milliGRAM(s) Oral before breakfast  polyethylene glycol 3350 17 Gram(s) Oral two times a day  pregabalin 200 milliGRAM(s) Oral three times a day  senna 2 Tablet(s) Oral at bedtime  triamterene 37.5 mG/hydrochlorothiazide 25 mG Tablet 1 Tablet(s) Oral daily    MEDICATIONS  (PRN):  albuterol    90 MICROgram(s) HFA Inhaler 2 Puff(s) Inhalation every 6 hours PRN Shortness of Breath and/or Wheezing  bisacodyl 5 milliGRAM(s) Oral every 12 hours PRN Constipation  hemorrhoidal Ointment 1 Application(s) Rectal two times a day PRN hemorrhoidal pain  morphine   Solution 10 milliGRAM(s) Oral every 4 hours PRN Moderate Pain (4 - 6)  morphine   Solution 25 milliGRAM(s) Oral every 4 hours PRN Severe Pain (7 - 10)      Allergies    shellfish (Hives; Short breath)  diclofenac (Other)    Intolerances        PAST MEDICAL & SURGICAL HISTORY:  Asthma      Scoliosis (and kyphoscoliosis), idiopathic      Lumbar stenosis with neurogenic claudication      Spondylisthesis      Bilateral sciatica      History of anemia      Varicose veins of both lower extremities      HTN (hypertension)      S/P  section  X3; ,,       Gastric bypass status for obesity        H/O varicose vein ligation  b/l thigh      History of hip replacement  left      History of bunionectomy  b/l      History of cervical spinal surgery      History of lumbosacral spine surgery  + rods/screw      H/O medial meniscus repair of left knee      REVIEW OF SYSTEMS	  See HPI     PHYSICAL EXAM:   Vital Signs:  Vital Signs Last 24 Hrs  T(C): 36.3 (01 Dec 2023 07:34), Max: 37.4 (2023 20:12)  T(F): 97.3 (01 Dec 2023 07:34), Max: 99.4 (2023 20:12)  HR: 70 (01 Dec 2023 07:34) (62 - 83)  BP: 102/68 (01 Dec 2023 07:34) (102/68 - 113/64)  BP(mean): --  RR: 14 (01 Dec 2023 07:34) (14 - 16)  SpO2: 97% (01 Dec 2023 07:34) (93% - 97%)    Parameters below as of 01 Dec 2023 07:34  Patient On (Oxygen Delivery Method): room air      Daily Height in cm: 157.48 (2023 12:44)    Daily I&O's Summary      GENERAL:  Appears stated age, NAD  HEENT:  NC/AT,  conjunctivae clear and pink  CHEST:  Full & symmetric excursion  HEART:  Regular rhythm, S1, S2  ABDOMEN:  Soft, non-tender, non-distended, normoactive bowel sounds  EXTREMITIES:  no edema  SKIN:  No rash or jaundice  NEURO:  Alert, oriented          LABS:                        7.1    5.13  )-----------( 524      ( 01 Dec 2023 05:14 )             22.8       Hemoglobin: 7.1 g/dL (. @ 05:14)   Hemoglobin: 7.8 g/dL (.23 @ 11:53)   Hemoglobin: 7.2 g/dL (. @ 06:56)   Hemoglobin: 7.5 g/dL (.. @ 05:29)   Hemoglobin: 8.0 g/dL (.. @ 06:10)   Hemoglobin: 7.7 g/dL (..23 @ 09:12)           140  |  105  |  11  ----------------------------<  97  4.1   |  29  |  0.57    Ca    8.4      2023 06:56    TPro  6.0  /  Alb  2.0<L>  /  TBili  0.2  /  DBili  x   /  AST  29  /  ALT  23  /  AlkPhos  120  11-30    PT/INR - ( 2023 11:53 )   PT: 11.8 sec;   INR: 1.04 ratio    PTT - ( 2023 11:53 )  PTT:33.6 sec      Urinalysis Basic - ( 2023 06:56 )    Color: x / Appearance: x / SG: x / pH: x  Gluc: 97 mg/dL / Ketone: x  / Bili: x / Urobili: x   Blood: x / Protein: x / Nitrite: x   Leuk Esterase: x / RBC: x / WBC x   Sq Epi: x / Non Sq Epi: x / Bacteria: x

## 2023-12-01 NOTE — PROGRESS NOTE ADULT - ASSESSMENT
48 year old female with past medical history of chronic back pain (s/p multiple spinal surgeries), asthma, obesity (s/p gastric bypass), HTN, opoid dependence, admitted for elective revision of PSF with extension of fusion with instrumentation from T3 throgh pelvis, pedicle subtraction osteotomy (PSO) L4, tethers, iliac fixation, smith clark osteotomies T3-T10 with Plastic Surgery closure with Dr. Schwab, Dr. Gonzalez and Dr. Simpson. Intraoperative course complicated by acute blood loss requiring 4PRBC. Post-op hospital course significant for urinary retention, s/p paulino removal 11/21. Now transferred to Acute rehab    Acute Blood Loss Anemia, likely multifactorial, due to post-operative blood loss, hx of gastric bypass, hx of iron deficiency  - Will monitor Hg/Hct, transfuse if Hg <7  - Noted Hg 7.1 today, will check CBC in AM  - C/w protonix daily  - GI consulted, will f/u recommendations; she already had EGD/colon earlier in November  - Iron level pending; other labs pending  - s/p 4 units PRBC post operatively    Chronic back pain s/p T4-Pelvis Revision Surgery  - S/P elective revision of PSF with extension of fusion with instrumentation from T3 through pelvis (11/14)  - S/P L4 pedicle subtraction osteotomy (PSO)  - S/P proximal tethers and iliac fixation  - S/P T3-T10 Smith-Clark osteotomies with Plastic Surgery closure   - HV x 2 and SHAWNA x 4 drains removed on 11/20   - Follow up CT T/L Spine to eval worsening pain     Asthma  -No evidence of exacerbation   -albuterol inhaler Q6H PRN    HTN  -Continue triamterene 37.5mg- HCTZ 25mg QD - has not met criteria, last dose 11/24  -Monitor BP, stable, can consider discontinuing if BP remains normotensive    DVT prophylaxis:  Lovenox 40 mg SC QD

## 2023-12-02 LAB
HCT VFR BLD CALC: 24.5 % — LOW (ref 34.5–45)
HCT VFR BLD CALC: 24.5 % — LOW (ref 34.5–45)
HGB BLD-MCNC: 7.4 G/DL — LOW (ref 11.5–15.5)
HGB BLD-MCNC: 7.4 G/DL — LOW (ref 11.5–15.5)
MCHC RBC-ENTMCNC: 27.1 PG — SIGNIFICANT CHANGE UP (ref 27–34)
MCHC RBC-ENTMCNC: 27.1 PG — SIGNIFICANT CHANGE UP (ref 27–34)
MCHC RBC-ENTMCNC: 30.2 GM/DL — LOW (ref 32–36)
MCHC RBC-ENTMCNC: 30.2 GM/DL — LOW (ref 32–36)
MCV RBC AUTO: 89.7 FL — SIGNIFICANT CHANGE UP (ref 80–100)
MCV RBC AUTO: 89.7 FL — SIGNIFICANT CHANGE UP (ref 80–100)
NRBC # BLD: 0 /100 WBCS — SIGNIFICANT CHANGE UP (ref 0–0)
NRBC # BLD: 0 /100 WBCS — SIGNIFICANT CHANGE UP (ref 0–0)
PLATELET # BLD AUTO: 510 K/UL — HIGH (ref 150–400)
PLATELET # BLD AUTO: 510 K/UL — HIGH (ref 150–400)
RBC # BLD: 2.73 M/UL — LOW (ref 3.8–5.2)
RBC # BLD: 2.73 M/UL — LOW (ref 3.8–5.2)
RBC # FLD: 14.3 % — SIGNIFICANT CHANGE UP (ref 10.3–14.5)
RBC # FLD: 14.3 % — SIGNIFICANT CHANGE UP (ref 10.3–14.5)
WBC # BLD: 4.75 K/UL — SIGNIFICANT CHANGE UP (ref 3.8–10.5)
WBC # BLD: 4.75 K/UL — SIGNIFICANT CHANGE UP (ref 3.8–10.5)
WBC # FLD AUTO: 4.75 K/UL — SIGNIFICANT CHANGE UP (ref 3.8–10.5)
WBC # FLD AUTO: 4.75 K/UL — SIGNIFICANT CHANGE UP (ref 3.8–10.5)

## 2023-12-02 PROCEDURE — 99231 SBSQ HOSP IP/OBS SF/LOW 25: CPT

## 2023-12-02 PROCEDURE — 99232 SBSQ HOSP IP/OBS MODERATE 35: CPT

## 2023-12-02 PROCEDURE — 99233 SBSQ HOSP IP/OBS HIGH 50: CPT

## 2023-12-02 RX ORDER — GABAPENTIN 400 MG/1
100 CAPSULE ORAL AT BEDTIME
Refills: 0 | Status: DISCONTINUED | OUTPATIENT
Start: 2023-12-02 | End: 2023-12-06

## 2023-12-02 RX ADMIN — MORPHINE SULFATE 25 MILLIGRAM(S): 50 CAPSULE, EXTENDED RELEASE ORAL at 13:06

## 2023-12-02 RX ADMIN — Medication 200 MILLIGRAM(S): at 21:46

## 2023-12-02 RX ADMIN — Medication 975 MILLIGRAM(S): at 21:33

## 2023-12-02 RX ADMIN — GABAPENTIN 100 MILLIGRAM(S): 400 CAPSULE ORAL at 21:36

## 2023-12-02 RX ADMIN — Medication 3 MILLIGRAM(S): at 21:34

## 2023-12-02 RX ADMIN — MORPHINE SULFATE 25 MILLIGRAM(S): 50 CAPSULE, EXTENDED RELEASE ORAL at 18:45

## 2023-12-02 RX ADMIN — PANTOPRAZOLE SODIUM 40 MILLIGRAM(S): 20 TABLET, DELAYED RELEASE ORAL at 06:31

## 2023-12-02 RX ADMIN — DULOXETINE HYDROCHLORIDE 60 MILLIGRAM(S): 30 CAPSULE, DELAYED RELEASE ORAL at 11:44

## 2023-12-02 RX ADMIN — MORPHINE SULFATE 25 MILLIGRAM(S): 50 CAPSULE, EXTENDED RELEASE ORAL at 08:52

## 2023-12-02 RX ADMIN — MORPHINE SULFATE 25 MILLIGRAM(S): 50 CAPSULE, EXTENDED RELEASE ORAL at 21:37

## 2023-12-02 RX ADMIN — Medication 1 SPRAY(S): at 17:16

## 2023-12-02 RX ADMIN — MORPHINE SULFATE 25 MILLIGRAM(S): 50 CAPSULE, EXTENDED RELEASE ORAL at 17:23

## 2023-12-02 RX ADMIN — METHOCARBAMOL 500 MILLIGRAM(S): 500 TABLET, FILM COATED ORAL at 06:31

## 2023-12-02 RX ADMIN — MORPHINE SULFATE 25 MILLIGRAM(S): 50 CAPSULE, EXTENDED RELEASE ORAL at 04:00

## 2023-12-02 RX ADMIN — MORPHINE SULFATE 10 MILLIGRAM(S): 50 CAPSULE, EXTENDED RELEASE ORAL at 00:15

## 2023-12-02 RX ADMIN — Medication 1 TABLET(S): at 11:44

## 2023-12-02 RX ADMIN — MORPHINE SULFATE 25 MILLIGRAM(S): 50 CAPSULE, EXTENDED RELEASE ORAL at 15:34

## 2023-12-02 RX ADMIN — Medication 975 MILLIGRAM(S): at 14:49

## 2023-12-02 RX ADMIN — SENNA PLUS 2 TABLET(S): 8.6 TABLET ORAL at 21:36

## 2023-12-02 RX ADMIN — METHOCARBAMOL 500 MILLIGRAM(S): 500 TABLET, FILM COATED ORAL at 13:05

## 2023-12-02 RX ADMIN — POLYETHYLENE GLYCOL 3350 17 GRAM(S): 17 POWDER, FOR SOLUTION ORAL at 06:32

## 2023-12-02 RX ADMIN — Medication 975 MILLIGRAM(S): at 06:31

## 2023-12-02 RX ADMIN — NALOXEGOL OXALATE 12.5 MILLIGRAM(S): 12.5 TABLET, FILM COATED ORAL at 11:44

## 2023-12-02 RX ADMIN — Medication 1 TABLET(S): at 06:31

## 2023-12-02 RX ADMIN — ENOXAPARIN SODIUM 40 MILLIGRAM(S): 100 INJECTION SUBCUTANEOUS at 06:32

## 2023-12-02 RX ADMIN — METHOCARBAMOL 500 MILLIGRAM(S): 500 TABLET, FILM COATED ORAL at 21:34

## 2023-12-02 RX ADMIN — Medication 200 MILLIGRAM(S): at 14:49

## 2023-12-02 RX ADMIN — IRON SUCROSE 110 MILLIGRAM(S): 20 INJECTION, SOLUTION INTRAVENOUS at 11:44

## 2023-12-02 RX ADMIN — MORPHINE SULFATE 25 MILLIGRAM(S): 50 CAPSULE, EXTENDED RELEASE ORAL at 12:08

## 2023-12-02 RX ADMIN — Medication 1 SPRAY(S): at 06:33

## 2023-12-02 RX ADMIN — Medication 975 MILLIGRAM(S): at 15:33

## 2023-12-02 RX ADMIN — Medication 200 MILLIGRAM(S): at 06:31

## 2023-12-02 NOTE — PROGRESS NOTE ADULT - NS ATTEND AMEND GEN_ALL_CORE FT
Agree with the assessment and plan of URSULA Walters.  CBC stable.  No overt GI bleeding.  Agree with iron replacement.  No GI procedures indicated at present.   GI will sign off. Please reconsult as needed. Thank you.

## 2023-12-02 NOTE — PROGRESS NOTE ADULT - SUBJECTIVE AND OBJECTIVE BOX
Patient is a 48y old  Female who presents with a chief complaint of Spinal stenosis with neurogenic claudication s/p T4-Pelvis revision surgery (01 Dec 2023 11:35)    No events overnight  Denies chest pain, SOB  Tolerating diet   Last BM Thursday    Patient seen and examined at bedside.    ALLERGIES:  shellfish (Hives; Short breath)  diclofenac (Other)    MEDICATIONS  (STANDING):  acetaminophen     Tablet .. 975 milliGRAM(s) Oral every 8 hours  DULoxetine 60 milliGRAM(s) Oral daily  enoxaparin Injectable 40 milliGRAM(s) SubCutaneous every 24 hours  fentaNYL   Patch  25 MICROgram(s)/Hr 1 Patch Transdermal every 72 hours  fluticasone propionate 50 MICROgram(s)/spray Nasal Spray 1 Spray(s) Both Nostrils two times a day  iron sucrose IVPB 200 milliGRAM(s) IV Intermittent every 24 hours  melatonin. 3 milliGRAM(s) Oral at bedtime  methocarbamol 500 milliGRAM(s) Oral three times a day  multivitamin 1 Tablet(s) Oral daily  naloxegol 12.5 milliGRAM(s) Oral daily  pantoprazole    Tablet 40 milliGRAM(s) Oral before breakfast  polyethylene glycol 3350 17 Gram(s) Oral two times a day  pregabalin 200 milliGRAM(s) Oral three times a day  senna 2 Tablet(s) Oral at bedtime  triamterene 37.5 mG/hydrochlorothiazide 25 mG Tablet 1 Tablet(s) Oral daily    MEDICATIONS  (PRN):  albuterol    90 MICROgram(s) HFA Inhaler 2 Puff(s) Inhalation every 6 hours PRN Shortness of Breath and/or Wheezing  bisacodyl 5 milliGRAM(s) Oral every 12 hours PRN Constipation  hemorrhoidal Ointment 1 Application(s) Rectal two times a day PRN hemorrhoidal pain  morphine   Solution 10 milliGRAM(s) Oral every 4 hours PRN Moderate Pain (4 - 6)  morphine   Solution 25 milliGRAM(s) Oral every 4 hours PRN Severe Pain (7 - 10)    Vital Signs Last 24 Hrs  T(F): 97 (01 Dec 2023 21:17), Max: 97 (01 Dec 2023 21:17)  HR: 63 (01 Dec 2023 21:17) (63 - 63)  BP: 112/69 (01 Dec 2023 21:17) (112/69 - 112/69)  RR: 16 (01 Dec 2023 21:17) (16 - 16)  SpO2: 97% (01 Dec 2023 21:17) (97% - 97%)  I&O's Summary    BMI (kg/m2): 36.6 (12-01-23 @ 11:35)  PHYSICAL EXAM:  General: NAD, A/O x 3  ENT: MMM, no scleral icterus  Neck: Supple, No JVD, no thyroidomegaly  Lungs: Clear to auscultation bilaterally, no wheezes, no rales, no rhonchi, good inspiratory effort  Cardio: RRR, S1/S2, No murmurs  Abdomen: Soft, Nontender, Nondistended; Bowel sounds present  Extremities: No calf tenderness, No pitting edema, no skin changes    LABS:                        7.4    4.75  )-----------( 510      ( 02 Dec 2023 05:27 )             24.5       11-30    140  |  105  |  11  ----------------------------<  97  4.1   |  29  |  0.57    Ca    8.4      30 Nov 2023 06:56    TPro  6.0  /  Alb  2.0  /  TBili  0.2  /  DBili  x   /  AST  29  /  ALT  23  /  AlkPhos  120  11-30       PT/INR - ( 30 Nov 2023 11:53 )   PT: 11.8 sec;   INR: 1.04 ratio         PTT - ( 30 Nov 2023 11:53 )  PTT:33.6 sec     TSH 1.106   TSH with FT4 reflex --  Total T3 --    Urinalysis Basic - ( 30 Nov 2023 06:56 )    Color: x / Appearance: x / SG: x / pH: x  Gluc: 97 mg/dL / Ketone: x  / Bili: x / Urobili: x   Blood: x / Protein: x / Nitrite: x   Leuk Esterase: x / RBC: x / WBC x   Sq Epi: x / Non Sq Epi: x / Bacteria: x    COVID-19 PCR: NotDetec (11-22-23 @ 21:39)      Care Discussed with Consultants/Other Providers:   GI

## 2023-12-02 NOTE — PROGRESS NOTE ADULT - ASSESSMENT
48 year old female with past medical history of chronic back pain (s/p multiple spinal surgeries), asthma, obesity (s/p gastric bypass), HTN, opoid dependence, admitted for elective revision of PSF with extension of fusion with instrumentation from T3 throgh pelvis, pedicle subtraction osteotomy (PSO) L4, tethers, iliac fixation, smith clark osteotomies T3-T10 with Plastic Surgery closure with Dr. Schwab, Dr. Gonzalez and Dr. Simpson. Intraoperative course complicated by acute blood loss requiring 4PRBC. Post-op hospital course significant for urinary retention, s/p paulino removal 11/21. Now transferred to Acute rehab    Acute Blood Loss Anemia, likely multifactorial, due to post-operative blood loss, hx of gastric bypass, hx of iron deficiency  - Will monitor Hg/Hct, transfuse if Hg <7  - Noted Hg stable 7.4 today, will check CBC in AM  - C/w protonix daily  - GI consulted, appreciate recommendations; she already had EGD/colon earlier in November  - S/p IV Iron yesterday; will give another infusion today  - s/p 4 units PRBC post operatively    Chronic back pain s/p T4-Pelvis Revision Surgery  - S/P elective revision of PSF with extension of fusion with instrumentation from T3 through pelvis (11/14)  - S/P L4 pedicle subtraction osteotomy (PSO)  - S/P proximal tethers and iliac fixation  - S/P T3-T10 Smith-Clark osteotomies with Plastic Surgery closure   - HV x 2 and SHAWNA x 4 drains removed on 11/20   - Follow up CT T/L Spine to eval worsening pain     Asthma  -No evidence of exacerbation   -albuterol inhaler Q6H PRN    HTN  -Continue triamterene 37.5mg- HCTZ 25mg QD - has not met criteria, last dose 11/24  -Monitor BP, stable, can consider discontinuing if BP remains normotensive    DVT prophylaxis:  Lovenox 40 mg SC QD

## 2023-12-02 NOTE — PROGRESS NOTE ADULT - SUBJECTIVE AND OBJECTIVE BOX
INTERVAL HPI/OVERNIGHT EVENTS:  HPI:  Mrs. Savi Ivy is a 48 year old female patient with past medical history of chronic back pain (s/p multiple spinal surgeries), asthma, obesity (s/p gastric bypass), and HTN who presented to [facility] on  for an elective revision of a PSF with extension of fusion with instrumentation from T3 throgh pelvis, pedicle subtraction osteotomy (PSO) L4, tethers, iliac fixation, smith clark osteotomies T3-T10 with Plastic Surgery closure with Dr. Schwab, Dr. Gonzalez and Dr. Simpson. Intraoperative course with acute blood loss requiring 3 unit PRBC transfusion with one additional PRBC post-op. Post-op hospital course significant for urinary retention, s/p paulino removal  and TOV passed. Patient evaluated by PT/OT and was recommended for acute inpatient rehab. Patient is medically stable for discharge to [facility] on .    (2023 13:26)    23: Pt seen and examined.  No acute events overnight    MEDICATIONS  (STANDING):  acetaminophen     Tablet .. 975 milliGRAM(s) Oral every 8 hours  DULoxetine 60 milliGRAM(s) Oral daily  enoxaparin Injectable 40 milliGRAM(s) SubCutaneous every 24 hours  fluticasone propionate 50 MICROgram(s)/spray Nasal Spray 1 Spray(s) Both Nostrils two times a day  melatonin. 3 milliGRAM(s) Oral at bedtime  methocarbamol 500 milliGRAM(s) Oral three times a day  multivitamin 1 Tablet(s) Oral daily  naloxegol 12.5 milliGRAM(s) Oral daily  pantoprazole    Tablet 40 milliGRAM(s) Oral before breakfast  polyethylene glycol 3350 17 Gram(s) Oral two times a day  pregabalin 200 milliGRAM(s) Oral three times a day  senna 2 Tablet(s) Oral at bedtime  triamterene 37.5 mG/hydrochlorothiazide 25 mG Tablet 1 Tablet(s) Oral daily    MEDICATIONS  (PRN):  albuterol    90 MICROgram(s) HFA Inhaler 2 Puff(s) Inhalation every 6 hours PRN Shortness of Breath and/or Wheezing  bisacodyl 5 milliGRAM(s) Oral every 12 hours PRN Constipation  hemorrhoidal Ointment 1 Application(s) Rectal two times a day PRN hemorrhoidal pain  morphine   Solution 10 milliGRAM(s) Oral every 4 hours PRN Moderate Pain (4 - 6)  morphine   Solution 25 milliGRAM(s) Oral every 4 hours PRN Severe Pain (7 - 10)      Allergies    shellfish (Hives; Short breath)  diclofenac (Other)    Intolerances        PAST MEDICAL & SURGICAL HISTORY:  Asthma      Scoliosis (and kyphoscoliosis), idiopathic      Lumbar stenosis with neurogenic claudication      Spondylisthesis      Bilateral sciatica      History of anemia      Varicose veins of both lower extremities      HTN (hypertension)      S/P  section  X3; ,,       Gastric bypass status for obesity        H/O varicose vein ligation  b/l thigh      History of hip replacement  left      History of bunionectomy  b/l      History of cervical spinal surgery      History of lumbosacral spine surgery  + rods/screw      H/O medial meniscus repair of left knee    PHYSICAL EXAM:   Vital Signs:  Vital Signs Last 24 Hrs  T(C): 36.1 (01 Dec 2023 21:17), Max: 36.1 (01 Dec 2023 21:17)  T(F): 97 (01 Dec 2023 21:17), Max: 97 (01 Dec 2023 21:17)  HR: 91 (02 Dec 2023 08:00) (63 - 91)  BP: 113/69 (02 Dec 2023 08:00) (112/69 - 113/69)  BP(mean): --  RR: 16 (02 Dec 2023 08:00) (16 - 16)  SpO2: 96% (02 Dec 2023 08:00) (96% - 97%)    Parameters below as of 02 Dec 2023 08:00  Patient On (Oxygen Delivery Method): room air      Daily     Daily I&O's Summary      GENERAL:  Appears stated age, no distress  HEENT:  NC/AT,  conjunctivae clear and pink,sclera -anicteric  CHEST:  Full & symmetric excursion  HEART:  Regular rhythm, S1, S2  ABDOMEN:  Soft, non-tender, non-distended, normoactive bowel sounds  EXTEREMITIES:  no cyanosis,clubbing or edema  SKIN:  No rash/warm/dry  NEURO:  Alert, oriented      LABS:                        7.4    4.75  )-----------( 510      ( 02 Dec 2023 05:27 )             24.5               amylase   lipase  RADIOLOGY & ADDITIONAL TESTS:   GI Follow up    12/2/23: Pt seen and examined.  No acute events overnight    MEDICATIONS  (STANDING):  acetaminophen     Tablet .. 975 milliGRAM(s) Oral every 8 hours  DULoxetine 60 milliGRAM(s) Oral daily  enoxaparin Injectable 40 milliGRAM(s) SubCutaneous every 24 hours  fluticasone propionate 50 MICROgram(s)/spray Nasal Spray 1 Spray(s) Both Nostrils two times a day  melatonin. 3 milliGRAM(s) Oral at bedtime  methocarbamol 500 milliGRAM(s) Oral three times a day  multivitamin 1 Tablet(s) Oral daily  naloxegol 12.5 milliGRAM(s) Oral daily  pantoprazole    Tablet 40 milliGRAM(s) Oral before breakfast  polyethylene glycol 3350 17 Gram(s) Oral two times a day  pregabalin 200 milliGRAM(s) Oral three times a day  senna 2 Tablet(s) Oral at bedtime  triamterene 37.5 mG/hydrochlorothiazide 25 mG Tablet 1 Tablet(s) Oral daily    MEDICATIONS  (PRN):  albuterol    90 MICROgram(s) HFA Inhaler 2 Puff(s) Inhalation every 6 hours PRN Shortness of Breath and/or Wheezing  bisacodyl 5 milliGRAM(s) Oral every 12 hours PRN Constipation  hemorrhoidal Ointment 1 Application(s) Rectal two times a day PRN hemorrhoidal pain  morphine   Solution 10 milliGRAM(s) Oral every 4 hours PRN Moderate Pain (4 - 6)  morphine   Solution 25 milliGRAM(s) Oral every 4 hours PRN Severe Pain (7 - 10)      Allergies    shellfish (Hives; Short breath)  diclofenac (Other)    Intolerances      PHYSICAL EXAM:   Vital Signs:  Vital Signs Last 24 Hrs  T(C): 36.1 (01 Dec 2023 21:17), Max: 36.1 (01 Dec 2023 21:17)  T(F): 97 (01 Dec 2023 21:17), Max: 97 (01 Dec 2023 21:17)  HR: 91 (02 Dec 2023 08:00) (63 - 91)  BP: 113/69 (02 Dec 2023 08:00) (112/69 - 113/69)  BP(mean): --  RR: 16 (02 Dec 2023 08:00) (16 - 16)  SpO2: 96% (02 Dec 2023 08:00) (96% - 97%)    Parameters below as of 02 Dec 2023 08:00  Patient On (Oxygen Delivery Method): room air      Daily     Daily I&O's Summary      GENERAL:  Appears stated age, no distress  HEENT:  NC/AT,  conjunctivae clear and pink, sclera anicteric  CHEST:  Full & symmetric excursion  HEART:  Regular rhythm, S1, S2  ABDOMEN:  Soft, non-tender, non-distended, normoactive bowel sounds  EXTREMITIES:  no edema  SKIN:  No rash or jaundice  NEURO:  Alert, oriented      LABS:                        7.4    4.75  )-----------( 510      ( 02 Dec 2023 05:27 )             24.5     Hemoglobin: 7.4 g/dL (12.02.23 @ 05:27)   Hemoglobin: 7.1 g/dL (12.01.23 @ 05:14)   Hemoglobin: 7.8 g/dL (11.30.23 @ 11:53)   Hemoglobin: 7.2 g/dL (11.30.23 @ 06:56)

## 2023-12-02 NOTE — PROGRESS NOTE ADULT - SUBJECTIVE AND OBJECTIVE BOX
HPI:  Mrs. Savi Ivy is a 48 year old female patient with past medical history of chronic back pain (s/p multiple spinal surgeries), asthma, obesity (s/p gastric bypass), and HTN who presented to [facility] on 11/14 for an elective revision of a PSF with extension of fusion with instrumentation from T3 through pelvis, pedicle subtraction osteotomy (PSO) L4, tethers, iliac fixation, smith clark osteotomies T3-T10 with Plastic Surgery closure with Dr. Schwab, Dr. Gonzalez and Dr. Simpson. Intraoperative course with acute blood loss requiring 3 unit PRBC transfusion with one additional PRBC post-op. Post-op hospital course significant for urinary retention, s/p paulino removal 11/21 and TOV passed. Patient evaluated by PT/OT and was recommended for acute inpatient rehab. Patient is medically stable for discharge to [facility] on 11/22.     TDD: 12/6  ___________________________________________________________________________    SUBJECTIVE/ROS  Patient was seen and evaluated at bedside today   Reported restful sleep and is in no acute distress.  patient complains of burning in her thighs  Denies any CP, SOB, CALLAWAY, palpitations, fever, chills, body aches, cough, congestion, or any other symptoms at this time.   ___________________________________________________________________________    CT THORACIC SPINE AND LUMBAR SPINE (11/27/2023)    IMPRESSION: Recent thoracic fusion T4-T9 with screws and connecting rods and immature bony fusion mass. More remote fusion T10 through the sacrum with transpedicular screws and mature bony fusion mass.  ___________________________________________________________________________    Vital Signs Last 24 Hrs  ICU Vital Signs Last 24 Hrs  T(C): 36.1 (01 Dec 2023 21:17), Max: 36.1 (01 Dec 2023 21:17)  T(F): 97 (01 Dec 2023 21:17), Max: 97 (01 Dec 2023 21:17)  HR: 91 (02 Dec 2023 08:00) (63 - 91)  BP: 113/69 (02 Dec 2023 08:00) (112/69 - 113/69)  RR: 16 (02 Dec 2023 08:00) (16 - 16)  SpO2: 96% (02 Dec 2023 08:00) (96% - 97%)    ___________________________________________________________________________    LAB                                   7.4    4.75  )-----------( 510      ( 02 Dec 2023 05:27 )             24.5                7.1    5.13  )-----------( 524      ( 01 Dec 2023 05:14 )             22.8                             7.8    6.73  )-----------( 547      ( 30 Nov 2023 11:53 )             25.3                             7.2    6.08  )-----------( 527      ( 30 Nov 2023 06:56 )             23.6         11-30    140  |  105  |  11  ----------------------------<  97  4.1   |  29  |  0.57    Ca    8.4      30 Nov 2023 06:56    TPro  6.0  /  Alb  2.0<L>  /  TBili  0.2  /  DBili  x   /  AST  29  /  ALT  23  /  AlkPhos  120  11-30    LIVER FUNCTIONS - ( 30 Nov 2023 06:56 )  Alb: 2.0 g/dL / Pro: 6.0 g/dL / ALK PHOS: 120 U/L / ALT: 23 U/L / AST: 29 U/L / GGT: x           PT/INR - ( 30 Nov 2023 11:53 )   PT: 11.8 sec;   INR: 1.04 ratio    PTT - ( 30 Nov 2023 11:53 )  PTT:33.6 sec    Free Thyroxine, Serum in AM (12.01.23 @ 05:14)   Free Thyroxine, Serum: 1.0 ng/dL    Thyroid Stimulating Hormone, Serum in AM (12.01.23 @ 05:14)   Thyroid Stimulating Hormone, Serum: 1.106 uIU/mL    Iron with Total Binding Capacity in AM (12.01.23 @ 05:14)   Iron - Total Binding Capacity.: 243 ug/dL  % Saturation, Iron: 7 %  Iron Total: 17 ug/dL  Unsaturated Iron Binding Capacity: 226 ug/dL    Folate, Serum in AM (12.01.23 @ 05:14)   Folate, Serum: 9.4 ng/mL    Occult Blood, Feces (11.30.23 @ 13:26)   Occult Blood, Feces: Positive    Antibody Screen Interpretation (11.30.23 @ 09:32)   Antibody Screen: NEG    Type + Screen (11.30.23 @ 09:32)   ABO RH Interpretation: O POS    Vitamin B12, Serum (11.27.23 @ 05:29)   Vitamin B12, Serum: 885 pg/mL    ___________________________________________________________________________    MEDICATIONS  (STANDING):  acetaminophen     Tablet .. 975 milliGRAM(s) Oral every 8 hours  DULoxetine 60 milliGRAM(s) Oral daily  enoxaparin Injectable 40 milliGRAM(s) SubCutaneous every 24 hours  fluticasone propionate 50 MICROgram(s)/spray Nasal Spray 1 Spray(s) Both Nostrils two times a day  iron sucrose IVPB 200 milliGRAM(s) IV Intermittent every 24 hours  melatonin. 3 milliGRAM(s) Oral at bedtime  methocarbamol 500 milliGRAM(s) Oral three times a day  multivitamin 1 Tablet(s) Oral daily  naloxegol 12.5 milliGRAM(s) Oral daily  pantoprazole    Tablet 40 milliGRAM(s) Oral before breakfast  polyethylene glycol 3350 17 Gram(s) Oral two times a day  pregabalin 200 milliGRAM(s) Oral three times a day  senna 2 Tablet(s) Oral at bedtime  triamterene 37.5 mG/hydrochlorothiazide 25 mG Tablet 1 Tablet(s) Oral daily    MEDICATIONS  (PRN):  albuterol    90 MICROgram(s) HFA Inhaler 2 Puff(s) Inhalation every 6 hours PRN Shortness of Breath and/or Wheezing  bisacodyl 5 milliGRAM(s) Oral every 12 hours PRN Constipation  hemorrhoidal Ointment 1 Application(s) Rectal two times a day PRN hemorrhoidal pain  morphine   Solution 10 milliGRAM(s) Oral every 4 hours PRN Moderate Pain (4 - 6)  morphine   Solution 25 milliGRAM(s) Oral every 4 hours PRN Severe Pain (7 - 10)    ___________________________________________________________________________    PHYSICAL EXAM:  Gen - NAD, Comfortable  HEENT - NCAT, EOMI, MMM  Neck - Supple, No limited ROM  Pulm - breathing comfortably on room air  Cardiovascular - warm and well perfused  Abdomen - Soft, NT, ND  Extremities - No Cyanosis, no clubbing, no edema, no calf tenderness  Neuro-     Cognitive - awake, alert, oriented to person, place, date, year, and situation.  Able to follow command     Motor -                     LEFT    UE - 4/5                    RIGHT UE - 4/5                    LEFT    LE - 4/5                    RIGHT LE - 4/5        Sensory - Intact to LT   Psychiatric - Mood stable, Affect WNL  Skin:  left buttock healing wound with scab, spinal surgical incision clean and dry and left LIVE    ___________________________________________________________________________

## 2023-12-02 NOTE — PROGRESS NOTE ADULT - PROBLEM SELECTOR PLAN 1
GI Consultation  for anemia in setting of known ALVIN s/p RYGB. As per patient had EGD / colonoscopy 2 months ago, reportedly normal     Anemia likely multifactorial including post op bleeding, known ALVIN in setting of RYGB    [ ] trend CBC  [ ] con't iron supplementation   [ ] keep active T& S   [ ] Monitor stools for color and consistency  [ ] Continue PPI  [ ] Avoid NSAIDs. GI Consultation  for anemia in setting of known ALVIN s/p RYGB. 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

## 2023-12-02 NOTE — PROGRESS NOTE ADULT - ASSESSMENT
GI Consultation  for anemia in setting of known ALVIN s/p RYGB. As per patient had EGD / colonoscopy 2 months ago, reportedly normal      Mrs. Savi Ivy is a 48 year old female patient with past medical history of chronic back pain (s/p multiple spinal surgeries), asthma, obesity (s/p gastric bypass), and HTN who presented to [facility] on 11/14 for an elective revision of a PSF with extension of fusion with instrumentation from T3 through pelvis, pedicle subtraction osteotomy (PSO) L4, tethers, iliac fixation, smith clark  osteotomies T3-T10 with Plastic Surgery closure.  Intraoperative course with acute blood loss requiring 3 unit PRBC transfusion with one additional PRBC post-op. Post-op hospital course significant for urinary retention, s/p paulino removal 11/21 and TOV passed.   GI Consultation  for anemia in setting of known ALVIN s/p RYGB. As per patient had EGD / colonoscopy 2 months ago, reportedly normal

## 2023-12-02 NOTE — PROGRESS NOTE ADULT - ASSESSMENT
Mrs. Savi Ivy is a 48 year old female patient with past medical history of chronic back pain (s/p multiple spinal surgeries), asthma, obesity (s/p gastric bypass), and HTN who is admitted for Acute Inpatient Rehabilitation with a multidisciplinary rehab program at [facility] with functional impairments in ADLs and mobility secondary to chronic back pain s/p multiple spinal surgeries treated further surgically with an elective revision of a PSF with extension of fusion with instrumentation from T3 through pelvis, L4 pedicle subtraction osteotomy (PSO), tethers, iliac fixation, T3-T10 Duvall-Meneses osteotomies with Plastic Surgery closure whose intraoperative course was remarkable for acute blood loss requiring 3 unit PRBC transfusion with one additional PRBC post-op. Post-op hospital course additionally significant for urinary retention.    Spinal stenosis with neurogenic claudication s/p T4-Pelvis revision surgery  - S/P elective revision of PSF with extension of fusion with instrumentation from T3 through pelvis (11/14)  - S/P L4 pedicle subtraction osteotomy (PSO)  - S/P proximal tethers and iliac fixation  - S/P T3-T10 Smith-Meneses osteotomies with Plastic Surgery closure   - HV x 2 and SHAWNA x 4 drains removed on 11/20   - Impaired ADLs and mobility  - Need for assistance with personal care   - Continue comprehensive rehab program of PT/OT - 3 hours a day, 5 days a week. P&O as needed   - Pain control as below  - Fall/Spinal precautions  - WBAT    Pain Control   - Initial regimen     * Tylenol 975 mg Q8H     * Cymbalta 60 mg QD     * Lyrica 200 mg TID     * Robaxin 500 mg TID     * Fentanyl Patch 50 mcg Q72H     * PRN: Morphine 15 mg Q4H mod pain, 30 mg Q4H severe pain  - Current regimen as follows (changed on 11/29):     * acetaminophen     Tablet .. 975 milliGRAM(s) Oral every 8 hours     * DULoxetine 60 milliGRAM(s) Oral daily     * methocarbamol 500 milliGRAM(s) Oral three times a day     * fentaNYL   Patch  25 MICROgram(s)/Hr 1 Patch Transdermal every 72 hours     * pregabalin 200 milliGRAM(s) Oral three times a day     * morphine   Solution 10 milliGRAM(s) Oral every 4 hours PRN Moderate Pain (4 - 6)     * morphine   Solution 25 milliGRAM(s) Oral every 4 hours PRN Severe Pain (7 - 10)  - will add gabapentin 100 mg po qhs to further treat neuropathy  - follow with pain management outpatient  - evaluated by pain management at [facility] who recommended discharge on home dose of nucynta 75mg 4x/day, fentanyl patch, as well as robaxin 500mg PO 3x/day and lyrica 200mg PO 3x/day      Acute Blood Loss Anemia  - s/p 3u PRBCs intra-op and one additional PRBC post-op  - monitor H/H  -H/H low but stable  Asthma  - albuterol inhaler Q6H PRN    HTN  - continue triamterene 37.5mg- HCTZ 25mg QD  - Monitor BP  -BP: 113/69 (02 Dec 2023 08:00) (112/69 - 113/69)    Mood / Cognition  - Neuropsychology consult PRN    Sleep  - Maintain quiet hours and a low stim environment.   - Melatonin 3 mg QHS     GI / Bowel  - at risk for constipation due to neurologic diagnosis, immobility, and/or medication use  - Senna qHS  - Miralax BID  - Movantik 12.5 mg QD  - Dulcolax 5 mg BID PRN constipation  - GI ppx: pantoprazole 40 mg QD     / Bladder  - Currently patient voids independently  - Cobb removed 11/21 and passed TOV    Skin:  - Skin assessment on admission performed : Left buttock healing wound with scab, spinal surgical incision clean an dry and left LIVE (11/25)  - Pressure Injury/Skin: OOB to chair, PT/OT  - nursing to monitor skin q Shift    Diet:  - Diet Consistency: regular  - Aspiration Precautions  - Nutrition consult    DVT prophylaxis:   - Lovenox 40 mg QD

## 2023-12-03 PROCEDURE — 99231 SBSQ HOSP IP/OBS SF/LOW 25: CPT

## 2023-12-03 PROCEDURE — 99232 SBSQ HOSP IP/OBS MODERATE 35: CPT

## 2023-12-03 RX ORDER — ASCORBIC ACID 60 MG
1000 TABLET,CHEWABLE ORAL DAILY
Refills: 0 | Status: DISCONTINUED | OUTPATIENT
Start: 2023-12-03 | End: 2023-12-06

## 2023-12-03 RX ORDER — FERROUS SULFATE 325(65) MG
325 TABLET ORAL DAILY
Refills: 0 | Status: DISCONTINUED | OUTPATIENT
Start: 2023-12-03 | End: 2023-12-06

## 2023-12-03 RX ADMIN — Medication 975 MILLIGRAM(S): at 22:19

## 2023-12-03 RX ADMIN — MORPHINE SULFATE 25 MILLIGRAM(S): 50 CAPSULE, EXTENDED RELEASE ORAL at 05:56

## 2023-12-03 RX ADMIN — POLYETHYLENE GLYCOL 3350 17 GRAM(S): 17 POWDER, FOR SOLUTION ORAL at 05:36

## 2023-12-03 RX ADMIN — Medication 1 TABLET(S): at 11:30

## 2023-12-03 RX ADMIN — METHOCARBAMOL 500 MILLIGRAM(S): 500 TABLET, FILM COATED ORAL at 05:38

## 2023-12-03 RX ADMIN — MORPHINE SULFATE 25 MILLIGRAM(S): 50 CAPSULE, EXTENDED RELEASE ORAL at 01:38

## 2023-12-03 RX ADMIN — Medication 200 MILLIGRAM(S): at 22:20

## 2023-12-03 RX ADMIN — Medication 975 MILLIGRAM(S): at 14:02

## 2023-12-03 RX ADMIN — POLYETHYLENE GLYCOL 3350 17 GRAM(S): 17 POWDER, FOR SOLUTION ORAL at 17:51

## 2023-12-03 RX ADMIN — MORPHINE SULFATE 25 MILLIGRAM(S): 50 CAPSULE, EXTENDED RELEASE ORAL at 14:09

## 2023-12-03 RX ADMIN — Medication 200 MILLIGRAM(S): at 05:36

## 2023-12-03 RX ADMIN — GABAPENTIN 100 MILLIGRAM(S): 400 CAPSULE ORAL at 22:20

## 2023-12-03 RX ADMIN — MORPHINE SULFATE 25 MILLIGRAM(S): 50 CAPSULE, EXTENDED RELEASE ORAL at 19:24

## 2023-12-03 RX ADMIN — METHOCARBAMOL 500 MILLIGRAM(S): 500 TABLET, FILM COATED ORAL at 22:22

## 2023-12-03 RX ADMIN — Medication 325 MILLIGRAM(S): at 11:30

## 2023-12-03 RX ADMIN — Medication 975 MILLIGRAM(S): at 05:37

## 2023-12-03 RX ADMIN — MORPHINE SULFATE 25 MILLIGRAM(S): 50 CAPSULE, EXTENDED RELEASE ORAL at 11:48

## 2023-12-03 RX ADMIN — Medication 1 SPRAY(S): at 05:38

## 2023-12-03 RX ADMIN — NALOXEGOL OXALATE 12.5 MILLIGRAM(S): 12.5 TABLET, FILM COATED ORAL at 11:30

## 2023-12-03 RX ADMIN — MORPHINE SULFATE 25 MILLIGRAM(S): 50 CAPSULE, EXTENDED RELEASE ORAL at 22:20

## 2023-12-03 RX ADMIN — Medication 1 SPRAY(S): at 17:51

## 2023-12-03 RX ADMIN — Medication 200 MILLIGRAM(S): at 13:18

## 2023-12-03 RX ADMIN — Medication 975 MILLIGRAM(S): at 13:18

## 2023-12-03 RX ADMIN — ENOXAPARIN SODIUM 40 MILLIGRAM(S): 100 INJECTION SUBCUTANEOUS at 05:37

## 2023-12-03 RX ADMIN — Medication 3 MILLIGRAM(S): at 22:20

## 2023-12-03 RX ADMIN — PANTOPRAZOLE SODIUM 40 MILLIGRAM(S): 20 TABLET, DELAYED RELEASE ORAL at 05:39

## 2023-12-03 RX ADMIN — MORPHINE SULFATE 25 MILLIGRAM(S): 50 CAPSULE, EXTENDED RELEASE ORAL at 15:02

## 2023-12-03 RX ADMIN — DULOXETINE HYDROCHLORIDE 60 MILLIGRAM(S): 30 CAPSULE, DELAYED RELEASE ORAL at 11:31

## 2023-12-03 RX ADMIN — MORPHINE SULFATE 25 MILLIGRAM(S): 50 CAPSULE, EXTENDED RELEASE ORAL at 18:14

## 2023-12-03 RX ADMIN — MORPHINE SULFATE 25 MILLIGRAM(S): 50 CAPSULE, EXTENDED RELEASE ORAL at 10:00

## 2023-12-03 RX ADMIN — SENNA PLUS 2 TABLET(S): 8.6 TABLET ORAL at 22:21

## 2023-12-03 RX ADMIN — Medication 1000 MILLIGRAM(S): at 11:31

## 2023-12-03 RX ADMIN — METHOCARBAMOL 500 MILLIGRAM(S): 500 TABLET, FILM COATED ORAL at 13:18

## 2023-12-03 NOTE — PROGRESS NOTE ADULT - ASSESSMENT
48 year old female with past medical history of chronic back pain (s/p multiple spinal surgeries), asthma, obesity (s/p gastric bypass), HTN, opoid dependence, admitted for elective revision of PSF with extension of fusion with instrumentation from T3 throgh pelvis, pedicle subtraction osteotomy (PSO) L4, tethers, iliac fixation, smith clark osteotomies T3-T10 with Plastic Surgery closure with Dr. Schwab, Dr. Gonzalez and Dr. Simpson. Intraoperative course complicated by acute blood loss requiring 4PRBC. Post-op hospital course significant for urinary retention, s/p paulino removal 11/21. Now transferred to Acute rehab    Acute Blood Loss Anemia, likely multifactorial, due to post-operative blood loss, hx of gastric bypass, hx of iron deficiency  - Will monitor Hg/Hct, transfuse if Hg <7  - Noted Hg stable 7.4, will check CBC in AM  - C/w protonix daily  - GI consulted, appreciate recommendations; she already had EGD/colon earlier in November  - S/p IV Iron x 2 doses; will start oral iron today+ vitamin C+ bowel regimen  - s/p 4 units PRBC post operatively    Chronic back pain s/p T4-Pelvis Revision Surgery  - S/P elective revision of PSF with extension of fusion with instrumentation from T3 through pelvis (11/14)  - S/P L4 pedicle subtraction osteotomy (PSO)  - S/P proximal tethers and iliac fixation  - S/P T3-T10 Smith-Clark osteotomies with Plastic Surgery closure   - HV x 2 and SHAWNA x 4 drains removed on 11/20   - Follow up CT T/L Spine to eval worsening pain     Asthma  -No evidence of exacerbation   -albuterol inhaler Q6H PRN    HTN  -Continue triamterene 37.5mg- HCTZ 25mg QD - has not met criteria, last dose 11/24  -Monitor BP, stable, can consider discontinuing if BP remains normotensive    DVT prophylaxis:  Lovenox 40 mg SC QD

## 2023-12-03 NOTE — PROGRESS NOTE ADULT - SUBJECTIVE AND OBJECTIVE BOX
Patient is a 48y old  Female who presents with a chief complaint of Spinal stenosis with neurogenic claudication s/p T4-Pelvis revision surgery (02 Dec 2023 12:49)      Patient seen and examined at bedside.    ALLERGIES:  shellfish (Hives; Short breath)  diclofenac (Other)    MEDICATIONS  (STANDING):  acetaminophen     Tablet .. 975 milliGRAM(s) Oral every 8 hours  DULoxetine 60 milliGRAM(s) Oral daily  enoxaparin Injectable 40 milliGRAM(s) SubCutaneous every 24 hours  fluticasone propionate 50 MICROgram(s)/spray Nasal Spray 1 Spray(s) Both Nostrils two times a day  gabapentin 100 milliGRAM(s) Oral at bedtime  melatonin. 3 milliGRAM(s) Oral at bedtime  methocarbamol 500 milliGRAM(s) Oral three times a day  multivitamin 1 Tablet(s) Oral daily  naloxegol 12.5 milliGRAM(s) Oral daily  pantoprazole    Tablet 40 milliGRAM(s) Oral before breakfast  polyethylene glycol 3350 17 Gram(s) Oral two times a day  pregabalin 200 milliGRAM(s) Oral three times a day  senna 2 Tablet(s) Oral at bedtime  triamterene 37.5 mG/hydrochlorothiazide 25 mG Tablet 1 Tablet(s) Oral daily    MEDICATIONS  (PRN):  albuterol    90 MICROgram(s) HFA Inhaler 2 Puff(s) Inhalation every 6 hours PRN Shortness of Breath and/or Wheezing  bisacodyl 5 milliGRAM(s) Oral every 12 hours PRN Constipation  hemorrhoidal Ointment 1 Application(s) Rectal two times a day PRN hemorrhoidal pain  morphine   Solution 25 milliGRAM(s) Oral every 4 hours PRN Severe Pain (7 - 10)  morphine   Solution 10 milliGRAM(s) Oral every 4 hours PRN Moderate Pain (4 - 6)    Vital Signs Last 24 Hrs  T(F): 98.9 (03 Dec 2023 06:52), Max: 98.9 (03 Dec 2023 06:52)  HR: 78 (03 Dec 2023 06:52) (78 - 78)  BP: 96/60 (03 Dec 2023 06:52) (96/60 - 100/62)  RR: 14 (03 Dec 2023 06:52) (14 - 15)  SpO2: 96% (03 Dec 2023 06:52) (96% - 97%)  I&O's Summary    BMI (kg/m2): 36.6 (12-01-23 @ 11:35)  PHYSICAL EXAM:  General: NAD, A/O x 3  ENT: MMM, no scleral icterus  Neck: Supple, No JVD, no thyroidomegaly  Lungs: Clear to auscultation bilaterally, no wheezes, no rales, no rhonchi, good inspiratory effort  Cardio: RRR, S1/S2, No murmurs  Abdomen: Soft, Nontender, Nondistended; Bowel sounds present  Extremities: No calf tenderness, No pitting edema, no skin changes    LABS:                        7.4    4.75  )-----------( 510      ( 02 Dec 2023 05:27 )             24.5       PT/INR - ( 30 Nov 2023 11:53 )   PT: 11.8 sec;   INR: 1.04 ratio    PTT - ( 30 Nov 2023 11:53 )  PTT:33.6 sec     TSH 1.106   TSH with FT4 reflex --  Total T3 --    COVID-19 PCR: NotDetec (11-22-23 @ 21:39)  COVID-19 PCR: NotDetec (11-22-23 @ 21:39)

## 2023-12-03 NOTE — PROGRESS NOTE ADULT - ASSESSMENT
Mrs. Savi Ivy is a 48 year old female patient with past medical history of chronic back pain (s/p multiple spinal surgeries), asthma, obesity (s/p gastric bypass), and HTN who is admitted for Acute Inpatient Rehabilitation with a multidisciplinary rehab program at [facility] with functional impairments in ADLs and mobility secondary to chronic back pain s/p multiple spinal surgeries treated further surgically with an elective revision of a PSF with extension of fusion with instrumentation from T3 through pelvis, L4 pedicle subtraction osteotomy (PSO), tethers, iliac fixation, T3-T10 Duvall-Meneses osteotomies with Plastic Surgery closure whose intraoperative course was remarkable for acute blood loss requiring 3 unit PRBC transfusion with one additional PRBC post-op. Post-op hospital course additionally significant for urinary retention.    Spinal stenosis with neurogenic claudication s/p T4-Pelvis revision surgery  - S/P elective revision of PSF with extension of fusion with instrumentation from T3 through pelvis (11/14)  - S/P L4 pedicle subtraction osteotomy (PSO)  - S/P proximal tethers and iliac fixation  - S/P T3-T10 Smith-Meneses osteotomies with Plastic Surgery closure   - HV x 2 and SHAWNA x 4 drains removed on 11/20   - Impaired ADLs and mobility  - Need for assistance with personal care   - Continue comprehensive rehab program of PT/OT - 3 hours a day, 5 days a week. P&O as needed   - Pain control as below  - Fall/Spinal precautions  - WBAT    Pain Control   - Initial regimen     * Tylenol 975 mg Q8H     * Cymbalta 60 mg QD     * Lyrica 200 mg TID     * Robaxin 500 mg TID     * Fentanyl Patch 50 mcg Q72H     * PRN: Morphine 15 mg Q4H mod pain, 30 mg Q4H severe pain  - Current regimen as follows (changed on 11/29):     * acetaminophen     Tablet .. 975 milliGRAM(s) Oral every 8 hours     * DULoxetine 60 milliGRAM(s) Oral daily     * methocarbamol 500 milliGRAM(s) Oral three times a day     * fentaNYL   Patch  25 MICROgram(s)/Hr 1 Patch Transdermal every 72 hours     * pregabalin 200 milliGRAM(s) Oral three times a day     * morphine   Solution 10 milliGRAM(s) Oral every 4 hours PRN Moderate Pain (4 - 6)     * morphine   Solution 25 milliGRAM(s) Oral every 4 hours PRN Severe Pain (7 - 10)  - gabapentin 100 mg po qhs was started  to further treat neuropathy  - follow with pain management outpatient  - evaluated by pain management at [facility] who recommended discharge on home dose of nucynta 75mg 4x/day, fentanyl patch, as well as robaxin 500mg PO 3x/day and lyrica 200mg PO 3x/day      Acute Blood Loss Anemia  - s/p 3u PRBCs intra-op and one additional PRBC post-op  - monitor H/H  -H/H low but stable    Asthma  - albuterol inhaler Q6H PRN    HTN  - continue triamterene 37.5mg- HCTZ 25mg QD  - Monitor BP  -BP: 113/69 (02 Dec 2023 08:00) (112/69 - 113/69)  - BP: 102/69 (03 Dec 2023 08:00) (96/60 - 102/69)    Mood / Cognition  - Neuropsychology consult PRN    Sleep  - Maintain quiet hours and a low stim environment.   - Melatonin 3 mg QHS     GI / Bowel  - at risk for constipation due to neurologic diagnosis, immobility, and/or medication use  - Senna qHS  - Miralax BID  - Movantik 12.5 mg QD  - Dulcolax 5 mg BID PRN constipation  - GI ppx: pantoprazole 40 mg QD     / Bladder  - Currently patient voids independently  - Cobb removed 11/21 and passed TOV    Skin:  - Skin assessment on admission performed : Left buttock healing wound with scab, spinal surgical incision clean an dry and left LIVE (11/25)  - Pressure Injury/Skin: OOB to chair, PT/OT  - nursing to monitor skin q Shift    Diet:  - Diet Consistency: regular  - Aspiration Precautions  - Nutrition consult    DVT prophylaxis:   - Lovenox 40 mg QD   Mrs. Savi Ivy is a 48 year old female patient with past medical history of chronic back pain (s/p multiple spinal surgeries), asthma, obesity (s/p gastric bypass), and HTN who is admitted for Acute Inpatient Rehabilitation with a multidisciplinary rehab program at [facility] with functional impairments in ADLs and mobility secondary to chronic back pain s/p multiple spinal surgeries treated further surgically with an elective revision of a PSF with extension of fusion with instrumentation from T3 through pelvis, L4 pedicle subtraction osteotomy (PSO), tethers, iliac fixation, T3-T10 Duvall-Meneses osteotomies with Plastic Surgery closure whose intraoperative course was remarkable for acute blood loss requiring 3 unit PRBC transfusion with one additional PRBC post-op. Post-op hospital course additionally significant for urinary retention.    Spinal stenosis with neurogenic claudication s/p T4-Pelvis revision surgery  - S/P elective revision of PSF with extension of fusion with instrumentation from T3 through pelvis (11/14)  - S/P L4 pedicle subtraction osteotomy (PSO)  - S/P proximal tethers and iliac fixation  - S/P T3-T10 Smith-Meneses osteotomies with Plastic Surgery closure   - HV x 2 and SHAWNA x 4 drains removed on 11/20   - Impaired ADLs and mobility  - Need for assistance with personal care   - Continue comprehensive rehab program of PT/OT - 3 hours a day, 5 days a week. P&O as needed   - Pain control as below  - Fall/Spinal precautions  - WBAT    Pain Control   - Initial regimen     * Tylenol 975 mg Q8H     * Cymbalta 60 mg QD     * Lyrica 200 mg TID     * Robaxin 500 mg TID     * Fentanyl Patch 50 mcg Q72H     * PRN: Morphine 15 mg Q4H mod pain, 30 mg Q4H severe pain  - Current regimen as follows (changed on 11/29):     * acetaminophen     Tablet .. 975 milliGRAM(s) Oral every 8 hours     * DULoxetine 60 milliGRAM(s) Oral daily     * methocarbamol 500 milliGRAM(s) Oral three times a day     * fentaNYL   Patch  25 MICROgram(s)/Hr 1 Patch Transdermal every 72 hours     * pregabalin 200 milliGRAM(s) Oral three times a day     * morphine   Solution 10 milliGRAM(s) Oral every 4 hours PRN Moderate Pain (4 - 6)     * morphine   Solution 25 milliGRAM(s) Oral every 4 hours PRN Severe Pain (7 - 10)  - gabapentin 100 mg po qhs was started  to further treat neuropathy  - follow with pain management outpatient  - evaluated by pain management at [facility] who recommended discharge on home dose of nucynta 75mg 4x/day, fentanyl patch, as well as robaxin 500mg PO 3x/day and lyrica 200mg PO 3x/day      Acute Blood Loss Anemia  - s/p 3u PRBCs intra-op and one additional PRBC post-op  - monitor H/H  -H/H low but stable    Asthma  - albuterol inhaler Q6H PRN    HTN  - continue triamterene 37.5mg- HCTZ 25mg QD  - Monitor BP  -BP: 113/69 (02 Dec 2023 08:00) (112/69 - 113/69)  - BP: 102/69 (03 Dec 2023 08:00) (96/60 - 102/69)    Mood / Cognition  - Neuropsychology consult PRN    Sleep  - Maintain quiet hours and a low stim environment.   - Melatonin 3 mg QHS     GI / Bowel  - at risk for constipation due to neurologic diagnosis, immobility, and/or medication use  - Senna qHS  - Miralax BID  - Movantik 12.5 mg QD  - Dulcolax 5 mg BID PRN constipation  - GI ppx: pantoprazole 40 mg QD     / Bladder  - Currently patient voids independently  - Cobb removed 11/21 and passed TOV    Skin:  - Skin assessment on admission performed : Left buttock healing wound with scab, spinal surgical incision clean an dry and left LIVE (11/25)  - Pressure Injury/Skin: OOB to chair, PT/OT  - nursing to monitor skin q Shift    Diet:  - Diet Consistency: regular  - Aspiration Precautions  - Nutrition consult    DVT prophylaxis:   - Lovenox 40 mg QD

## 2023-12-03 NOTE — PROGRESS NOTE ADULT - SUBJECTIVE AND OBJECTIVE BOX
HPI:  Mrs. Savi Ivy is a 48 year old female patient with past medical history of chronic back pain (s/p multiple spinal surgeries), asthma, obesity (s/p gastric bypass), and HTN who presented to [facility] on 11/14 for an elective revision of a PSF with extension of fusion with instrumentation from T3 through pelvis, pedicle subtraction osteotomy (PSO) L4, tethers, iliac fixation, smith clark osteotomies T3-T10 with Plastic Surgery closure with Dr. Schwab, Dr. Gonzalez and Dr. Simpson. Intraoperative course with acute blood loss requiring 3 unit PRBC transfusion with one additional PRBC post-op. Post-op hospital course significant for urinary retention, s/p paulino removal 11/21 and TOV passed. Patient evaluated by PT/OT and was recommended for acute inpatient rehab. Patient is medically stable for discharge to [facility] on 11/22.     TDD: 12/6  ___________________________________________________________________________    SUBJECTIVE/ROS  Patient was seen and evaluated at bedside today   Reported restful sleep and is in no acute distress.  She has  burning in her thighs. She received her first dose of neurontin yesterday.  Denies any CP, SOB, CALLAWAY, palpitations, fever, chills, body aches, cough, congestion, or any other symptoms at this time.   ___________________________________________________________________________    CT THORACIC SPINE AND LUMBAR SPINE (11/27/2023)    IMPRESSION: Recent thoracic fusion T4-T9 with screws and connecting rods and immature bony fusion mass. More remote fusion T10 through the sacrum with transpedicular screws and mature bony fusion mass.  ___________________________________________________________________________    Vital Signs Last 24 Hrs    T(C): 36.6 (03 Dec 2023 08:00), Max: 37.2 (03 Dec 2023 06:52)  T(F): 97.8 (03 Dec 2023 08:00), Max: 98.9 (03 Dec 2023 06:52)  HR: 69 (03 Dec 2023 08:00) (69 - 78)  BP: 102/69 (03 Dec 2023 08:00) (96/60 - 102/69)  RR: 16 (03 Dec 2023 08:00) (14 - 16)  SpO2: 97% (03 Dec 2023 08:00) (96% - 97%)    ___________________________________________________________________________    LAB                                   7.4    4.75  )-----------( 510      ( 02 Dec 2023 05:27 )             24.5                7.1    5.13  )-----------( 524      ( 01 Dec 2023 05:14 )             22.8                             7.8    6.73  )-----------( 547      ( 30 Nov 2023 11:53 )             25.3                             7.2    6.08  )-----------( 527      ( 30 Nov 2023 06:56 )             23.6         11-30    140  |  105  |  11  ----------------------------<  97  4.1   |  29  |  0.57    Ca    8.4      30 Nov 2023 06:56    TPro  6.0  /  Alb  2.0<L>  /  TBili  0.2  /  DBili  x   /  AST  29  /  ALT  23  /  AlkPhos  120  11-30    LIVER FUNCTIONS - ( 30 Nov 2023 06:56 )  Alb: 2.0 g/dL / Pro: 6.0 g/dL / ALK PHOS: 120 U/L / ALT: 23 U/L / AST: 29 U/L / GGT: x           PT/INR - ( 30 Nov 2023 11:53 )   PT: 11.8 sec;   INR: 1.04 ratio    PTT - ( 30 Nov 2023 11:53 )  PTT:33.6 sec    Free Thyroxine, Serum in AM (12.01.23 @ 05:14)   Free Thyroxine, Serum: 1.0 ng/dL    Thyroid Stimulating Hormone, Serum in AM (12.01.23 @ 05:14)   Thyroid Stimulating Hormone, Serum: 1.106 uIU/mL    Iron with Total Binding Capacity in AM (12.01.23 @ 05:14)   Iron - Total Binding Capacity.: 243 ug/dL  % Saturation, Iron: 7 %  Iron Total: 17 ug/dL  Unsaturated Iron Binding Capacity: 226 ug/dL    Folate, Serum in AM (12.01.23 @ 05:14)   Folate, Serum: 9.4 ng/mL    Occult Blood, Feces (11.30.23 @ 13:26)   Occult Blood, Feces: Positive    Antibody Screen Interpretation (11.30.23 @ 09:32)   Antibody Screen: NEG    Type + Screen (11.30.23 @ 09:32)   ABO RH Interpretation: O POS    Vitamin B12, Serum (11.27.23 @ 05:29)   Vitamin B12, Serum: 885 pg/mL    ___________________________________________________________________________    MEDICATIONS  (STANDING):  acetaminophen     Tablet .. 975 milliGRAM(s) Oral every 8 hours  DULoxetine 60 milliGRAM(s) Oral daily  enoxaparin Injectable 40 milliGRAM(s) SubCutaneous every 24 hours  fluticasone propionate 50 MICROgram(s)/spray Nasal Spray 1 Spray(s) Both Nostrils two times a day  iron sucrose IVPB 200 milliGRAM(s) IV Intermittent every 24 hours  melatonin. 3 milliGRAM(s) Oral at bedtime  methocarbamol 500 milliGRAM(s) Oral three times a day  multivitamin 1 Tablet(s) Oral daily  naloxegol 12.5 milliGRAM(s) Oral daily  pantoprazole    Tablet 40 milliGRAM(s) Oral before breakfast  polyethylene glycol 3350 17 Gram(s) Oral two times a day  pregabalin 200 milliGRAM(s) Oral three times a day  senna 2 Tablet(s) Oral at bedtime  triamterene 37.5 mG/hydrochlorothiazide 25 mG Tablet 1 Tablet(s) Oral daily    MEDICATIONS  (PRN):  albuterol    90 MICROgram(s) HFA Inhaler 2 Puff(s) Inhalation every 6 hours PRN Shortness of Breath and/or Wheezing  bisacodyl 5 milliGRAM(s) Oral every 12 hours PRN Constipation  hemorrhoidal Ointment 1 Application(s) Rectal two times a day PRN hemorrhoidal pain  morphine   Solution 10 milliGRAM(s) Oral every 4 hours PRN Moderate Pain (4 - 6)  morphine   Solution 25 milliGRAM(s) Oral every 4 hours PRN Severe Pain (7 - 10)    ___________________________________________________________________________    PHYSICAL EXAM:  Gen - NAD, Comfortable  HEENT - NCAT, EOMI, MMM  Neck - Supple, No limited ROM  Pulm - breathing comfortably on room air  Cardiovascular - warm and well perfused  Abdomen - Soft, NT, ND  Extremities - No Cyanosis, no clubbing, no edema, no calf tenderness  Neuro-     Cognitive - awake, alert, oriented to person, place, date, year, and situation.  Able to follow command     Motor -                     LEFT    UE - 4/5                    RIGHT UE - 4/5                    LEFT    LE - 4/5                    RIGHT LE - 4/5        Sensory - Intact to LT   Psychiatric - Mood stable, Affect WNL  Skin:  left buttock healing wound with scab, spinal surgical incision clean and dry and left LIVE    ___________________________________________________________________________ HPI:  Mrs. Savi Ivy is a 48 year old female patient with past medical history of chronic back pain (s/p multiple spinal surgeries), asthma, obesity (s/p gastric bypass), and HTN who presented to [facility] on 11/14 for an elective revision of a PSF with extension of fusion with instrumentation from T3 through pelvis, pedicle subtraction osteotomy (PSO) L4, tethers, iliac fixation, smith clark osteotomies T3-T10 with Plastic Surgery closure with Dr. Schwab, Dr. Gonzalez and Dr. Simpson. Intraoperative course with acute blood loss requiring 3 unit PRBC transfusion with one additional PRBC post-op. Post-op hospital course significant for urinary retention, s/p paulino removal 11/21 and TOV passed. Patient evaluated by PT/OT and was recommended for acute inpatient rehab. Patient is medically stable for discharge to [facility] on 11/22.     TDD: 12/6  ___________________________________________________________________________    SUBJECTIVE/ROS  Patient was seen and evaluated at bedside today   Reported restful sleep and is in no acute distress.  She has  burning in her thighs. She received her first dose of neurontin yesterday.  Denies any CP, SOB, CALLAWAY, palpitations, fever, chills, body aches, cough, congestion, or any other symptoms at this time.   ___________________________________________________________________________    CT THORACIC SPINE AND LUMBAR SPINE (11/27/2023)    IMPRESSION: Recent thoracic fusion T4-T9 with screws and connecting rods and immature bony fusion mass. More remote fusion T10 through the sacrum with transpedicular screws and mature bony fusion mass.  ___________________________________________________________________________    Vital Signs Last 24 Hrs    T(C): 36.6 (03 Dec 2023 08:00), Max: 37.2 (03 Dec 2023 06:52)  T(F): 97.8 (03 Dec 2023 08:00), Max: 98.9 (03 Dec 2023 06:52)  HR: 69 (03 Dec 2023 08:00) (69 - 78)  BP: 102/69 (03 Dec 2023 08:00) (96/60 - 102/69)  RR: 16 (03 Dec 2023 08:00) (14 - 16)  SpO2: 97% (03 Dec 2023 08:00) (96% - 97%)    ___________________________________________________________________________    LAB                                   7.4    4.75  )-----------( 510      ( 02 Dec 2023 05:27 )             24.5                7.1    5.13  )-----------( 524      ( 01 Dec 2023 05:14 )             22.8                             7.8    6.73  )-----------( 547      ( 30 Nov 2023 11:53 )             25.3                             7.2    6.08  )-----------( 527      ( 30 Nov 2023 06:56 )             23.6         11-30    140  |  105  |  11  ----------------------------<  97  4.1   |  29  |  0.57    Ca    8.4      30 Nov 2023 06:56    TPro  6.0  /  Alb  2.0<L>  /  TBili  0.2  /  DBili  x   /  AST  29  /  ALT  23  /  AlkPhos  120  11-30    LIVER FUNCTIONS - ( 30 Nov 2023 06:56 )  Alb: 2.0 g/dL / Pro: 6.0 g/dL / ALK PHOS: 120 U/L / ALT: 23 U/L / AST: 29 U/L / GGT: x           PT/INR - ( 30 Nov 2023 11:53 )   PT: 11.8 sec;   INR: 1.04 ratio    PTT - ( 30 Nov 2023 11:53 )  PTT:33.6 sec    Free Thyroxine, Serum in AM (12.01.23 @ 05:14)   Free Thyroxine, Serum: 1.0 ng/dL    Thyroid Stimulating Hormone, Serum in AM (12.01.23 @ 05:14)   Thyroid Stimulating Hormone, Serum: 1.106 uIU/mL    Iron with Total Binding Capacity in AM (12.01.23 @ 05:14)   Iron - Total Binding Capacity.: 243 ug/dL  % Saturation, Iron: 7 %  Iron Total: 17 ug/dL  Unsaturated Iron Binding Capacity: 226 ug/dL    Folate, Serum in AM (12.01.23 @ 05:14)   Folate, Serum: 9.4 ng/mL    Occult Blood, Feces (11.30.23 @ 13:26)   Occult Blood, Feces: Positive    Antibody Screen Interpretation (11.30.23 @ 09:32)   Antibody Screen: NEG    Type + Screen (11.30.23 @ 09:32)   ABO RH Interpretation: O POS    Vitamin B12, Serum (11.27.23 @ 05:29)   Vitamin B12, Serum: 885 pg/mL    ___________________________________________________________________________    MEDICATIONS  (STANDING):  acetaminophen     Tablet .. 975 milliGRAM(s) Oral every 8 hours  DULoxetine 60 milliGRAM(s) Oral daily  enoxaparin Injectable 40 milliGRAM(s) SubCutaneous every 24 hours  fluticasone propionate 50 MICROgram(s)/spray Nasal Spray 1 Spray(s) Both Nostrils two times a day  iron sucrose IVPB 200 milliGRAM(s) IV Intermittent every 24 hours  melatonin. 3 milliGRAM(s) Oral at bedtime  methocarbamol 500 milliGRAM(s) Oral three times a day  multivitamin 1 Tablet(s) Oral daily  naloxegol 12.5 milliGRAM(s) Oral daily  pantoprazole    Tablet 40 milliGRAM(s) Oral before breakfast  polyethylene glycol 3350 17 Gram(s) Oral two times a day  pregabalin 200 milliGRAM(s) Oral three times a day  senna 2 Tablet(s) Oral at bedtime  triamterene 37.5 mG/hydrochlorothiazide 25 mG Tablet 1 Tablet(s) Oral daily    MEDICATIONS  (PRN):  albuterol    90 MICROgram(s) HFA Inhaler 2 Puff(s) Inhalation every 6 hours PRN Shortness of Breath and/or Wheezing  bisacodyl 5 milliGRAM(s) Oral every 12 hours PRN Constipation  hemorrhoidal Ointment 1 Application(s) Rectal two times a day PRN hemorrhoidal pain  morphine   Solution 10 milliGRAM(s) Oral every 4 hours PRN Moderate Pain (4 - 6)  morphine   Solution 25 milliGRAM(s) Oral every 4 hours PRN Severe Pain (7 - 10)    ___________________________________________________________________________    PHYSICAL EXAM:  Gen - NAD, Comfortable  HEENT - NCAT, EOMI, MMM  Neck - Supple, No limited ROM  Pulm - breathing comfortably on room air  Cardiovascular - warm and well perfused  Abdomen - Soft, NT, ND  Extremities - No Cyanosis, no clubbing, no edema, no calf tenderness  Neuro-     Cognitive - awake, alert, oriented to person, place, date, year, and situation.  Able to follow command     Motor -                     LEFT    UE - 4/5                    RIGHT UE - 4/5                    LEFT    LE - 4/5                    RIGHT LE - 4/5        Sensory - Intact to LT   Psychiatric - Mood stable, Affect WNL  Skin:  left buttock healing wound with scab, spinal surgical incision clean and dry and left LIVE    ___________________________________________________________________________

## 2023-12-04 ENCOUNTER — TRANSCRIPTION ENCOUNTER (OUTPATIENT)
Age: 48
End: 2023-12-04

## 2023-12-04 LAB
ALBUMIN SERPL ELPH-MCNC: 2.1 G/DL — LOW (ref 3.3–5)
ALBUMIN SERPL ELPH-MCNC: 2.1 G/DL — LOW (ref 3.3–5)
ALP SERPL-CCNC: 130 U/L — HIGH (ref 40–120)
ALP SERPL-CCNC: 130 U/L — HIGH (ref 40–120)
ALT FLD-CCNC: 21 U/L — SIGNIFICANT CHANGE UP (ref 10–45)
ALT FLD-CCNC: 21 U/L — SIGNIFICANT CHANGE UP (ref 10–45)
ANION GAP SERPL CALC-SCNC: 5 MMOL/L — SIGNIFICANT CHANGE UP (ref 5–17)
ANION GAP SERPL CALC-SCNC: 5 MMOL/L — SIGNIFICANT CHANGE UP (ref 5–17)
AST SERPL-CCNC: 26 U/L — SIGNIFICANT CHANGE UP (ref 10–40)
AST SERPL-CCNC: 26 U/L — SIGNIFICANT CHANGE UP (ref 10–40)
BILIRUB SERPL-MCNC: 0.2 MG/DL — SIGNIFICANT CHANGE UP (ref 0.2–1.2)
BILIRUB SERPL-MCNC: 0.2 MG/DL — SIGNIFICANT CHANGE UP (ref 0.2–1.2)
BLD GP AB SCN SERPL QL: SIGNIFICANT CHANGE UP
BLD GP AB SCN SERPL QL: SIGNIFICANT CHANGE UP
BUN SERPL-MCNC: 15 MG/DL — SIGNIFICANT CHANGE UP (ref 7–23)
BUN SERPL-MCNC: 15 MG/DL — SIGNIFICANT CHANGE UP (ref 7–23)
CALCIUM SERPL-MCNC: 8.7 MG/DL — SIGNIFICANT CHANGE UP (ref 8.4–10.5)
CALCIUM SERPL-MCNC: 8.7 MG/DL — SIGNIFICANT CHANGE UP (ref 8.4–10.5)
CHLORIDE SERPL-SCNC: 105 MMOL/L — SIGNIFICANT CHANGE UP (ref 96–108)
CHLORIDE SERPL-SCNC: 105 MMOL/L — SIGNIFICANT CHANGE UP (ref 96–108)
CO2 SERPL-SCNC: 32 MMOL/L — HIGH (ref 22–31)
CO2 SERPL-SCNC: 32 MMOL/L — HIGH (ref 22–31)
CREAT SERPL-MCNC: 0.64 MG/DL — SIGNIFICANT CHANGE UP (ref 0.5–1.3)
CREAT SERPL-MCNC: 0.64 MG/DL — SIGNIFICANT CHANGE UP (ref 0.5–1.3)
EGFR: 108 ML/MIN/1.73M2 — SIGNIFICANT CHANGE UP
EGFR: 108 ML/MIN/1.73M2 — SIGNIFICANT CHANGE UP
GLUCOSE SERPL-MCNC: 87 MG/DL — SIGNIFICANT CHANGE UP (ref 70–99)
GLUCOSE SERPL-MCNC: 87 MG/DL — SIGNIFICANT CHANGE UP (ref 70–99)
HCT VFR BLD CALC: 26.2 % — LOW (ref 34.5–45)
HCT VFR BLD CALC: 26.2 % — LOW (ref 34.5–45)
HGB BLD-MCNC: 8 G/DL — LOW (ref 11.5–15.5)
HGB BLD-MCNC: 8 G/DL — LOW (ref 11.5–15.5)
MCHC RBC-ENTMCNC: 27.5 PG — SIGNIFICANT CHANGE UP (ref 27–34)
MCHC RBC-ENTMCNC: 27.5 PG — SIGNIFICANT CHANGE UP (ref 27–34)
MCHC RBC-ENTMCNC: 30.5 GM/DL — LOW (ref 32–36)
MCHC RBC-ENTMCNC: 30.5 GM/DL — LOW (ref 32–36)
MCV RBC AUTO: 90 FL — SIGNIFICANT CHANGE UP (ref 80–100)
MCV RBC AUTO: 90 FL — SIGNIFICANT CHANGE UP (ref 80–100)
NRBC # BLD: 0 /100 WBCS — SIGNIFICANT CHANGE UP (ref 0–0)
NRBC # BLD: 0 /100 WBCS — SIGNIFICANT CHANGE UP (ref 0–0)
PLATELET # BLD AUTO: 509 K/UL — HIGH (ref 150–400)
PLATELET # BLD AUTO: 509 K/UL — HIGH (ref 150–400)
POTASSIUM SERPL-MCNC: 4.4 MMOL/L — SIGNIFICANT CHANGE UP (ref 3.5–5.3)
POTASSIUM SERPL-MCNC: 4.4 MMOL/L — SIGNIFICANT CHANGE UP (ref 3.5–5.3)
POTASSIUM SERPL-SCNC: 4.4 MMOL/L — SIGNIFICANT CHANGE UP (ref 3.5–5.3)
POTASSIUM SERPL-SCNC: 4.4 MMOL/L — SIGNIFICANT CHANGE UP (ref 3.5–5.3)
PROT SERPL-MCNC: 6.3 G/DL — SIGNIFICANT CHANGE UP (ref 6–8.3)
PROT SERPL-MCNC: 6.3 G/DL — SIGNIFICANT CHANGE UP (ref 6–8.3)
RBC # BLD: 2.91 M/UL — LOW (ref 3.8–5.2)
RBC # BLD: 2.91 M/UL — LOW (ref 3.8–5.2)
RBC # FLD: 14.6 % — HIGH (ref 10.3–14.5)
RBC # FLD: 14.6 % — HIGH (ref 10.3–14.5)
SODIUM SERPL-SCNC: 142 MMOL/L — SIGNIFICANT CHANGE UP (ref 135–145)
SODIUM SERPL-SCNC: 142 MMOL/L — SIGNIFICANT CHANGE UP (ref 135–145)
WBC # BLD: 5.07 K/UL — SIGNIFICANT CHANGE UP (ref 3.8–10.5)
WBC # BLD: 5.07 K/UL — SIGNIFICANT CHANGE UP (ref 3.8–10.5)
WBC # FLD AUTO: 5.07 K/UL — SIGNIFICANT CHANGE UP (ref 3.8–10.5)
WBC # FLD AUTO: 5.07 K/UL — SIGNIFICANT CHANGE UP (ref 3.8–10.5)

## 2023-12-04 PROCEDURE — 99232 SBSQ HOSP IP/OBS MODERATE 35: CPT

## 2023-12-04 RX ORDER — METHOCARBAMOL 500 MG/1
750 TABLET, FILM COATED ORAL THREE TIMES A DAY
Refills: 0 | Status: DISCONTINUED | OUTPATIENT
Start: 2023-12-04 | End: 2023-12-06

## 2023-12-04 RX ADMIN — GABAPENTIN 100 MILLIGRAM(S): 400 CAPSULE ORAL at 22:28

## 2023-12-04 RX ADMIN — DULOXETINE HYDROCHLORIDE 60 MILLIGRAM(S): 30 CAPSULE, DELAYED RELEASE ORAL at 13:03

## 2023-12-04 RX ADMIN — Medication 1 SPRAY(S): at 05:53

## 2023-12-04 RX ADMIN — POLYETHYLENE GLYCOL 3350 17 GRAM(S): 17 POWDER, FOR SOLUTION ORAL at 05:54

## 2023-12-04 RX ADMIN — MORPHINE SULFATE 25 MILLIGRAM(S): 50 CAPSULE, EXTENDED RELEASE ORAL at 14:41

## 2023-12-04 RX ADMIN — Medication 975 MILLIGRAM(S): at 13:03

## 2023-12-04 RX ADMIN — Medication 975 MILLIGRAM(S): at 22:28

## 2023-12-04 RX ADMIN — MORPHINE SULFATE 25 MILLIGRAM(S): 50 CAPSULE, EXTENDED RELEASE ORAL at 19:38

## 2023-12-04 RX ADMIN — METHOCARBAMOL 500 MILLIGRAM(S): 500 TABLET, FILM COATED ORAL at 05:54

## 2023-12-04 RX ADMIN — METHOCARBAMOL 750 MILLIGRAM(S): 500 TABLET, FILM COATED ORAL at 13:03

## 2023-12-04 RX ADMIN — Medication 200 MILLIGRAM(S): at 05:55

## 2023-12-04 RX ADMIN — PANTOPRAZOLE SODIUM 40 MILLIGRAM(S): 20 TABLET, DELAYED RELEASE ORAL at 05:54

## 2023-12-04 RX ADMIN — NALOXEGOL OXALATE 12.5 MILLIGRAM(S): 12.5 TABLET, FILM COATED ORAL at 13:03

## 2023-12-04 RX ADMIN — Medication 3 MILLIGRAM(S): at 22:28

## 2023-12-04 RX ADMIN — Medication 1 SPRAY(S): at 18:03

## 2023-12-04 RX ADMIN — MORPHINE SULFATE 25 MILLIGRAM(S): 50 CAPSULE, EXTENDED RELEASE ORAL at 05:55

## 2023-12-04 RX ADMIN — Medication 975 MILLIGRAM(S): at 05:57

## 2023-12-04 RX ADMIN — Medication 325 MILLIGRAM(S): at 13:03

## 2023-12-04 RX ADMIN — Medication 1 TABLET(S): at 13:03

## 2023-12-04 RX ADMIN — MORPHINE SULFATE 25 MILLIGRAM(S): 50 CAPSULE, EXTENDED RELEASE ORAL at 22:30

## 2023-12-04 RX ADMIN — Medication 200 MILLIGRAM(S): at 22:29

## 2023-12-04 RX ADMIN — MORPHINE SULFATE 25 MILLIGRAM(S): 50 CAPSULE, EXTENDED RELEASE ORAL at 09:55

## 2023-12-04 RX ADMIN — SENNA PLUS 2 TABLET(S): 8.6 TABLET ORAL at 22:29

## 2023-12-04 RX ADMIN — Medication 200 MILLIGRAM(S): at 13:02

## 2023-12-04 RX ADMIN — ENOXAPARIN SODIUM 40 MILLIGRAM(S): 100 INJECTION SUBCUTANEOUS at 05:57

## 2023-12-04 RX ADMIN — MORPHINE SULFATE 25 MILLIGRAM(S): 50 CAPSULE, EXTENDED RELEASE ORAL at 13:57

## 2023-12-04 RX ADMIN — MORPHINE SULFATE 25 MILLIGRAM(S): 50 CAPSULE, EXTENDED RELEASE ORAL at 02:42

## 2023-12-04 RX ADMIN — Medication 1000 MILLIGRAM(S): at 13:03

## 2023-12-04 RX ADMIN — METHOCARBAMOL 750 MILLIGRAM(S): 500 TABLET, FILM COATED ORAL at 22:29

## 2023-12-04 RX ADMIN — MORPHINE SULFATE 25 MILLIGRAM(S): 50 CAPSULE, EXTENDED RELEASE ORAL at 18:03

## 2023-12-04 RX ADMIN — MORPHINE SULFATE 25 MILLIGRAM(S): 50 CAPSULE, EXTENDED RELEASE ORAL at 11:22

## 2023-12-04 RX ADMIN — Medication 975 MILLIGRAM(S): at 13:23

## 2023-12-04 NOTE — PROGRESS NOTE ADULT - ASSESSMENT
48 year old female with past medical history of chronic back pain (s/p multiple spinal surgeries), asthma, obesity (s/p gastric bypass), HTN, opoid dependence, admitted for elective revision of PSF with extension of fusion with instrumentation from T3 throgh pelvis, pedicle subtraction osteotomy (PSO) L4, tethers, iliac fixation, smith clark osteotomies T3-T10 with Plastic Surgery closure with Dr. Schwab, Dr. Gonzalez and Dr. Simpson. Intraoperative course complicated by acute blood loss requiring 4PRBC. Post-op hospital course significant for urinary retention, s/p paulino removal 11/21. Now transferred to Acute rehab    Acute Blood Loss Anemia, likely multifactorial, due to post-operative blood loss, hx of gastric bypass, hx of iron deficiency  - Will monitor Hg/Hct, transfuse if Hg <7  - Noted Hg stable 8  - C/w protonix daily  - GI consulted, appreciate recommendations; she already had EGD/colon earlier in November  - S/p IV Iron x 2 doses; c/w oral iron today+ vitamin C+ bowel regimen  - s/p 4 units PRBC post operatively    Chronic back pain s/p T4-Pelvis Revision Surgery  - S/P elective revision of PSF with extension of fusion with instrumentation from T3 through pelvis (11/14)  - S/P L4 pedicle subtraction osteotomy (PSO)  - S/P proximal tethers and iliac fixation  - S/P T3-T10 Smith-Clark osteotomies with Plastic Surgery closure   - HV x 2 and SHAWNA x 4 drains removed on 11/20   - Follow up CT T/L Spine to eval worsening pain     Asthma  -No evidence of exacerbation   -albuterol inhaler Q6H PRN    HTN  -Continue triamterene 37.5mg- HCTZ 25mg QD - has not met criteria, last dose 11/24  -Monitor BP, stable, can consider discontinuing if BP remains normotensive    DVT prophylaxis:  Lovenox 40 mg SC QD

## 2023-12-04 NOTE — DISCHARGE NOTE PROVIDER - NSDCCPCAREPLAN_GEN_ALL_CORE_FT
PRINCIPAL DISCHARGE DIAGNOSIS  Diagnosis: Lumbar spinal stenosis  Assessment and Plan of Treatment: You were admitted to [facility] due to weakness and functional impairments due to your spinal stenosis and resulting T4-Pelvis revision surgery. You participated in daily therapies and made functional gains throughout your stay. Take your medications as prescribed. Follow up with orthopedic surgery, PM&R, and your primary care doctor upon discharge.      SECONDARY DISCHARGE DIAGNOSES  Diagnosis: Iron deficiency anemia  Assessment and Plan of Treatment: Anemia is a low number of red blood cells or a low amount of hemoglobin in your red blood cells. Hemoglobin is a protein that helps carry oxygen throughout your body. Red blood cells use iron to create hemoglobin. Common causes of anemia include blood loss and iron deficiency. Your anemia is likely multifactorial including post-operative blood loss, hx of gastric bypass, and hx of iron deficiency. Your levels improved after iron infusion and oral supplementation. Please follow up with your primary care doctor for repeat blood work and further management as well as gastroenterology.       PRINCIPAL DISCHARGE DIAGNOSIS  Diagnosis: Lumbar spinal stenosis  Assessment and Plan of Treatment: You were admitted to [facility] due to weakness and functional impairments due to your spinal stenosis and resulting T4-Pelvis revision surgery. You participated in daily therapies and made functional gains throughout your stay. Take your medications as prescribed. Follow up with orthopedic surgery, PM&R, and your primary care doctor upon discharge.      SECONDARY DISCHARGE DIAGNOSES  Diagnosis: Iron deficiency anemia  Assessment and Plan of Treatment: Anemia is a low number of red blood cells or a low amount of hemoglobin in your red blood cells. Hemoglobin is a protein that helps carry oxygen throughout your body. Red blood cells use iron to create hemoglobin. Common causes of anemia include blood loss and iron deficiency. Your anemia is likely multifactorial including post-operative blood loss, hx of gastric bypass, and hx of iron deficiency. Your levels improved after iron infusion and oral supplementation. Please follow up with your primary care doctor for repeat blood work and further management as well as gastroenterology.

## 2023-12-04 NOTE — PROGRESS NOTE ADULT - SUBJECTIVE AND OBJECTIVE BOX
HPI:  Mrs. Savi Ivy is a 48 year old female patient with past medical history of chronic back pain (s/p multiple spinal surgeries), asthma, obesity (s/p gastric bypass), and HTN who presented to [facility] on 11/14 for an elective revision of a PSF with extension of fusion with instrumentation from T3 through pelvis, pedicle subtraction osteotomy (PSO) L4, tethers, iliac fixation, smith clark osteotomies T3-T10 with Plastic Surgery closure with Dr. Schwab, Dr. Gonzalez and Dr. Simpson. Intraoperative course with acute blood loss requiring 3 unit PRBC transfusion with one additional PRBC post-op. Post-op hospital course significant for urinary retention, s/p paulino removal 11/21 and TOV passed. Patient evaluated by PT/OT and was recommended for acute inpatient rehab. Patient is medically stable for discharge to [facility] on 11/22.     TDD: 12/6  ___________________________________________________________________________    SUBJECTIVE/ROS  Patient was seen and evaluated at bedside today.  Patient feels tired today and has pain in low back to left thigh.  Gabapentin 100 mg QHS added over the weekend.  Complaining of increased muscle spasm today. Increased robaxin to 760 mg TID.   Hgb improved to 8 today. S/p IV iron infusion x 2.  Patient is motivated to continue participation on the recommended rehabilitation program.   Denies any CP, SOB, CALLAWAY, palpitations, fever, chills, body aches, cough, congestion, or any other symptoms at this time.   ___________________________________________________________________________    CT THORACIC SPINE AND LUMBAR SPINE (11/27/2023)    IMPRESSION: Recent thoracic fusion T4-T9 with screws and connecting rods and immature bony fusion mass. More remote fusion T10 through the sacrum with transpedicular screws and mature bony fusion mass.  ___________________________________________________________________________    Vital Signs Last 24 Hrs  T(C): 36.6 (04 Dec 2023 09:39), Max: 37.1 (04 Dec 2023 06:42)  T(F): 97.8 (04 Dec 2023 09:39), Max: 98.8 (04 Dec 2023 06:42)  HR: 80 (04 Dec 2023 09:39) (72 - 80)  BP: 110/72 (04 Dec 2023 09:39) (89/55 - 110/72)  BP(mean): --  RR: 16 (04 Dec 2023 09:39) (14 - 16)  SpO2: 95% (04 Dec 2023 09:39) (95% - 96%)  ___________________________________________________________________________    LAB    LABS:                          8.0    5.07  )-----------( 509      ( 04 Dec 2023 06:32 )             26.2     12-04    142  |  105  |  15  ----------------------------<  87  4.4   |  32<H>  |  0.64    Ca    8.7      04 Dec 2023 06:32    TPro  6.3  /  Alb  2.1<L>  /  TBili  0.2  /  DBili  x   /  AST  26  /  ALT  21  /  AlkPhos  130<H>  12-04                          7.1    5.13  )-----------( 524      ( 01 Dec 2023 05:14 )             22.8                             7.8    6.73  )-----------( 547      ( 30 Nov 2023 11:53 )             25.3                             7.2    6.08  )-----------( 527      ( 30 Nov 2023 06:56 )             23.6     PT/INR - ( 30 Nov 2023 11:53 )   PT: 11.8 sec;   INR: 1.04 ratio    PTT - ( 30 Nov 2023 11:53 )  PTT:33.6 sec    Free Thyroxine, Serum in AM (12.01.23 @ 05:14)   Free Thyroxine, Serum: 1.0 ng/dL    Thyroid Stimulating Hormone, Serum in AM (12.01.23 @ 05:14)   Thyroid Stimulating Hormone, Serum: 1.106 uIU/mL    Iron with Total Binding Capacity in AM (12.01.23 @ 05:14)   Iron - Total Binding Capacity.: 243 ug/dL  % Saturation, Iron: 7 %  Iron Total: 17 ug/dL  Unsaturated Iron Binding Capacity: 226 ug/dL    Folate, Serum in AM (12.01.23 @ 05:14)   Folate, Serum: 9.4 ng/mL    Occult Blood, Feces (11.30.23 @ 13:26)   Occult Blood, Feces: Positive    Antibody Screen Interpretation (11.30.23 @ 09:32)   Antibody Screen: NEG    Type + Screen (11.30.23 @ 09:32)   ABO RH Interpretation: O POS    Vitamin B12, Serum (11.27.23 @ 05:29)   Vitamin B12, Serum: 885 pg/mL  ___________________________________________________________________________    MEDICATIONS  (STANDING):  acetaminophen     Tablet .. 975 milliGRAM(s) Oral every 8 hours  ascorbic acid 1000 milliGRAM(s) Oral daily  DULoxetine 60 milliGRAM(s) Oral daily  enoxaparin Injectable 40 milliGRAM(s) SubCutaneous every 24 hours  ferrous    sulfate 325 milliGRAM(s) Oral daily  fluticasone propionate 50 MICROgram(s)/spray Nasal Spray 1 Spray(s) Both Nostrils two times a day  gabapentin 100 milliGRAM(s) Oral at bedtime  melatonin. 3 milliGRAM(s) Oral at bedtime  methocarbamol 750 milliGRAM(s) Oral three times a day  multivitamin 1 Tablet(s) Oral daily  naloxegol 12.5 milliGRAM(s) Oral daily  pantoprazole    Tablet 40 milliGRAM(s) Oral before breakfast  polyethylene glycol 3350 17 Gram(s) Oral two times a day  pregabalin 200 milliGRAM(s) Oral three times a day  senna 2 Tablet(s) Oral at bedtime  triamterene 37.5 mG/hydrochlorothiazide 25 mG Tablet 1 Tablet(s) Oral daily    MEDICATIONS  (PRN):  albuterol    90 MICROgram(s) HFA Inhaler 2 Puff(s) Inhalation every 6 hours PRN Shortness of Breath and/or Wheezing  bisacodyl 5 milliGRAM(s) Oral every 12 hours PRN Constipation  hemorrhoidal Ointment 1 Application(s) Rectal two times a day PRN hemorrhoidal pain  morphine   Solution 10 milliGRAM(s) Oral every 4 hours PRN Moderate Pain (4 - 6)  morphine   Solution 25 milliGRAM(s) Oral every 4 hours PRN Severe Pain (7 - 10)  ___________________________________________________________________________    PHYSICAL EXAM:  Gen - NAD, Comfortable  HEENT - NCAT, EOMI, MMM  Neck - Supple, No limited ROM  Pulm - breathing comfortably on room air  Cardiovascular - warm and well perfused  Abdomen - Soft, NT, ND  Extremities - No Cyanosis, no clubbing, no edema, no calf tenderness  Neuro-     Cognitive - awake, alert, oriented to person, place, date, year, and situation.  Able to follow command     Motor -                     LEFT    UE - 4/5                    RIGHT UE - 4/5                    LEFT    LE - 4/5                    RIGHT LE - 4/5        Sensory - Intact to LT   Psychiatric - Mood stable, Affect WNL  Skin:  left buttock healing wound with scab, spinal surgical incision clean and dry and left LIVE    ___________________________________________________________________________ HPI:  Mrs. Savi Ivy is a 48 year old female patient with past medical history of chronic back pain (s/p multiple spinal surgeries), asthma, obesity (s/p gastric bypass), and HTN who presented to [facility] on 11/14 for an elective revision of a PSF with extension of fusion with instrumentation from T3 through pelvis, pedicle subtraction osteotomy (PSO) L4, tethers, iliac fixation, smith clark osteotomies T3-T10 with Plastic Surgery closure with Dr. Schwab, Dr. Gonzalez and Dr. Simpson. Intraoperative course with acute blood loss requiring 3 unit PRBC transfusion with one additional PRBC post-op. Post-op hospital course significant for urinary retention, s/p paulino removal 11/21 and TOV passed. Patient evaluated by PT/OT and was recommended for acute inpatient rehab. Patient is medically stable for discharge to [facility] on 11/22.     TDD: 12/6  ___________________________________________________________________________    SUBJECTIVE/ROS  Patient was seen and evaluated at bedside today.  Patient feels tired today and has pain in low back to left thigh.  Gabapentin 100 mg QHS added over the weekend.  Complaining of increased muscle spasm today. Increased robaxin to 760 mg TID.   Hgb improved to 8 today. S/p IV iron infusion x 2.  Patient is motivated to continue participation on the recommended rehabilitation program.   Denies any CP, SOB, CALLAWAY, palpitations, fever, chills, body aches, cough, congestion, or any other symptoms at this time.   ___________________________________________________________________________    CT THORACIC SPINE AND LUMBAR SPINE (11/27/2023)    IMPRESSION: Recent thoracic fusion T4-T9 with screws and connecting rods and immature bony fusion mass. More remote fusion T10 through the sacrum with transpedicular screws and mature bony fusion mass.  ___________________________________________________________________________    Vital Signs Last 24 Hrs  T(C): 36.6 (04 Dec 2023 09:39), Max: 37.1 (04 Dec 2023 06:42)  T(F): 97.8 (04 Dec 2023 09:39), Max: 98.8 (04 Dec 2023 06:42)  HR: 80 (04 Dec 2023 09:39) (72 - 80)  BP: 110/72 (04 Dec 2023 09:39) (89/55 - 110/72)  BP(mean): --  RR: 16 (04 Dec 2023 09:39) (14 - 16)  SpO2: 95% (04 Dec 2023 09:39) (95% - 96%)  ___________________________________________________________________________    LAB    LABS:                          8.0    5.07  )-----------( 509      ( 04 Dec 2023 06:32 )             26.2     12-04    142  |  105  |  15  ----------------------------<  87  4.4   |  32<H>  |  0.64    Ca    8.7      04 Dec 2023 06:32    TPro  6.3  /  Alb  2.1<L>  /  TBili  0.2  /  DBili  x   /  AST  26  /  ALT  21  /  AlkPhos  130<H>  12-04                          7.1    5.13  )-----------( 524      ( 01 Dec 2023 05:14 )             22.8                             7.8    6.73  )-----------( 547      ( 30 Nov 2023 11:53 )             25.3                             7.2    6.08  )-----------( 527      ( 30 Nov 2023 06:56 )             23.6     PT/INR - ( 30 Nov 2023 11:53 )   PT: 11.8 sec;   INR: 1.04 ratio    PTT - ( 30 Nov 2023 11:53 )  PTT:33.6 sec    Free Thyroxine, Serum in AM (12.01.23 @ 05:14)   Free Thyroxine, Serum: 1.0 ng/dL    Thyroid Stimulating Hormone, Serum in AM (12.01.23 @ 05:14)   Thyroid Stimulating Hormone, Serum: 1.106 uIU/mL    Iron with Total Binding Capacity in AM (12.01.23 @ 05:14)   Iron - Total Binding Capacity.: 243 ug/dL  % Saturation, Iron: 7 %  Iron Total: 17 ug/dL  Unsaturated Iron Binding Capacity: 226 ug/dL    Folate, Serum in AM (12.01.23 @ 05:14)   Folate, Serum: 9.4 ng/mL    Occult Blood, Feces (11.30.23 @ 13:26)   Occult Blood, Feces: Positive    Antibody Screen Interpretation (11.30.23 @ 09:32)   Antibody Screen: NEG    Type + Screen (11.30.23 @ 09:32)   ABO RH Interpretation: O POS    Vitamin B12, Serum (11.27.23 @ 05:29)   Vitamin B12, Serum: 885 pg/mL  ___________________________________________________________________________    MEDICATIONS  (STANDING):  acetaminophen     Tablet .. 975 milliGRAM(s) Oral every 8 hours  ascorbic acid 1000 milliGRAM(s) Oral daily  DULoxetine 60 milliGRAM(s) Oral daily  enoxaparin Injectable 40 milliGRAM(s) SubCutaneous every 24 hours  ferrous    sulfate 325 milliGRAM(s) Oral daily  fluticasone propionate 50 MICROgram(s)/spray Nasal Spray 1 Spray(s) Both Nostrils two times a day  gabapentin 100 milliGRAM(s) Oral at bedtime  melatonin. 3 milliGRAM(s) Oral at bedtime  methocarbamol 750 milliGRAM(s) Oral three times a day  multivitamin 1 Tablet(s) Oral daily  naloxegol 12.5 milliGRAM(s) Oral daily  pantoprazole    Tablet 40 milliGRAM(s) Oral before breakfast  polyethylene glycol 3350 17 Gram(s) Oral two times a day  pregabalin 200 milliGRAM(s) Oral three times a day  senna 2 Tablet(s) Oral at bedtime  triamterene 37.5 mG/hydrochlorothiazide 25 mG Tablet 1 Tablet(s) Oral daily    MEDICATIONS  (PRN):  albuterol    90 MICROgram(s) HFA Inhaler 2 Puff(s) Inhalation every 6 hours PRN Shortness of Breath and/or Wheezing  bisacodyl 5 milliGRAM(s) Oral every 12 hours PRN Constipation  hemorrhoidal Ointment 1 Application(s) Rectal two times a day PRN hemorrhoidal pain  morphine   Solution 10 milliGRAM(s) Oral every 4 hours PRN Moderate Pain (4 - 6)  morphine   Solution 25 milliGRAM(s) Oral every 4 hours PRN Severe Pain (7 - 10)  ___________________________________________________________________________    PHYSICAL EXAM:  Gen - NAD, Comfortable  HEENT - NCAT, EOMI, MMM  Neck - Supple, No limited ROM  Pulm - breathing comfortably on room air  Cardiovascular - warm and well perfused  Abdomen - Soft, NT, ND  Extremities - No Cyanosis, no clubbing, no edema, no calf tenderness  Neuro-     Cognitive - awake, alert, oriented to person, place, date, year, and situation.  Able to follow command     Motor -                     LEFT    UE - 4/5                    RIGHT UE - 4/5                    LEFT    LE - 4/5                    RIGHT LE - 4/5        Sensory - Intact to LT   Psychiatric - Mood stable, Affect WNL  Skin:  left buttock healing wound with scab, spinal surgical incision clean and dry and left LIVE    ___________________________________________________________________________ HPI:  Mrs. Savi Ivy is a 48 year old female patient with past medical history of chronic back pain (s/p multiple spinal surgeries), asthma, obesity (s/p gastric bypass), and HTN who presented to [facility] on 11/14 for an elective revision of a PSF with extension of fusion with instrumentation from T3 through pelvis, pedicle subtraction osteotomy (PSO) L4, tethers, iliac fixation, smith clark osteotomies T3-T10 with Plastic Surgery closure with Dr. Schwab, Dr. Gonzalez and Dr. Simpson. Intraoperative course with acute blood loss requiring 3 unit PRBC transfusion with one additional PRBC post-op. Post-op hospital course significant for urinary retention, s/p paulino removal 11/21 and TOV passed. Patient evaluated by PT/OT and was recommended for acute inpatient rehab. Patient is medically stable for discharge to [facility] on 11/22.     TDD: 12/6  ___________________________________________________________________________    SUBJECTIVE/ROS  Patient was seen and evaluated at bedside today.  Patient feels tired today and has pain in low back to left thigh.  Gabapentin 100 mg QHS added over the weekend.  Complaining of increased muscle spasm today. Increased robaxin to 760 mg TID.   Hgb improved to 8 today. S/p IV iron infusion x 2.  Patient is motivated to continue participation on the recommended rehabilitation program.   Denies any CP, SOB, CALLAWAY, palpitations, fever, chills, body aches, cough, congestion, or any other symptoms at this time.   ___________________________________________________________________________    CT THORACIC SPINE AND LUMBAR SPINE (11/27/2023)    IMPRESSION: Recent thoracic fusion T4-T9 with screws and connecting rods and immature bony fusion mass. More remote fusion T10 through the sacrum with transpedicular screws and mature bony fusion mass.  ___________________________________________________________________________    Vital Signs Last 24 Hrs  T(C): 36.6 (04 Dec 2023 09:39), Max: 37.1 (04 Dec 2023 06:42)  T(F): 97.8 (04 Dec 2023 09:39), Max: 98.8 (04 Dec 2023 06:42)  HR: 80 (04 Dec 2023 09:39) (72 - 80)  BP: 110/72 (04 Dec 2023 09:39) (89/55 - 110/72)  RR: 16 (04 Dec 2023 09:39) (14 - 16)  SpO2: 95% (04 Dec 2023 09:39) (95% - 96%)  ___________________________________________________________________________    LAB                        8.0    5.07  )-----------( 509      ( 04 Dec 2023 06:32 )             26.2     12-04    142  |  105  |  15  ----------------------------<  87  4.4   |  32<H>  |  0.64    Ca    8.7      04 Dec 2023 06:32    TPro  6.3  /  Alb  2.1<L>  /  TBili  0.2  /  DBili  x   /  AST  26  /  ALT  21  /  AlkPhos  130<H>  12-04                          7.1    5.13  )-----------( 524      ( 01 Dec 2023 05:14 )             22.8                             7.8    6.73  )-----------( 547      ( 30 Nov 2023 11:53 )             25.3                             7.2    6.08  )-----------( 527      ( 30 Nov 2023 06:56 )             23.6     PT/INR - ( 30 Nov 2023 11:53 )   PT: 11.8 sec;   INR: 1.04 ratio    PTT - ( 30 Nov 2023 11:53 )  PTT:33.6 sec    Free Thyroxine, Serum in AM (12.01.23 @ 05:14)   Free Thyroxine, Serum: 1.0 ng/dL    Thyroid Stimulating Hormone, Serum in AM (12.01.23 @ 05:14)   Thyroid Stimulating Hormone, Serum: 1.106 uIU/mL    Iron with Total Binding Capacity in AM (12.01.23 @ 05:14)   Iron - Total Binding Capacity.: 243 ug/dL  % Saturation, Iron: 7 %  Iron Total: 17 ug/dL  Unsaturated Iron Binding Capacity: 226 ug/dL    Folate, Serum in AM (12.01.23 @ 05:14)   Folate, Serum: 9.4 ng/mL    Occult Blood, Feces (11.30.23 @ 13:26)   Occult Blood, Feces: Positive    Antibody Screen Interpretation (11.30.23 @ 09:32)   Antibody Screen: NEG    Type + Screen (11.30.23 @ 09:32)   ABO RH Interpretation: O POS    Vitamin B12, Serum (11.27.23 @ 05:29)   Vitamin B12, Serum: 885 pg/mL  ___________________________________________________________________________    MEDICATIONS  (STANDING):  acetaminophen     Tablet .. 975 milliGRAM(s) Oral every 8 hours  ascorbic acid 1000 milliGRAM(s) Oral daily  DULoxetine 60 milliGRAM(s) Oral daily  enoxaparin Injectable 40 milliGRAM(s) SubCutaneous every 24 hours  ferrous    sulfate 325 milliGRAM(s) Oral daily  fluticasone propionate 50 MICROgram(s)/spray Nasal Spray 1 Spray(s) Both Nostrils two times a day  gabapentin 100 milliGRAM(s) Oral at bedtime  melatonin. 3 milliGRAM(s) Oral at bedtime  methocarbamol 750 milliGRAM(s) Oral three times a day  multivitamin 1 Tablet(s) Oral daily  naloxegol 12.5 milliGRAM(s) Oral daily  pantoprazole    Tablet 40 milliGRAM(s) Oral before breakfast  polyethylene glycol 3350 17 Gram(s) Oral two times a day  pregabalin 200 milliGRAM(s) Oral three times a day  senna 2 Tablet(s) Oral at bedtime  triamterene 37.5 mG/hydrochlorothiazide 25 mG Tablet 1 Tablet(s) Oral daily    MEDICATIONS  (PRN):  albuterol    90 MICROgram(s) HFA Inhaler 2 Puff(s) Inhalation every 6 hours PRN Shortness of Breath and/or Wheezing  bisacodyl 5 milliGRAM(s) Oral every 12 hours PRN Constipation  hemorrhoidal Ointment 1 Application(s) Rectal two times a day PRN hemorrhoidal pain  morphine   Solution 10 milliGRAM(s) Oral every 4 hours PRN Moderate Pain (4 - 6)  morphine   Solution 25 milliGRAM(s) Oral every 4 hours PRN Severe Pain (7 - 10)  ___________________________________________________________________________    PHYSICAL EXAM:  Gen - NAD, Comfortable  HEENT - NCAT, EOMI, MMM  Neck - Supple, No limited ROM  Pulm - breathing comfortably on room air  Cardiovascular - warm and well perfused  Abdomen - Soft, NT, ND  Extremities - No Cyanosis, no clubbing, no edema, no calf tenderness  Neuro-     Cognitive - awake, alert, oriented to person, place, date, year, and situation.  Able to follow command     Motor -                     LEFT    UE - 4/5                    RIGHT UE - 4/5                    LEFT    LE - 4/5                    RIGHT LE - 4/5        Sensory - Intact to LT   Psychiatric - Mood stable, Affect WNL  Skin:  left buttock healing wound with scab, spinal surgical incision clean and dry and left LIVE    ___________________________________________________________________________ HPI:  Mrs. Savi Ivy is a 48 year old female patient with past medical history of chronic back pain (s/p multiple spinal surgeries), asthma, obesity (s/p gastric bypass), and HTN who presented to [facility] on 11/14 for an elective revision of a PSF with extension of fusion with instrumentation from T3 through pelvis, pedicle subtraction osteotomy (PSO) L4, tethers, iliac fixation, smith clark osteotomies T3-T10 with Plastic Surgery closure with Dr. Schwab, Dr. Gonzalez and Dr. Simpson. Intraoperative course with acute blood loss requiring 3 unit PRBC transfusion with one additional PRBC post-op. Post-op hospital course significant for urinary retention, s/p paulino removal 11/21 and TOV passed. Patient evaluated by PT/OT and was recommended for acute inpatient rehab. Patient is medically stable for discharge to [facility] on 11/22.     TDD: 12/6  ___________________________________________________________________________    SUBJECTIVE/ROS  Patient was seen and evaluated at bedside today.  Patient feels tired today and has pain in low back to left thigh.  Gabapentin 100 mg QHS added over the weekend.  Complaining of increased muscle spasm today. Increased robaxin to 760 mg TID.   Hgb improved to 8 today. S/p IV iron infusion x 2.  Patient is motivated to continue participation on the recommended rehabilitation program.   Denies any CP, SOB, CALLAWAY, palpitations, fever, chills, body aches, cough, congestion, or any other symptoms at this time.   ___________________________________________________________________________    CT THORACIC SPINE AND LUMBAR SPINE (11/27/2023)    IMPRESSION: Recent thoracic fusion T4-T9 with screws and connecting rods and immature bony fusion mass. More remote fusion T10 through the sacrum with transpedicular screws and mature bony fusion mass.  ___________________________________________________________________________    Vital Signs Last 24 Hrs  T(C): 36.6 (04 Dec 2023 09:39), Max: 37.1 (04 Dec 2023 06:42)  T(F): 97.8 (04 Dec 2023 09:39), Max: 98.8 (04 Dec 2023 06:42)  HR: 80 (04 Dec 2023 09:39) (72 - 80)  BP: 110/72 (04 Dec 2023 09:39) (89/55 - 110/72)  RR: 16 (04 Dec 2023 09:39) (14 - 16)  SpO2: 95% (04 Dec 2023 09:39) (95% - 96%)  ___________________________________________________________________________    LAB                        8.0    5.07  )-----------( 509      ( 04 Dec 2023 06:32 )             26.2     12-04    142  |  105  |  15  ----------------------------<  87  4.4   |  32<H>  |  0.64    Ca    8.7      04 Dec 2023 06:32    TPro  6.3  /  Alb  2.1<L>  /  TBili  0.2  /  DBili  x   /  AST  26  /  ALT  21  /  AlkPhos  130<H>  12-04                          7.1    5.13  )-----------( 524      ( 01 Dec 2023 05:14 )             22.8                             7.8    6.73  )-----------( 547      ( 30 Nov 2023 11:53 )             25.3                             7.2    6.08  )-----------( 527      ( 30 Nov 2023 06:56 )             23.6     PT/INR - ( 30 Nov 2023 11:53 )   PT: 11.8 sec;   INR: 1.04 ratio    PTT - ( 30 Nov 2023 11:53 )  PTT:33.6 sec    Free Thyroxine, Serum in AM (12.01.23 @ 05:14)   Free Thyroxine, Serum: 1.0 ng/dL    Thyroid Stimulating Hormone, Serum in AM (12.01.23 @ 05:14)   Thyroid Stimulating Hormone, Serum: 1.106 uIU/mL    Iron with Total Binding Capacity in AM (12.01.23 @ 05:14)   Iron - Total Binding Capacity.: 243 ug/dL  % Saturation, Iron: 7 %  Iron Total: 17 ug/dL  Unsaturated Iron Binding Capacity: 226 ug/dL    Folate, Serum in AM (12.01.23 @ 05:14)   Folate, Serum: 9.4 ng/mL    Occult Blood, Feces (11.30.23 @ 13:26)   Occult Blood, Feces: Positive    Antibody Screen Interpretation (11.30.23 @ 09:32)   Antibody Screen: NEG    Type + Screen (11.30.23 @ 09:32)   ABO RH Interpretation: O POS    Vitamin B12, Serum (11.27.23 @ 05:29)   Vitamin B12, Serum: 885 pg/mL  ___________________________________________________________________________    MEDICATIONS  (STANDING):  acetaminophen     Tablet .. 975 milliGRAM(s) Oral every 8 hours  ascorbic acid 1000 milliGRAM(s) Oral daily  DULoxetine 60 milliGRAM(s) Oral daily  enoxaparin Injectable 40 milliGRAM(s) SubCutaneous every 24 hours  ferrous    sulfate 325 milliGRAM(s) Oral daily  fluticasone propionate 50 MICROgram(s)/spray Nasal Spray 1 Spray(s) Both Nostrils two times a day  gabapentin 100 milliGRAM(s) Oral at bedtime  melatonin. 3 milliGRAM(s) Oral at bedtime  methocarbamol 750 milliGRAM(s) Oral three times a day  multivitamin 1 Tablet(s) Oral daily  naloxegol 12.5 milliGRAM(s) Oral daily  pantoprazole    Tablet 40 milliGRAM(s) Oral before breakfast  polyethylene glycol 3350 17 Gram(s) Oral two times a day  pregabalin 200 milliGRAM(s) Oral three times a day  senna 2 Tablet(s) Oral at bedtime  triamterene 37.5 mG/hydrochlorothiazide 25 mG Tablet 1 Tablet(s) Oral daily    MEDICATIONS  (PRN):  albuterol    90 MICROgram(s) HFA Inhaler 2 Puff(s) Inhalation every 6 hours PRN Shortness of Breath and/or Wheezing  bisacodyl 5 milliGRAM(s) Oral every 12 hours PRN Constipation  hemorrhoidal Ointment 1 Application(s) Rectal two times a day PRN hemorrhoidal pain  morphine   Solution 10 milliGRAM(s) Oral every 4 hours PRN Moderate Pain (4 - 6)  morphine   Solution 25 milliGRAM(s) Oral every 4 hours PRN Severe Pain (7 - 10)  ___________________________________________________________________________    PHYSICAL EXAM:  Gen - NAD, Comfortable  HEENT - NCAT, EOMI, MMM  Neck - Supple, No limited ROM  Pulm - breathing comfortably on room air  Cardiovascular - warm and well perfused  Abdomen - Soft, NT, ND  Extremities - No Cyanosis, no clubbing, no edema, no calf tenderness  Neuro-     Cognitive - awake, alert, oriented to person, place, date, year, and situation.  Able to follow command     Motor -                     LEFT    UE - 4/5                    RIGHT UE - 4/5                    LEFT    LE - 4/5                    RIGHT LE - 4/5        Sensory - Intact to LT   Psychiatric - Mood stable, Affect WNL  Skin:  left buttock healing wound with scab, spinal surgical incision clean and dry and left LIVE    ___________________________________________________________________________

## 2023-12-04 NOTE — DISCHARGE NOTE PROVIDER - NSDCCPGOAL_GEN_ALL_CORE_FT
[Never (0 pts)] : Never (0 points) [No] : In the past 12 months have you used drugs other than those required for medical reasons? No [No falls in past year] : Patient reported no falls in the past year [0] : 2) Feeling down, depressed, or hopeless: Not at all (0) [] : No [de-identified] : no [de-identified] : no [de-identified] : work  [de-identified] : no [[id number]] : 0 To get better and follow your care plan as instructed.

## 2023-12-04 NOTE — DISCHARGE NOTE PROVIDER - NSDCMRMEDTOKEN_GEN_ALL_CORE_FT
acetaminophen 325 mg oral tablet: 3 tab(s) orally every 8 hours  Benadryl 25 mg oral capsule: 1 cap(s) orally once a day (at bedtime)  Cymbalta 60 mg oral delayed release capsule: 1 cap(s) orally once a day  Dyazide 25 mg-37.5 mg oral capsule: 1 cap(s) orally once a day  enoxaparin 40 mg/0.4 mL injectable solution: 40 milligram(s) injectable once a day for DVT prophylaxis while at rehab  fentaNYL 50 mcg/hr transdermal film, extended release: 1 film(s) transdermal every 72 hours  methocarbamol 500 mg oral tablet: 1 tab(s) orally 3 times a day  naloxegol 12.5 mg oral tablet: 1 tab(s) orally once a day  Nucynta 75 mg oral tablet: 1 tab(s) orally every 6 hours (home dosing ~ 4x/day)  omeprazole 40 mg oral delayed release capsule: 1 cap(s) orally once a day  polyethylene glycol 3350 oral powder for reconstitution: 17 gram(s) orally once a day  pregabalin 200 mg oral capsule: 1 cap(s) orally 3 times a day  Proventil 90 mcg/inh inhalation aerosol: inhaled prn  senna leaf extract oral tablet: 2 tab(s) orally once a day (at bedtime)   acetaminophen 325 mg oral tablet: 3 tab(s) orally every 8 hours  albuterol 90 mcg/inh inhalation aerosol: 2 puff(s) inhaled every 6 hours as needed for  bronchospasm  ascorbic acid 1000 mg oral tablet: 1 tab(s) orally once a day  bisacodyl 5 mg oral delayed release tablet: 1 tab(s) orally every 12 hours as needed for Constipation  DULoxetine 60 mg oral delayed release capsule: 1 cap(s) orally once a day MDD: 60mg  fentaNYL 50 mcg/hr transdermal film, extended release: 1 patch transdermally every 72 hours MDD: 1200mcg  ferrous sulfate 325 mg (65 mg elemental iron) oral tablet: 1 tab(s) orally once a day  fluticasone 50 mcg/inh nasal spray: 1 spray(s) in each nostril 2 times a day  gabapentin 100 mg oral capsule: 1 cap(s) orally once a day (at bedtime)  melatonin 3 mg oral tablet: 1 tab(s) orally once a day (at bedtime)  methocarbamol 750 mg oral tablet: 1 tab(s) orally 3 times a day  morphine 10 mg/5 mL oral solution: 5 milliliter(s) orally every 4 hours as needed for  severe pain MDD: 60mg  Multiple Vitamins oral tablet: 1 tab(s) orally once a day  naloxegol 12.5 mg oral tablet: 1 tab(s) orally once a day MDD: 12.5mg  naloxone 4 mg/0.1 mL nasal spray: 4 milligram(s) intranasally once a day MDD: 4mg  Nucynta 75 mg oral tablet: 1 tab(s) orally every 6 hours as needed for  moderate pain (home dosing ~ 4x/day) MDD: 300mg  omeprazole 40 mg oral delayed release capsule: 1 cap(s) orally once a day  polyethylene glycol 3350 oral powder for reconstitution: 17 gram(s) orally 2 times a day  pregabalin 200 mg oral capsule: 1 cap(s) orally 3 times a day MDD: 600mg  senna leaf extract oral tablet: 2 tab(s) orally once a day (at bedtime)  triamterene-hydrochlorothiazide 37.5 mg-25 mg oral tablet: 1 tab(s) orally once a day   acetaminophen 325 mg oral tablet: 3 tab(s) orally every 8 hours  albuterol 90 mcg/inh inhalation aerosol: 2 puff(s) inhaled every 6 hours as needed for  bronchospasm  ascorbic acid 1000 mg oral tablet: 1 tab(s) orally once a day  bisacodyl 5 mg oral delayed release tablet: 1 tab(s) orally every 12 hours as needed for Constipation  DULoxetine 60 mg oral delayed release capsule: 1 cap(s) orally once a day MDD: 60mg  fentaNYL 25 mcg/hr transdermal film, extended release: 25 mcg/hr transdermally every 72 hours MDD: 600mcg  ferrous sulfate 325 mg (65 mg elemental iron) oral tablet: 1 tab(s) orally once a day  fluticasone 50 mcg/inh nasal spray: 1 spray(s) in each nostril 2 times a day  gabapentin 100 mg oral capsule: 1 cap(s) orally once a day (at bedtime)  melatonin 3 mg oral tablet: 1 tab(s) orally once a day (at bedtime)  methocarbamol 750 mg oral tablet: 1 tab(s) orally 3 times a day  morphine 10 mg/5 mL oral solution: 5 milliliter(s) orally every 4 hours as needed for  severe pain MDD: 60mg  Multiple Vitamins oral tablet: 1 tab(s) orally once a day  naloxegol 12.5 mg oral tablet: 1 tab(s) orally once a day MDD: 12.5mg  naloxone 4 mg/0.1 mL nasal spray: 4 milligram(s) intranasally once a day MDD: 4mg  Nucynta 75 mg oral tablet: 1 tab(s) orally every 6 hours as needed for  moderate pain (home dosing ~ 4x/day) MDD: 300mg  omeprazole 40 mg oral delayed release capsule: 1 cap(s) orally once a day  polyethylene glycol 3350 oral powder for reconstitution: 17 gram(s) orally 2 times a day  pregabalin 200 mg oral capsule: 1 cap(s) orally 3 times a day MDD: 600mg  senna leaf extract oral tablet: 2 tab(s) orally once a day (at bedtime)  triamterene-hydrochlorothiazide 37.5 mg-25 mg oral tablet: 1 tab(s) orally once a day

## 2023-12-04 NOTE — DISCHARGE NOTE PROVIDER - PROVIDER TOKENS
PROVIDER:[TOKEN:[[id number]],SCHEDULEDAPPT:[12/07/2023]],PROVIDER:[TOKEN:[[id number]],FOLLOWUP:[1 month]] PROVIDER:[TOKEN:[[id number]],SCHEDULEDAPPT:[12/07/2023]],PROVIDER:[TOKEN:[[id number]],FOLLOWUP:[1 month]]

## 2023-12-04 NOTE — DISCHARGE NOTE PROVIDER - HOSPITAL COURSE
HPI:  Mrs. Savi Ivy is a 48 year old female patient with past medical history of chronic back pain (s/p multiple spinal surgeries), asthma, obesity (s/p gastric bypass), and HTN who presented to [facility] on 11/14 for an elective revision of a PSF with extension of fusion with instrumentation from T3 throgh pelvis, pedicle subtraction osteotomy (PSO) L4, tethers, iliac fixation, smith clark osteotomies T3-T10 with Plastic Surgery closure with Dr. Schwab, Dr. Gonzalez and Dr. Simpson. Intraoperative course with acute blood loss requiring 3 unit PRBC transfusion with one additional PRBC post-op. Post-op hospital course significant for urinary retention, s/p paulino removal 11/21 and TOV passed. Patient evaluated by PT/OT and was recommended for acute inpatient rehab. Patient is medically stable for discharge to [facility] on 11/22.    (22 Nov 2023 13:26)    REHAB COURSE:  Patient participated in daily therapies and made good functional gains.  Rehab course notable for anemia adn FOBT+. Anemia likely multifactorial, due to post-operative blood loss, hx of gastric bypass, hx of iron deficiency. Patient treated with iron infusion x 2 doses then placed on oral supplementation with ferrous sulfate and vitamin C. GI consulted, patient already had EGD/colononscopy earlier in November. Continued on protonix QD. CT thoracic and lumbar spine 11/27 stable. Pain control tapered down as tolerated.     Patient tolerated course of inpatient PT/OT/SLP rehab with significant functional improvements. Patient seen and examined on day of discharge.  Medications, medication side effects, and discharge instructions were reviewed with the patient, who expressed understanding of all information.  Patient was medically and functionally optimized and cleared for discharge.     HPI:  Mrs. Savi Ivy is a 48 year old female patient with past medical history of chronic back pain (s/p multiple spinal surgeries), asthma, obesity (s/p gastric bypass), and HTN who presented to [facility] on 11/14 for an elective revision of a PSF with extension of fusion with instrumentation from T3 throgh pelvis, pedicle subtraction osteotomy (PSO) L4, tethers, iliac fixation, smith clark osteotomies T3-T10 with Plastic Surgery closure with Dr. Schwab, Dr. Gonzalez and Dr. Simpson. Intraoperative course with acute blood loss requiring 3 unit PRBC transfusion with one additional PRBC post-op. Post-op hospital course significant for urinary retention, s/p paulino removal 11/21 and TOV passed. Patient evaluated by PT/OT and was recommended for acute inpatient rehab. Patient is medically stable for discharge to [facility] on 11/22.    (22 Nov 2023 13:26)    REHAB COURSE:  Patient participated in daily therapies and made good functional gains.  Rehab course notable for anemia adn FOBT+. Anemia likely multifactorial, due to post-operative blood loss, hx of gastric bypass, hx of iron deficiency. Patient treated with iron infusion x 2 doses then placed on oral supplementation with ferrous sulfate and vitamin C. GI consulted, patient already had EGD/colononscopy earlier in November. Continued on protonix QD. CT thoracic and lumbar spine 11/27 stable. Pain control tapered down as tolerated.     Patient tolerated course of inpatient PT/OT/SLP rehab with significant functional improvements. Patient seen and examined on day of discharge.  Medications, medication side effects, and discharge instructions were reviewed with the patient, who expressed understanding of all information.  Patient was medically and functionally optimized and cleared for discharge.     HPI:  Mrs. Savi Ivy is a 48 year old female patient with past medical history of chronic back pain (s/p multiple spinal surgeries), asthma, obesity (s/p gastric bypass), and HTN who presented to [facility] on 11/14 for an elective revision of a PSF with extension of fusion with instrumentation from T3 through pelvis, pedicle subtraction osteotomy (PSO) L4, tethers, iliac fixation, Smith Meneses osteotomies T3-T10 with Plastic Surgery closure with Dr. Schwab, Dr. Gonzalez and Dr. Simpson. Intraoperative course with acute blood loss requiring 3 unit PRBC transfusion with one additional PRBC post-op. Post-op hospital course significant for urinary retention, s/p Cobb removal 11/21 and TOV passed. Patient evaluated by PT/OT and was recommended for acute inpatient rehab. Patient is medically stable for discharge to [facility] on 11/22.    (22 Nov 2023 13:26)    REHAB COURSE:  Patient participated in daily therapies and made good functional gains.  Rehab course notable for anemia adn FOBT+. Anemia likely multifactorial, due to post-operative blood loss, hx of gastric bypass, hx of iron deficiency. Patient treated with iron infusion x 2 doses then placed on oral supplementation with ferrous sulfate and vitamin C. GI consulted, patient already had EGD/colonoscopy earlier in November. Continued on Protonix QD. CT thoracic and lumbar spine 11/27 stable. Pain control tapered down as tolerated.     Patient tolerated course of inpatient PT/OT rehab with significant functional improvements. Patient seen and examined on day of discharge.  Medications, medication side effects, and discharge instructions were reviewed with the patient, who expressed understanding of all information.  Patient was medically and functionally optimized and cleared for discharge.     HPI:  Mrs. Savi Ivy is a 48 year old female patient with past medical history of chronic back pain (s/p multiple spinal surgeries), asthma, obesity (s/p gastric bypass), and HTN who presented to [facility] on 11/14 for an elective revision of a PSF with extension of fusion with instrumentation from T3 through pelvis, pedicle subtraction osteotomy (PSO) L4, tethers, iliac fixation, Smith Meneses osteotomies T3-T10 with Plastic Surgery closure with Dr. Schwab, Dr. Gonzalez and Dr. Simpson. Intraoperative course with acute blood loss requiring 3 unit PRBC transfusion with one additional PRBC post-op. Post-op hospital course significant for urinary retention, s/p Cobb removal 11/21 and TOV passed. Patient evaluated by PT/OT and was recommended for acute inpatient rehab. Patient is medically stable for discharge to [facility] on 11/22.    (22 Nov 2023 13:26)    REHAB COURSE:  Patient participated in daily therapies and made good functional gains.  Rehab course notable for anemia adn FOBT+. Anemia likely multifactorial, due to post-operative blood loss, hx of gastric bypass, hx of iron deficiency. Patient treated with iron infusion x 2 doses then placed on oral supplementation with ferrous sulfate and vitamin C. GI consulted, patient already had EGD/colonoscopy earlier in November. Continued on Protonix QD. CT thoracic and lumbar spine 11/27 stable. Pain control tapered down as tolerated.     Patient tolerated course of inpatient PT/OT rehab with significant functional improvements. Patient seen and examined on day of discharge.  Medications, medication side effects, and discharge instructions were reviewed with the patient, who expressed understanding of all information.  Patient was medically and functionally optimized and cleared for discharge.

## 2023-12-04 NOTE — PROGRESS NOTE ADULT - ASSESSMENT
Mrs. Savi Ivy is a 48 year old female patient with past medical history of chronic back pain (s/p multiple spinal surgeries), asthma, obesity (s/p gastric bypass), and HTN who is admitted for Acute Inpatient Rehabilitation with a multidisciplinary rehab program at [facility] with functional impairments in ADLs and mobility secondary to chronic back pain s/p multiple spinal surgeries treated further surgically with an elective revision of a PSF with extension of fusion with instrumentation from T3 through pelvis, L4 pedicle subtraction osteotomy (PSO), tethers, iliac fixation, T3-T10 Duvall-Meneses osteotomies with Plastic Surgery closure whose intraoperative course was remarkable for acute blood loss requiring 3 unit PRBC transfusion with one additional PRBC post-op. Post-op hospital course additionally significant for urinary retention.    Spinal stenosis with neurogenic claudication s/p T4-Pelvis revision surgery  - S/P elective revision of PSF with extension of fusion with instrumentation from T3 through pelvis (11/14)  - S/P L4 pedicle subtraction osteotomy (PSO)  - S/P proximal tethers and iliac fixation  - S/P T3-T10 Smith-Meneses osteotomies with Plastic Surgery closure   - HV x 2 and SHAWNA x 4 drains removed on 11/20   - Impaired ADLs and mobility  - Need for assistance with personal care   - Continue comprehensive rehab program of PT/OT - 3 hours a day, 5 days a week. P&O as needed   - Pain control as below  - Fall/Spinal precautions  - WBAT    Pain Control   - Initial regimen     * Tylenol 975 mg Q8H     * Cymbalta 60 mg QD     * Lyrica 200 mg TID     * Robaxin 500 mg TID - increased to 750 mg TID on 12/4     * Fentanyl Patch 50 mcg Q72H     * PRN: Morphine 15 mg Q4H mod pain, 30 mg Q4H severe pain  - Current regimen as follows (changed on 11/29):     * acetaminophen     Tablet .. 975 milliGRAM(s) Oral every 8 hours     * DULoxetine 60 milliGRAM(s) Oral daily     * methocarbamol 500 milliGRAM(s) Oral three times a day     * fentaNYL   Patch  25 MICROgram(s)/Hr 1 Patch Transdermal every 72 hours     * pregabalin 200 milliGRAM(s) Oral three times a day     * morphine   Solution 10 milliGRAM(s) Oral every 4 hours PRN Moderate Pain (4 - 6)     * morphine   Solution 25 milliGRAM(s) Oral every 4 hours PRN Severe Pain (7 - 10)  - follow with pain management outpatient  - evaluated by pain management at [facility] who recommended discharge on home dose of nucynta 75mg 4x/day, fentanyl patch, as well as robaxin 500mg PO 3x/day and lyrica 200mg PO 3x/day      Acute Blood Loss Anemia  - s/p 3u PRBCs intra-op and one additional PRBC post-op  - monitor H/H    Asthma  - albuterol inhaler Q6H PRN    HTN  - continue triamterene 37.5mg- HCTZ 25mg QD  - Monitor BP    Mood / Cognition  - Neuropsychology consult PRN    Sleep  - Maintain quiet hours and a low stim environment.   - Melatonin 3 mg QHS     GI / Bowel  - at risk for constipation due to neurologic diagnosis, immobility, and/or medication use  - Senna qHS  - Miralax BID  - Movantik 12.5 mg QD  - Dulcolax 5 mg BID PRN constipation  - GI ppx: pantoprazole 40 mg QD     / Bladder  - Currently patient voids independently  - Cobb removed 11/21 and passed TOV    Skin:  - Skin assessment on admission performed : Left buttock healing wound with scab, spinal surgical incision clean an dry and left LIVE (11/25)  - Pressure Injury/Skin: OOB to chair, PT/OT  - nursing to monitor skin q Shift    Diet:  - Diet Consistency: regular  - Aspiration Precautions  - Nutrition consult    DVT prophylaxis:   - Lovenox 40 mg QD    Outpatient Follow-up:  Schwab, Frank Johann  Orthopaedic Surgery  [address]  [place] [address]  Phone: [phone]  Fax: [phone]    Code Status/Emergency Contact:    ---------------    Goals: Safe discharge to home  Estimated Length of Stay: 10-14 days  Rehab Potential: Good  Medical Prognosis: Good  Estimated Disposition: Home with home care      PRESCREEN COMPARISON:  I have reviewed the prescreen information and I have found no relevant changes between the preadmission screening and my post admission evaluation.    RATIONALE FOR INPATIENT ADMISSION: Patient demonstrates the following:  [X] Medically appropriate for rehabilitation admission  [X] Has attainable rehab goals with an appropriate initial discharge plan  [X]Has rehabilitation potential (expected to make a significant improvement within a reasonable period of time)  [X] Requires close medical management by a rehab physician, rehab nursing care, Hospitalist and comprehensive interdisciplinary team (including PT, OT and/or SLP, Prosthetics and Orthotics)     Mrs. Savi Ivy is a 48 year old female patient with past medical history of chronic back pain (s/p multiple spinal surgeries), asthma, obesity (s/p gastric bypass), and HTN who is admitted for Acute Inpatient Rehabilitation with a multidisciplinary rehab program at [facility] with functional impairments in ADLs and mobility secondary to chronic back pain s/p multiple spinal surgeries treated further surgically with an elective revision of a PSF with extension of fusion with instrumentation from T3 through pelvis, L4 pedicle subtraction osteotomy (PSO), tethers, iliac fixation, T3-T10 Duvall-Meneses osteotomies with Plastic Surgery closure whose intraoperative course was remarkable for acute blood loss requiring 3 unit PRBC transfusion with one additional PRBC post-op. Post-op hospital course additionally significant for urinary retention.    Spinal stenosis with neurogenic claudication s/p T4-Pelvis revision surgery  - S/P elective revision of PSF with extension of fusion with instrumentation from T3 through pelvis (11/14)  - S/P L4 pedicle subtraction osteotomy (PSO)  - S/P proximal tethers and iliac fixation  - S/P T3-T10 Smith-Meneses osteotomies with Plastic Surgery closure   - HV x 2 and SHAWNA x 4 drains removed on 11/20   - Impaired ADLs and mobility  - Need for assistance with personal care   - Continue comprehensive rehab program of PT/OT - 3 hours a day, 5 days a week. P&O as needed   - Pain control as below  - Fall/Spinal precautions  - WBAT    Pain Control   - Initial regimen     * Tylenol 975 mg Q8H     * Cymbalta 60 mg QD     * Lyrica 200 mg TID     * Robaxin 500 mg TID - increased to 750 mg TID on 12/4     * Fentanyl Patch 50 mcg Q72H     * PRN: Morphine 15 mg Q4H mod pain, 30 mg Q4H severe pain  - Current regimen as follows (changed on 11/29):     * acetaminophen     Tablet .. 975 milliGRAM(s) Oral every 8 hours     * DULoxetine 60 milliGRAM(s) Oral daily     * methocarbamol 500 milliGRAM(s) Oral three times a day     * fentaNYL   Patch  25 MICROgram(s)/Hr 1 Patch Transdermal every 72 hours     * pregabalin 200 milliGRAM(s) Oral three times a day     * morphine   Solution 10 milliGRAM(s) Oral every 4 hours PRN Moderate Pain (4 - 6)     * morphine   Solution 25 milliGRAM(s) Oral every 4 hours PRN Severe Pain (7 - 10)  - follow with pain management outpatient  - evaluated by pain management at [facility] who recommended discharge on home dose of nucynta 75mg 4x/day, fentanyl patch, as well as robaxin 500mg PO 3x/day and lyrica 200mg PO 3x/day      Acute Blood Loss Anemia  - s/p 3u PRBCs intra-op and one additional PRBC post-op  - monitor H/H    Asthma  - albuterol inhaler Q6H PRN    HTN  - continue triamterene 37.5mg- HCTZ 25mg QD  - Monitor BP    Mood / Cognition  - Neuropsychology consult PRN    Sleep  - Maintain quiet hours and a low stim environment.   - Melatonin 3 mg QHS     GI / Bowel  - at risk for constipation due to neurologic diagnosis, immobility, and/or medication use  - Senna qHS  - Miralax BID  - Movantik 12.5 mg QD  - Dulcolax 5 mg BID PRN constipation  - GI ppx: pantoprazole 40 mg QD     / Bladder  - Currently patient voids independently  - Cobb removed 11/21 and passed TOV    Skin:  - Skin assessment on admission performed : Left buttock healing wound with scab, spinal surgical incision clean an dry and left LIVE (11/25)  - Pressure Injury/Skin: OOB to chair, PT/OT  - nursing to monitor skin q Shift    Diet:  - Diet Consistency: regular  - Aspiration Precautions  - Nutrition consult    DVT prophylaxis:   - Lovenox 40 mg QD    Outpatient Follow-up:  Schwab, Frank Johann  Orthopaedic Surgery  [address]  [place] [address]  Phone: [phone]  Fax: [phone]    Code Status/Emergency Contact:    ---------------    Goals: Safe discharge to home  Estimated Length of Stay: 10-14 days  Rehab Potential: Good  Medical Prognosis: Good  Estimated Disposition: Home with home care      PRESCREEN COMPARISON:  I have reviewed the prescreen information and I have found no relevant changes between the preadmission screening and my post admission evaluation.    RATIONALE FOR INPATIENT ADMISSION: Patient demonstrates the following:  [X] Medically appropriate for rehabilitation admission  [X] Has attainable rehab goals with an appropriate initial discharge plan  [X]Has rehabilitation potential (expected to make a significant improvement within a reasonable period of time)  [X] Requires close medical management by a rehab physician, rehab nursing care, Hospitalist and comprehensive interdisciplinary team (including PT, OT and/or SLP, Prosthetics and Orthotics)     Mrs. Savi Ivy is a 48 year old female patient with past medical history of chronic back pain (s/p multiple spinal surgeries), asthma, obesity (s/p gastric bypass), and HTN who is admitted for Acute Inpatient Rehabilitation with a multidisciplinary rehab program at [facility] with functional impairments in ADLs and mobility secondary to chronic back pain s/p multiple spinal surgeries treated further surgically with an elective revision of a PSF with extension of fusion with instrumentation from T3 through pelvis, L4 pedicle subtraction osteotomy (PSO), tethers, iliac fixation, T3-T10 Duvall-Meneses osteotomies with Plastic Surgery closure whose intraoperative course was remarkable for acute blood loss requiring 3 unit PRBC transfusion with one additional PRBC post-op. Post-op hospital course additionally significant for urinary retention.    Spinal stenosis with neurogenic claudication s/p T4-Pelvis revision surgery  - S/P elective revision of PSF with extension of fusion with instrumentation from T3 through pelvis (11/14)  - S/P L4 pedicle subtraction osteotomy (PSO)  - S/P proximal tethers and iliac fixation  - S/P T3-T10 Smith-Meneses osteotomies with Plastic Surgery closure   - HV x 2 and SHAWNA x 4 drains removed on 11/20   - Impaired ADLs and mobility  - Need for assistance with personal care   - Continue comprehensive rehab program of PT/OT - 3 hours a day, 5 days a week. P&O as needed   - Pain control as below  - Fall/Spinal precautions  - WBAT  - CT thoracic/lumbar spine (11/27) stable    Pain Control   - Initial regimen     * Tylenol 975 mg Q8H     * Cymbalta 60 mg QD     * Lyrica 200 mg TID     * Robaxin 500 mg TID      * Fentanyl Patch 50 mcg Q72H     * PRN: Morphine 15 mg Q4H mod pain, 30 mg Q4H severe pain  - Current regimen as follows (changed on 11/29):     * acetaminophen 975 milliGRAM(s) Q8H     * DULoxetine 60 milliGRAM(s) Oral daily     * methocarbamol 500 milliGRAM(s) Oral three times a day - increased to 750 mg TID on 12/4     * fentaNYL   Patch  25 MICROgram(s)/Hr 1 Patch Transdermal every 72 hours     * pregabalin 200 milliGRAM(s) Oral three times a day     * morphine   Solution 10 milliGRAM(s) Oral every 4 hours PRN Moderate Pain (4 - 6)     * morphine   Solution 25 milliGRAM(s) Oral every 4 hours PRN Severe Pain (7 - 10)     * gabapentin 100 mg QHS added on 12/2  - follow with pain management outpatient  - evaluated by pain management at [facility] who recommended discharge on home dose of nucynta 75mg 4x/day, fentanyl patch, as well as robaxin 500mg PO 3x/day and lyrica 200mg PO 3x/day    Acute Blood Loss Anemia/ Iron Deficiency Anemia  - likely multifactorial, due to post-operative blood loss, hx of gastric bypass, hx of iron deficiency  - s/p 3u PRBCs intra-op and one additional PRBC post-op  - FOBT+  - monitor H/H, transfuse Hgb <7  - s/p IV iron infusion x 2 doses  - continue oral iron + Vit C QD  - continue Protonix QD  - GI consulted (she already had EGD/colon earlier in November)  - hospitalist following    Asthma  - albuterol inhaler Q6H PRN    HTN  - continue triamterene 37.5mg- HCTZ 25mg QD  - Monitor BP    Mood / Cognition  - Neuropsychology consult PRN    Sleep  - Maintain quiet hours and a low stim environment.   - Melatonin 3 mg QHS     GI / Bowel  - at risk for constipation due to neurologic diagnosis, immobility, and/or medication use  - Senna qHS  - Miralax BID  - Movantik 12.5 mg QD  - Dulcolax 5 mg BID PRN constipation  - GI ppx: pantoprazole 40 mg QD     / Bladder  - Currently patient voids independently  - Cobb removed 11/21 and passed TOV    Skin:  - Skin assessment on admission performed : Left buttock healing wound with scab, spinal surgical incision clean an dry and left LIVE (11/25)  - Pressure Injury/Skin: OOB to chair, PT/OT  - nursing to monitor skin q Shift    Diet:  - Diet Consistency: regular  - Aspiration Precautions  - Nutrition consult    DVT prophylaxis:   - Lovenox 40 mg QD    Outpatient Follow-up:  Schwab, Frank Johann  Orthopaedic Surgery  [address]  [place] [address]  Phone: [phone]  Fax: [phone]    Code Status/Emergency Contact:    ---------------    Goals: Safe discharge to home  Estimated Length of Stay: 10-14 days  Rehab Potential: Good  Medical Prognosis: Good  Estimated Disposition: Home with home care      PRESCREEN COMPARISON:  I have reviewed the prescreen information and I have found no relevant changes between the preadmission screening and my post admission evaluation.    RATIONALE FOR INPATIENT ADMISSION: Patient demonstrates the following:  [X] Medically appropriate for rehabilitation admission  [X] Has attainable rehab goals with an appropriate initial discharge plan  [X]Has rehabilitation potential (expected to make a significant improvement within a reasonable period of time)  [X] Requires close medical management by a rehab physician, rehab nursing care, Hospitalist and comprehensive interdisciplinary team (including PT, OT and/or SLP, Prosthetics and Orthotics)     Mrs. Savi Ivy is a 48 year old female patient with past medical history of chronic back pain (s/p multiple spinal surgeries), asthma, obesity (s/p gastric bypass), and HTN who is admitted for Acute Inpatient Rehabilitation with a multidisciplinary rehab program at [facility] with functional impairments in ADLs and mobility secondary to chronic back pain s/p multiple spinal surgeries treated further surgically with an elective revision of a PSF with extension of fusion with instrumentation from T3 through pelvis, L4 pedicle subtraction osteotomy (PSO), tethers, iliac fixation, T3-T10 Duvall-Meneses osteotomies with Plastic Surgery closure whose intraoperative course was remarkable for acute blood loss requiring 3 unit PRBC transfusion with one additional PRBC post-op. Post-op hospital course additionally significant for urinary retention.    Spinal stenosis with neurogenic claudication s/p T4-Pelvis revision surgery  - S/P elective revision of PSF with extension of fusion with instrumentation from T3 through pelvis (11/14)  - S/P L4 pedicle subtraction osteotomy (PSO)  - S/P proximal tethers and iliac fixation  - S/P T3-T10 Smith-Meneses osteotomies with Plastic Surgery closure   - HV x 2 and SHAWNA x 4 drains removed on 11/20   - Impaired ADLs and mobility  - Need for assistance with personal care   - Continue comprehensive rehab program of PT/OT - 3 hours a day, 5 days a week. P&O as needed   - Pain control as below  - Fall/Spinal precautions  - WBAT  - CT thoracic/lumbar spine (11/27) stable    Pain Control   - Initial regimen     * Tylenol 975 mg Q8H     * Cymbalta 60 mg QD     * Lyrica 200 mg TID     * Robaxin 500 mg TID      * Fentanyl Patch 50 mcg Q72H     * PRN: Morphine 15 mg Q4H mod pain, 30 mg Q4H severe pain  - Current regimen as follows (changed on 11/29):     * acetaminophen 975 milliGRAM(s) Q8H     * DULoxetine 60 milliGRAM(s) Oral daily     * methocarbamol 500 milliGRAM(s) Oral three times a day - increased to 750 mg TID on 12/4     * fentaNYL   Patch  25 MICROgram(s)/Hr 1 Patch Transdermal every 72 hours     * pregabalin 200 milliGRAM(s) Oral three times a day     * morphine   Solution 10 milliGRAM(s) Oral every 4 hours PRN Moderate Pain (4 - 6)     * morphine   Solution 25 milliGRAM(s) Oral every 4 hours PRN Severe Pain (7 - 10)     * gabapentin 100 mg QHS added on 12/2  - follow with pain management outpatient  - evaluated by pain management at [facility] who recommended discharge on home dose of nucynta 75mg 4x/day, fentanyl patch, as well as robaxin 500mg PO 3x/day and lyrica 200mg PO 3x/day    Acute Blood Loss Anemia/ Iron Deficiency Anemia  - likely multifactorial, due to post-operative blood loss, hx of gastric bypass, hx of iron deficiency  - s/p 3u PRBCs intra-op and one additional PRBC post-op  - FOBT+  - monitor H/H, transfuse Hgb <7  - s/p IV iron infusion x 2 doses  - continue oral iron + Vit C QD  - continue Protonix QD  - GI consulted (she already had EGD/colon earlier in November)  - hospitalist following    Asthma  - albuterol inhaler Q6H PRN    HTN  - continue triamterene 37.5mg- HCTZ 25mg QD  - Monitor BP    Mood / Cognition  - Neuropsychology consult PRN    Sleep  - Maintain quiet hours and a low stim environment.   - Melatonin 3 mg QHS     GI / Bowel  - at risk for constipation due to neurologic diagnosis, immobility, and/or medication use  - Senna qHS  - Miralax BID  - Movantik 12.5 mg QD  - Dulcolax 5 mg BID PRN constipation  - GI ppx: pantoprazole 40 mg QD     / Bladder  - Currently patient voids independently  - Cobb removed 11/21 and passed TOV    Skin:  - Skin assessment on admission performed : Left buttock healing wound with scab, spinal surgical incision clean an dry and left LIVE (11/25)  - Pressure Injury/Skin: OOB to chair, PT/OT  - nursing to monitor skin q Shift    Diet:  - Diet Consistency: regular  - Aspiration Precautions  - Nutrition consult    DVT prophylaxis:   - Lovenox 40 mg QD    Outpatient Follow-up:  Schwab, Frank Johann  Orthopaedic Surgery  [address]  [place] [address]  Phone: [phone]  Fax: [phone]    Code Status/Emergency Contact:    ---------------    Goals: Safe discharge to home  Estimated Length of Stay: 10-14 days  Rehab Potential: Good  Medical Prognosis: Good  Estimated Disposition: Home with home care      PRESCREEN COMPARISON:  I have reviewed the prescreen information and I have found no relevant changes between the preadmission screening and my post admission evaluation.    RATIONALE FOR INPATIENT ADMISSION: Patient demonstrates the following:  [X] Medically appropriate for rehabilitation admission  [X] Has attainable rehab goals with an appropriate initial discharge plan  [X]Has rehabilitation potential (expected to make a significant improvement within a reasonable period of time)  [X] Requires close medical management by a rehab physician, rehab nursing care, Hospitalist and comprehensive interdisciplinary team (including PT, OT and/or SLP, Prosthetics and Orthotics)

## 2023-12-04 NOTE — DISCHARGE NOTE PROVIDER - CARE PROVIDER_API CALL
Schwab, Frank Johann  Orthopaedic Surgery  [address]  [place] [address]  Phone: [phone]  Fax: [phone]  Scheduled Appointment: 12/07/2023    Sanju Pearce  Physical/Rehab Medicine  [address] [place] [address]  Phone: [phone]  Fax: [phone]  Follow Up Time: 1 month   Schwab, Frank Johann  Orthopaedic Surgery  [address]  [place] [address]  Phone: [phone]  Fax: [phone]  Scheduled Appointment: 12/07/2023    Sanju Pearce  Physical/Rehab Medicine  [address] [place] [address]  Phone: [phone]  Fax: [phone]  Follow Up Time: 1 month

## 2023-12-04 NOTE — DISCHARGE NOTE PROVIDER - NSDCFUSCHEDAPPT_GEN_ALL_CORE_FT
Schwab, Frank  [facility]  ORTHOSURG [address]  Scheduled Appointment: 12/07/2023     Schwab, Frank  [facility]  ORTHOSURG [address]  Scheduled Appointment: 12/07/2023

## 2023-12-04 NOTE — PROGRESS NOTE ADULT - SUBJECTIVE AND OBJECTIVE BOX
Patient is a 48y old  Female who presents with a chief complaint of Spinal stenosis with neurogenic claudication s/p T4-Pelvis revision surgery (03 Dec 2023 16:29)      Patient seen and examined at bedside.    ALLERGIES:  shellfish (Hives; Short breath)  diclofenac (Other)    MEDICATIONS  (STANDING):  acetaminophen     Tablet .. 975 milliGRAM(s) Oral every 8 hours  ascorbic acid 1000 milliGRAM(s) Oral daily  DULoxetine 60 milliGRAM(s) Oral daily  enoxaparin Injectable 40 milliGRAM(s) SubCutaneous every 24 hours  ferrous    sulfate 325 milliGRAM(s) Oral daily  fluticasone propionate 50 MICROgram(s)/spray Nasal Spray 1 Spray(s) Both Nostrils two times a day  gabapentin 100 milliGRAM(s) Oral at bedtime  melatonin. 3 milliGRAM(s) Oral at bedtime  methocarbamol 500 milliGRAM(s) Oral three times a day  multivitamin 1 Tablet(s) Oral daily  naloxegol 12.5 milliGRAM(s) Oral daily  pantoprazole    Tablet 40 milliGRAM(s) Oral before breakfast  polyethylene glycol 3350 17 Gram(s) Oral two times a day  pregabalin 200 milliGRAM(s) Oral three times a day  senna 2 Tablet(s) Oral at bedtime  triamterene 37.5 mG/hydrochlorothiazide 25 mG Tablet 1 Tablet(s) Oral daily    MEDICATIONS  (PRN):  albuterol    90 MICROgram(s) HFA Inhaler 2 Puff(s) Inhalation every 6 hours PRN Shortness of Breath and/or Wheezing  bisacodyl 5 milliGRAM(s) Oral every 12 hours PRN Constipation  hemorrhoidal Ointment 1 Application(s) Rectal two times a day PRN hemorrhoidal pain  morphine   Solution 10 milliGRAM(s) Oral every 4 hours PRN Moderate Pain (4 - 6)  morphine   Solution 25 milliGRAM(s) Oral every 4 hours PRN Severe Pain (7 - 10)    Vital Signs Last 24 Hrs  T(F): 98.8 (04 Dec 2023 06:42), Max: 98.8 (04 Dec 2023 06:42)  HR: 73 (04 Dec 2023 06:42) (72 - 73)  BP: 89/55 (04 Dec 2023 06:42) (89/55 - 110/68)  RR: 14 (04 Dec 2023 06:42) (14 - 14)  SpO2: 96% (04 Dec 2023 06:42) (96% - 96%)  I&O's Summary    BMI (kg/m2): 36.6 (12-01-23 @ 11:35)  PHYSICAL EXAM:  General: NAD, A/O x 3  ENT: MMM, no scleral icterus  Neck: Supple, No JVD, no thyroidomegaly  Lungs: Clear to auscultation bilaterally, no wheezes, no rales, no rhonchi, good inspiratory effort  Cardio: RRR, S1/S2, No murmurs  Abdomen: Soft, Nontender, Nondistended; Bowel sounds present  Extremities: No calf tenderness, No pitting edema, no skin changes    LABS:                        8.0    5.07  )-----------( 509      ( 04 Dec 2023 06:32 )             26.2       COVID-19 PCR: NotDetec (11-22-23 @ 21:39)  COVID-19 PCR: NotDetec (11-22-23 @ 21:39)

## 2023-12-05 ENCOUNTER — TRANSCRIPTION ENCOUNTER (OUTPATIENT)
Age: 48
End: 2023-12-05

## 2023-12-05 PROCEDURE — 99232 SBSQ HOSP IP/OBS MODERATE 35: CPT

## 2023-12-05 RX ADMIN — ENOXAPARIN SODIUM 40 MILLIGRAM(S): 100 INJECTION SUBCUTANEOUS at 06:27

## 2023-12-05 RX ADMIN — Medication 200 MILLIGRAM(S): at 14:26

## 2023-12-05 RX ADMIN — Medication 200 MILLIGRAM(S): at 21:14

## 2023-12-05 RX ADMIN — MORPHINE SULFATE 25 MILLIGRAM(S): 50 CAPSULE, EXTENDED RELEASE ORAL at 12:50

## 2023-12-05 RX ADMIN — Medication 1 SPRAY(S): at 17:57

## 2023-12-05 RX ADMIN — MORPHINE SULFATE 25 MILLIGRAM(S): 50 CAPSULE, EXTENDED RELEASE ORAL at 13:35

## 2023-12-05 RX ADMIN — METHOCARBAMOL 750 MILLIGRAM(S): 500 TABLET, FILM COATED ORAL at 14:26

## 2023-12-05 RX ADMIN — Medication 200 MILLIGRAM(S): at 06:28

## 2023-12-05 RX ADMIN — Medication 975 MILLIGRAM(S): at 06:27

## 2023-12-05 RX ADMIN — Medication 975 MILLIGRAM(S): at 15:05

## 2023-12-05 RX ADMIN — Medication 1000 MILLIGRAM(S): at 12:49

## 2023-12-05 RX ADMIN — METHOCARBAMOL 750 MILLIGRAM(S): 500 TABLET, FILM COATED ORAL at 06:29

## 2023-12-05 RX ADMIN — MORPHINE SULFATE 25 MILLIGRAM(S): 50 CAPSULE, EXTENDED RELEASE ORAL at 18:35

## 2023-12-05 RX ADMIN — NALOXEGOL OXALATE 12.5 MILLIGRAM(S): 12.5 TABLET, FILM COATED ORAL at 12:49

## 2023-12-05 RX ADMIN — Medication 325 MILLIGRAM(S): at 12:49

## 2023-12-05 RX ADMIN — MORPHINE SULFATE 25 MILLIGRAM(S): 50 CAPSULE, EXTENDED RELEASE ORAL at 06:29

## 2023-12-05 RX ADMIN — Medication 3 MILLIGRAM(S): at 21:14

## 2023-12-05 RX ADMIN — MORPHINE SULFATE 25 MILLIGRAM(S): 50 CAPSULE, EXTENDED RELEASE ORAL at 17:58

## 2023-12-05 RX ADMIN — Medication 975 MILLIGRAM(S): at 14:26

## 2023-12-05 RX ADMIN — MORPHINE SULFATE 25 MILLIGRAM(S): 50 CAPSULE, EXTENDED RELEASE ORAL at 22:09

## 2023-12-05 RX ADMIN — DULOXETINE HYDROCHLORIDE 60 MILLIGRAM(S): 30 CAPSULE, DELAYED RELEASE ORAL at 12:49

## 2023-12-05 RX ADMIN — Medication 975 MILLIGRAM(S): at 21:15

## 2023-12-05 RX ADMIN — GABAPENTIN 100 MILLIGRAM(S): 400 CAPSULE ORAL at 21:14

## 2023-12-05 RX ADMIN — Medication 1 SPRAY(S): at 06:26

## 2023-12-05 RX ADMIN — METHOCARBAMOL 750 MILLIGRAM(S): 500 TABLET, FILM COATED ORAL at 21:14

## 2023-12-05 RX ADMIN — PANTOPRAZOLE SODIUM 40 MILLIGRAM(S): 20 TABLET, DELAYED RELEASE ORAL at 06:28

## 2023-12-05 RX ADMIN — Medication 1 TABLET(S): at 12:49

## 2023-12-05 RX ADMIN — POLYETHYLENE GLYCOL 3350 17 GRAM(S): 17 POWDER, FOR SOLUTION ORAL at 06:26

## 2023-12-05 RX ADMIN — SENNA PLUS 2 TABLET(S): 8.6 TABLET ORAL at 21:15

## 2023-12-05 RX ADMIN — MORPHINE SULFATE 25 MILLIGRAM(S): 50 CAPSULE, EXTENDED RELEASE ORAL at 23:10

## 2023-12-05 NOTE — DISCHARGE NOTE NURSING/CASE MANAGEMENT/SOCIAL WORK - NSDCPEFALRISK_GEN_ALL_CORE
For information on Fall & Injury Prevention, visit: [email or web address] OR  [email or web address] OR  https://www.cdc.gov/steadi/patient.html For information on Fall & Injury Prevention, visit: [email or web address] OR  [email or web address] OR  https://www.cdc.gov/steadi/patient.html

## 2023-12-05 NOTE — DISCHARGE NOTE NURSING/CASE MANAGEMENT/SOCIAL WORK - PATIENT PORTAL LINK FT
You can access the FollowMyHealth Patient Portal offered by [facility] by registering at the following website: [email or web address]. By joining [employer]’s FollowMyHealth portal, you will also be able to view your health information using other applications (apps) compatible with our system. You can access the FollowMyHealth Patient Portal offered by [facility] by registering at the following website: [email or web address]. By joining [employer]’s FollowMyHealth portal, you will also be able to view your health information using other applications (apps) compatible with our system.

## 2023-12-05 NOTE — PROGRESS NOTE ADULT - ASSESSMENT
Mrs. Savi Ivy is a 48 year old female patient with past medical history of chronic back pain (s/p multiple spinal surgeries), asthma, obesity (s/p gastric bypass), and HTN who is admitted for Acute Inpatient Rehabilitation with a multidisciplinary rehab program at [facility] with functional impairments in ADLs and mobility secondary to chronic back pain s/p multiple spinal surgeries treated further surgically with an elective revision of a PSF with extension of fusion with instrumentation from T3 through pelvis, L4 pedicle subtraction osteotomy (PSO), tethers, iliac fixation, T3-T10 Duvall-Meneses osteotomies with Plastic Surgery closure whose intraoperative course was remarkable for acute blood loss requiring 3 unit PRBC transfusion with one additional PRBC post-op. Post-op hospital course additionally significant for urinary retention.    Spinal stenosis with neurogenic claudication s/p T4-Pelvis revision surgery  - S/P elective revision of PSF with extension of fusion with instrumentation from T3 through pelvis (11/14)  - S/P L4 pedicle subtraction osteotomy (PSO)  - S/P proximal tethers and iliac fixation  - S/P T3-T10 Smith-Meneses osteotomies with Plastic Surgery closure   - HV x 2 and SHAWNA x 4 drains removed on 11/20   - Impaired ADLs and mobility  - Need for assistance with personal care   - Continue comprehensive rehab program of PT/OT - 3 hours a day, 5 days a week. P&O as needed   - Pain control as below  - Fall/Spinal precautions  - WBAT  - CT thoracic/lumbar spine (11/27) stable    Pain Control   - Initial regimen     * Tylenol 975 mg Q8H     * Cymbalta 60 mg QD     * Lyrica 200 mg TID     * Robaxin 500 mg TID      * Fentanyl Patch 50 mcg Q72H     * PRN: Morphine 15 mg Q4H mod pain, 30 mg Q4H severe pain  - Current regimen as follows (changed on 11/29):     * acetaminophen 975 milliGRAM(s) Q8H     * DULoxetine 60 milliGRAM(s) Oral daily     * methocarbamol 500 milliGRAM(s) Oral three times a day - increased to 750 mg TID on 12/4     * fentaNYL   Patch  25 MICROgram(s)/Hr 1 Patch Transdermal every 72 hours     * pregabalin 200 milliGRAM(s) Oral three times a day     * morphine   Solution 10 milliGRAM(s) Oral every 4 hours PRN Moderate Pain (4 - 6)     * morphine   Solution 25 milliGRAM(s) Oral every 4 hours PRN Severe Pain (7 - 10)     * gabapentin 100 mg QHS added on 12/2  - follow with pain management outpatient  - evaluated by pain management at [facility] who recommended discharge on home dose of nucynta 75mg 4x/day, fentanyl patch, as well as robaxin 500mg PO 3x/day and lyrica 200mg PO 3x/day    Acute Blood Loss Anemia/ Iron Deficiency Anemia  - likely multifactorial, due to post-operative blood loss, hx of gastric bypass, hx of iron deficiency  - s/p 3u PRBCs intra-op and one additional PRBC post-op  - FOBT+  - monitor H/H, transfuse Hgb <7  - s/p IV iron infusion x 2 doses  - continue oral iron + Vit C QD  - continue Protonix QD  - GI consulted (she already had EGD/colon earlier in November)  - hospitalist following    Asthma  - albuterol inhaler Q6H PRN    HTN  - continue triamterene 37.5mg- HCTZ 25mg QD  - Monitor BP    Mood / Cognition  - Neuropsychology consult PRN    Sleep  - Maintain quiet hours and a low stim environment.   - Melatonin 3 mg QHS     GI / Bowel  - at risk for constipation due to neurologic diagnosis, immobility, and/or medication use  - Senna qHS  - Miralax BID  - Movantik 12.5 mg QD  - Dulcolax 5 mg BID PRN constipation  - GI ppx: pantoprazole 40 mg QD     / Bladder  - Currently patient voids independently  - Cobb removed 11/21 and passed TOV    Skin:  - Skin assessment on admission performed : Left buttock healing wound with scab, spinal surgical incision clean an dry and left LIVE (11/25)  - Pressure Injury/Skin: OOB to chair, PT/OT  - nursing to monitor skin q Shift    Diet:  - Diet Consistency: regular  - Aspiration Precautions  - Nutrition consult    DVT prophylaxis:   - Lovenox 40 mg QD    Outpatient Follow-up:  Schwab, Frank Johann  Orthopaedic Surgery  [address]  [place] [address]  Phone: [phone]  Fax: [phone]    Code Status/Emergency Contact:    ---------------    Goals: Safe discharge to home  Estimated Length of Stay: 10-14 days  Rehab Potential: Good  Medical Prognosis: Good  Estimated Disposition: Home with home care      PRESCREEN COMPARISON:  I have reviewed the prescreen information and I have found no relevant changes between the preadmission screening and my post admission evaluation.    RATIONALE FOR INPATIENT ADMISSION: Patient demonstrates the following:  [X] Medically appropriate for rehabilitation admission  [X] Has attainable rehab goals with an appropriate initial discharge plan  [X]Has rehabilitation potential (expected to make a significant improvement within a reasonable period of time)  [X] Requires close medical management by a rehab physician, rehab nursing care, Hospitalist and comprehensive interdisciplinary team (including PT, OT and/or SLP, Prosthetics and Orthotics)     Mrs. Savi Ivy is a 48 year old female patient with past medical history of chronic back pain (s/p multiple spinal surgeries), asthma, obesity (s/p gastric bypass), and HTN who is admitted for Acute Inpatient Rehabilitation with a multidisciplinary rehab program at [facility] with functional impairments in ADLs and mobility secondary to chronic back pain s/p multiple spinal surgeries treated further surgically with an elective revision of a PSF with extension of fusion with instrumentation from T3 through pelvis, L4 pedicle subtraction osteotomy (PSO), tethers, iliac fixation, T3-T10 Duvall-Meneses osteotomies with Plastic Surgery closure whose intraoperative course was remarkable for acute blood loss requiring 3 unit PRBC transfusion with one additional PRBC post-op. Post-op hospital course additionally significant for urinary retention.    Spinal stenosis with neurogenic claudication s/p T4-Pelvis revision surgery  - S/P elective revision of PSF with extension of fusion with instrumentation from T3 through pelvis (11/14)  - S/P L4 pedicle subtraction osteotomy (PSO)  - S/P proximal tethers and iliac fixation  - S/P T3-T10 Smith-Meneses osteotomies with Plastic Surgery closure   - HV x 2 and SHAWNA x 4 drains removed on 11/20   - Impaired ADLs and mobility  - Need for assistance with personal care   - Continue comprehensive rehab program of PT/OT - 3 hours a day, 5 days a week. P&O as needed   - Pain control as below  - Fall/Spinal precautions  - WBAT  - CT thoracic/lumbar spine (11/27) stable    Pain Control   - Initial regimen     * Tylenol 975 mg Q8H     * Cymbalta 60 mg QD     * Lyrica 200 mg TID     * Robaxin 500 mg TID      * Fentanyl Patch 50 mcg Q72H     * PRN: Morphine 15 mg Q4H mod pain, 30 mg Q4H severe pain  - Current regimen as follows (changed on 11/29):     * acetaminophen 975 milliGRAM(s) Q8H     * DULoxetine 60 milliGRAM(s) Oral daily     * methocarbamol 500 milliGRAM(s) Oral three times a day - increased to 750 mg TID on 12/4     * fentaNYL   Patch  25 MICROgram(s)/Hr 1 Patch Transdermal every 72 hours     * pregabalin 200 milliGRAM(s) Oral three times a day     * morphine   Solution 10 milliGRAM(s) Oral every 4 hours PRN Moderate Pain (4 - 6)     * morphine   Solution 25 milliGRAM(s) Oral every 4 hours PRN Severe Pain (7 - 10)     * gabapentin 100 mg QHS added on 12/2  - follow with pain management outpatient  - evaluated by pain management at [facility] who recommended discharge on home dose of nucynta 75mg 4x/day, fentanyl patch, as well as robaxin 500mg PO 3x/day and lyrica 200mg PO 3x/day    Acute Blood Loss Anemia/ Iron Deficiency Anemia  - likely multifactorial, due to post-operative blood loss, hx of gastric bypass, hx of iron deficiency  - s/p 3u PRBCs intra-op and one additional PRBC post-op  - FOBT+  - monitor H/H, transfuse Hgb <7  - s/p IV iron infusion x 2 doses  - continue oral iron + Vit C QD  - continue Protonix QD  - GI consulted (she already had EGD/colon earlier in November)  - hospitalist following    Asthma  - albuterol inhaler Q6H PRN    HTN  - continue triamterene 37.5mg- HCTZ 25mg QD  - Monitor BP    Mood / Cognition  - Neuropsychology consult PRN    Sleep  - Maintain quiet hours and a low stim environment.   - Melatonin 3 mg QHS     GI / Bowel  - at risk for constipation due to neurologic diagnosis, immobility, and/or medication use  - Senna qHS  - Miralax BID  - Movantik 12.5 mg QD  - Dulcolax 5 mg BID PRN constipation  - GI ppx: pantoprazole 40 mg QD     / Bladder  - Currently patient voids independently  - Cobb removed 11/21 and passed TOV    Skin:  - Skin assessment on admission performed : Left buttock healing wound with scab, spinal surgical incision clean an dry and left LIVE (11/25)  - Pressure Injury/Skin: OOB to chair, PT/OT  - nursing to monitor skin q Shift    Diet:  - Diet Consistency: regular  - Aspiration Precautions  - Nutrition consult    DVT prophylaxis:   - Lovenox 40 mg QD    Outpatient Follow-up:  Schwab, Frank Johann  Orthopaedic Surgery  [address]  [place] [address]  Phone: [phone]  Fax: [phone]    Code Status/Emergency Contact:    ---------------    Goals: Safe discharge to home  Estimated Length of Stay: 10-14 days  Rehab Potential: Good  Medical Prognosis: Good  Estimated Disposition: Home with home care      PRESCREEN COMPARISON:  I have reviewed the prescreen information and I have found no relevant changes between the preadmission screening and my post admission evaluation.    RATIONALE FOR INPATIENT ADMISSION: Patient demonstrates the following:  [X] Medically appropriate for rehabilitation admission  [X] Has attainable rehab goals with an appropriate initial discharge plan  [X]Has rehabilitation potential (expected to make a significant improvement within a reasonable period of time)  [X] Requires close medical management by a rehab physician, rehab nursing care, Hospitalist and comprehensive interdisciplinary team (including PT, OT and/or SLP, Prosthetics and Orthotics)

## 2023-12-05 NOTE — PROGRESS NOTE ADULT - SUBJECTIVE AND OBJECTIVE BOX
Patient is a 48y old  Female who presents with a chief complaint of Spinal stenosis with neurogenic claudication s/p T4-Pelvis revision surgery (04 Dec 2023 15:55)    Reports feeling well  Denies chest pain, SOB  Tolerating diet  BM yesterday  Patient seen and examined at bedside.    ALLERGIES:  shellfish (Hives; Short breath)  diclofenac (Other)    MEDICATIONS  (STANDING):  acetaminophen     Tablet .. 975 milliGRAM(s) Oral every 8 hours  ascorbic acid 1000 milliGRAM(s) Oral daily  DULoxetine 60 milliGRAM(s) Oral daily  enoxaparin Injectable 40 milliGRAM(s) SubCutaneous every 24 hours  fentaNYL   Patch  25 MICROgram(s)/Hr 1 Patch Transdermal every 72 hours  ferrous    sulfate 325 milliGRAM(s) Oral daily  fluticasone propionate 50 MICROgram(s)/spray Nasal Spray 1 Spray(s) Both Nostrils two times a day  gabapentin 100 milliGRAM(s) Oral at bedtime  melatonin. 3 milliGRAM(s) Oral at bedtime  methocarbamol 750 milliGRAM(s) Oral three times a day  multivitamin 1 Tablet(s) Oral daily  naloxegol 12.5 milliGRAM(s) Oral daily  pantoprazole    Tablet 40 milliGRAM(s) Oral before breakfast  polyethylene glycol 3350 17 Gram(s) Oral two times a day  pregabalin 200 milliGRAM(s) Oral three times a day  senna 2 Tablet(s) Oral at bedtime  triamterene 37.5 mG/hydrochlorothiazide 25 mG Tablet 1 Tablet(s) Oral daily    MEDICATIONS  (PRN):  albuterol    90 MICROgram(s) HFA Inhaler 2 Puff(s) Inhalation every 6 hours PRN Shortness of Breath and/or Wheezing  bisacodyl 5 milliGRAM(s) Oral every 12 hours PRN Constipation  hemorrhoidal Ointment 1 Application(s) Rectal two times a day PRN hemorrhoidal pain  morphine   Solution 10 milliGRAM(s) Oral every 4 hours PRN Moderate Pain (4 - 6)  morphine   Solution 25 milliGRAM(s) Oral every 4 hours PRN Severe Pain (7 - 10)    Vital Signs Last 24 Hrs  T(F): 97.7 (05 Dec 2023 08:27), Max: 97.9 (05 Dec 2023 06:47)  HR: 67 (05 Dec 2023 08:27) (63 - 74)  BP: 106/73 (05 Dec 2023 08:27) (100/65 - 106/73)  RR: 15 (05 Dec 2023 08:27) (14 - 15)  SpO2: 96% (05 Dec 2023 08:27) (95% - 100%)  I&O's Summary    BMI (kg/m2): 36.6 (12-01-23 @ 11:35)  PHYSICAL EXAM:  General: NAD, A/O x 3  ENT: MMM, no scleral icterus  Neck: Supple, No JVD, no thyroidomegaly  Lungs: Clear to auscultation bilaterally, no wheezes, no rales, no rhonchi, good inspiratory effort  Cardio: RRR, S1/S2, No murmurs  Abdomen: Soft, Nontender, Nondistended; Bowel sounds present  Extremities: No calf tenderness, No pitting edema, no skin changes    LABS:                        8.0    5.07  )-----------( 509      ( 04 Dec 2023 06:32 )             26.2       12-04    142  |  105  |  15  ----------------------------<  87  4.4   |  32  |  0.64    Ca    8.7      04 Dec 2023 06:32    TPro  6.3  /  Alb  2.1  /  TBili  0.2  /  DBili  x   /  AST  26  /  ALT  21  /  AlkPhos  130  12-04     Urinalysis Basic - ( 04 Dec 2023 06:32 )    Color: x / Appearance: x / SG: x / pH: x  Gluc: 87 mg/dL / Ketone: x  / Bili: x / Urobili: x   Blood: x / Protein: x / Nitrite: x   Leuk Esterase: x / RBC: x / WBC x   Sq Epi: x / Non Sq Epi: x / Bacteria: x    COVID-19 PCR: NotDetec (11-22-23 @ 21:39)  COVID-19 PCR: NotDetec (11-22-23 @ 21:39)

## 2023-12-05 NOTE — PROGRESS NOTE ADULT - SUBJECTIVE AND OBJECTIVE BOX
HPI:  Mrs. Savi Ivy is a 48 year old female patient with past medical history of chronic back pain (s/p multiple spinal surgeries), asthma, obesity (s/p gastric bypass), and HTN who presented to [facility] on 11/14 for an elective revision of a PSF with extension of fusion with instrumentation from T3 through pelvis, pedicle subtraction osteotomy (PSO) L4, tethers, iliac fixation, smith clark osteotomies T3-T10 with Plastic Surgery closure with Dr. Schwab, Dr. Gonzalez and Dr. Simpson. Intraoperative course with acute blood loss requiring 3 unit PRBC transfusion with one additional PRBC post-op. Post-op hospital course significant for urinary retention, s/p paulino removal 11/21 and TOV passed. Patient evaluated by PT/OT and was recommended for acute inpatient rehab. Patient is medically stable for discharge to [facility] on 11/22.     TDD: 12/6  ___________________________________________________________________________    SUBJECTIVE/ROS  Patient was seen and evaluated at bedside today.  Patient slept well and denies any overnight events.  Robaxin increased to 750 mg TID.   Hgb stable. S/p IV iron infusion x 2.  Patient is motivated to continue participation on the recommended rehabilitation program.   Denies any CP, SOB, CALLAWAY, palpitations, fever, chills, body aches, cough, congestion, or any other symptoms at this time.   ___________________________________________________________________________    CT THORACIC SPINE AND LUMBAR SPINE (11/27/2023)    IMPRESSION: Recent thoracic fusion T4-T9 with screws and connecting rods and immature bony fusion mass. More remote fusion T10 through the sacrum with transpedicular screws and mature bony fusion mass.  ___________________________________________________________________________    Vital Signs Last 24 Hrs  T(C): 36.5 (05 Dec 2023 08:27), Max: 36.6 (04 Dec 2023 20:00)  T(F): 97.7 (05 Dec 2023 08:27), Max: 97.9 (05 Dec 2023 06:47)  HR: 67 (05 Dec 2023 08:27) (63 - 74)  BP: 106/73 (05 Dec 2023 08:27) (100/65 - 106/73)  BP(mean): --  RR: 15 (05 Dec 2023 08:27) (14 - 15)  SpO2: 96% (05 Dec 2023 08:27) (95% - 100%)  ___________________________________________________________________________    LAB                        8.0    5.07  )-----------( 509      ( 04 Dec 2023 06:32 )             26.2     12-04    142  |  105  |  15  ----------------------------<  87  4.4   |  32<H>  |  0.64    Ca    8.7      04 Dec 2023 06:32    TPro  6.3  /  Alb  2.1<L>  /  TBili  0.2  /  DBili  x   /  AST  26  /  ALT  21  /  AlkPhos  130<H>  12-04                          7.1    5.13  )-----------( 524      ( 01 Dec 2023 05:14 )             22.8                             7.8    6.73  )-----------( 547      ( 30 Nov 2023 11:53 )             25.3                             7.2    6.08  )-----------( 527      ( 30 Nov 2023 06:56 )             23.6     PT/INR - ( 30 Nov 2023 11:53 )   PT: 11.8 sec;   INR: 1.04 ratio    PTT - ( 30 Nov 2023 11:53 )  PTT:33.6 sec    Free Thyroxine, Serum in AM (12.01.23 @ 05:14)   Free Thyroxine, Serum: 1.0 ng/dL    Thyroid Stimulating Hormone, Serum in AM (12.01.23 @ 05:14)   Thyroid Stimulating Hormone, Serum: 1.106 uIU/mL    Iron with Total Binding Capacity in AM (12.01.23 @ 05:14)   Iron - Total Binding Capacity.: 243 ug/dL  % Saturation, Iron: 7 %  Iron Total: 17 ug/dL  Unsaturated Iron Binding Capacity: 226 ug/dL    Folate, Serum in AM (12.01.23 @ 05:14)   Folate, Serum: 9.4 ng/mL    Occult Blood, Feces (11.30.23 @ 13:26)   Occult Blood, Feces: Positive    Antibody Screen Interpretation (11.30.23 @ 09:32)   Antibody Screen: NEG    Type + Screen (11.30.23 @ 09:32)   ABO RH Interpretation: O POS    Vitamin B12, Serum (11.27.23 @ 05:29)   Vitamin B12, Serum: 885 pg/mL  ___________________________________________________________________________    MEDICATIONS  (STANDING):  acetaminophen     Tablet .. 975 milliGRAM(s) Oral every 8 hours  ascorbic acid 1000 milliGRAM(s) Oral daily  DULoxetine 60 milliGRAM(s) Oral daily  enoxaparin Injectable 40 milliGRAM(s) SubCutaneous every 24 hours  fentaNYL   Patch  25 MICROgram(s)/Hr 1 Patch Transdermal every 72 hours  ferrous    sulfate 325 milliGRAM(s) Oral daily  fluticasone propionate 50 MICROgram(s)/spray Nasal Spray 1 Spray(s) Both Nostrils two times a day  gabapentin 100 milliGRAM(s) Oral at bedtime  melatonin. 3 milliGRAM(s) Oral at bedtime  methocarbamol 750 milliGRAM(s) Oral three times a day  multivitamin 1 Tablet(s) Oral daily  naloxegol 12.5 milliGRAM(s) Oral daily  pantoprazole    Tablet 40 milliGRAM(s) Oral before breakfast  polyethylene glycol 3350 17 Gram(s) Oral two times a day  pregabalin 200 milliGRAM(s) Oral three times a day  senna 2 Tablet(s) Oral at bedtime  triamterene 37.5 mG/hydrochlorothiazide 25 mG Tablet 1 Tablet(s) Oral daily    MEDICATIONS  (PRN):  albuterol    90 MICROgram(s) HFA Inhaler 2 Puff(s) Inhalation every 6 hours PRN Shortness of Breath and/or Wheezing  bisacodyl 5 milliGRAM(s) Oral every 12 hours PRN Constipation  hemorrhoidal Ointment 1 Application(s) Rectal two times a day PRN hemorrhoidal pain  morphine   Solution 10 milliGRAM(s) Oral every 4 hours PRN Moderate Pain (4 - 6)  morphine   Solution 25 milliGRAM(s) Oral every 4 hours PRN Severe Pain (7 - 10)    ___________________________________________________________________________    PHYSICAL EXAM:  Gen - NAD, Comfortable  HEENT - NCAT, EOMI, MMM  Neck - Supple, No limited ROM  Pulm - breathing comfortably on room air  Cardiovascular - warm and well perfused  Abdomen - Soft, NT, ND  Extremities - No Cyanosis, no clubbing, no edema, no calf tenderness  Neuro-     Cognitive - awake, alert, oriented to person, place, date, year, and situation.  Able to follow command     Motor -                     LEFT    UE - 4/5                    RIGHT UE - 4/5                    LEFT    LE - 4/5                    RIGHT LE - 4/5        Sensory - Intact to LT   Psychiatric - Mood stable, Affect WNL  Skin:  left buttock healing wound with scab, spinal surgical incision clean and dry and left LIVE    ___________________________________________________________________________ HPI:  Mrs. Savi Ivy is a 48 year old female patient with past medical history of chronic back pain (s/p multiple spinal surgeries), asthma, obesity (s/p gastric bypass), and HTN who presented to [facility] on 11/14 for an elective revision of a PSF with extension of fusion with instrumentation from T3 through pelvis, pedicle subtraction osteotomy (PSO) L4, tethers, iliac fixation, smith clark osteotomies T3-T10 with Plastic Surgery closure with Dr. Schwab, Dr. Gonzalez and Dr. Simpson. Intraoperative course with acute blood loss requiring 3 unit PRBC transfusion with one additional PRBC post-op. Post-op hospital course significant for urinary retention, s/p paulino removal 11/21 and TOV passed. Patient evaluated by PT/OT and was recommended for acute inpatient rehab. Patient is medically stable for discharge to [facility] on 11/22.     TDD: 12/6  ___________________________________________________________________________    SUBJECTIVE/ROS  Patient was seen and evaluated at bedside today.  Patient slept well and denies any overnight events.  Robaxin increased to 750 mg TID.   Hgb stable. S/p IV iron infusion x 2.  Patient is motivated to continue participation on the recommended rehabilitation program.   Denies any CP, SOB, CALLAWAY, palpitations, fever, chills, body aches, cough, congestion, or any other symptoms at this time.   ___________________________________________________________________________    CT THORACIC SPINE AND LUMBAR SPINE (11/27/2023)    IMPRESSION: Recent thoracic fusion T4-T9 with screws and connecting rods and immature bony fusion mass. More remote fusion T10 through the sacrum with transpedicular screws and mature bony fusion mass.  ___________________________________________________________________________    Vital Signs Last 24 Hrs  T(C): 36.5 (05 Dec 2023 08:27), Max: 36.6 (04 Dec 2023 20:00)  T(F): 97.7 (05 Dec 2023 08:27), Max: 97.9 (05 Dec 2023 06:47)  HR: 67 (05 Dec 2023 08:27) (63 - 74)  BP: 106/73 (05 Dec 2023 08:27) (100/65 - 106/73)  BP(mean): --  RR: 15 (05 Dec 2023 08:27) (14 - 15)  SpO2: 96% (05 Dec 2023 08:27) (95% - 100%)  ___________________________________________________________________________    LAB                        8.0    5.07  )-----------( 509      ( 04 Dec 2023 06:32 )             26.2     12-04    142  |  105  |  15  ----------------------------<  87  4.4   |  32<H>  |  0.64    Ca    8.7      04 Dec 2023 06:32    TPro  6.3  /  Alb  2.1<L>  /  TBili  0.2  /  DBili  x   /  AST  26  /  ALT  21  /  AlkPhos  130<H>  12-04                          7.1    5.13  )-----------( 524      ( 01 Dec 2023 05:14 )             22.8                             7.8    6.73  )-----------( 547      ( 30 Nov 2023 11:53 )             25.3                             7.2    6.08  )-----------( 527      ( 30 Nov 2023 06:56 )             23.6     PT/INR - ( 30 Nov 2023 11:53 )   PT: 11.8 sec;   INR: 1.04 ratio    PTT - ( 30 Nov 2023 11:53 )  PTT:33.6 sec    Free Thyroxine, Serum in AM (12.01.23 @ 05:14)   Free Thyroxine, Serum: 1.0 ng/dL    Thyroid Stimulating Hormone, Serum in AM (12.01.23 @ 05:14)   Thyroid Stimulating Hormone, Serum: 1.106 uIU/mL    Iron with Total Binding Capacity in AM (12.01.23 @ 05:14)   Iron - Total Binding Capacity.: 243 ug/dL  % Saturation, Iron: 7 %  Iron Total: 17 ug/dL  Unsaturated Iron Binding Capacity: 226 ug/dL    Folate, Serum in AM (12.01.23 @ 05:14)   Folate, Serum: 9.4 ng/mL    Occult Blood, Feces (11.30.23 @ 13:26)   Occult Blood, Feces: Positive    Antibody Screen Interpretation (11.30.23 @ 09:32)   Antibody Screen: NEG    Type + Screen (11.30.23 @ 09:32)   ABO RH Interpretation: O POS    Vitamin B12, Serum (11.27.23 @ 05:29)   Vitamin B12, Serum: 885 pg/mL  ___________________________________________________________________________    MEDICATIONS  (STANDING):  acetaminophen     Tablet .. 975 milliGRAM(s) Oral every 8 hours  ascorbic acid 1000 milliGRAM(s) Oral daily  DULoxetine 60 milliGRAM(s) Oral daily  enoxaparin Injectable 40 milliGRAM(s) SubCutaneous every 24 hours  fentaNYL   Patch  25 MICROgram(s)/Hr 1 Patch Transdermal every 72 hours  ferrous    sulfate 325 milliGRAM(s) Oral daily  fluticasone propionate 50 MICROgram(s)/spray Nasal Spray 1 Spray(s) Both Nostrils two times a day  gabapentin 100 milliGRAM(s) Oral at bedtime  melatonin. 3 milliGRAM(s) Oral at bedtime  methocarbamol 750 milliGRAM(s) Oral three times a day  multivitamin 1 Tablet(s) Oral daily  naloxegol 12.5 milliGRAM(s) Oral daily  pantoprazole    Tablet 40 milliGRAM(s) Oral before breakfast  polyethylene glycol 3350 17 Gram(s) Oral two times a day  pregabalin 200 milliGRAM(s) Oral three times a day  senna 2 Tablet(s) Oral at bedtime  triamterene 37.5 mG/hydrochlorothiazide 25 mG Tablet 1 Tablet(s) Oral daily    MEDICATIONS  (PRN):  albuterol    90 MICROgram(s) HFA Inhaler 2 Puff(s) Inhalation every 6 hours PRN Shortness of Breath and/or Wheezing  bisacodyl 5 milliGRAM(s) Oral every 12 hours PRN Constipation  hemorrhoidal Ointment 1 Application(s) Rectal two times a day PRN hemorrhoidal pain  morphine   Solution 10 milliGRAM(s) Oral every 4 hours PRN Moderate Pain (4 - 6)  morphine   Solution 25 milliGRAM(s) Oral every 4 hours PRN Severe Pain (7 - 10)    ___________________________________________________________________________    PHYSICAL EXAM:  Gen - NAD, Comfortable  HEENT - NCAT, EOMI, MMM  Neck - Supple, No limited ROM  Pulm - breathing comfortably on room air  Cardiovascular - warm and well perfused  Abdomen - Soft, NT, ND  Extremities - No Cyanosis, no clubbing, no edema, no calf tenderness  Neuro-     Cognitive - awake, alert, oriented to person, place, date, year, and situation.  Able to follow command     Motor -                     LEFT    UE - 4/5                    RIGHT UE - 4/5                    LEFT    LE - 4/5                    RIGHT LE - 4/5        Sensory - Intact to LT   Psychiatric - Mood stable, Affect WNL  Skin:  left buttock healing wound with scab, spinal surgical incision clean and dry and left LIVE    ___________________________________________________________________________ HPI:  Mrs. Saiv Ivy is a 48 year old female patient with past medical history of chronic back pain (s/p multiple spinal surgeries), asthma, obesity (s/p gastric bypass), and HTN who presented to [facility] on 11/14 for an elective revision of a PSF with extension of fusion with instrumentation from T3 through pelvis, pedicle subtraction osteotomy (PSO) L4, tethers, iliac fixation, smith clark osteotomies T3-T10 with Plastic Surgery closure with Dr. Schwab, Dr. Gonzalez and Dr. Simpson. Intraoperative course with acute blood loss requiring 3 unit PRBC transfusion with one additional PRBC post-op. Post-op hospital course significant for urinary retention, s/p paulino removal 11/21 and TOV passed. Patient evaluated by PT/OT and was recommended for acute inpatient rehab. Patient is medically stable for discharge to [facility] on 11/22.     TDD: 12/6  ___________________________________________________________________________    SUBJECTIVE/ROS  Patient was seen and evaluated at bedside today.  Patient slept well and denies any overnight events.  Robaxin increased to 750 mg TID.   Hgb stable. S/p IV iron infusion x 2.  Patient is motivated to continue participation on the recommended rehabilitation program.   Denies any CP, SOB, CALLAWAY, palpitations, fever, chills, body aches, cough, congestion, or any other symptoms at this time.   ___________________________________________________________________________    CT THORACIC SPINE AND LUMBAR SPINE (11/27/2023)    IMPRESSION: Recent thoracic fusion T4-T9 with screws and connecting rods and immature bony fusion mass. More remote fusion T10 through the sacrum with transpedicular screws and mature bony fusion mass.  ___________________________________________________________________________    Vital Signs Last 24 Hrs  T(C): 36.5 (05 Dec 2023 08:27), Max: 36.6 (04 Dec 2023 20:00)  T(F): 97.7 (05 Dec 2023 08:27), Max: 97.9 (05 Dec 2023 06:47)  HR: 67 (05 Dec 2023 08:27) (63 - 74)  BP: 106/73 (05 Dec 2023 08:27) (100/65 - 106/73)  RR: 15 (05 Dec 2023 08:27) (14 - 15)  SpO2: 96% (05 Dec 2023 08:27) (95% - 100%)  ___________________________________________________________________________    LAB                        8.0    5.07  )-----------( 509      ( 04 Dec 2023 06:32 )             26.2     12-04    142  |  105  |  15  ----------------------------<  87  4.4   |  32<H>  |  0.64    Ca    8.7      04 Dec 2023 06:32    TPro  6.3  /  Alb  2.1<L>  /  TBili  0.2  /  DBili  x   /  AST  26  /  ALT  21  /  AlkPhos  130<H>  12-04                          7.1    5.13  )-----------( 524      ( 01 Dec 2023 05:14 )             22.8                             7.8    6.73  )-----------( 547      ( 30 Nov 2023 11:53 )             25.3                             7.2    6.08  )-----------( 527      ( 30 Nov 2023 06:56 )             23.6     PT/INR - ( 30 Nov 2023 11:53 )   PT: 11.8 sec;   INR: 1.04 ratio    PTT - ( 30 Nov 2023 11:53 )  PTT:33.6 sec    Free Thyroxine, Serum in AM (12.01.23 @ 05:14)   Free Thyroxine, Serum: 1.0 ng/dL    Thyroid Stimulating Hormone, Serum in AM (12.01.23 @ 05:14)   Thyroid Stimulating Hormone, Serum: 1.106 uIU/mL    Iron with Total Binding Capacity in AM (12.01.23 @ 05:14)   Iron - Total Binding Capacity.: 243 ug/dL  % Saturation, Iron: 7 %  Iron Total: 17 ug/dL  Unsaturated Iron Binding Capacity: 226 ug/dL    Folate, Serum in AM (12.01.23 @ 05:14)   Folate, Serum: 9.4 ng/mL    Occult Blood, Feces (11.30.23 @ 13:26)   Occult Blood, Feces: Positive    Antibody Screen Interpretation (11.30.23 @ 09:32)   Antibody Screen: NEG    Type + Screen (11.30.23 @ 09:32)   ABO RH Interpretation: O POS    Vitamin B12, Serum (11.27.23 @ 05:29)   Vitamin B12, Serum: 885 pg/mL  ___________________________________________________________________________    MEDICATIONS  (STANDING):  acetaminophen     Tablet .. 975 milliGRAM(s) Oral every 8 hours  ascorbic acid 1000 milliGRAM(s) Oral daily  DULoxetine 60 milliGRAM(s) Oral daily  enoxaparin Injectable 40 milliGRAM(s) SubCutaneous every 24 hours  fentaNYL   Patch  25 MICROgram(s)/Hr 1 Patch Transdermal every 72 hours  ferrous    sulfate 325 milliGRAM(s) Oral daily  fluticasone propionate 50 MICROgram(s)/spray Nasal Spray 1 Spray(s) Both Nostrils two times a day  gabapentin 100 milliGRAM(s) Oral at bedtime  melatonin. 3 milliGRAM(s) Oral at bedtime  methocarbamol 750 milliGRAM(s) Oral three times a day  multivitamin 1 Tablet(s) Oral daily  naloxegol 12.5 milliGRAM(s) Oral daily  pantoprazole    Tablet 40 milliGRAM(s) Oral before breakfast  polyethylene glycol 3350 17 Gram(s) Oral two times a day  pregabalin 200 milliGRAM(s) Oral three times a day  senna 2 Tablet(s) Oral at bedtime  triamterene 37.5 mG/hydrochlorothiazide 25 mG Tablet 1 Tablet(s) Oral daily    MEDICATIONS  (PRN):  albuterol    90 MICROgram(s) HFA Inhaler 2 Puff(s) Inhalation every 6 hours PRN Shortness of Breath and/or Wheezing  bisacodyl 5 milliGRAM(s) Oral every 12 hours PRN Constipation  hemorrhoidal Ointment 1 Application(s) Rectal two times a day PRN hemorrhoidal pain  morphine   Solution 10 milliGRAM(s) Oral every 4 hours PRN Moderate Pain (4 - 6)  morphine   Solution 25 milliGRAM(s) Oral every 4 hours PRN Severe Pain (7 - 10)    ___________________________________________________________________________    PHYSICAL EXAM:  Gen - NAD, Comfortable  HEENT - NCAT, EOMI, MMM  Neck - Supple, No limited ROM  Pulm - breathing comfortably on room air  Cardiovascular - warm and well perfused  Abdomen - Soft, NT, ND  Extremities - No Cyanosis, no clubbing, no edema, no calf tenderness  Neuro-     Cognitive - awake, alert, oriented to person, place, date, year, and situation.  Able to follow command     Motor -                     LEFT    UE - 4/5                    RIGHT UE - 4/5                    LEFT    LE - 4/5                    RIGHT LE - 4/5        Sensory - Intact to LT   Psychiatric - Mood stable, Affect WNL  Skin:  left buttock healing wound with scab, spinal surgical incision clean and dry and left LIVE    ___________________________________________________________________________ HPI:  Mrs. Savi Ivy is a 48 year old female patient with past medical history of chronic back pain (s/p multiple spinal surgeries), asthma, obesity (s/p gastric bypass), and HTN who presented to [facility] on 11/14 for an elective revision of a PSF with extension of fusion with instrumentation from T3 through pelvis, pedicle subtraction osteotomy (PSO) L4, tethers, iliac fixation, smith clark osteotomies T3-T10 with Plastic Surgery closure with Dr. Schwab, Dr. Gonzalez and Dr. Simpson. Intraoperative course with acute blood loss requiring 3 unit PRBC transfusion with one additional PRBC post-op. Post-op hospital course significant for urinary retention, s/p paulino removal 11/21 and TOV passed. Patient evaluated by PT/OT and was recommended for acute inpatient rehab. Patient is medically stable for discharge to [facility] on 11/22.     TDD: 12/6  ___________________________________________________________________________    SUBJECTIVE/ROS  Patient was seen and evaluated at bedside today.  Patient slept well and denies any overnight events.  Robaxin increased to 750 mg TID.   Hgb stable. S/p IV iron infusion x 2.  Patient is motivated to continue participation on the recommended rehabilitation program.   Denies any CP, SOB, CALLAWAY, palpitations, fever, chills, body aches, cough, congestion, or any other symptoms at this time.   ___________________________________________________________________________    CT THORACIC SPINE AND LUMBAR SPINE (11/27/2023)    IMPRESSION: Recent thoracic fusion T4-T9 with screws and connecting rods and immature bony fusion mass. More remote fusion T10 through the sacrum with transpedicular screws and mature bony fusion mass.  ___________________________________________________________________________    Vital Signs Last 24 Hrs  T(C): 36.5 (05 Dec 2023 08:27), Max: 36.6 (04 Dec 2023 20:00)  T(F): 97.7 (05 Dec 2023 08:27), Max: 97.9 (05 Dec 2023 06:47)  HR: 67 (05 Dec 2023 08:27) (63 - 74)  BP: 106/73 (05 Dec 2023 08:27) (100/65 - 106/73)  RR: 15 (05 Dec 2023 08:27) (14 - 15)  SpO2: 96% (05 Dec 2023 08:27) (95% - 100%)  ___________________________________________________________________________    LAB                        8.0    5.07  )-----------( 509      ( 04 Dec 2023 06:32 )             26.2     12-04    142  |  105  |  15  ----------------------------<  87  4.4   |  32<H>  |  0.64    Ca    8.7      04 Dec 2023 06:32    TPro  6.3  /  Alb  2.1<L>  /  TBili  0.2  /  DBili  x   /  AST  26  /  ALT  21  /  AlkPhos  130<H>  12-04                          7.1    5.13  )-----------( 524      ( 01 Dec 2023 05:14 )             22.8                             7.8    6.73  )-----------( 547      ( 30 Nov 2023 11:53 )             25.3                             7.2    6.08  )-----------( 527      ( 30 Nov 2023 06:56 )             23.6     PT/INR - ( 30 Nov 2023 11:53 )   PT: 11.8 sec;   INR: 1.04 ratio    PTT - ( 30 Nov 2023 11:53 )  PTT:33.6 sec    Free Thyroxine, Serum in AM (12.01.23 @ 05:14)   Free Thyroxine, Serum: 1.0 ng/dL    Thyroid Stimulating Hormone, Serum in AM (12.01.23 @ 05:14)   Thyroid Stimulating Hormone, Serum: 1.106 uIU/mL    Iron with Total Binding Capacity in AM (12.01.23 @ 05:14)   Iron - Total Binding Capacity.: 243 ug/dL  % Saturation, Iron: 7 %  Iron Total: 17 ug/dL  Unsaturated Iron Binding Capacity: 226 ug/dL    Folate, Serum in AM (12.01.23 @ 05:14)   Folate, Serum: 9.4 ng/mL    Occult Blood, Feces (11.30.23 @ 13:26)   Occult Blood, Feces: Positive    Antibody Screen Interpretation (11.30.23 @ 09:32)   Antibody Screen: NEG    Type + Screen (11.30.23 @ 09:32)   ABO RH Interpretation: O POS    Vitamin B12, Serum (11.27.23 @ 05:29)   Vitamin B12, Serum: 885 pg/mL  ___________________________________________________________________________    MEDICATIONS  (STANDING):  acetaminophen     Tablet .. 975 milliGRAM(s) Oral every 8 hours  ascorbic acid 1000 milliGRAM(s) Oral daily  DULoxetine 60 milliGRAM(s) Oral daily  enoxaparin Injectable 40 milliGRAM(s) SubCutaneous every 24 hours  fentaNYL   Patch  25 MICROgram(s)/Hr 1 Patch Transdermal every 72 hours  ferrous    sulfate 325 milliGRAM(s) Oral daily  fluticasone propionate 50 MICROgram(s)/spray Nasal Spray 1 Spray(s) Both Nostrils two times a day  gabapentin 100 milliGRAM(s) Oral at bedtime  melatonin. 3 milliGRAM(s) Oral at bedtime  methocarbamol 750 milliGRAM(s) Oral three times a day  multivitamin 1 Tablet(s) Oral daily  naloxegol 12.5 milliGRAM(s) Oral daily  pantoprazole    Tablet 40 milliGRAM(s) Oral before breakfast  polyethylene glycol 3350 17 Gram(s) Oral two times a day  pregabalin 200 milliGRAM(s) Oral three times a day  senna 2 Tablet(s) Oral at bedtime  triamterene 37.5 mG/hydrochlorothiazide 25 mG Tablet 1 Tablet(s) Oral daily    MEDICATIONS  (PRN):  albuterol    90 MICROgram(s) HFA Inhaler 2 Puff(s) Inhalation every 6 hours PRN Shortness of Breath and/or Wheezing  bisacodyl 5 milliGRAM(s) Oral every 12 hours PRN Constipation  hemorrhoidal Ointment 1 Application(s) Rectal two times a day PRN hemorrhoidal pain  morphine   Solution 10 milliGRAM(s) Oral every 4 hours PRN Moderate Pain (4 - 6)  morphine   Solution 25 milliGRAM(s) Oral every 4 hours PRN Severe Pain (7 - 10)    ___________________________________________________________________________    PHYSICAL EXAM:  Gen - NAD, Comfortable  HEENT - NCAT, EOMI, MMM  Neck - Supple, No limited ROM  Pulm - breathing comfortably on room air  Cardiovascular - warm and well perfused  Abdomen - Soft, NT, ND  Extremities - No Cyanosis, no clubbing, no edema, no calf tenderness  Neuro-     Cognitive - awake, alert, oriented to person, place, date, year, and situation.  Able to follow command     Motor -                     LEFT    UE - 4/5                    RIGHT UE - 4/5                    LEFT    LE - 4/5                    RIGHT LE - 4/5        Sensory - Intact to LT   Psychiatric - Mood stable, Affect WNL  Skin:  left buttock healing wound with scab, spinal surgical incision clean and dry and left LIVE    ___________________________________________________________________________

## 2023-12-06 VITALS
SYSTOLIC BLOOD PRESSURE: 102 MMHG | TEMPERATURE: 98 F | OXYGEN SATURATION: 98 % | RESPIRATION RATE: 16 BRPM | DIASTOLIC BLOOD PRESSURE: 66 MMHG | HEART RATE: 63 BPM

## 2023-12-06 PROCEDURE — 86900 BLOOD TYPING SEROLOGIC ABO: CPT

## 2023-12-06 PROCEDURE — 97163 PT EVAL HIGH COMPLEX 45 MIN: CPT

## 2023-12-06 PROCEDURE — 83550 IRON BINDING TEST: CPT

## 2023-12-06 PROCEDURE — 97110 THERAPEUTIC EXERCISES: CPT

## 2023-12-06 PROCEDURE — 97167 OT EVAL HIGH COMPLEX 60 MIN: CPT

## 2023-12-06 PROCEDURE — 84443 ASSAY THYROID STIM HORMONE: CPT

## 2023-12-06 PROCEDURE — 99239 HOSP IP/OBS DSCHRG MGMT >30: CPT

## 2023-12-06 PROCEDURE — 87635 SARS-COV-2 COVID-19 AMP PRB: CPT

## 2023-12-06 PROCEDURE — 86901 BLOOD TYPING SEROLOGIC RH(D): CPT

## 2023-12-06 PROCEDURE — 82272 OCCULT BLD FECES 1-3 TESTS: CPT

## 2023-12-06 PROCEDURE — 82746 ASSAY OF FOLIC ACID SERUM: CPT

## 2023-12-06 PROCEDURE — 72128 CT CHEST SPINE W/O DYE: CPT

## 2023-12-06 PROCEDURE — 86850 RBC ANTIBODY SCREEN: CPT

## 2023-12-06 PROCEDURE — 94640 AIRWAY INHALATION TREATMENT: CPT

## 2023-12-06 PROCEDURE — 97116 GAIT TRAINING THERAPY: CPT

## 2023-12-06 PROCEDURE — 97112 NEUROMUSCULAR REEDUCATION: CPT

## 2023-12-06 PROCEDURE — 82607 VITAMIN B-12: CPT

## 2023-12-06 PROCEDURE — 99232 SBSQ HOSP IP/OBS MODERATE 35: CPT

## 2023-12-06 PROCEDURE — 85730 THROMBOPLASTIN TIME PARTIAL: CPT

## 2023-12-06 PROCEDURE — 85610 PROTHROMBIN TIME: CPT

## 2023-12-06 PROCEDURE — 36415 COLL VENOUS BLD VENIPUNCTURE: CPT

## 2023-12-06 PROCEDURE — 83540 ASSAY OF IRON: CPT

## 2023-12-06 PROCEDURE — 97530 THERAPEUTIC ACTIVITIES: CPT

## 2023-12-06 PROCEDURE — 85027 COMPLETE CBC AUTOMATED: CPT

## 2023-12-06 PROCEDURE — 84439 ASSAY OF FREE THYROXINE: CPT

## 2023-12-06 PROCEDURE — 80053 COMPREHEN METABOLIC PANEL: CPT

## 2023-12-06 PROCEDURE — 97535 SELF CARE MNGMENT TRAINING: CPT

## 2023-12-06 PROCEDURE — 85025 COMPLETE CBC W/AUTO DIFF WBC: CPT

## 2023-12-06 PROCEDURE — 97542 WHEELCHAIR MNGMENT TRAINING: CPT

## 2023-12-06 PROCEDURE — 72131 CT LUMBAR SPINE W/O DYE: CPT

## 2023-12-06 RX ORDER — FENTANYL CITRATE 50 UG/ML
1 INJECTION INTRAVENOUS
Qty: 2 | Refills: 0
Start: 2023-12-06 | End: 2023-12-11

## 2023-12-06 RX ORDER — NALOXONE HYDROCHLORIDE 4 MG/.1ML
4 SPRAY NASAL
Qty: 2 | Refills: 0
Start: 2023-12-06 | End: 2023-12-09

## 2023-12-06 RX ORDER — GABAPENTIN 400 MG/1
1 CAPSULE ORAL
Qty: 30 | Refills: 0
Start: 2023-12-06 | End: 2024-01-04

## 2023-12-06 RX ORDER — POLYETHYLENE GLYCOL 3350 17 G/17G
17 POWDER, FOR SOLUTION ORAL
Qty: 0 | Refills: 0 | DISCHARGE
Start: 2023-12-06

## 2023-12-06 RX ORDER — LANOLIN ALCOHOL/MO/W.PET/CERES
1 CREAM (GRAM) TOPICAL
Qty: 30 | Refills: 0
Start: 2023-12-06 | End: 2024-01-04

## 2023-12-06 RX ORDER — FENTANYL CITRATE 50 UG/ML
25 INJECTION INTRAVENOUS
Qty: 2 | Refills: 0
Start: 2023-12-06 | End: 2023-12-11

## 2023-12-06 RX ORDER — SENNA PLUS 8.6 MG/1
2 TABLET ORAL
Qty: 56 | Refills: 0
Start: 2023-12-06 | End: 2024-01-02

## 2023-12-06 RX ORDER — ALBUTEROL 90 UG/1
2 AEROSOL, METERED ORAL
Qty: 1 | Refills: 0
Start: 2023-12-06 | End: 2024-01-04

## 2023-12-06 RX ORDER — TRIAMTERENE/HYDROCHLOROTHIAZID 75 MG-50MG
1 TABLET ORAL
Qty: 30 | Refills: 0
Start: 2023-12-06 | End: 2024-01-04

## 2023-12-06 RX ORDER — ALBUTEROL 90 UG/1
0 AEROSOL, METERED ORAL
Refills: 0 | DISCHARGE

## 2023-12-06 RX ORDER — TAPENTADOL HYDROCHLORIDE 50 MG/1
1 TABLET, FILM COATED ORAL
Qty: 28 | Refills: 0
Start: 2023-12-06 | End: 2023-12-12

## 2023-12-06 RX ORDER — METHOCARBAMOL 500 MG/1
1 TABLET, FILM COATED ORAL
Qty: 90 | Refills: 0
Start: 2023-12-06 | End: 2024-01-04

## 2023-12-06 RX ORDER — MORPHINE SULFATE 50 MG/1
5 CAPSULE, EXTENDED RELEASE ORAL
Qty: 210 | Refills: 0
Start: 2023-12-06 | End: 2023-12-12

## 2023-12-06 RX ORDER — ACETAMINOPHEN 500 MG
3 TABLET ORAL
Qty: 0 | Refills: 0 | DISCHARGE
Start: 2023-12-06

## 2023-12-06 RX ORDER — OMEPRAZOLE 10 MG/1
1 CAPSULE, DELAYED RELEASE ORAL
Refills: 0 | DISCHARGE

## 2023-12-06 RX ORDER — OMEPRAZOLE 10 MG/1
1 CAPSULE, DELAYED RELEASE ORAL
Qty: 30 | Refills: 0
Start: 2023-12-06 | End: 2024-01-04

## 2023-12-06 RX ORDER — TAPENTADOL HYDROCHLORIDE 50 MG/1
1 TABLET, FILM COATED ORAL
Qty: 0 | Refills: 0 | DISCHARGE

## 2023-12-06 RX ORDER — NALOXEGOL OXALATE 12.5 MG/1
1 TABLET, FILM COATED ORAL
Qty: 30 | Refills: 0
Start: 2023-12-06 | End: 2024-01-04

## 2023-12-06 RX ORDER — FERROUS SULFATE 325(65) MG
1 TABLET ORAL
Qty: 30 | Refills: 0
Start: 2023-12-06 | End: 2024-01-04

## 2023-12-06 RX ORDER — FLUTICASONE PROPIONATE 50 MCG
1 SPRAY, SUSPENSION NASAL
Qty: 1 | Refills: 0
Start: 2023-12-06 | End: 2024-01-04

## 2023-12-06 RX ORDER — ASCORBIC ACID 60 MG
1 TABLET,CHEWABLE ORAL
Qty: 0 | Refills: 0 | DISCHARGE
Start: 2023-12-06

## 2023-12-06 RX ORDER — DULOXETINE HYDROCHLORIDE 30 MG/1
1 CAPSULE, DELAYED RELEASE ORAL
Qty: 30 | Refills: 0
Start: 2023-12-06 | End: 2024-01-04

## 2023-12-06 RX ORDER — TRIAMTERENE/HYDROCHLOROTHIAZID 75 MG-50MG
1 TABLET ORAL
Refills: 0 | DISCHARGE

## 2023-12-06 RX ADMIN — Medication 1 TABLET(S): at 05:46

## 2023-12-06 RX ADMIN — DULOXETINE HYDROCHLORIDE 60 MILLIGRAM(S): 30 CAPSULE, DELAYED RELEASE ORAL at 11:07

## 2023-12-06 RX ADMIN — PANTOPRAZOLE SODIUM 40 MILLIGRAM(S): 20 TABLET, DELAYED RELEASE ORAL at 06:12

## 2023-12-06 RX ADMIN — Medication 975 MILLIGRAM(S): at 06:40

## 2023-12-06 RX ADMIN — NALOXEGOL OXALATE 12.5 MILLIGRAM(S): 12.5 TABLET, FILM COATED ORAL at 11:07

## 2023-12-06 RX ADMIN — MORPHINE SULFATE 25 MILLIGRAM(S): 50 CAPSULE, EXTENDED RELEASE ORAL at 08:50

## 2023-12-06 RX ADMIN — MORPHINE SULFATE 25 MILLIGRAM(S): 50 CAPSULE, EXTENDED RELEASE ORAL at 08:07

## 2023-12-06 RX ADMIN — MORPHINE SULFATE 25 MILLIGRAM(S): 50 CAPSULE, EXTENDED RELEASE ORAL at 12:28

## 2023-12-06 RX ADMIN — Medication 975 MILLIGRAM(S): at 05:37

## 2023-12-06 RX ADMIN — Medication 1000 MILLIGRAM(S): at 11:07

## 2023-12-06 RX ADMIN — MORPHINE SULFATE 25 MILLIGRAM(S): 50 CAPSULE, EXTENDED RELEASE ORAL at 13:15

## 2023-12-06 RX ADMIN — ENOXAPARIN SODIUM 40 MILLIGRAM(S): 100 INJECTION SUBCUTANEOUS at 05:47

## 2023-12-06 RX ADMIN — Medication 200 MILLIGRAM(S): at 05:45

## 2023-12-06 RX ADMIN — Medication 1 TABLET(S): at 11:07

## 2023-12-06 RX ADMIN — METHOCARBAMOL 750 MILLIGRAM(S): 500 TABLET, FILM COATED ORAL at 05:46

## 2023-12-06 RX ADMIN — POLYETHYLENE GLYCOL 3350 17 GRAM(S): 17 POWDER, FOR SOLUTION ORAL at 05:45

## 2023-12-06 RX ADMIN — Medication 1 SPRAY(S): at 05:47

## 2023-12-06 RX ADMIN — Medication 975 MILLIGRAM(S): at 05:46

## 2023-12-06 RX ADMIN — Medication 325 MILLIGRAM(S): at 11:07

## 2023-12-06 NOTE — PROGRESS NOTE ADULT - ASSESSMENT
Mrs. Savi Ivy is a 48 year old female patient with past medical history of chronic back pain (s/p multiple spinal surgeries), asthma, obesity (s/p gastric bypass), and HTN who is admitted for Acute Inpatient Rehabilitation with a multidisciplinary rehab program at [facility], [place] with functional impairments in ADLs and mobility secondary to chronic back pain s/p multiple spinal surgeries treated further surgically with an elective revision of a PSF with extension of fusion with instrumentation from T3 through pelvis, L4 pedicle subtraction osteotomy (PSO), tethers, iliac fixation, T3-T10 Duvall-Meneses osteotomies with Plastic Surgery closure whose intraoperative course was remarkable for acute blood loss requiring 3 unit PRBC transfusion with one additional PRBC post-op. Post-op hospital course additionally significant for urinary retention.    Spinal stenosis with neurogenic claudication s/p T4-Pelvis revision surgery  - S/P elective revision of PSF with extension of fusion with instrumentation from T3 through pelvis (11/14)  - S/P L4 pedicle subtraction osteotomy (PSO)  - S/P proximal tethers and iliac fixation  - S/P T3-T10 Smith-Meneses osteotomies with Plastic Surgery closure   - HV x 2 and SHAWNA x 4 drains removed on 11/20   - Impaired ADLs and mobility  - Need for assistance with personal care   - Completed comprehensive rehab program of PT/OT - 3 hours a day, 5 days a week. P&O as needed   - Pain control as below  - Fall/Spinal precautions  - WBAT  - CT thoracic/lumbar spine (11/27) stable  - Discharge home today    Pain Control   - Initial regimen     * Tylenol 975 mg Q8H     * Cymbalta 60 mg QD     * Lyrica 200 mg TID     * Robaxin 500 mg TID      * Fentanyl Patch 50 mcg Q72H     * PRN: Morphine 15 mg Q4H mod pain, 30 mg Q4H severe pain  - Current regimen as follows (changed on 11/29):     * acetaminophen 975 milliGRAM(s) Q8H     * DULoxetine 60 milliGRAM(s) Oral daily     * methocarbamol 500 milliGRAM(s) Oral three times a day - increased to 750 mg TID on 12/4     * fentaNYL   Patch  25 MICROgram(s)/Hr 1 Patch Transdermal every 72 hours     * pregabalin 200 milliGRAM(s) Oral three times a day     * morphine   Solution 10 milliGRAM(s) Oral every 4 hours PRN Moderate Pain (4 - 6)     * morphine   Solution 25 milliGRAM(s) Oral every 4 hours PRN Severe Pain (7 - 10)     * gabapentin 100 mg QHS added on 12/2  - follow with pain management outpatient  - evaluated by pain management at [facility] who recommended discharge on home dose of Nucynta 75mg 4x/day, fentanyl patch, as well as Robaxin 750mg PO 3x/day and Lyrica 200mg PO 3x/day    Acute Blood Loss Anemia/ Iron Deficiency Anemia  - likely multifactorial, due to post-operative blood loss, hx of gastric bypass, hx of iron deficiency  - s/p 3u PRBCs intra-op and one additional PRBC post-op  - FOBT+  - monitor H/H, transfuse Hgb <7  - s/p IV iron infusion x 2 doses  - continue oral iron + Vit C QD  - continue Protonix QD  - GI consulted (she already had EGD/colon earlier in November)  - hospitalist following    Asthma  - albuterol inhaler Q6H PRN    HTN  - continue triamterene 37.5mg- HCTZ 25mg QD  - Monitor BP    Mood / Cognition  - Neuropsychology consult PRN    Sleep  - Maintain quiet hours and a low stim environment.   - Melatonin 3 mg QHS     GI / Bowel  - at risk for constipation due to neurologic diagnosis, immobility, and/or medication use  - Senna qHS  - Miralax BID  - Movantik 12.5 mg QD  - Dulcolax 5 mg BID PRN constipation  - GI ppx: pantoprazole 40 mg QD     / Bladder  - Currently patient voids independently  - Cobb removed 11/21 and passed TOV    Skin:  - Skin assessment on admission performed : Left buttock healing wound with scab, spinal surgical incision clean an dry and left LIVE (11/25)  - Pressure Injury/Skin: OOB to chair, PT/OT  - nursing to monitor skin q Shift    Diet:  - Diet Consistency: regular  - Aspiration Precautions  - Nutrition consult    DVT prophylaxis:   - Lovenox 40 mg QD    Outpatient Follow-up:  Schwab, Frank Johann  Orthopaedic Surgery  [address]  [place] [address]  Phone: [phone]  Fax: [phone]    Code Status/Emergency Contact:    ---------------    Goals: Safe discharge to home  Estimated Length of Stay: 10-14 days  Rehab Potential: Good  Medical Prognosis: Good  Estimated Disposition: Home with home care      PRESCREEN COMPARISON:  I have reviewed the prescreen information and I have found no relevant changes between the preadmission screening and my post admission evaluation.    RATIONALE FOR INPATIENT ADMISSION: Patient demonstrates the following:  [X] Medically appropriate for rehabilitation admission  [X] Has attainable rehab goals with an appropriate initial discharge plan  [X]Has rehabilitation potential (expected to make a significant improvement within a reasonable period of time)  [X] Requires close medical management by a rehab physician, rehab nursing care, Hospitalist and comprehensive interdisciplinary team (including PT, OT and/or SLP, Prosthetics and Orthotics)     Mrs. Savi Ivy is a 48 year old female patient with past medical history of chronic back pain (s/p multiple spinal surgeries), asthma, obesity (s/p gastric bypass), and HTN who is admitted for Acute Inpatient Rehabilitation with a multidisciplinary rehab program at [facility] with functional impairments in ADLs and mobility secondary to chronic back pain s/p multiple spinal surgeries treated further surgically with an elective revision of a PSF with extension of fusion with instrumentation from T3 through pelvis, L4 pedicle subtraction osteotomy (PSO), tethers, iliac fixation, T3-T10 Duvall-Meneses osteotomies with Plastic Surgery closure whose intraoperative course was remarkable for acute blood loss requiring 3 unit PRBC transfusion with one additional PRBC post-op. Post-op hospital course additionally significant for urinary retention.    Spinal stenosis with neurogenic claudication s/p T4-Pelvis revision surgery  - S/P elective revision of PSF with extension of fusion with instrumentation from T3 through pelvis (11/14)  - S/P L4 pedicle subtraction osteotomy (PSO)  - S/P proximal tethers and iliac fixation  - S/P T3-T10 Smith-Meneses osteotomies with Plastic Surgery closure   - HV x 2 and SHAWNA x 4 drains removed on 11/20   - Impaired ADLs and mobility  - Need for assistance with personal care   - Completed comprehensive rehab program of PT/OT - 3 hours a day, 5 days a week. P&O as needed   - Pain control as below  - Fall/Spinal precautions  - WBAT  - CT thoracic/lumbar spine (11/27) stable  - Discharge home today    Pain Control   - Initial regimen     * Tylenol 975 mg Q8H     * Cymbalta 60 mg QD     * Lyrica 200 mg TID     * Robaxin 500 mg TID      * Fentanyl Patch 50 mcg Q72H     * PRN: Morphine 15 mg Q4H mod pain, 30 mg Q4H severe pain  - Current regimen as follows (changed on 11/29):     * acetaminophen 975 milliGRAM(s) Q8H     * DULoxetine 60 milliGRAM(s) Oral daily     * methocarbamol 500 milliGRAM(s) Oral three times a day - increased to 750 mg TID on 12/4     * fentaNYL   Patch  25 MICROgram(s)/Hr 1 Patch Transdermal every 72 hours     * pregabalin 200 milliGRAM(s) Oral three times a day     * morphine   Solution 10 milliGRAM(s) Oral every 4 hours PRN Moderate Pain (4 - 6)     * morphine   Solution 25 milliGRAM(s) Oral every 4 hours PRN Severe Pain (7 - 10)     * gabapentin 100 mg QHS added on 12/2  - follow with pain management outpatient  - evaluated by pain management at [facility] who recommended discharge on home dose of Nucynta 75mg 4x/day, fentanyl patch, as well as Robaxin 750mg PO 3x/day and Lyrica 200mg PO 3x/day    Acute Blood Loss Anemia/ Iron Deficiency Anemia  - likely multifactorial, due to post-operative blood loss, hx of gastric bypass, hx of iron deficiency  - s/p 3u PRBCs intra-op and one additional PRBC post-op  - FOBT+  - monitor H/H, transfuse Hgb <7  - s/p IV iron infusion x 2 doses  - continue oral iron + Vit C QD  - continue Protonix QD  - GI consulted (she already had EGD/colon earlier in November)  - hospitalist following    Asthma  - albuterol inhaler Q6H PRN    HTN  - continue triamterene 37.5mg- HCTZ 25mg QD  - Monitor BP    Mood / Cognition  - Neuropsychology consult PRN    Sleep  - Maintain quiet hours and a low stim environment.   - Melatonin 3 mg QHS     GI / Bowel  - at risk for constipation due to neurologic diagnosis, immobility, and/or medication use  - Senna qHS  - Miralax BID  - Movantik 12.5 mg QD  - Dulcolax 5 mg BID PRN constipation  - GI ppx: pantoprazole 40 mg QD     / Bladder  - Currently patient voids independently  - Cobb removed 11/21 and passed TOV    Skin:  - Skin assessment on admission performed : Left buttock healing wound with scab, spinal surgical incision clean an dry and left LIVE (11/25)  - Pressure Injury/Skin: OOB to chair, PT/OT  - nursing to monitor skin q Shift    Diet:  - Diet Consistency: regular  - Aspiration Precautions  - Nutrition consult    DVT prophylaxis:   - Lovenox 40 mg QD    Outpatient Follow-up:  Schwab, Frank Johann  Orthopaedic Surgery  [address]  [place] [address]  Phone: [phone]  Fax: [phone]    Code Status/Emergency Contact:    ---------------    Goals: Safe discharge to home  Estimated Length of Stay: 10-14 days  Rehab Potential: Good  Medical Prognosis: Good  Estimated Disposition: Home with home care      PRESCREEN COMPARISON:  I have reviewed the prescreen information and I have found no relevant changes between the preadmission screening and my post admission evaluation.    RATIONALE FOR INPATIENT ADMISSION: Patient demonstrates the following:  [X] Medically appropriate for rehabilitation admission  [X] Has attainable rehab goals with an appropriate initial discharge plan  [X]Has rehabilitation potential (expected to make a significant improvement within a reasonable period of time)  [X] Requires close medical management by a rehab physician, rehab nursing care, Hospitalist and comprehensive interdisciplinary team (including PT, OT and/or SLP, Prosthetics and Orthotics)

## 2023-12-06 NOTE — PROGRESS NOTE ADULT - SUBJECTIVE AND OBJECTIVE BOX
HPI:  Mrs. Savi Ivy is a 48 year old female patient with past medical history of chronic back pain (s/p multiple spinal surgeries), asthma, obesity (s/p gastric bypass), and HTN who presented to [facility] on 11/14 for an elective revision of a PSF with extension of fusion with instrumentation from T3 through pelvis, pedicle subtraction osteotomy (PSO) L4, tethers, iliac fixation, smith clark osteotomies T3-T10 with Plastic Surgery closure with Dr. Schwab, Dr. Gonzalez and Dr. Simpson. Intraoperative course with acute blood loss requiring 3 unit PRBC transfusion with one additional PRBC post-op. Post-op hospital course significant for urinary retention, s/p paulino removal 11/21 and TOV passed. Patient evaluated by PT/OT and was recommended for acute inpatient rehab. Patient is medically stable for discharge to [facility] on 11/22.     ___________________________________________________________________________    SUBJECTIVE/ROS  Patient was seen and evaluated at bedside today.  Patient slept well and denies any overnight events.  Patient is motivated to continue participation on the recommended rehabilitation program as outpatient.  Scheduled for discharge home today.   Denies any CP, SOB, CALLAWAY, palpitations, fever, chills, body aches, cough, congestion, or any other symptoms at this time.   ___________________________________________________________________________    CT THORACIC SPINE AND LUMBAR SPINE (11/27/2023)    IMPRESSION: Recent thoracic fusion T4-T9 with screws and connecting rods and immature bony fusion mass. More remote fusion T10 through the sacrum with transpedicular screws and mature bony fusion mass.  ___________________________________________________________________________    Vital Signs Last 24 Hrs  T(C): 36.5 (05 Dec 2023 08:27), Max: 36.6 (04 Dec 2023 20:00)  T(F): 97.7 (05 Dec 2023 08:27), Max: 97.9 (05 Dec 2023 06:47)  HR: 67 (05 Dec 2023 08:27) (63 - 74)  BP: 106/73 (05 Dec 2023 08:27) (100/65 - 106/73)  RR: 15 (05 Dec 2023 08:27) (14 - 15)  SpO2: 96% (05 Dec 2023 08:27) (95% - 100%)  ___________________________________________________________________________    LAB                        8.0    5.07  )-----------( 509      ( 04 Dec 2023 06:32 )             26.2     12-04    142  |  105  |  15  ----------------------------<  87  4.4   |  32<H>  |  0.64    Ca    8.7      04 Dec 2023 06:32    TPro  6.3  /  Alb  2.1<L>  /  TBili  0.2  /  DBili  x   /  AST  26  /  ALT  21  /  AlkPhos  130<H>  12-04                          7.1    5.13  )-----------( 524      ( 01 Dec 2023 05:14 )             22.8                             7.8    6.73  )-----------( 547      ( 30 Nov 2023 11:53 )             25.3                             7.2    6.08  )-----------( 527      ( 30 Nov 2023 06:56 )             23.6     PT/INR - ( 30 Nov 2023 11:53 )   PT: 11.8 sec;   INR: 1.04 ratio    PTT - ( 30 Nov 2023 11:53 )  PTT:33.6 sec    Free Thyroxine, Serum in AM (12.01.23 @ 05:14)   Free Thyroxine, Serum: 1.0 ng/dL    Thyroid Stimulating Hormone, Serum in AM (12.01.23 @ 05:14)   Thyroid Stimulating Hormone, Serum: 1.106 uIU/mL    Iron with Total Binding Capacity in AM (12.01.23 @ 05:14)   Iron - Total Binding Capacity.: 243 ug/dL  % Saturation, Iron: 7 %  Iron Total: 17 ug/dL  Unsaturated Iron Binding Capacity: 226 ug/dL    Folate, Serum in AM (12.01.23 @ 05:14)   Folate, Serum: 9.4 ng/mL    Occult Blood, Feces (11.30.23 @ 13:26)   Occult Blood, Feces: Positive    Antibody Screen Interpretation (11.30.23 @ 09:32)   Antibody Screen: NEG    Type + Screen (11.30.23 @ 09:32)   ABO RH Interpretation: O POS    Vitamin B12, Serum (11.27.23 @ 05:29)   Vitamin B12, Serum: 885 pg/mL  ___________________________________________________________________________    MEDICATIONS  (STANDING):  acetaminophen     Tablet .. 975 milliGRAM(s) Oral every 8 hours  ascorbic acid 1000 milliGRAM(s) Oral daily  DULoxetine 60 milliGRAM(s) Oral daily  enoxaparin Injectable 40 milliGRAM(s) SubCutaneous every 24 hours  fentaNYL   Patch  25 MICROgram(s)/Hr 1 Patch Transdermal every 72 hours  ferrous    sulfate 325 milliGRAM(s) Oral daily  fluticasone propionate 50 MICROgram(s)/spray Nasal Spray 1 Spray(s) Both Nostrils two times a day  gabapentin 100 milliGRAM(s) Oral at bedtime  melatonin. 3 milliGRAM(s) Oral at bedtime  methocarbamol 750 milliGRAM(s) Oral three times a day  multivitamin 1 Tablet(s) Oral daily  naloxegol 12.5 milliGRAM(s) Oral daily  pantoprazole    Tablet 40 milliGRAM(s) Oral before breakfast  polyethylene glycol 3350 17 Gram(s) Oral two times a day  pregabalin 200 milliGRAM(s) Oral three times a day  senna 2 Tablet(s) Oral at bedtime  triamterene 37.5 mG/hydrochlorothiazide 25 mG Tablet 1 Tablet(s) Oral daily    MEDICATIONS  (PRN):  albuterol    90 MICROgram(s) HFA Inhaler 2 Puff(s) Inhalation every 6 hours PRN Shortness of Breath and/or Wheezing  bisacodyl 5 milliGRAM(s) Oral every 12 hours PRN Constipation  hemorrhoidal Ointment 1 Application(s) Rectal two times a day PRN hemorrhoidal pain  morphine   Solution 10 milliGRAM(s) Oral every 4 hours PRN Moderate Pain (4 - 6)  morphine   Solution 25 milliGRAM(s) Oral every 4 hours PRN Severe Pain (7 - 10)    ___________________________________________________________________________    PHYSICAL EXAM:  Gen - NAD, Comfortable  HEENT - NCAT, EOMI, MMM  Neck - Supple, No limited ROM  Pulm - breathing comfortably on room air  Cardiovascular - warm and well perfused  Abdomen - Soft, NT, ND  Extremities - No Cyanosis, no clubbing, no edema, no calf tenderness  Neuro-     Cognitive - awake, alert, oriented to person, place, date, year, and situation.  Able to follow command     Motor -                     LEFT    UE - 4/5                    RIGHT UE - 4/5                    LEFT    LE - 4/5                    RIGHT LE - 4/5        Sensory - Intact to LT   Psychiatric - Mood stable, Affect WNL  Skin:  left buttock healing wound with scab, spinal surgical incision clean and dry and left LIVE    ___________________________________________________________________________ HPI:  Mrs. Savi Ivy is a 48 year old female patient with past medical history of chronic back pain (s/p multiple spinal surgeries), asthma, obesity (s/p gastric bypass), and HTN who presented to [facility] on 11/14 for an elective revision of a PSF with extension of fusion with instrumentation from T3 through pelvis, pedicle subtraction osteotomy (PSO) L4, tethers, iliac fixation, smith clark osteotomies T3-T10 with Plastic Surgery closure with Dr. Schwab, Dr. Gonzalez and Dr. Simpson. Intraoperative course with acute blood loss requiring 3 unit PRBC transfusion with one additional PRBC post-op. Post-op hospital course significant for urinary retention, s/p paulino removal 11/21 and TOV passed. Patient evaluated by PT/OT and was recommended for acute inpatient rehab. Patient is medically stable for discharge to [facility] on 11/22.     ___________________________________________________________________________    SUBJECTIVE/ROS  Patient was seen and evaluated at bedside today.  Patient slept well and denies any overnight events.  Patient is motivated to continue participation on the recommended rehabilitation program as outpatient.  Scheduled for discharge home today.   Denies any CP, SOB, CALLAWAY, palpitations, fever, chills, body aches, cough, congestion, or any other symptoms at this time.   ___________________________________________________________________________    CT THORACIC SPINE AND LUMBAR SPINE (11/27/2023)    IMPRESSION: Recent thoracic fusion T4-T9 with screws and connecting rods and immature bony fusion mass. More remote fusion T10 through the sacrum with transpedicular screws and mature bony fusion mass.  ___________________________________________________________________________    Vital Signs Last 24 Hrs  T(C): 36.5 (05 Dec 2023 08:27), Max: 36.6 (04 Dec 2023 20:00)  T(F): 97.7 (05 Dec 2023 08:27), Max: 97.9 (05 Dec 2023 06:47)  HR: 67 (05 Dec 2023 08:27) (63 - 74)  BP: 106/73 (05 Dec 2023 08:27) (100/65 - 106/73)  RR: 15 (05 Dec 2023 08:27) (14 - 15)  SpO2: 96% (05 Dec 2023 08:27) (95% - 100%)  ___________________________________________________________________________    LAB                        8.0    5.07  )-----------( 509      ( 04 Dec 2023 06:32 )             26.2     12-04    142  |  105  |  15  ----------------------------<  87  4.4   |  32<H>  |  0.64    Ca    8.7      04 Dec 2023 06:32    TPro  6.3  /  Alb  2.1<L>  /  TBili  0.2  /  DBili  x   /  AST  26  /  ALT  21  /  AlkPhos  130<H>  12-04                          7.1    5.13  )-----------( 524      ( 01 Dec 2023 05:14 )             22.8                             7.8    6.73  )-----------( 547      ( 30 Nov 2023 11:53 )             25.3                             7.2    6.08  )-----------( 527      ( 30 Nov 2023 06:56 )             23.6     PT/INR - ( 30 Nov 2023 11:53 )   PT: 11.8 sec;   INR: 1.04 ratio    PTT - ( 30 Nov 2023 11:53 )  PTT:33.6 sec    Free Thyroxine, Serum in AM (12.01.23 @ 05:14)   Free Thyroxine, Serum: 1.0 ng/dL    Thyroid Stimulating Hormone, Serum in AM (12.01.23 @ 05:14)   Thyroid Stimulating Hormone, Serum: 1.106 uIU/mL    Iron with Total Binding Capacity in AM (12.01.23 @ 05:14)   Iron - Total Binding Capacity.: 243 ug/dL  % Saturation, Iron: 7 %  Iron Total: 17 ug/dL  Unsaturated Iron Binding Capacity: 226 ug/dL    Folate, Serum in AM (12.01.23 @ 05:14)   Folate, Serum: 9.4 ng/mL    Occult Blood, Feces (11.30.23 @ 13:26)   Occult Blood, Feces: Positive    Antibody Screen Interpretation (11.30.23 @ 09:32)   Antibody Screen: NEG    Type + Screen (11.30.23 @ 09:32)   ABO RH Interpretation: O POS    Vitamin B12, Serum (11.27.23 @ 05:29)   Vitamin B12, Serum: 885 pg/mL  ___________________________________________________________________________    MEDICATIONS  (STANDING):  acetaminophen     Tablet .. 975 milliGRAM(s) Oral every 8 hours  ascorbic acid 1000 milliGRAM(s) Oral daily  DULoxetine 60 milliGRAM(s) Oral daily  enoxaparin Injectable 40 milliGRAM(s) SubCutaneous every 24 hours  fentaNYL   Patch  25 MICROgram(s)/Hr 1 Patch Transdermal every 72 hours  ferrous    sulfate 325 milliGRAM(s) Oral daily  fluticasone propionate 50 MICROgram(s)/spray Nasal Spray 1 Spray(s) Both Nostrils two times a day  gabapentin 100 milliGRAM(s) Oral at bedtime  melatonin. 3 milliGRAM(s) Oral at bedtime  methocarbamol 750 milliGRAM(s) Oral three times a day  multivitamin 1 Tablet(s) Oral daily  naloxegol 12.5 milliGRAM(s) Oral daily  pantoprazole    Tablet 40 milliGRAM(s) Oral before breakfast  polyethylene glycol 3350 17 Gram(s) Oral two times a day  pregabalin 200 milliGRAM(s) Oral three times a day  senna 2 Tablet(s) Oral at bedtime  triamterene 37.5 mG/hydrochlorothiazide 25 mG Tablet 1 Tablet(s) Oral daily    MEDICATIONS  (PRN):  albuterol    90 MICROgram(s) HFA Inhaler 2 Puff(s) Inhalation every 6 hours PRN Shortness of Breath and/or Wheezing  bisacodyl 5 milliGRAM(s) Oral every 12 hours PRN Constipation  hemorrhoidal Ointment 1 Application(s) Rectal two times a day PRN hemorrhoidal pain  morphine   Solution 10 milliGRAM(s) Oral every 4 hours PRN Moderate Pain (4 - 6)  morphine   Solution 25 milliGRAM(s) Oral every 4 hours PRN Severe Pain (7 - 10)    ___________________________________________________________________________    PHYSICAL EXAM:  Gen - NAD, Comfortable  HEENT - NCAT, EOMI, MMM  Neck - Supple, No limited ROM  Pulm - breathing comfortably on room air  Cardiovascular - warm and well perfused  Abdomen - Soft, NT, ND  Extremities - No Cyanosis, no clubbing, no edema, no calf tenderness  Neuro-     Cognitive - awake, alert, oriented to person, place, date, year, and situation.  Able to follow command     Motor -                     LEFT    UE - 4/5                    RIGHT UE - 4/5                    LEFT    LE - 4/5                    RIGHT LE - 4/5        Sensory - Intact to LT   Psychiatric - Mood stable, Affect WNL  Skin:  left buttock healing wound with scab, spinal surgical incision clean and dry and left LIVE    ___________________________________________________________________________

## 2023-12-06 NOTE — PROGRESS NOTE ADULT - PROVIDER SPECIALTY LIST ADULT
Hospitalist
Hospitalist
Physiatry
Physiatry
Hospitalist
Internal Medicine
Physiatry
Hospitalist
Physiatry
Hospitalist
Gastroenterology
Hospitalist
Hospitalist
Physiatry
Hospitalist
Physiatry
Physiatry

## 2023-12-06 NOTE — PROGRESS NOTE ADULT - SUBJECTIVE AND OBJECTIVE BOX
Patient is a 48y old  Female who presents with a chief complaint of Spinal stenosis with neurogenic claudication s/p T4-Pelvis revision surgery (06 Dec 2023 07:16)      Patient seen and examined at bedside.    ALLERGIES:  shellfish (Hives; Short breath)  diclofenac (Other)    MEDICATIONS  (STANDING):  acetaminophen     Tablet .. 975 milliGRAM(s) Oral every 8 hours  ascorbic acid 1000 milliGRAM(s) Oral daily  DULoxetine 60 milliGRAM(s) Oral daily  enoxaparin Injectable 40 milliGRAM(s) SubCutaneous every 24 hours  ferrous    sulfate 325 milliGRAM(s) Oral daily  fluticasone propionate 50 MICROgram(s)/spray Nasal Spray 1 Spray(s) Both Nostrils two times a day  gabapentin 100 milliGRAM(s) Oral at bedtime  melatonin. 3 milliGRAM(s) Oral at bedtime  methocarbamol 750 milliGRAM(s) Oral three times a day  multivitamin 1 Tablet(s) Oral daily  naloxegol 12.5 milliGRAM(s) Oral daily  pantoprazole    Tablet 40 milliGRAM(s) Oral before breakfast  polyethylene glycol 3350 17 Gram(s) Oral two times a day  pregabalin 200 milliGRAM(s) Oral three times a day  senna 2 Tablet(s) Oral at bedtime  triamterene 37.5 mG/hydrochlorothiazide 25 mG Tablet 1 Tablet(s) Oral daily    MEDICATIONS  (PRN):  albuterol    90 MICROgram(s) HFA Inhaler 2 Puff(s) Inhalation every 6 hours PRN Shortness of Breath and/or Wheezing  bisacodyl 5 milliGRAM(s) Oral every 12 hours PRN Constipation  hemorrhoidal Ointment 1 Application(s) Rectal two times a day PRN hemorrhoidal pain  morphine   Solution 10 milliGRAM(s) Oral every 4 hours PRN Moderate Pain (4 - 6)  morphine   Solution 25 milliGRAM(s) Oral every 4 hours PRN Severe Pain (7 - 10)    Vital Signs Last 24 Hrs  T(F): 98 (06 Dec 2023 07:47), Max: 98 (05 Dec 2023 20:10)  HR: 63 (06 Dec 2023 07:47) (63 - 78)  BP: 102/66 (06 Dec 2023 07:47) (99/60 - 102/66)  RR: 16 (06 Dec 2023 07:47) (15 - 16)  SpO2: 98% (06 Dec 2023 07:47) (97% - 98%)  I&O's Summary    BMI (kg/m2): 36.6 (12-01-23 @ 11:35)  PHYSICAL EXAM:  General: NAD, A/O x 3  ENT: MMM, no scleral icterus  Neck: Supple, No JVD, no thyroidomegaly  Lungs: Clear to auscultation bilaterally, no wheezes, no rales, no rhonchi, good inspiratory effort  Cardio: RRR, S1/S2, No murmurs  Abdomen: Soft, Nontender, Nondistended; Bowel sounds present  Extremities: No calf tenderness, No pitting edema, no skin changes    LABS:                        8.0    5.07  )-----------( 509      ( 04 Dec 2023 06:32 )             26.2       12-04    142  |  105  |  15  ----------------------------<  87  4.4   |  32  |  0.64    Ca    8.7      04 Dec 2023 06:32    TPro  6.3  /  Alb  2.1  /  TBili  0.2  /  DBili  x   /  AST  26  /  ALT  21  /  AlkPhos  130  12-04     Urinalysis Basic - ( 04 Dec 2023 06:32 )    Color: x / Appearance: x / SG: x / pH: x  Gluc: 87 mg/dL / Ketone: x  / Bili: x / Urobili: x   Blood: x / Protein: x / Nitrite: x   Leuk Esterase: x / RBC: x / WBC x   Sq Epi: x / Non Sq Epi: x / Bacteria: x    OVID-19 PCR: NotDetec (11-22-23 @ 21:39)  COVID-19 PCR: NotDetec (11-22-23 @ 21:39)

## 2023-12-07 ENCOUNTER — APPOINTMENT (OUTPATIENT)
Dept: ORTHOPEDIC SURGERY | Facility: CLINIC | Age: 48
End: 2023-12-07

## 2023-12-13 LAB

## 2023-12-28 NOTE — DIETITIAN INITIAL EVALUATION ADULT - CALCULATED FROM (CAL/KG)
Occupational Therapy    Patient not seen in therapy.     Occupational Therapy evaluation orders received. Spoke with RN regarding patients current mobility. RN endorsed that patient is ambulating independently in the room and does not require any assist for toileting. Please re-order Occupational Therapy if there is a change in functional status.       OBJECTIVE                       Documented in the chart in the following areas: Assessment/Plan.      Therapy procedure time and total treatment time can be found documented on the Time Entry flowsheet   [id number]

## 2024-01-05 ENCOUNTER — APPOINTMENT (OUTPATIENT)
Dept: ORTHOPEDIC SURGERY | Facility: CLINIC | Age: 49
End: 2024-01-05

## 2024-01-05 DIAGNOSIS — Z98.1 ARTHRODESIS STATUS: ICD-10-CM

## 2024-01-08 ENCOUNTER — RX RENEWAL (OUTPATIENT)
Age: 49
End: 2024-01-08

## 2024-01-08 DIAGNOSIS — J30.9 ALLERGIC RHINITIS, UNSPECIFIED: ICD-10-CM

## 2024-01-08 DIAGNOSIS — M62.838 OTHER MUSCLE SPASM: ICD-10-CM

## 2024-01-08 DIAGNOSIS — J45.909 UNSPECIFIED ASTHMA, UNCOMPLICATED: ICD-10-CM

## 2024-01-08 RX ORDER — METHOCARBAMOL 750 MG/1
750 TABLET, FILM COATED ORAL
Qty: 1 | Refills: 0 | Status: ACTIVE | COMMUNITY
Start: 2024-01-08 | End: 1900-01-01

## 2024-01-08 RX ORDER — FLUTICASONE PROPIONATE 50 UG/1
50 SPRAY, METERED NASAL
Qty: 1 | Refills: 0 | Status: ACTIVE | COMMUNITY
Start: 2024-01-08 | End: 1900-01-01

## 2024-01-08 RX ORDER — CHLORHEXIDINE GLUCONATE 4 %
325 (65 FE) LIQUID (ML) TOPICAL
Qty: 30 | Refills: 0 | Status: ACTIVE | COMMUNITY
Start: 2024-01-08 | End: 1900-01-01

## 2024-01-08 RX ORDER — ALBUTEROL SULFATE 90 UG/1
108 (90 BASE) INHALANT RESPIRATORY (INHALATION)
Qty: 1 | Refills: 0 | Status: ACTIVE | COMMUNITY
Start: 2024-01-08 | End: 1900-01-01

## 2024-01-08 NOTE — H&P ADULT - PROBLEM/PLAN-2
DISPLAY PLAN FREE TEXT Detail Level: Detailed Quality 131: Pain Assessment And Follow-Up: Pain assessment using a standardized tool is documented as negative, no follow-up plan required Quality 110: Preventive Care And Screening: Influenza Immunization: Influenza Immunization Administered during Influenza season Quality 130: Documentation Of Current Medications In The Medical Record: Current Medications Documented Quality 402: Tobacco Use And Help With Quitting Among Adolescents: Patient screened for tobacco and never smoked

## 2024-01-11 ENCOUNTER — APPOINTMENT (OUTPATIENT)
Dept: ORTHOPEDIC SURGERY | Facility: CLINIC | Age: 49
End: 2024-01-11

## 2024-01-16 PROBLEM — Z98.1 S/P SPINAL FUSION: Status: ACTIVE | Noted: 2022-12-15

## 2024-01-18 NOTE — DISCUSSION/SUMMARY
[de-identified] : A lengthy discussion was had with the patient regarding her condition.  Referral to Dr Hawkins for L GT bursa inj next week.  Rx PT.   F/u in 3 months. The patient's questions were sought and answered satisfactorily.   I, Marlin Handy NP am acting as a scribe for Dr. Frank Schwab.

## 2024-01-18 NOTE — PHYSICAL EXAM
[de-identified] : tele [de-identified] : Scoliosis xray 12/29/2023 (tanmay):  Instrumentation intact, no halos about the screws.

## 2024-01-18 NOTE — HISTORY OF PRESENT ILLNESS
[de-identified] : Pt is approx 6 weeks s/p revision thoracolumbar spinal fusion.  Complains of upper back pain with pain in buttocks and posterior thighs.  Her posture is much straighter.  Has left lateral GT bursitis which makes walking difficult.  Walking around the house.  On chronic opiates managed by pain MD.  Incision has healed well.

## 2024-01-23 ENCOUNTER — RX RENEWAL (OUTPATIENT)
Age: 49
End: 2024-01-23

## 2024-01-23 NOTE — DISCUSSION/SUMMARY
[de-identified] : A lengthy discussion was had with the patient regarding her anticipated recovery.   We have advised  to increase her ambulation tolerance and to remain active.  Furthermore, we have advised the patient to refrain from bending, lifting, or twisting.   The patient's questions were sought and answered satisfactorily.    Rx gabapentin 100mg tid.  F/u with scoli xray in January.  I, Marlin Handy NP am acting as a scribe for Dr. Frank Schwab.

## 2024-01-23 NOTE — DISCUSSION/SUMMARY
[de-identified] : A lengthy discussion was held with the patient regarding her condition.  We discussed nonoperative treatment strategies including physical therapy, pharmacologic management and steroid injections.  We discussed surgical options (revision PSF T3-S1, PSO L4, tethers, iliac fixation) and the attendant benefits, alternatives, and risks, including but not limited to infection, bleeding, persistent pain, persistent neurologic deficit, neurologic injury, adjacent segment degeneration, and the need for future surgery.  The patient's questions were sought and answered satisfactorily. She will proceed with medical clearance, including pain mgmt pre-op consult with [facility].       Marlin SHETH NP am acting as a scribe for Dr. Frank Schwab.

## 2024-01-23 NOTE — PHYSICAL EXAM
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

## 2024-01-23 NOTE — HISTORY OF PRESENT ILLNESS
[de-identified] : Pt is approx 2 weeks s/p revision thoracolumbar spinal fusion.  She got home from rehab yesterday.  Has right low back pain and LLE pain, difficult lifting left leg in rehab.  RLE spasms are better.  Incision healing well.  Walking during the day or in recliner.  Pain mgmt MD managing medications- nucynta 75mg, gabapentin 100mg qhs x 1 week, robaxin 750mg tid, morphine prn.

## 2024-01-23 NOTE — HISTORY OF PRESENT ILLNESS
[de-identified] : Telehealth appt: verbal consent obtained.  Pt presents in f/u to discuss CT L spine done at [facility].  She c/o being pitched forward with prolonged standing and ambulating.  Has persistent low back pain radiating to both legs, right constant, left intermittent.   Her left low back is most painful into bilateral legs.  States she is requiring more opiates (oxycodone as per pt) prescribed by pain mgmt,   Denies any new leg weakness, although legs fatigue with walking.    Last visit 9/21/2023: Pt is approximately 6 months s/p acdf. Had a fall 3 wks ago. Has severe L low back pain with sharp radicular pain in RLE with numbness/tingling. Has spasms in L anterior thigh. Is unable to walk or climb stairs without severe pain. Has been using heat and takes oral pain meds (has pain mgmt MD). No recent PT. Had MARLON (caudal?) 1 week ago with no relief.

## 2024-02-12 ENCOUNTER — RX RENEWAL (OUTPATIENT)
Age: 49
End: 2024-02-12

## 2024-04-13 NOTE — PROGRESS NOTE ADULT - REASON FOR ADMISSION
Telephoned pt to review GLP-1 instructions for upcoming colonoscopy on 4/18/24 with no answer.    LVM regarding last dose of Ozempic on or before 4/18/24, instr portal     Spinal stenosis with neurogenic claudication s/p T4-Pelvis revision surgery

## 2024-04-18 NOTE — DISCHARGE NOTE NURSING/CASE MANAGEMENT/SOCIAL WORK - NSPROEXTENSIONSOFSELF_GEN_A_NUR
Pt presents to the ED from  via EMS for evaluation pf nausea and dizziness. Pt had an abnormal EKG at  which brings him to the ED. Pt received 4mg IV zofran en route via EMS. Pt is emetic during triage. Pt reports persistent nausea and dizziness.    none

## 2024-06-07 ENCOUNTER — APPOINTMENT (OUTPATIENT)
Age: 49
End: 2024-06-07
Payer: COMMERCIAL

## 2024-06-07 PROCEDURE — 99213 OFFICE O/P EST LOW 20 MIN: CPT

## 2024-06-12 NOTE — HISTORY OF PRESENT ILLNESS
[de-identified] : Pt is approximately 7 months s/p revision thoracolumbar spinal fusion.  She complains of posterior neck pain and bilateral LE spasms.  Her low back feels "unstable" standing.  Takes nucynta 100mg and gabapentin 100mg qhs, managed by pain mgmt MD.  Had caudal (?) MARLON on Monday.     She is walking and been in PT.

## 2024-06-12 NOTE — DISCUSSION/SUMMARY
[de-identified] : A lengthy discussion was had with the patient regarding her condition.    Referral to neurologist on LI.   Continue with regular exercise and walking program.  F/u in 3 months.   The patient's questions were sought and answered satisfactorily.  IMarlin NP am acting as a scribe for Dr. Frank Schwab.

## 2024-06-12 NOTE — PHYSICAL EXAM
[de-identified] : deferred [de-identified] : Scoliosis xrays (tanmay):  Instrumentation intact, no halos about the screws.

## 2024-07-26 RX ORDER — GABAPENTIN 100 MG/1
100 CAPSULE ORAL 3 TIMES DAILY
Qty: 60 | Refills: 1 | Status: ACTIVE | COMMUNITY
Start: 2024-07-26 | End: 1900-01-01

## 2024-09-06 ENCOUNTER — NON-APPOINTMENT (OUTPATIENT)
Age: 49
End: 2024-09-06

## 2024-12-25 ENCOUNTER — EMERGENCY (EMERGENCY)
Facility: HOSPITAL | Age: 49
LOS: 1 days | Discharge: HOME | End: 2024-12-25
Attending: EMERGENCY MEDICINE | Admitting: EMERGENCY MEDICINE
Payer: COMMERCIAL

## 2024-12-25 VITALS
SYSTOLIC BLOOD PRESSURE: 149 MMHG | HEIGHT: 64 IN | OXYGEN SATURATION: 100 % | HEART RATE: 81 BPM | RESPIRATION RATE: 18 BRPM | DIASTOLIC BLOOD PRESSURE: 66 MMHG | TEMPERATURE: 97 F | WEIGHT: 167.99 LBS

## 2024-12-25 DIAGNOSIS — Z98.890 OTHER SPECIFIED POSTPROCEDURAL STATES: Chronic | ICD-10-CM

## 2024-12-25 DIAGNOSIS — Z98.89 OTHER SPECIFIED POSTPROCEDURAL STATES: Chronic | ICD-10-CM

## 2024-12-25 DIAGNOSIS — Z98.84 BARIATRIC SURGERY STATUS: Chronic | ICD-10-CM

## 2024-12-25 DIAGNOSIS — Z96.649 PRESENCE OF UNSPECIFIED ARTIFICIAL HIP JOINT: Chronic | ICD-10-CM

## 2024-12-25 PROCEDURE — 99285 EMERGENCY DEPT VISIT HI MDM: CPT

## 2024-12-26 VITALS — HEART RATE: 89 BPM

## 2024-12-26 LAB
ALBUMIN SERPL ELPH-MCNC: 4.7 G/DL — SIGNIFICANT CHANGE UP (ref 3.3–5)
ALP SERPL-CCNC: 108 U/L — SIGNIFICANT CHANGE UP (ref 40–120)
ALT FLD-CCNC: 36 U/L — SIGNIFICANT CHANGE UP (ref 12–78)
ANION GAP SERPL CALC-SCNC: 7 MMOL/L — SIGNIFICANT CHANGE UP (ref 5–17)
APPEARANCE UR: CLEAR — SIGNIFICANT CHANGE UP
AST SERPL-CCNC: 36 U/L — SIGNIFICANT CHANGE UP (ref 15–37)
BASOPHILS # BLD AUTO: 0.05 K/UL — SIGNIFICANT CHANGE UP (ref 0–0.2)
BASOPHILS NFR BLD AUTO: 0.4 % — SIGNIFICANT CHANGE UP (ref 0–2)
BILIRUB SERPL-MCNC: 0.4 MG/DL — SIGNIFICANT CHANGE UP (ref 0.2–1.2)
BILIRUB UR-MCNC: NEGATIVE — SIGNIFICANT CHANGE UP
BUN SERPL-MCNC: 29 MG/DL — HIGH (ref 7–23)
CALCIUM SERPL-MCNC: 9.7 MG/DL — SIGNIFICANT CHANGE UP (ref 8.5–10.1)
CHLORIDE SERPL-SCNC: 103 MMOL/L — SIGNIFICANT CHANGE UP (ref 96–108)
CO2 SERPL-SCNC: 26 MMOL/L — SIGNIFICANT CHANGE UP (ref 22–31)
COLOR SPEC: YELLOW — SIGNIFICANT CHANGE UP
CREAT SERPL-MCNC: 0.71 MG/DL — SIGNIFICANT CHANGE UP (ref 0.5–1.3)
DIFF PNL FLD: ABNORMAL
EGFR: 104 ML/MIN/1.73M2 — SIGNIFICANT CHANGE UP
EGFR: 104 ML/MIN/1.73M2 — SIGNIFICANT CHANGE UP
EOSINOPHIL # BLD AUTO: 0.13 K/UL — SIGNIFICANT CHANGE UP (ref 0–0.5)
EOSINOPHIL NFR BLD AUTO: 1.1 % — SIGNIFICANT CHANGE UP (ref 0–6)
GLUCOSE SERPL-MCNC: 122 MG/DL — HIGH (ref 70–99)
GLUCOSE UR QL: NEGATIVE MG/DL — SIGNIFICANT CHANGE UP
HCG SERPL-ACNC: <1 MIU/ML — SIGNIFICANT CHANGE UP
HCT VFR BLD CALC: 35.5 % — SIGNIFICANT CHANGE UP (ref 34.5–45)
HGB BLD-MCNC: 11.3 G/DL — LOW (ref 11.5–15.5)
IMM GRANULOCYTES NFR BLD AUTO: 0.4 % — SIGNIFICANT CHANGE UP (ref 0–0.9)
KETONES UR-MCNC: 15 MG/DL
LACTATE SERPL-SCNC: 1.9 MMOL/L — SIGNIFICANT CHANGE UP (ref 0.7–2)
LEUKOCYTE ESTERASE UR-ACNC: NEGATIVE — SIGNIFICANT CHANGE UP
LIDOCAIN IGE QN: 23 U/L — SIGNIFICANT CHANGE UP (ref 13–75)
LYMPHOCYTES # BLD AUTO: 1.11 K/UL — SIGNIFICANT CHANGE UP (ref 1–3.3)
LYMPHOCYTES # BLD AUTO: 9.8 % — LOW (ref 13–44)
MCHC RBC-ENTMCNC: 25.2 PG — LOW (ref 27–34)
MCHC RBC-ENTMCNC: 31.8 G/DL — LOW (ref 32–36)
MCV RBC AUTO: 79.1 FL — LOW (ref 80–100)
MONOCYTES # BLD AUTO: 0.72 K/UL — SIGNIFICANT CHANGE UP (ref 0–0.9)
MONOCYTES NFR BLD AUTO: 6.4 % — SIGNIFICANT CHANGE UP (ref 2–14)
NEUTROPHILS # BLD AUTO: 9.27 K/UL — HIGH (ref 1.8–7.4)
NEUTROPHILS NFR BLD AUTO: 81.9 % — HIGH (ref 43–77)
NITRITE UR-MCNC: NEGATIVE — SIGNIFICANT CHANGE UP
NRBC # BLD: 0 /100 WBCS — SIGNIFICANT CHANGE UP (ref 0–0)
NRBC BLD-RTO: 0 /100 WBCS — SIGNIFICANT CHANGE UP (ref 0–0)
PH UR: 6 — SIGNIFICANT CHANGE UP (ref 5–8)
PLATELET # BLD AUTO: 382 K/UL — SIGNIFICANT CHANGE UP (ref 150–400)
POTASSIUM SERPL-MCNC: 3.9 MMOL/L — SIGNIFICANT CHANGE UP (ref 3.5–5.3)
POTASSIUM SERPL-SCNC: 3.9 MMOL/L — SIGNIFICANT CHANGE UP (ref 3.5–5.3)
PROT SERPL-MCNC: 8.2 G/DL — SIGNIFICANT CHANGE UP (ref 6–8.3)
PROT UR-MCNC: NEGATIVE MG/DL — SIGNIFICANT CHANGE UP
RBC # BLD: 4.49 M/UL — SIGNIFICANT CHANGE UP (ref 3.8–5.2)
RBC # FLD: 18.2 % — HIGH (ref 10.3–14.5)
SODIUM SERPL-SCNC: 136 MMOL/L — SIGNIFICANT CHANGE UP (ref 135–145)
SP GR SPEC: 1.03 — HIGH (ref 1–1.03)
TROPONIN I, HIGH SENSITIVITY RESULT: 9 NG/L — SIGNIFICANT CHANGE UP
UROBILINOGEN FLD QL: 0.2 MG/DL — SIGNIFICANT CHANGE UP (ref 0.2–1)
WBC # BLD: 11.33 K/UL — HIGH (ref 3.8–10.5)
WBC # FLD AUTO: 11.33 K/UL — HIGH (ref 3.8–10.5)

## 2024-12-26 PROCEDURE — 74177 CT ABD & PELVIS W/CONTRAST: CPT | Mod: MC

## 2024-12-26 PROCEDURE — 93010 ELECTROCARDIOGRAM REPORT: CPT

## 2024-12-26 PROCEDURE — 36415 COLL VENOUS BLD VENIPUNCTURE: CPT

## 2024-12-26 PROCEDURE — 83605 ASSAY OF LACTIC ACID: CPT

## 2024-12-26 PROCEDURE — 83690 ASSAY OF LIPASE: CPT

## 2024-12-26 PROCEDURE — 96374 THER/PROPH/DIAG INJ IV PUSH: CPT | Mod: XU

## 2024-12-26 PROCEDURE — 80053 COMPREHEN METABOLIC PANEL: CPT

## 2024-12-26 PROCEDURE — 99285 EMERGENCY DEPT VISIT HI MDM: CPT | Mod: 25

## 2024-12-26 PROCEDURE — 74177 CT ABD & PELVIS W/CONTRAST: CPT | Mod: 26,MC

## 2024-12-26 PROCEDURE — 84702 CHORIONIC GONADOTROPIN TEST: CPT

## 2024-12-26 PROCEDURE — 96375 TX/PRO/DX INJ NEW DRUG ADDON: CPT

## 2024-12-26 PROCEDURE — 93005 ELECTROCARDIOGRAM TRACING: CPT

## 2024-12-26 PROCEDURE — 84484 ASSAY OF TROPONIN QUANT: CPT

## 2024-12-26 PROCEDURE — 81001 URINALYSIS AUTO W/SCOPE: CPT

## 2024-12-26 PROCEDURE — 85025 COMPLETE CBC W/AUTO DIFF WBC: CPT

## 2024-12-26 RX ORDER — ONDANSETRON HCL/PF 4 MG/2 ML
1 VIAL (ML) INJECTION
Qty: 8 | Refills: 0
Start: 2024-12-26

## 2024-12-26 RX ORDER — OMEPRAZOLE 20 MG/1
1 CAPSULE, DELAYED RELEASE ORAL
Qty: 14 | Refills: 0
Start: 2024-12-26 | End: 2025-01-08

## 2024-12-26 RX ORDER — ONDANSETRON HCL/PF 4 MG/2 ML
4 VIAL (ML) INJECTION ONCE
Refills: 0 | Status: COMPLETED | OUTPATIENT
Start: 2024-12-26 | End: 2024-12-26

## 2024-12-26 RX ORDER — HYDROMORPHONE/SOD CHLOR,ISO/PF 2 MG/10 ML
1 SYRINGE (ML) INJECTION ONCE
Refills: 0 | Status: DISCONTINUED | OUTPATIENT
Start: 2024-12-26 | End: 2024-12-26

## 2024-12-26 RX ORDER — IOHEXOL 350 MG/ML
30 INJECTION, SOLUTION INTRAVENOUS ONCE
Refills: 0 | Status: COMPLETED | OUTPATIENT
Start: 2024-12-26 | End: 2024-12-26

## 2024-12-26 RX ORDER — METOCLOPRAMIDE HCL 10 MG
10 TABLET ORAL ONCE
Refills: 0 | Status: COMPLETED | OUTPATIENT
Start: 2024-12-26 | End: 2024-12-26

## 2024-12-26 RX ADMIN — Medication 4 MILLIGRAM(S): at 01:13

## 2024-12-26 RX ADMIN — Medication 20 MILLIGRAM(S): at 01:13

## 2024-12-26 RX ADMIN — IOHEXOL 30 MILLILITER(S): 350 INJECTION, SOLUTION INTRAVENOUS at 01:21

## 2024-12-26 RX ADMIN — Medication 10 MILLIGRAM(S): at 01:46

## 2024-12-26 RX ADMIN — Medication 1000 MILLILITER(S): at 01:12

## 2024-12-26 RX ADMIN — Medication 1 MILLIGRAM(S): at 04:00

## 2024-12-27 NOTE — PROGRESS NOTE ADULT - SUBJECTIVE AND OBJECTIVE BOX
Post Op Day#: 2  Procedure:  Laparoscopic lysis of adhesions    The patient is doing well without complaints.    Vital Signs Last 24 Hrs  T(C): 36.8 (20 Jul 2018 05:24), Max: 37.2 (19 Jul 2018 17:20)  T(F): 98.3 (20 Jul 2018 05:24), Max: 98.9 (19 Jul 2018 17:20)  HR: 64 (20 Jul 2018 07:57) (62 - 73)  BP: 99/54 (20 Jul 2018 05:24) (90/44 - 102/61)  BP(mean): --  RR: 17 (20 Jul 2018 05:24) (16 - 17)  SpO2: 100% (20 Jul 2018 05:24) (97% - 100%)    PHYSICAL EXAM:  General: NAD.  HEENT: no JVD, no jaundice.  LUNGS: CTAB.  Heart: S1 S2 RRR  Abd: soft nt/nd   Wounds: clean dry and intact                          9.8    4.21  )-----------( 174      ( 20 Jul 2018 06:30 )             29.6       07-20    144  |  113<H>  |  8   ----------------------------<  86  4.4   |  24  |  0.47<L>    Ca    7.7<L>      20 Jul 2018 06:30    TPro  7.8  /  Alb  4.2  /  TBili  0.9  /  DBili  x   /  AST  19  /  ALT  25  /  AlkPhos  65  07-18 (E4) spontaneous

## 2025-01-22 NOTE — ED ADULT TRIAGE NOTE - HEIGHT IN INCHES
Assessment Mirlande was seen today for pre-op exam.    Diagnoses and all orders for this visit:    Preop examination    Human papillomavirus (HPV) type 16 DNA detected in cervical specimen    Dysplasia of cervix, low grade (SUSAN 1)    S/P LEEP         Plan    Mirlande Eldridge is scheduled for  EUA, TLHBS  on . Pre-op instructions, including showering with Hibiclens, discussed with patient and patient received paper copy.     The risks, benefits and alternatives to the procedure were discussed.  We discussed the risks of pain, bleeding, blood clot, infection, allergic reaction, neurovascular injury, injury to uterus, injury to surrounding structures such as bowel, bladder and/or ureters, and possibility of inability to complete the procedure.  We discussed code status - full code.  Blood transfusion is acceptable.  We discussed resident physician participation in the procedure, including pelvic exam.  All questions answered, consent obtained.        Subjective     Mirlande Eldridge is a 37 y.o. female here for a pre-op consultation. She is without complaint today.       Patient Active Problem List   Diagnosis    Overweight (BMI 25.0-29.9)    Status post cardiac catheterization    Chronic tension-type headache, not intractable    Pure hypercholesterolemia    Human papillomavirus (HPV) type 16 DNA detected in cervical specimen    S/P LEEP    Dysplasia of cervix, low grade (SUSAN 1)    Anxiety         Gynecologic History  Patient's last menstrual period was 01/10/2025 (approximate).  Contraception: tubal ligation  Last Pap: 23. Results were: nml HPV 16      Obstetric History  OB History    Para Term  AB Living   3 2 2 0 1 2   SAB IAB Ectopic Multiple Live Births   1 0 0 0 2      # Outcome Date GA Lbr Raf/2nd Weight Sex Type Anes PTL Lv   3 Term 14 42w0d  4082 g (9 lb) M Vag-Spont EPI     2 Term 11 38w0d  3317 g (7 lb 5 oz) F Vag-Spont EPI  THEODORE   1 SAB                Past  Medical/Surgical/Family/Social History    Past Medical History:   Diagnosis Date    COVID-19 12/2020    Iron deficiency anemia      Past Surgical History:   Procedure Laterality Date    AUGMENTATION MAMMAPLASTY      2013    BREAST IMPLANT      CARDIAC CATHETERIZATION      NC LAPAROSCOPY W/RMVL ADNEXAL STRUCTURES Bilateral 06/16/2023    Procedure: SALPINGECTOMY, LAPAROSCOPIC;  Surgeon: Yardlie Toussaint-Foster, DO;  Location: [facility];  Service: Gynecology     Family History   Problem Relation Age of Onset    Hypertension Mother     Hypertension Father     Diabetes Father     No Known Problems Sister     No Known Problems Sister     No Known Problems Daughter     Breast cancer Paternal Grandmother         age unknown    Colon cancer Neg Hx     Cancer Neg Hx     Ovarian cancer Neg Hx      Social History     Socioeconomic History    Marital status: Single     Spouse name: Not on file    Number of children: 2    Years of education: 12    Highest education level: High school graduate   Occupational History    Not on file   Tobacco Use    Smoking status: Never     Passive exposure: Never    Smokeless tobacco: Never   Vaping Use    Vaping status: Never Used   Substance and Sexual Activity    Alcohol use: Not Currently    Drug use: Never    Sexual activity: Yes     Partners: Male     Birth control/protection: Female Sterilization   Other Topics Concern    Not on file   Social History Narrative    Not on file     Social Drivers of Health     Financial Resource Strain: Low Risk  (1/22/2025)    Overall Financial Resource Strain (CARDIA)     Difficulty of Paying Living Expenses: Not hard at all   Food Insecurity: No Food Insecurity (1/22/2025)    Hunger Vital Sign     Worried About Running Out of Food in the Last Year: Never true     Ran Out of Food in the Last Year: Never true   Transportation Needs: No Transportation Needs (1/22/2025)    PRAPARE - Transportation     Lack of Transportation (Medical): No     Lack of  "Transportation (Non-Medical): No   Physical Activity: Not on file   Stress: No Stress Concern Present (4/15/2024)    [employer] [place] [employer] - Occupational Stress Questionnaire     Feeling of Stress : Only a little   Social Connections: Not on file   Intimate Partner Violence: Not on file   Housing Stability: Low Risk  (1/22/2025)    Housing Stability Vital Sign     Unable to Pay for Housing in the Last Year: No     Number of Times Moved in the Last Year: 0     Homeless in the Last Year: No         Patient has no known allergies.    Current Outpatient Medications:     Cholecalciferol (VITAMIN D3) 1,000 units tablet, Take 1 tablet (1,000 Units total) by mouth daily (Patient not taking: Reported on 1/22/2025), Disp: 120 tablet, Rfl: 0      Review of Systems  Constitutional: no fever, feels well  CV: No complaints of chest pain, palpitations  Pulm: No shortness of breath or dyspnea on exertion  Gastrointestinal: no complaints of abdominal pain, nausea  Genitourinary : no complaints of dysuria, vaginal discharge       Objective     /70 (BP Location: Left arm, Patient Position: Sitting)   Ht 5' 5\" (1.651 m)   Wt 84.6 kg (186 lb 6.4 oz)   LMP 01/10/2025 (Approximate)   BMI 31.02 kg/m²     GEN: The patient was alert and oriented x3, pleasant well-appearing female in no acute distress.   CV: Regular rate and rhythm  PULM: nonlabored respirations, CTAB  ABD: BS positive, soft, nontender  : deferred  EXT: No calf tenderness  MSK: Normal gait  Skin: warm, dry  Neuro: no focal deficits  Psych: normal affect and judgement, cooperative   " 4

## (undated) DEVICE — GLV 7 PROTEXIS (WHITE)

## (undated) DEVICE — SUCTION YANKAUER NO CONTROL VENT

## (undated) DEVICE — MEDICATION LABELS W MARKER

## (undated) DEVICE — SUT VICRYL 2-0 27" SH UNDYED

## (undated) DEVICE — SUT POLYSORB 0 36" GS-21 UNDYED

## (undated) DEVICE — DRAPE BACK TABLE COVER 80X90"

## (undated) DEVICE — SUT ETHILON 3-0 18" PS-1

## (undated) DEVICE — DRAPE 1/2 SHEET 40X57"

## (undated) DEVICE — DRAPE 3/4 SHEET 52X76"

## (undated) DEVICE — DRAIN JACKSON PRATT 10MM FLAT 3/4 NO TROCAR

## (undated) DEVICE — VENODYNE/SCD SLEEVE CALF MEDIUM

## (undated) DEVICE — NDL SPINAL 18G X 3.5" (PINK)

## (undated) DEVICE — DRAIN RESERVOIR FOR JACKSON PRATT 100CC CARDINAL

## (undated) DEVICE — WARMING BLANKET UPPER ADULT

## (undated) DEVICE — GOWN TRIMAX XXL

## (undated) DEVICE — GLV 8.5 PROTEXIS ORTHO (CREAM)

## (undated) DEVICE — ELCTR BOVIE PENCIL BLADE 10FT

## (undated) DEVICE — ELCTR STRYKER NEPTUNE SMOKE EVACUATION PENCIL (GREEN)

## (undated) DEVICE — DRAPE ISOLATION W INCISE FILM & POUCH

## (undated) DEVICE — BLADE SCALPEL SAFETYLOCK #15

## (undated) DEVICE — DRSG STERISTRIPS 0.5 X 4"

## (undated) DEVICE — SUT POLYSORB 2-0 30" V-20 UNDYED

## (undated) DEVICE — SUT MONOCRYL 3-0 18" PS-1

## (undated) DEVICE — PACK SPINE

## (undated) DEVICE — SUT VICRYL 2-0 27" CT-1 UNDYED

## (undated) DEVICE — DRAPE LIGHT HANDLE COVER (BLUE)

## (undated) DEVICE — SUT PDS II 2-0 27" CT-2

## (undated) DEVICE — STAPLER SKIN PROXIMATE

## (undated) DEVICE — MARKING PEN W RULER

## (undated) DEVICE — DRSG TEGADERM 4X4.75"

## (undated) DEVICE — DRAIN JACKSON PRATT 3 SPRING RESERVOIR W 10FR PVC DRAIN

## (undated) DEVICE — DRILL BIT EBI MEDICAL 12MM

## (undated) DEVICE — SUT POLYSORB 3-0 18" P-12 UNDYED

## (undated) DEVICE — PREP DURAPREP 26CC

## (undated) DEVICE — Device

## (undated) DEVICE — POSITIONER FOAM EGG CRATE ULNAR 2PCS (PINK)

## (undated) DEVICE — DRAPE C ARM 41X74"

## (undated) DEVICE — PACK MINOR

## (undated) DEVICE — SUT VICRYL 1 36" CTB-1 UNDYED

## (undated) DEVICE — DRSG BIOPATCH DISK W CHG 1" W 4.0MM HOLE

## (undated) DEVICE — WARMING BLANKET LOWER ADULT

## (undated) DEVICE — GLV 7.5 PROTEXIS (WHITE)

## (undated) DEVICE — GLV 8 PROTEXIS (WHITE)

## (undated) DEVICE — GOWN XXXL

## (undated) DEVICE — SUT PDS II 0 27" CT-1

## (undated) DEVICE — SUT VICRYL 3-0 18" PS-1 UNDYED

## (undated) DEVICE — SUT SILK 2-0 30" PSL

## (undated) DEVICE — MIDAS REX MR8 BALL FLUTED LG BORE 5MM X 14CM

## (undated) DEVICE — DRAPE TOWEL BLUE STICKY

## (undated) DEVICE — DRSG TEGADERM 3.5X6"

## (undated) DEVICE — SOL IRR POUR NS 0.9% 500ML

## (undated) DEVICE — WOUND IRR SURGIPHOR

## (undated) DEVICE — SUT STRATAFIX SYMMETRIC PDS 1 45CM OS-6

## (undated) DEVICE — NDL T HANDLE JAMSHIDI 11G 6"

## (undated) DEVICE — DRAPE INSTRUMENT POUCH 6.75" X 11"

## (undated) DEVICE — SPONGE ENDO PEANUT 5MM

## (undated) DEVICE — SUT VICRYL 1 27" OS-8 UNDYED

## (undated) DEVICE — MIDAS REX MR8 MATCH HEAD FLUTED LG BORE 3MM X 14CM

## (undated) DEVICE — STRYKER BONE MILL BLADE MEDIUM 5.0MM

## (undated) DEVICE — ELCTR AQUAMANTYS BIPOLAR SEALER 6.0

## (undated) DEVICE — DRSG MASTISOL

## (undated) DEVICE — SOL IRR POUR H2O 250ML